# Patient Record
Sex: MALE | Race: WHITE | HISPANIC OR LATINO | Employment: FULL TIME | URBAN - METROPOLITAN AREA
[De-identification: names, ages, dates, MRNs, and addresses within clinical notes are randomized per-mention and may not be internally consistent; named-entity substitution may affect disease eponyms.]

---

## 2017-06-16 ENCOUNTER — GENERIC CONVERSION - ENCOUNTER (OUTPATIENT)
Dept: OTHER | Facility: OTHER | Age: 47
End: 2017-06-16

## 2017-06-23 ENCOUNTER — ALLSCRIPTS OFFICE VISIT (OUTPATIENT)
Dept: OTHER | Facility: OTHER | Age: 47
End: 2017-06-23

## 2017-07-07 ENCOUNTER — ALLSCRIPTS OFFICE VISIT (OUTPATIENT)
Dept: OTHER | Facility: OTHER | Age: 47
End: 2017-07-07

## 2017-07-07 ENCOUNTER — APPOINTMENT (OUTPATIENT)
Dept: LAB | Facility: HOSPITAL | Age: 47
End: 2017-07-07
Payer: COMMERCIAL

## 2017-07-07 DIAGNOSIS — I10 ESSENTIAL (PRIMARY) HYPERTENSION: ICD-10-CM

## 2017-07-07 DIAGNOSIS — E66.9 OBESITY: ICD-10-CM

## 2017-07-07 DIAGNOSIS — E11.65 TYPE 2 DIABETES MELLITUS WITH HYPERGLYCEMIA (HCC): ICD-10-CM

## 2017-07-07 DIAGNOSIS — F32.9 MAJOR DEPRESSIVE DISORDER, SINGLE EPISODE: ICD-10-CM

## 2017-07-07 DIAGNOSIS — E55.9 VITAMIN D DEFICIENCY: ICD-10-CM

## 2017-07-07 DIAGNOSIS — E11.319 TYPE 2 DIABETES MELLITUS WITH RETINOPATHY WITHOUT MACULAR EDEMA (HCC): ICD-10-CM

## 2017-07-07 DIAGNOSIS — E78.5 HYPERLIPIDEMIA: ICD-10-CM

## 2017-07-07 DIAGNOSIS — E29.1 TESTICULAR HYPOFUNCTION: ICD-10-CM

## 2017-07-07 LAB
25(OH)D3 SERPL-MCNC: 40.7 NG/ML (ref 30–100)
ALBUMIN SERPL BCP-MCNC: 4.1 G/DL (ref 3.5–5)
ALP SERPL-CCNC: 71 U/L (ref 46–116)
ALT SERPL W P-5'-P-CCNC: 45 U/L (ref 12–78)
ANION GAP SERPL CALCULATED.3IONS-SCNC: 7 MMOL/L (ref 4–13)
AST SERPL W P-5'-P-CCNC: 35 U/L (ref 5–45)
BASOPHILS # BLD MANUAL: 0.18 THOUSAND/UL (ref 0–0.1)
BASOPHILS NFR MAR MANUAL: 1 % (ref 0–1)
BILIRUB SERPL-MCNC: 0.46 MG/DL (ref 0.2–1)
BUN SERPL-MCNC: 13 MG/DL (ref 5–25)
CALCIUM SERPL-MCNC: 8.8 MG/DL (ref 8.3–10.1)
CHLORIDE SERPL-SCNC: 104 MMOL/L (ref 100–108)
CHOLEST SERPL-MCNC: 118 MG/DL (ref 50–200)
CO2 SERPL-SCNC: 30 MMOL/L (ref 21–32)
CREAT SERPL-MCNC: 1.13 MG/DL (ref 0.6–1.3)
CREAT UR-MCNC: 262 MG/DL
EOSINOPHIL # BLD MANUAL: 0 THOUSAND/UL (ref 0–0.4)
EOSINOPHIL NFR BLD MANUAL: 0 % (ref 0–6)
ERYTHROCYTE [DISTWIDTH] IN BLOOD BY AUTOMATED COUNT: 13.2 % (ref 11.6–15.1)
GFR SERPL CREATININE-BSD FRML MDRD: >60 ML/MIN/1.73SQ M
GLUCOSE P FAST SERPL-MCNC: 58 MG/DL (ref 65–99)
HBA1C MFR BLD HPLC: 8.1 %
HCT VFR BLD AUTO: 40.6 % (ref 36.5–49.3)
HDLC SERPL-MCNC: 31 MG/DL (ref 40–60)
HGB BLD-MCNC: 13.2 G/DL (ref 12–17)
LDLC SERPL CALC-MCNC: 66 MG/DL (ref 0–100)
LYMPHOCYTES # BLD AUTO: 13.28 THOUSAND/UL (ref 0.6–4.47)
LYMPHOCYTES # BLD AUTO: 72 % (ref 14–44)
MCH RBC QN AUTO: 28.4 PG (ref 26.8–34.3)
MCHC RBC AUTO-ENTMCNC: 32.5 G/DL (ref 31.4–37.4)
MCV RBC AUTO: 87 FL (ref 82–98)
MICROALBUMIN UR-MCNC: 19 MG/L (ref 0–20)
MICROALBUMIN/CREAT 24H UR: 7 MG/G CREATININE (ref 0–30)
MONOCYTES # BLD AUTO: 1.29 THOUSAND/UL (ref 0–1.22)
MONOCYTES NFR BLD: 7 % (ref 4–12)
NEUTROPHILS # BLD MANUAL: 3.51 THOUSAND/UL (ref 1.85–7.62)
NEUTS SEG NFR BLD AUTO: 19 % (ref 43–75)
NRBC BLD AUTO-RTO: 0 /100 WBCS
PLATELET # BLD AUTO: 212 THOUSANDS/UL (ref 149–390)
PLATELET BLD QL SMEAR: ADEQUATE
PMV BLD AUTO: 9.7 FL (ref 8.9–12.7)
POTASSIUM SERPL-SCNC: 4 MMOL/L (ref 3.5–5.3)
PROT SERPL-MCNC: 7 G/DL (ref 6.4–8.2)
RBC # BLD AUTO: 4.65 MILLION/UL (ref 3.88–5.62)
RBC MORPH BLD: NORMAL
SODIUM SERPL-SCNC: 141 MMOL/L (ref 136–145)
T4 FREE SERPL-MCNC: 0.82 NG/DL (ref 0.76–1.46)
TOTAL CELLS COUNTED SPEC: 100
TRIGL SERPL-MCNC: 105 MG/DL
TSH SERPL DL<=0.05 MIU/L-ACNC: 1.82 UIU/ML (ref 0.36–3.74)
VARIANT LYMPHS # BLD AUTO: 1 %
WBC # BLD AUTO: 18.45 THOUSAND/UL (ref 4.31–10.16)

## 2017-07-07 PROCEDURE — 82570 ASSAY OF URINE CREATININE: CPT

## 2017-07-07 PROCEDURE — 36415 COLL VENOUS BLD VENIPUNCTURE: CPT

## 2017-07-07 PROCEDURE — 80053 COMPREHEN METABOLIC PANEL: CPT

## 2017-07-07 PROCEDURE — 82306 VITAMIN D 25 HYDROXY: CPT

## 2017-07-07 PROCEDURE — 85027 COMPLETE CBC AUTOMATED: CPT

## 2017-07-07 PROCEDURE — 84402 ASSAY OF FREE TESTOSTERONE: CPT

## 2017-07-07 PROCEDURE — 84443 ASSAY THYROID STIM HORMONE: CPT

## 2017-07-07 PROCEDURE — 84439 ASSAY OF FREE THYROXINE: CPT

## 2017-07-07 PROCEDURE — 85007 BL SMEAR W/DIFF WBC COUNT: CPT

## 2017-07-07 PROCEDURE — 82043 UR ALBUMIN QUANTITATIVE: CPT

## 2017-07-07 PROCEDURE — 84403 ASSAY OF TOTAL TESTOSTERONE: CPT

## 2017-07-07 PROCEDURE — 80061 LIPID PANEL: CPT

## 2017-07-08 LAB
TESTOST FREE SERPL-MCNC: 12.1 PG/ML (ref 6.8–21.5)
TESTOST SERPL-MCNC: 311 NG/DL (ref 348–1197)
TESTOSTERONE COMMENT: ABNORMAL

## 2017-07-14 ENCOUNTER — ALLSCRIPTS OFFICE VISIT (OUTPATIENT)
Dept: OTHER | Facility: OTHER | Age: 47
End: 2017-07-14

## 2017-07-24 ENCOUNTER — GENERIC CONVERSION - ENCOUNTER (OUTPATIENT)
Dept: OTHER | Facility: OTHER | Age: 47
End: 2017-07-24

## 2017-07-28 ENCOUNTER — GENERIC CONVERSION - ENCOUNTER (OUTPATIENT)
Dept: OTHER | Facility: OTHER | Age: 47
End: 2017-07-28

## 2017-10-17 ENCOUNTER — ALLSCRIPTS OFFICE VISIT (OUTPATIENT)
Dept: OTHER | Facility: OTHER | Age: 47
End: 2017-10-17

## 2017-10-17 LAB
GLUCOSE SERPL-MCNC: 86 MG/DL
HBA1C MFR BLD HPLC: 7.6 %

## 2017-11-29 ENCOUNTER — GENERIC CONVERSION - ENCOUNTER (OUTPATIENT)
Dept: OTHER | Facility: OTHER | Age: 47
End: 2017-11-29

## 2017-12-20 ENCOUNTER — GENERIC CONVERSION - ENCOUNTER (OUTPATIENT)
Dept: INTERNAL MEDICINE CLINIC | Facility: CLINIC | Age: 47
End: 2017-12-20

## 2018-01-12 NOTE — MISCELLANEOUS
Message   Date: 28 Jul 2017 2:56 PM EST, Recorded By: Hakan Gu For: Manuel Santana , Self   Phone: [phone] (Home), [phone] (Work)   Reason: General Medical Question   PC from PT with BS report which was reviewed by Dr Giancarlo Holland   He did report some dizziness around lunch @ times  Since his fasting BS were running high in the AM, DR Giancarlo Holland will decrease the AM dose to 20u, the lunch dose will remain the same @ 25u & the PM dose will increase to 30u  PT notified of the same & understands  He will call on MON to give us an update  Active Problems    1  Acute tonsillitis (463) (J03 90)   2  Axillary Lymphadenectomy   3  Chronic lymphocytic leukemia (204 10) (C91 10)   4  Colonoscopy (Fiberoptic) Screening   5  Cough (786 2) (R05)   6  Depression (311) (F32 9)   7  Diabetes mellitus type 2, uncontrolled (250 02) (E11 65)   8  Diabetic retinopathy (250 50,362 01) (E11 319)   9  Fatty liver (571 8) (K76 0)   10  Hyperlipidemia (272 4) (E78 5)   11  Hypertension (401 9) (I10)   12  Intertrigo (695 89) (L30 4)   13  Lymphocytosis (288 61) (D72 820)   14  Need for prophylactic vaccination and inoculation against influenza (V04 81) (Z23)   15  Obesity (278 00) (E66 9)   16  Otitis externa, unspecified laterality   17  Sebaceous cyst (706 2) (L72 3)   18  Sexual dysfunction (302 70) (R37)   19  Snoring (786 09) (R06 83)   20  Somnolence, daytime (780 54) (R40 0)   21  Special screening examination for neoplasm of prostate (V76 44) (Z12 5)   22  Testicular hypogonadism (257 2) (E29 1)   23  Thoracic back pain (724 1) (M54 6)   24  Thoracic disc herniation (722 11) (M51 24)   25  Vitamin D deficiency (268 9) (E55 9)    Current Meds   1  BD Pen Needle Marielos U/F 32G X 4 MM Miscellaneous; Use 3 per day; Therapy: 77Czp9955 to (Evaluate:62Cox7696)  Requested for: 16CCA2019; Last   Rx:[id number] Ordered   2   BuPROPion HCl ER (SR) 150 MG Oral Tablet Extended Release 12 Hour; TAKE 1   TABLET TWICE DAILY; Therapy: 45VOR2553 to (Marilou Rodrigues)  Requested for: 98GUS0214; Last   Rx:[id number] Ordered   3  Clotrimazole 1 % External Cream; apply sparingly to affected area(s) twice daily; Therapy: 34XZK5833 to (Last Rx:47Xcn0931)  Requested for: 33XFD6762 Ordered   4  GlipiZIDE 10 MG Oral Tablet; take one tablet by mouth twice daily; Therapy: 78YUJ8624 to (Marilou Rodrigues)  Requested for: 79WHS0079; Last   Rx:[id number] Ordered   5  Losartan Potassium 25 MG Oral Tablet; TAKE 1 TABLET DAILY AS DIRECTED; Therapy: 03KCC7767 to (Susanne Mg)  Requested for: 39TEW9304; Last   Rx:[id number] Ordered   6  MetFORMIN HCl - 1000 MG Oral Tablet; take one tablet by mouth twice daily; Therapy: 99VAK3173 to (Marilou Rodrigues)  Requested for: 53YCJ1877; Last   Rx:[id number]; Status: 1554 Surgeons Dr percy Moura Ordered   7  Metoprolol Succinate ER 50 MG Oral Tablet Extended Release 24 Hour; TAKE 1 TABLET   DAILY; Therapy: 13FOH4869 to (Evaluate:02Vly4043)  Requested for: 66Scs6689; Last   Rx:[id number] Ordered   8  NovoLOG Mix 70/30 FlexPen (70-30) 100 UNIT/ML Subcutaneous Suspension   Pen-injector; Inject 20U with breakfast 25U with lunch and 30U with   dinner; Therapy: 96LWL1431 to (Evaluate:04Okv4777)  Requested for: 40ROH3945; Last   Rx:[id number] Ordered   9  Omeprazole 20 MG Oral Capsule Delayed Release; TAKE 1 CAPSULE DAILY; Therapy: 98Kge7729 to (Evaluate:60Roc3293) Recorded   10  OneTouch Ultra Blue In [employer]; Therapy: 24VPN1946 to (Last Flaquita Arteaga)  Requested for: 68Mro8217 Ordered   11  Pravastatin Sodium 80 MG Oral Tablet; TAKE 1 TABLET DAILY; Therapy: 41SJB5688 to (Evaluate:38Vbw5692)  Requested for: 57URT8349; Last    Rx:[id number] Ordered   12  Vitamin D3 5000 UNIT Oral Capsule; Therapy: (Recorded:15Zll2460) to Recorded    Allergies    1   ACE Inhibitors    Signatures   Electronically signed by : GEMINI James ; Jul 28 2017  3:19PM EST                       (Author)

## 2018-01-12 NOTE — MISCELLANEOUS
Provider Comments  Provider Comments:   PATIENT NO SHOWED FOR 6- APPT  Signatures   Electronically signed by :  Marla Brown; Jun 23 2017  7:57AM EST                       (Author)

## 2018-01-14 VITALS
WEIGHT: 217 LBS | SYSTOLIC BLOOD PRESSURE: 132 MMHG | DIASTOLIC BLOOD PRESSURE: 80 MMHG | BODY MASS INDEX: 34.87 KG/M2 | TEMPERATURE: 98.1 F | RESPIRATION RATE: 18 BRPM | HEIGHT: 66 IN | HEART RATE: 75 BPM | OXYGEN SATURATION: 99 %

## 2018-01-14 VITALS
BODY MASS INDEX: 36.32 KG/M2 | DIASTOLIC BLOOD PRESSURE: 92 MMHG | WEIGHT: 226 LBS | HEART RATE: 69 BPM | SYSTOLIC BLOOD PRESSURE: 161 MMHG | RESPIRATION RATE: 15 BRPM | TEMPERATURE: 98.6 F | HEIGHT: 66 IN

## 2018-01-14 VITALS
WEIGHT: 216.5 LBS | HEART RATE: 71 BPM | RESPIRATION RATE: 20 BRPM | BODY MASS INDEX: 34.79 KG/M2 | DIASTOLIC BLOOD PRESSURE: 89 MMHG | TEMPERATURE: 97.6 F | SYSTOLIC BLOOD PRESSURE: 153 MMHG | HEIGHT: 66 IN

## 2018-01-18 NOTE — RESULT NOTES
Message   Recorded as Task   Date: 07/07/2017 02:03 PM, Created By: Phoebe Perera   Task Name: Follow Up   Assigned To: Siddhartha Lawrence   Regarding Patient: Nhung Magaña, Status: In Progress   Comment:    Rhianna Landa - 07 Jul 2017 2:03 PM     TASK CREATED  His bloodwork results were ok  His blood count is elevated as usual  He used to follow up with Dr Vipul Epperson before, he should call the to reestablish care  Also his blood sugar was low  monitor it for now, i changed his insulin regimen somewhat  Call us if he gets frequent hypoglycemic events   Milta Patches - 24 Jul 2017 2:42 PM     TASK EDITED  I left a message for Javan to call back  Milta Patches - 24 Jul 2017 2:43 PM     TASK IN PROGRESS   Milta Patches - 24 Jul 2017 2:50 PM     TASK EDITED  Javan saw Dr Jerrell Goldsmith on 7/14/17  He'll monitor his blood sugars and call them in, so that you can see how they are          Plan  Diabetes mellitus type 2, uncontrolled    · BD Pen Needle Marielos U/F 32G X 4 MM Miscellaneous; Use 3 per day

## 2018-01-22 VITALS
HEART RATE: 85 BPM | RESPIRATION RATE: 16 BRPM | DIASTOLIC BLOOD PRESSURE: 90 MMHG | HEIGHT: 66 IN | BODY MASS INDEX: 36.66 KG/M2 | SYSTOLIC BLOOD PRESSURE: 153 MMHG | TEMPERATURE: 97 F | WEIGHT: 228.13 LBS

## 2018-01-23 ENCOUNTER — GENERIC CONVERSION - ENCOUNTER (OUTPATIENT)
Dept: OTHER | Facility: OTHER | Age: 48
End: 2018-01-23

## 2018-02-21 DIAGNOSIS — K21.9 GASTROESOPHAGEAL REFLUX DISEASE WITHOUT ESOPHAGITIS: Primary | ICD-10-CM

## 2018-02-21 RX ORDER — OMEPRAZOLE 20 MG/1
1 CAPSULE, DELAYED RELEASE ORAL DAILY
COMMUNITY
Start: 2013-12-20 | End: 2018-02-21 | Stop reason: SDUPTHER

## 2018-02-21 RX ORDER — OMEPRAZOLE 20 MG/1
20 CAPSULE, DELAYED RELEASE ORAL DAILY
Qty: 90 CAPSULE | Refills: 0 | Status: SHIPPED | OUTPATIENT
Start: 2018-02-21 | End: 2018-04-26 | Stop reason: SDUPTHER

## 2018-02-26 DIAGNOSIS — I10 ESSENTIAL HYPERTENSION: Primary | ICD-10-CM

## 2018-02-26 RX ORDER — METOPROLOL SUCCINATE 50 MG/1
TABLET, EXTENDED RELEASE ORAL
Qty: 30 TABLET | Refills: 2 | Status: SHIPPED | OUTPATIENT
Start: 2018-02-26 | End: 2018-04-26 | Stop reason: SDUPTHER

## 2018-03-01 DIAGNOSIS — E11.65 TYPE 2 DIABETES MELLITUS WITH HYPERGLYCEMIA (HCC): ICD-10-CM

## 2018-03-06 DIAGNOSIS — E78.00 PURE HYPERCHOLESTEROLEMIA: Primary | ICD-10-CM

## 2018-03-06 RX ORDER — PRAVASTATIN SODIUM 80 MG/1
80 TABLET ORAL DAILY
Qty: 30 TABLET | Refills: 1 | Status: SHIPPED | OUTPATIENT
Start: 2018-03-06 | End: 2018-04-26 | Stop reason: SDUPTHER

## 2018-04-11 DIAGNOSIS — E11.9 TYPE 2 DIABETES MELLITUS WITHOUT COMPLICATION, WITHOUT LONG-TERM CURRENT USE OF INSULIN (HCC): Primary | ICD-10-CM

## 2018-04-26 DIAGNOSIS — E11.9 TYPE 2 DIABETES MELLITUS WITHOUT COMPLICATION, WITHOUT LONG-TERM CURRENT USE OF INSULIN (HCC): Primary | ICD-10-CM

## 2018-04-26 DIAGNOSIS — I10 ESSENTIAL HYPERTENSION: ICD-10-CM

## 2018-04-26 DIAGNOSIS — K21.9 GASTROESOPHAGEAL REFLUX DISEASE WITHOUT ESOPHAGITIS: ICD-10-CM

## 2018-04-26 DIAGNOSIS — E78.00 PURE HYPERCHOLESTEROLEMIA: ICD-10-CM

## 2018-04-26 RX ORDER — GLIPIZIDE 10 MG/1
1 TABLET ORAL 2 TIMES DAILY
COMMUNITY
Start: 2012-03-13 | End: 2018-04-26 | Stop reason: SDUPTHER

## 2018-04-26 RX ORDER — METFORMIN HYDROCHLORIDE 500 MG/1
1 TABLET, EXTENDED RELEASE ORAL 2 TIMES DAILY
COMMUNITY
Start: 2017-11-20 | End: 2018-04-26 | Stop reason: SDUPTHER

## 2018-04-26 RX ORDER — METOPROLOL SUCCINATE 50 MG/1
50 TABLET, EXTENDED RELEASE ORAL DAILY
Qty: 90 TABLET | Refills: 0 | Status: SHIPPED | OUTPATIENT
Start: 2018-04-26 | End: 2018-06-28 | Stop reason: SDUPTHER

## 2018-04-26 RX ORDER — LOSARTAN POTASSIUM 50 MG/1
50 TABLET ORAL DAILY
Qty: 90 TABLET | Refills: 0 | Status: SHIPPED | OUTPATIENT
Start: 2018-04-26 | End: 2018-07-24 | Stop reason: SDUPTHER

## 2018-04-26 RX ORDER — METFORMIN HYDROCHLORIDE 500 MG/1
500 TABLET, EXTENDED RELEASE ORAL 2 TIMES DAILY
Qty: 180 TABLET | Refills: 0 | Status: SHIPPED | OUTPATIENT
Start: 2018-04-26 | End: 2018-05-01 | Stop reason: SDUPTHER

## 2018-04-26 RX ORDER — OMEPRAZOLE 20 MG/1
20 CAPSULE, DELAYED RELEASE ORAL DAILY
Qty: 90 CAPSULE | Refills: 0 | Status: SHIPPED | OUTPATIENT
Start: 2018-04-26 | End: 2018-05-07 | Stop reason: SDUPTHER

## 2018-04-26 RX ORDER — PRAVASTATIN SODIUM 80 MG/1
80 TABLET ORAL DAILY
Qty: 90 TABLET | Refills: 0 | Status: SHIPPED | OUTPATIENT
Start: 2018-04-26 | End: 2018-05-01

## 2018-04-26 RX ORDER — LOSARTAN POTASSIUM 50 MG/1
1 TABLET ORAL DAILY
COMMUNITY
Start: 2017-07-07 | End: 2018-04-26 | Stop reason: SDUPTHER

## 2018-04-26 RX ORDER — GLIPIZIDE 10 MG/1
10 TABLET ORAL 2 TIMES DAILY
Qty: 180 TABLET | Refills: 0 | Status: SHIPPED | OUTPATIENT
Start: 2018-04-26 | End: 2018-07-24 | Stop reason: SDUPTHER

## 2018-05-01 ENCOUNTER — OFFICE VISIT (OUTPATIENT)
Dept: INTERNAL MEDICINE CLINIC | Facility: CLINIC | Age: 48
End: 2018-05-01
Payer: COMMERCIAL

## 2018-05-01 VITALS
TEMPERATURE: 98.2 F | RESPIRATION RATE: 15 BRPM | HEART RATE: 75 BPM | WEIGHT: 224.6 LBS | BODY MASS INDEX: 36.25 KG/M2 | SYSTOLIC BLOOD PRESSURE: 150 MMHG | DIASTOLIC BLOOD PRESSURE: 89 MMHG

## 2018-05-01 DIAGNOSIS — E11.9 TYPE 2 DIABETES MELLITUS WITHOUT COMPLICATION, WITHOUT LONG-TERM CURRENT USE OF INSULIN (HCC): Primary | ICD-10-CM

## 2018-05-01 DIAGNOSIS — F52.9 SEXUAL DISORDER: ICD-10-CM

## 2018-05-01 DIAGNOSIS — F32.9 REACTIVE DEPRESSION: ICD-10-CM

## 2018-05-01 DIAGNOSIS — K21.9 GASTROESOPHAGEAL REFLUX DISEASE WITHOUT ESOPHAGITIS: ICD-10-CM

## 2018-05-01 DIAGNOSIS — F32.5 MAJOR DEPRESSIVE DISORDER WITH SINGLE EPISODE, IN FULL REMISSION (HCC): Primary | ICD-10-CM

## 2018-05-01 DIAGNOSIS — I10 ESSENTIAL HYPERTENSION: ICD-10-CM

## 2018-05-01 DIAGNOSIS — E78.00 PURE HYPERCHOLESTEROLEMIA: ICD-10-CM

## 2018-05-01 PROCEDURE — 99214 OFFICE O/P EST MOD 30 MIN: CPT | Performed by: INTERNAL MEDICINE

## 2018-05-01 RX ORDER — ESCITALOPRAM OXALATE 10 MG/1
1 TABLET ORAL DAILY
COMMUNITY
Start: 2013-12-20 | End: 2018-05-01 | Stop reason: CLARIF

## 2018-05-01 RX ORDER — MAG HYDROX/ALUMINUM HYD/SIMETH 400-400-40
SUSPENSION, ORAL (FINAL DOSE FORM) ORAL
COMMUNITY

## 2018-05-01 RX ORDER — BUPROPION HYDROCHLORIDE 150 MG/1
TABLET, EXTENDED RELEASE ORAL
Qty: 60 TABLET | Refills: 3 | Status: SHIPPED | OUTPATIENT
Start: 2018-05-01 | End: 2018-07-24

## 2018-05-01 RX ORDER — SILDENAFIL 100 MG/1
TABLET, FILM COATED ORAL
COMMUNITY
Start: 2017-10-17 | End: 2018-10-10 | Stop reason: SDUPTHER

## 2018-05-01 RX ORDER — BUPROPION HYDROCHLORIDE 150 MG/1
150 TABLET, EXTENDED RELEASE ORAL 2 TIMES DAILY
Qty: 180 TABLET | Refills: 1 | Status: SHIPPED | OUTPATIENT
Start: 2018-05-01 | End: 2018-07-24 | Stop reason: SDUPTHER

## 2018-05-01 RX ORDER — METFORMIN HYDROCHLORIDE 500 MG/1
500 TABLET, EXTENDED RELEASE ORAL 2 TIMES DAILY
Qty: 180 TABLET | Refills: 0 | Status: SHIPPED | OUTPATIENT
Start: 2018-05-01 | End: 2018-05-22 | Stop reason: SDUPTHER

## 2018-05-01 RX ORDER — BUPROPION HYDROCHLORIDE 150 MG/1
1 TABLET, EXTENDED RELEASE ORAL 2 TIMES DAILY
COMMUNITY
Start: 2017-07-07 | End: 2018-05-01 | Stop reason: SDUPTHER

## 2018-05-01 NOTE — ASSESSMENT & PLAN NOTE
Will assess a hemoglobin A1c see how he is doing with multiple medication he said he feels well he is watching his diet    No polyuria polydipsia he is exercising

## 2018-05-01 NOTE — PATIENT INSTRUCTIONS
Plan de alimentación con "enfoque dietético para detener la hipertensión (DASH, por toño siglas en inglés)   LO QUE NECESITA SABER:   El plan de alimentación DASH está diseñado para ayudar a prevenir o disminuir la hipertensión  También puede ayudar a bajar el colesterol radha (colesterol LDL) y disminuír maldonado riesgo de enfermedad cardíaca  El plan es bajo en sodio, azúcar, grasas dañinas, y grasas en maldonado totalidad  Es alto en potasio, calcio, magnesio y [place]  Estos nutrientes se agregan al consumir más frutas, vegetales y granos enteros  INSTRUCCIONES SOBRE EL DOMINIC HOSPITALARIA:   Maldonado límite de sodio cada día: Maldonado dietista le indicará la cantidad de sodio que usted debe consumir a diario  La gente que tiene la presión arterial dominic debe consumir de 1,500 a 2,300 mg de sodio al día roseanne kenna  Zakiya cucharadita (cdta) de sal tiene 2,300 mg de sodio  Vidor puede parecer roseanne zakiya meta difícil, roberto pequeños cambios en los alimentos que usted consume pueden hacer zakiya gran diferencia  Maldonado médico o dietista puede ayudarlo a crear un plan alimenticio que cumpla maldonado límite de sodio  Formas de limitar el sodio:   · Sol las etiquetas de los alimentos  Las etiquetas pueden ayudarle a escoger alimentos bajos en sodio  La cantidad de sodio está incluida en miligramos (mg)  La columna del porcentaje de valor diario indica la cantidad de necesidades diarias satisfechas con 1 porción del alimento para cada nutriente en la lista  Escoja alimentos que tengan menos de 5% del porcentaje diario de [place]  Estos alimentos se consideran bajos en sodio  Los alimentos que tienen 20% o más del porcentaje diario de sodio se consideran alimentos altos en sodio  Evite alimentos que tengan más de 300 mg de sodio por porción  Escoja alimentos Alyse Cushing diga que son bajos en sodio, con sodio reducido, o sin sal agregada             · Evite la sal   No le agregue sal a la comida cuando se siente a la santiago a comer, y agregue muy poca sal a los alimentos israel la cocción  Use hierbas y condimentos, roseanne cebollas, ajo y especias sin sal para agregar sabor a los alimentos  Use jugo de lima, sourav o vinagre para darle a los alimentos un sabor ácido  Use chiles picantes o zakiya cantidad pequeña de salsa picante para agregar un sabor picante a los alimentos  · Pregunte sobre los sustitutos para la sal   Pregúntele a maldonado médico si es posible usar sustitutos de la sal  Algunos sustitutos de la sal vienen con ingredientes que pueden ser dañinos si usted tiene ciertos padecimientos médicos  · Escoja los alimentos cuidadosamente cuando sale a comer a restaurantes:  las comidas de los restaurantes, sobre todo restaurantes de comida rápida, susan siempre son altas en sodio  Algunos restaurantes ofrecen información nutricional que indica la cantidad de sodio en toño alimentos  Pida que preparen toño comidas con menos sal o sin sal   Lo que necesita saber acerca de las grasas:   · Incluya grasas saludables  Las grasas insaturadas y los ácidos grasos omega-3 son ejemplos de grasas saludables  Las grasas insaturadas se encuentran en los aceites de soja, [place], [place] o [place] y la margarina [facility] y [facility]  Los ácidos grasos [place] 3 se encuentran en el pescado con grasa, roseanne el salmón, el atún, la caballa y las zackary  También se le puede encontrar en el aceita de linaza y en la linaza molida  · Evite las grasas insalubres  No consuma grasas dañinas, roseanne grasas saturadas y grasas trans  Las grasas saturadas se encuentran en alimentos que contienen grasa animal  [place] Lav son Re wilkins, la [place]ira, la [facility] york, la cassandra y otros productos lácteos  Además se pueden encontrar en la [place], la margarina en suad, el aceite de antonio y el aceite de sheri  Las grasas trans se encuentran en comidas fritas, galletas estilo soda, tortillitas tostadas, y alimentos horneados hechos con [place]    Alimentos que puede incluir: Con el plan de alimentación DASH, usted necesita consumir un número específico de porciones de cada iris de alimentos  [place] le ayudará a consumir las cantidades suficientes de ciertos nutrientes y limitar otros  La cantidad de porciones que usted debe comer depende de la cantidad de calorías que usted necesita  [place]ass dietista le informará sobre cuántas calorías necesita usted  El número de porciones que viene en la lista al lado de los grupos alimenticios a continuación es para las personas que necesitan aproximadamente 2,000 calorías al día    · Granos:  6 a 8 porciones (3 de estas porciones deben ser de alimentos de granos enteros o integrales)    ¨ 1 tajada de pan integral     ¨ 1 onza de cereal seco    ¨ ½ taza de cereal cocinado, pasta, o arroz integral    · Verduras y frutas:  4 a 5 porciones de frutas y 4 a 5 porciones de vegetales    ¨ 1 fruta mediana    ¨ ½ taza de vegetales congelados, enlatados (sin sal adicional), o vegetales frescos y picados     ¨ ½ taza de fruta fresca, congelada, seca o enlatada (enlatada en sirope liviano o jugo de fruta)    ¨ ½ taza de jugo de verduras o frutas    · Productos lácteos:  2 a 3 porciones    ¨ 1 taza de [employer] o leche 1%    ¨ 1½ onzas de queso descremado o bajo en grasas y bajo en sodio    ¨ 6 onzas de yogurt descremado o bajo en grasas    · Hector appiah, hector elayne y pescado magros:  6 onzas o menos    ¨ Hector elayne (shahnaz, pavo) sin piel    ¨ Pescado (sobre todo pescado con grasa, roseanne salmón, atún fresco o STUART)    ¨ Carne de res o de cerdo magra (lomo, carne molida extra [place])    ¨ Claras de huevo y sustitutos del huevo    · [place] du sac, semillas y legumbres:  4 a 5 porciones por semana    ¨ ½ taza de frijoles y arbejas cocinadas    ¨ 1½ onzas de nueces sin sal    ¨ 2 cucharadas de mantequilla de maní o semillas    · Dulces y azúcares adicionales:  5 o menos por semana    ¨ 1 cucharada de azúcar, jalea o mermelada    ¨ ½ taza de sorbeto o gelatina    ¨ 1 taza de limonada    · Grasas:  2 a 3 porciones por semana    ¨ 1 cucharadita de margarina suave o aceite vegetal    ¨ 1 cucharada de [facility]    ¨ 2 cucharadas de aderezo para ensaladas  Alimentos que se deben evitar:   · Granos:      ¨ Postres horneados o fritos roseanne donas, pastelitos, galletas, y quequitos (altos en grasas y azúcar)    ¨ Mezclas para hacer pan de maíz y pasteles, alimentos empacados, roseanne rellenos para pavo, mezclas para hacer arroz y pasta, macarrones con [place], y cereales instantáneos (altos en sodio)    · Frutas y verduras:      ¨ Vegetales regulares enlatados (altos en sodio)    ¨ Repollo preparado en vinagre, vegetales en vinagre, y otros alimentos preparados en escabeche (altos en sodio)    ¨ Vegetales fritos o vegetales en mantequilla o salsas altas en grasas    ¨ Frutas en crema o salsa de mantequilla (altas en grasas)    · Productos lácteos:      ¨ Leche entera, leche semi descremada (2%), y crema (sisi en grasas)    ¨ Queso regular y queso procesado (alto en grasa y sodio)    · Hector y alimentos con proteínas:      ¨ Hector ahumadas o curadas, roseanne carne de nelda en conserva, tocino, jamón, perros calientes, y salchicha (sisi en grasas y sodio)    ¨ Frijoles enlatados y hector enlatadas o hector en pasta, roseanne hector en conserva, zackary, anchoas y [address] de imitación (altos en sodio)    ¨ Hector para emparedados, roseanne [place], [place], [place], y carne de res en rebanada (altos en sodio)    ¨ Hector altas en grasas (bistec estilo T-bone, carne molida para hamburguesas, costillas)    ¨ Huevos enteros y yemas de huevo (altos en grasas)    · Otros:      ¨ Condimentos hechos con sal, roseanne sal de ajo, sal de apio, sal de cebolla, sal condimentada, suavizantes para hector, y glutamato de monosodio (MSG, por toño siglas en inglés)    ¨ Sopa Miso y mezclas para sopa enlatadas o secas (altas en sodio)    ¨ Salsa de soya regular, salsa de [place], salsa [place], salsa para bistec, [place] Rollbar, y [address] [place] [address] Ave  vinagres con sabor (altas en sodio)    ¨ Condimentos regulares, roseanne [place], salsa de tomate y aderezos para ensalada (altos en sodio)    ¨ Salsa para kathryn y salsas en general, roseanne salsa [place] o de [place]-barre (altas en sodio y [place])    ¨ Bebidas altas en azúcar, roseanne bebidas gaseosas o jugos de frutas    ¨ Alimentos para [address], roseanne henry tostadas, palomitas de [place], pretzels, pie de sushant, [address] de soda [facility], y nueces saladas    ¨ Alimentos congelados, roseanne cenas preparadas, platos principales, vegetales con salsas, y kathryn cubiertas en pan (altas en sodio)  Otras pautas que debe seguir:   · Mantenga un peso saludable  Maldonado riesgo de enfermedad cardíaca es aún más alto si usted tiene sobrepeso  Maldonado médico podría sugerirle que adelgace si tiene sobrepeso  Usted puede perder peso si se propone consumir menos calorías y alimentos que tengan azúcar y grasas agregadas  El plan de alimentación DASH puede ayudarle a lograrlo  Zahraa buena forma de disminuir el consumo de calorías es consumiendo porciones más pequeñas en cada comida y menos meriendas entre comidas  Consulte a maldonado médico para obtener más información sobre cómo adelgazar  · Realice actividad física con regularidad  El ejercicio regular puede ayudarle a alcanzar o mantener un peso saludable  El ejercicio regular también puede ayudarle a disminuir maldonado presión arterial y mejorar toño niveles de colesterol  Loretta ejercicios moderados por 30 minutos o más todos los días de la [place]  Para bajar peso, asegúrese de ejercitarse por lo menos 60 minutos  Consulte con maldonado médico sobre un programa de ejercicio adecuado para usted  · Limite el consumo de alcohol  Las mujeres deberían limitar el consumo de alcohol a 1 bebida por día  Los hombres deberían limitar el consumo de alcohol a 2 tragos al día  Un trago equivale a 12 onzas de cerveza, 5 onzas de vino o 1 onza y ½ de licor    © 2017 [address] Information is for End User's use only and may not be sold, redistributed or otherwise used for commercial purposes  All illustrations and images included in CareNotes® are the copyrighted property of A D A M , Inc  or Jimmie Pineda  Esta información es sólo para uso en educación  Maldonado intención no es darle un consejo médico sobre enfermedades o tratamientos  Colsulte con maldonado Abraham So farmacéutico antes de seguir cualquier régimen médico para saber si es seguro y efectivo para usted  Go get her lab work done to check her diabetes year blood count your blood pressure is control will see her back in 4 months

## 2018-05-01 NOTE — ASSESSMENT & PLAN NOTE
Blood pressure is very well controlled got a blood pressure 130/80 continue same medication which is losartan 50 mg metoprolol succinate 50 mg

## 2018-05-01 NOTE — PROGRESS NOTES
Assessment/Plan:    Type 2 diabetes mellitus without complication, without long-term current use of insulin (Formerly [facility])    Will assess a hemoglobin A1c see how he is doing with multiple medication he said he feels well he is watching his diet  No polyuria polydipsia he is exercising    Gastroesophageal reflux disease without esophagitis   He did not complain about any takes omeprazole    Essential hypertension   Blood pressure is very well controlled got a blood pressure 130/80 continue same medication which is losartan 50 mg metoprolol succinate 50 mg    Pure hypercholesterolemia   Will check a lipid profile when his comes back  Apparently he is not taking the Pravachol         Problem List Items Addressed This Visit     Type 2 diabetes mellitus without complication, without long-term current use of insulin ([facility] [address] ) - Primary       Will assess a hemoglobin A1c see how he is doing with multiple medication he said he feels well he is watching his diet    No polyuria polydipsia he is exercising         Relevant Medications    insulin aspart protamine-insulin aspart (NOVOLOG MIX 70/30 FLEXPEN) 100 Units/mL SUPN    metFORMIN (GLUCOPHAGE-XR) 500 mg 24 hr tablet    Other Relevant Orders    CBC and differential    Comprehensive metabolic panel    Lipid Panel with Direct LDL reflex    TSH, 3rd generation with T4 reflex    Urinalysis with reflex to microscopic    Vitamin D 25 hydroxy    Gamma GT    HEMOGLOBIN A1C W/ EAG ESTIMATION    Microalbumin / creatinine urine ratio    LD,Blood    Essential hypertension      Blood pressure is very well controlled got a blood pressure 130/80 continue same medication which is losartan 50 mg metoprolol succinate 50 mg         Relevant Medications    metFORMIN (GLUCOPHAGE-XR) 500 mg 24 hr tablet    Other Relevant Orders    CBC and differential    Comprehensive metabolic panel    Lipid Panel with Direct LDL reflex    TSH, 3rd generation with T4 reflex    Urinalysis with reflex to microscopic Vitamin D 25 hydroxy    Gamma GT    HEMOGLOBIN A1C W/ EAG ESTIMATION    Microalbumin / creatinine urine ratio    LD,Blood    Magnesium    Pure hypercholesterolemia      Will check a lipid profile when his comes back  Apparently he is not taking the Pravachol         Relevant Orders    CBC and differential    Comprehensive metabolic panel    Lipid Panel with Direct LDL reflex    TSH, 3rd generation with T4 reflex    Urinalysis with reflex to microscopic    Vitamin D 25 hydroxy    Gamma GT    HEMOGLOBIN A1C W/ EAG ESTIMATION    Microalbumin / creatinine urine ratio    LD,Blood    Gastroesophageal reflux disease without esophagitis      He did not complain about any takes omeprazole         Relevant Orders    CBC and differential    Comprehensive metabolic panel    Lipid Panel with Direct LDL reflex    TSH, 3rd generation with T4 reflex    Urinalysis with reflex to microscopic    Vitamin D 25 hydroxy    Gamma GT    HEMOGLOBIN A1C W/ EAG ESTIMATION    Microalbumin / creatinine urine ratio    LD,Blood      Other Visit Diagnoses     Sexual disorder        Relevant Orders    Testosterone    Reactive depression        Relevant Medications    buPROPion (WELLBUTRIN SR) 150 mg 12 hr tablet            Subjective:      Patient ID: Marce Joyner is a 56 y o  male      Chief Complaint   Patient presents with    Follow-up         Current Outpatient Prescriptions:     buPROPion [facility]) 150 mg 12 hr tablet, Take 1 tablet (150 mg total) by mouth 2 (two) times a day, Disp: 180 tablet, Rfl: 1    Cholecalciferol (VITAMIN D3) 5000 units CAPS, Take by mouth, Disp: , Rfl:     glipiZIDE (GLUCOTROL) 10 mg tablet, Take 1 tablet (10 mg total) by mouth 2 (two) times a day, Disp: 180 tablet, Rfl: 0    insulin aspart protamine-insulin aspart (NOVOLOG MIX 70/30 FLEXPEN) 100 Units/mL SUPN, 15 units at breakfast, 20 units at Lunch, 25 units at dinner, Disp: 5 pen, Rfl: 0    Insulin Pen Needle (BD PEN NEEDLE ASHLEY U/F) 32G X 4 MM MISC, by Does not apply route, Disp: , Rfl:     losartan (COZAAR) 50 mg tablet, Take 1 tablet (50 mg total) by mouth daily, Disp: 90 tablet, Rfl: 0    metFORMIN (GLUCOPHAGE-XR) 500 mg 24 hr tablet, Take 1 tablet (500 mg total) by mouth 2 (two) times a day, Disp: 180 tablet, Rfl: 0    metoprolol succinate (TOPROL-XL) 50 mg 24 hr tablet, Take 1 tablet (50 mg total) by mouth daily, Disp: 90 tablet, Rfl: 0    omeprazole (PriLOSEC) 20 mg delayed release capsule, Take 1 capsule (20 mg total) by mouth daily, Disp: 90 capsule, Rfl: 0    sildenafil (VIAGRA) 100 mg tablet, Take by mouth, Disp: , Rfl:      Diabetes will check hemoglobin A1c with the next labs is on multiple medications including mix insulin before meals Glucophage and a cellphone a Re of said no episodes of hypoglycemia had no blood sugars she review from home  Will check a hemoglobin A1c    Blood pressure is well controlled no chest palpitation shortness of breath he is on losartan and metoprolol succinate  Hyperlipidemia apparently he is off the pravastatin will check a lipid profile make recommendations all diabetic should be on a statin for cardiovascular protection    Reviewed the ophthalmologist note he has diabetic retinopathy  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  Some the right eye        The following portions of the patient's history were reviewed and updated as appropriate: allergies, current medications, past family history, past medical history, past social history, past surgical history and problem list     Review of Systems   Constitutional: Negative  Negative for activity change, appetite change, fatigue, fever and unexpected weight change  HENT: Negative for congestion, ear pain, hearing loss, mouth sores, postnasal drip, rhinorrhea, sore throat, trouble swallowing and voice change  Eyes: Negative for pain, redness and visual disturbance     Respiratory: Negative for cough, chest tightness, shortness of breath and wheezing  Cardiovascular: Negative for chest pain, palpitations and leg swelling  Gastrointestinal: Negative for abdominal distention, abdominal pain, blood in stool, constipation, diarrhea and nausea  Endocrine: Negative for cold intolerance, heat intolerance, polydipsia, polyphagia and polyuria  Genitourinary: Negative for difficulty urinating, dysuria, flank pain, frequency, hematuria and urgency  Musculoskeletal: Negative for arthralgias, back pain, gait problem, joint swelling and myalgias  Skin: Negative for color change and pallor  Neurological: Negative for dizziness, tremors, seizures, syncope, weakness, numbness and headaches  Hematological: Negative for adenopathy  Does not bruise/bleed easily  Psychiatric/Behavioral: Negative  Negative for sleep disturbance  The patient is not nervous/anxious  Objective:    /89 (BP Location: Left arm, Patient Position: Sitting, Cuff Size: Large)   Pulse 75   Temp 98 2 °F (36 8 °C)   Resp 15   Wt 102 kg (224 lb 9 6 oz)   BMI 36 25 kg/m²      Physical Exam   Constitutional: He is oriented to person, place, and time  He appears well-developed and well-nourished  HENT:   Head: Normocephalic  Right Ear: External ear normal    Left Ear: External ear normal    Nose: Nose normal    Mouth/Throat: Oropharynx is clear and moist  No oropharyngeal exudate  Eyes: Conjunctivae and EOM are normal  Pupils are equal, round, and reactive to light  Neck: Normal range of motion  Neck supple  No thyromegaly present  Cardiovascular: Normal rate, regular rhythm, normal heart sounds and intact distal pulses  Exam reveals no gallop and no friction rub  No murmur heard  Blood pressure 130/80    No edema   Pulmonary/Chest: Effort normal and breath sounds normal  No respiratory distress  He has no wheezes  He has no rales  Abdominal: Soft  Bowel sounds are normal  He exhibits no distension and no mass   There is no tenderness  There is no rebound and no guarding  Musculoskeletal: Normal range of motion  Lymphadenopathy:     He has no cervical adenopathy  Neurological: He is alert and oriented to person, place, and time  Skin: Skin is warm and dry  Psychiatric: He has a normal mood and affect  His behavior is normal  Judgment normal    Nursing note and vitals reviewed

## 2018-05-07 DIAGNOSIS — K21.9 GASTROESOPHAGEAL REFLUX DISEASE WITHOUT ESOPHAGITIS: ICD-10-CM

## 2018-05-07 DIAGNOSIS — E78.00 PURE HYPERCHOLESTEROLEMIA: Primary | ICD-10-CM

## 2018-05-07 RX ORDER — OMEPRAZOLE 20 MG/1
20 CAPSULE, DELAYED RELEASE ORAL DAILY
Qty: 90 CAPSULE | Refills: 1 | Status: SHIPPED | OUTPATIENT
Start: 2018-05-07 | End: 2018-07-24 | Stop reason: SDUPTHER

## 2018-05-07 RX ORDER — PRAVASTATIN SODIUM 80 MG/1
80 TABLET ORAL DAILY
Qty: 90 TABLET | Refills: 1 | Status: SHIPPED | OUTPATIENT
Start: 2018-05-07 | End: 2018-07-24 | Stop reason: SDUPTHER

## 2018-05-22 DIAGNOSIS — E11.9 TYPE 2 DIABETES MELLITUS WITHOUT COMPLICATION, WITHOUT LONG-TERM CURRENT USE OF INSULIN (HCC): ICD-10-CM

## 2018-05-22 DIAGNOSIS — I10 ESSENTIAL HYPERTENSION: ICD-10-CM

## 2018-05-22 RX ORDER — METFORMIN HYDROCHLORIDE 500 MG/1
500 TABLET, EXTENDED RELEASE ORAL 2 TIMES DAILY
Qty: 180 TABLET | Refills: 1 | Status: SHIPPED | OUTPATIENT
Start: 2018-05-22 | End: 2018-07-24 | Stop reason: SDUPTHER

## 2018-05-31 DIAGNOSIS — E11.9 TYPE 2 DIABETES MELLITUS WITHOUT COMPLICATION, WITHOUT LONG-TERM CURRENT USE OF INSULIN (HCC): ICD-10-CM

## 2018-06-04 DIAGNOSIS — E11.9 TYPE 2 DIABETES MELLITUS WITHOUT COMPLICATION, WITHOUT LONG-TERM CURRENT USE OF INSULIN (HCC): ICD-10-CM

## 2018-06-20 ENCOUNTER — OFFICE VISIT (OUTPATIENT)
Dept: OBGYN CLINIC | Facility: HOSPITAL | Age: 48
End: 2018-06-20
Payer: COMMERCIAL

## 2018-06-28 DIAGNOSIS — I10 ESSENTIAL HYPERTENSION: ICD-10-CM

## 2018-06-28 RX ORDER — METOPROLOL SUCCINATE 50 MG/1
50 TABLET, EXTENDED RELEASE ORAL DAILY
Qty: 90 TABLET | Refills: 0 | Status: SHIPPED | OUTPATIENT
Start: 2018-06-28 | End: 2018-09-28 | Stop reason: SDUPTHER

## 2018-07-09 DIAGNOSIS — C91.10 CHRONIC LYMPHOCYTIC LEUKEMIA OF B-CELL TYPE NOT HAVING ACHIEVED REMISSION (HCC): ICD-10-CM

## 2018-07-24 DIAGNOSIS — K21.9 GASTROESOPHAGEAL REFLUX DISEASE WITHOUT ESOPHAGITIS: ICD-10-CM

## 2018-07-24 DIAGNOSIS — Z79.4 TYPE 2 DIABETES MELLITUS WITHOUT COMPLICATION, WITH LONG-TERM CURRENT USE OF INSULIN (HCC): Primary | ICD-10-CM

## 2018-07-24 DIAGNOSIS — F32.9 REACTIVE DEPRESSION: ICD-10-CM

## 2018-07-24 DIAGNOSIS — I10 ESSENTIAL HYPERTENSION: ICD-10-CM

## 2018-07-24 DIAGNOSIS — E11.9 TYPE 2 DIABETES MELLITUS WITHOUT COMPLICATION, WITH LONG-TERM CURRENT USE OF INSULIN (HCC): Primary | ICD-10-CM

## 2018-07-24 DIAGNOSIS — E11.9 TYPE 2 DIABETES MELLITUS WITHOUT COMPLICATION, WITHOUT LONG-TERM CURRENT USE OF INSULIN (HCC): ICD-10-CM

## 2018-07-24 DIAGNOSIS — E78.00 PURE HYPERCHOLESTEROLEMIA: ICD-10-CM

## 2018-07-24 RX ORDER — GLIPIZIDE 10 MG/1
10 TABLET ORAL 2 TIMES DAILY
Qty: 180 TABLET | Refills: 0 | Status: SHIPPED | OUTPATIENT
Start: 2018-07-24 | End: 2018-10-05 | Stop reason: SDUPTHER

## 2018-07-24 RX ORDER — PRAVASTATIN SODIUM 80 MG/1
80 TABLET ORAL DAILY
Qty: 90 TABLET | Refills: 0 | Status: SHIPPED | OUTPATIENT
Start: 2018-07-24 | End: 2018-10-05 | Stop reason: SDUPTHER

## 2018-07-24 RX ORDER — BUPROPION HYDROCHLORIDE 150 MG/1
150 TABLET, EXTENDED RELEASE ORAL 2 TIMES DAILY
Qty: 180 TABLET | Refills: 0 | Status: SHIPPED | OUTPATIENT
Start: 2018-07-24 | End: 2018-10-05 | Stop reason: SDUPTHER

## 2018-07-24 RX ORDER — LOSARTAN POTASSIUM 50 MG/1
50 TABLET ORAL DAILY
Qty: 90 TABLET | Refills: 0 | Status: SHIPPED | OUTPATIENT
Start: 2018-07-24 | End: 2018-10-05 | Stop reason: SDUPTHER

## 2018-07-24 RX ORDER — METFORMIN HYDROCHLORIDE 500 MG/1
500 TABLET, EXTENDED RELEASE ORAL 2 TIMES DAILY
Qty: 180 TABLET | Refills: 0 | Status: SHIPPED | OUTPATIENT
Start: 2018-07-24 | End: 2018-10-05 | Stop reason: SDUPTHER

## 2018-07-24 RX ORDER — OMEPRAZOLE 20 MG/1
20 CAPSULE, DELAYED RELEASE ORAL DAILY
Qty: 90 CAPSULE | Refills: 0 | Status: SHIPPED | OUTPATIENT
Start: 2018-07-24 | End: 2018-10-05 | Stop reason: SDUPTHER

## 2018-07-24 RX ORDER — INSULIN LISPRO 100 [IU]/ML
INJECTION, SUSPENSION SUBCUTANEOUS
Qty: 15 PEN | Refills: 0 | Status: SHIPPED | OUTPATIENT
Start: 2018-07-24 | End: 2018-08-27 | Stop reason: SDUPTHER

## 2018-07-30 PROCEDURE — 3072F LOW RISK FOR RETINOPATHY: CPT | Performed by: INTERNAL MEDICINE

## 2018-07-31 LAB
LEFT EYE DIABETIC RETINOPATHY: NORMAL
RIGHT EYE DIABETIC RETINOPATHY: NORMAL

## 2018-07-31 PROCEDURE — 3072F LOW RISK FOR RETINOPATHY: CPT | Performed by: INTERNAL MEDICINE

## 2018-08-07 ENCOUNTER — OFFICE VISIT (OUTPATIENT)
Dept: OBGYN CLINIC | Facility: HOSPITAL | Age: 48
End: 2018-08-07
Payer: COMMERCIAL

## 2018-08-27 DIAGNOSIS — Z79.4 TYPE 2 DIABETES MELLITUS WITHOUT COMPLICATION, WITH LONG-TERM CURRENT USE OF INSULIN (HCC): ICD-10-CM

## 2018-08-27 DIAGNOSIS — E11.9 TYPE 2 DIABETES MELLITUS WITHOUT COMPLICATION, WITH LONG-TERM CURRENT USE OF INSULIN (HCC): ICD-10-CM

## 2018-08-27 RX ORDER — INSULIN LISPRO 100 [IU]/ML
INJECTION, SUSPENSION SUBCUTANEOUS
Qty: 15 PEN | Refills: 2 | Status: SHIPPED | OUTPATIENT
Start: 2018-08-27 | End: 2018-09-24 | Stop reason: SDUPTHER

## 2018-08-27 RX ORDER — INSULIN LISPRO 100 [IU]/ML
INJECTION, SUSPENSION SUBCUTANEOUS
Qty: 15 PEN | Refills: 2 | Status: SHIPPED | OUTPATIENT
Start: 2018-08-27 | End: 2018-08-27 | Stop reason: SDUPTHER

## 2018-08-27 NOTE — TELEPHONE ENCOUNTER
Phone call from PT, On 7/24/2018 [facility] sent 90 day supply to [facility]  PT only received one box  He is requesting a new 90 prescription be sent to new pharmacy

## 2018-08-31 DIAGNOSIS — I10 ESSENTIAL HYPERTENSION: Primary | ICD-10-CM

## 2018-09-01 RX ORDER — GLYCERIN ADULT
SUPPOSITORY, RECTAL RECTAL WEEKLY
Qty: 1 DEVICE | Refills: 0 | Status: SHIPPED | OUTPATIENT
Start: 2018-09-01 | End: 2019-01-17 | Stop reason: ALTCHOICE

## 2018-09-04 ENCOUNTER — APPOINTMENT (OUTPATIENT)
Dept: LAB | Facility: HOSPITAL | Age: 48
End: 2018-09-04
Attending: INTERNAL MEDICINE
Payer: COMMERCIAL

## 2018-09-04 DIAGNOSIS — E11.9 TYPE 2 DIABETES MELLITUS WITHOUT COMPLICATION, WITHOUT LONG-TERM CURRENT USE OF INSULIN (HCC): ICD-10-CM

## 2018-09-04 DIAGNOSIS — F52.9 SEXUAL DISORDER: ICD-10-CM

## 2018-09-04 DIAGNOSIS — I10 ESSENTIAL HYPERTENSION: ICD-10-CM

## 2018-09-04 DIAGNOSIS — K21.9 GASTROESOPHAGEAL REFLUX DISEASE WITHOUT ESOPHAGITIS: ICD-10-CM

## 2018-09-04 DIAGNOSIS — E11.9 TYPE 2 DIABETES MELLITUS WITHOUT COMPLICATION, WITHOUT LONG-TERM CURRENT USE OF INSULIN (HCC): Primary | ICD-10-CM

## 2018-09-04 DIAGNOSIS — E78.00 PURE HYPERCHOLESTEROLEMIA: ICD-10-CM

## 2018-09-04 LAB
25(OH)D3 SERPL-MCNC: 32.5 NG/ML (ref 30–100)
ALBUMIN SERPL BCP-MCNC: 3.6 G/DL (ref 3.5–5)
ALP SERPL-CCNC: 97 U/L (ref 46–116)
ALT SERPL W P-5'-P-CCNC: 51 U/L (ref 12–78)
ANION GAP SERPL CALCULATED.3IONS-SCNC: 9 MMOL/L (ref 4–13)
AST SERPL W P-5'-P-CCNC: 21 U/L (ref 5–45)
BACTERIA UR QL AUTO: ABNORMAL /HPF
BASOPHILS # BLD MANUAL: 0.19 THOUSAND/UL (ref 0–0.1)
BASOPHILS NFR MAR MANUAL: 1 % (ref 0–1)
BILIRUB SERPL-MCNC: 0.38 MG/DL (ref 0.2–1)
BILIRUB UR QL STRIP: NEGATIVE
BUN SERPL-MCNC: 18 MG/DL (ref 5–25)
CALCIUM SERPL-MCNC: 8.8 MG/DL (ref 8.3–10.1)
CHLORIDE SERPL-SCNC: 100 MMOL/L (ref 100–108)
CHOLEST SERPL-MCNC: 156 MG/DL (ref 50–200)
CLARITY UR: CLEAR
CO2 SERPL-SCNC: 25 MMOL/L (ref 21–32)
COLOR UR: YELLOW
CREAT SERPL-MCNC: 1.09 MG/DL (ref 0.6–1.3)
CREAT UR-MCNC: 167 MG/DL
EOSINOPHIL # BLD MANUAL: 0 THOUSAND/UL (ref 0–0.4)
EOSINOPHIL NFR BLD MANUAL: 0 % (ref 0–6)
ERYTHROCYTE [DISTWIDTH] IN BLOOD BY AUTOMATED COUNT: 12.6 % (ref 11.6–15.1)
EST. AVERAGE GLUCOSE BLD GHB EST-MCNC: 295 MG/DL
GFR SERPL CREATININE-BSD FRML MDRD: 81 ML/MIN/1.73SQ M
GGT SERPL-CCNC: 92 U/L (ref 5–85)
GLUCOSE P FAST SERPL-MCNC: 306 MG/DL (ref 65–99)
GLUCOSE UR STRIP-MCNC: ABNORMAL MG/DL
HBA1C MFR BLD: 11.9 % (ref 4.2–6.3)
HCT VFR BLD AUTO: 42.8 % (ref 36.5–49.3)
HDLC SERPL-MCNC: 23 MG/DL (ref 40–60)
HGB BLD-MCNC: 13.5 G/DL (ref 12–17)
HGB UR QL STRIP.AUTO: NEGATIVE
KETONES UR STRIP-MCNC: NEGATIVE MG/DL
LDH SERPL-CCNC: 165 U/L (ref 81–234)
LDLC SERPL DIRECT ASSAY-MCNC: 58 MG/DL (ref 0–100)
LEUKOCYTE ESTERASE UR QL STRIP: ABNORMAL
LYMPHOCYTES # BLD AUTO: 13.65 THOUSAND/UL (ref 0.6–4.47)
LYMPHOCYTES # BLD AUTO: 71 % (ref 14–44)
MAGNESIUM SERPL-MCNC: 1.8 MG/DL (ref 1.6–2.6)
MCH RBC QN AUTO: 26.9 PG (ref 26.8–34.3)
MCHC RBC AUTO-ENTMCNC: 31.5 G/DL (ref 31.4–37.4)
MCV RBC AUTO: 85 FL (ref 82–98)
MICROALBUMIN UR-MCNC: 13.7 MG/L (ref 0–20)
MICROALBUMIN/CREAT 24H UR: 8 MG/G CREATININE (ref 0–30)
MONOCYTES # BLD AUTO: 0.38 THOUSAND/UL (ref 0–1.22)
MONOCYTES NFR BLD: 2 % (ref 4–12)
NEUTROPHILS # BLD MANUAL: 5 THOUSAND/UL (ref 1.85–7.62)
NEUTS SEG NFR BLD AUTO: 26 % (ref 43–75)
NITRITE UR QL STRIP: NEGATIVE
NON-SQ EPI CELLS URNS QL MICRO: ABNORMAL /HPF
NRBC BLD AUTO-RTO: 0 /100 WBCS
PH UR STRIP.AUTO: 5.5 [PH] (ref 4.5–8)
PLATELET # BLD AUTO: 206 THOUSANDS/UL (ref 149–390)
PLATELET BLD QL SMEAR: ADEQUATE
PMV BLD AUTO: 10.2 FL (ref 8.9–12.7)
POTASSIUM SERPL-SCNC: 4.1 MMOL/L (ref 3.5–5.3)
PROT SERPL-MCNC: 6.9 G/DL (ref 6.4–8.2)
PROT UR STRIP-MCNC: NEGATIVE MG/DL
RBC # BLD AUTO: 5.01 MILLION/UL (ref 3.88–5.62)
RBC #/AREA URNS AUTO: ABNORMAL /HPF
RBC MORPH BLD: NORMAL
SODIUM SERPL-SCNC: 134 MMOL/L (ref 136–145)
SP GR UR STRIP.AUTO: 1.02 (ref 1–1.03)
T4 FREE SERPL-MCNC: 0.9 NG/DL (ref 0.76–1.46)
TESTOST SERPL-MCNC: 148 NG/DL (ref 113–1065)
TOTAL CELLS COUNTED SPEC: 100
TRIGL SERPL-MCNC: 483 MG/DL
TSH SERPL DL<=0.05 MIU/L-ACNC: 4.21 UIU/ML (ref 0.36–3.74)
UROBILINOGEN UR QL STRIP.AUTO: 0.2 E.U./DL
WBC # BLD AUTO: 19.22 THOUSAND/UL (ref 4.31–10.16)
WBC #/AREA URNS AUTO: ABNORMAL /HPF

## 2018-09-04 PROCEDURE — 81001 URINALYSIS AUTO W/SCOPE: CPT | Performed by: INTERNAL MEDICINE

## 2018-09-04 PROCEDURE — 80053 COMPREHEN METABOLIC PANEL: CPT

## 2018-09-04 PROCEDURE — 3061F NEG MICROALBUMINURIA REV: CPT | Performed by: INTERNAL MEDICINE

## 2018-09-04 PROCEDURE — 84403 ASSAY OF TOTAL TESTOSTERONE: CPT

## 2018-09-04 PROCEDURE — 82306 VITAMIN D 25 HYDROXY: CPT

## 2018-09-04 PROCEDURE — 83615 LACTATE (LD) (LDH) ENZYME: CPT

## 2018-09-04 PROCEDURE — 82570 ASSAY OF URINE CREATININE: CPT | Performed by: INTERNAL MEDICINE

## 2018-09-04 PROCEDURE — 80061 LIPID PANEL: CPT

## 2018-09-04 PROCEDURE — 84439 ASSAY OF FREE THYROXINE: CPT

## 2018-09-04 PROCEDURE — 82977 ASSAY OF GGT: CPT

## 2018-09-04 PROCEDURE — 85007 BL SMEAR W/DIFF WBC COUNT: CPT

## 2018-09-04 PROCEDURE — 85027 COMPLETE CBC AUTOMATED: CPT

## 2018-09-04 PROCEDURE — 83721 ASSAY OF BLOOD LIPOPROTEIN: CPT

## 2018-09-04 PROCEDURE — 36415 COLL VENOUS BLD VENIPUNCTURE: CPT

## 2018-09-04 PROCEDURE — 84443 ASSAY THYROID STIM HORMONE: CPT

## 2018-09-04 PROCEDURE — 82043 UR ALBUMIN QUANTITATIVE: CPT | Performed by: INTERNAL MEDICINE

## 2018-09-04 PROCEDURE — 83036 HEMOGLOBIN GLYCOSYLATED A1C: CPT

## 2018-09-04 PROCEDURE — 83735 ASSAY OF MAGNESIUM: CPT

## 2018-09-24 DIAGNOSIS — Z79.4 TYPE 2 DIABETES MELLITUS WITHOUT COMPLICATION, WITH LONG-TERM CURRENT USE OF INSULIN (HCC): ICD-10-CM

## 2018-09-24 DIAGNOSIS — E11.9 TYPE 2 DIABETES MELLITUS WITHOUT COMPLICATION, WITH LONG-TERM CURRENT USE OF INSULIN (HCC): ICD-10-CM

## 2018-09-24 LAB
LEFT EYE DIABETIC RETINOPATHY: NORMAL
RIGHT EYE DIABETIC RETINOPATHY: NORMAL

## 2018-09-24 PROCEDURE — 3072F LOW RISK FOR RETINOPATHY: CPT | Performed by: INTERNAL MEDICINE

## 2018-09-24 RX ORDER — INSULIN LISPRO 100 [IU]/ML
INJECTION, SUSPENSION SUBCUTANEOUS
Qty: 5 PEN | Refills: 0 | Status: SHIPPED | OUTPATIENT
Start: 2018-09-24 | End: 2018-10-10 | Stop reason: SDUPTHER

## 2018-09-28 DIAGNOSIS — I10 ESSENTIAL HYPERTENSION: ICD-10-CM

## 2018-09-28 RX ORDER — METOPROLOL SUCCINATE 50 MG/1
50 TABLET, EXTENDED RELEASE ORAL DAILY
Qty: 90 TABLET | Refills: 0 | Status: SHIPPED | OUTPATIENT
Start: 2018-09-28 | End: 2018-12-12 | Stop reason: SDUPTHER

## 2018-10-05 DIAGNOSIS — E78.00 PURE HYPERCHOLESTEROLEMIA: ICD-10-CM

## 2018-10-05 DIAGNOSIS — K21.9 GASTROESOPHAGEAL REFLUX DISEASE WITHOUT ESOPHAGITIS: ICD-10-CM

## 2018-10-05 DIAGNOSIS — E11.9 TYPE 2 DIABETES MELLITUS WITHOUT COMPLICATION, WITHOUT LONG-TERM CURRENT USE OF INSULIN (HCC): ICD-10-CM

## 2018-10-05 DIAGNOSIS — F32.9 REACTIVE DEPRESSION: ICD-10-CM

## 2018-10-05 DIAGNOSIS — I10 ESSENTIAL HYPERTENSION: ICD-10-CM

## 2018-10-05 PROCEDURE — 4010F ACE/ARB THERAPY RXD/TAKEN: CPT | Performed by: INTERNAL MEDICINE

## 2018-10-05 RX ORDER — METFORMIN HYDROCHLORIDE 500 MG/1
TABLET, EXTENDED RELEASE ORAL
Qty: 180 TABLET | Refills: 0 | Status: SHIPPED | OUTPATIENT
Start: 2018-10-05 | End: 2018-10-10 | Stop reason: SDUPTHER

## 2018-10-05 RX ORDER — OMEPRAZOLE 20 MG/1
CAPSULE, DELAYED RELEASE ORAL
Qty: 90 CAPSULE | Refills: 0 | Status: SHIPPED | OUTPATIENT
Start: 2018-10-05 | End: 2019-01-06 | Stop reason: SDUPTHER

## 2018-10-05 RX ORDER — GLIPIZIDE 10 MG/1
TABLET ORAL
Qty: 180 TABLET | Refills: 0 | Status: SHIPPED | OUTPATIENT
Start: 2018-10-05 | End: 2019-01-06 | Stop reason: SDUPTHER

## 2018-10-05 RX ORDER — PRAVASTATIN SODIUM 80 MG/1
TABLET ORAL
Qty: 90 TABLET | Refills: 0 | Status: SHIPPED | OUTPATIENT
Start: 2018-10-05 | End: 2019-01-06 | Stop reason: SDUPTHER

## 2018-10-05 RX ORDER — PEN NEEDLE, DIABETIC 32GX 5/32"
NEEDLE, DISPOSABLE MISCELLANEOUS
Qty: 270 EACH | Refills: 0 | Status: SHIPPED | OUTPATIENT
Start: 2018-10-05 | End: 2019-01-06 | Stop reason: SDUPTHER

## 2018-10-05 RX ORDER — BUPROPION HYDROCHLORIDE 150 MG/1
TABLET, EXTENDED RELEASE ORAL
Qty: 180 TABLET | Refills: 0 | Status: SHIPPED | OUTPATIENT
Start: 2018-10-05 | End: 2019-01-06 | Stop reason: SDUPTHER

## 2018-10-05 RX ORDER — LOSARTAN POTASSIUM 50 MG/1
TABLET ORAL
Qty: 90 TABLET | Refills: 0 | Status: SHIPPED | OUTPATIENT
Start: 2018-10-05 | End: 2019-01-06 | Stop reason: SDUPTHER

## 2018-10-08 ENCOUNTER — OFFICE VISIT (OUTPATIENT)
Dept: OBGYN CLINIC | Facility: HOSPITAL | Age: 48
End: 2018-10-08
Payer: COMMERCIAL

## 2018-10-10 ENCOUNTER — OFFICE VISIT (OUTPATIENT)
Dept: INTERNAL MEDICINE CLINIC | Facility: CLINIC | Age: 48
End: 2018-10-10
Payer: COMMERCIAL

## 2018-10-10 VITALS
BODY MASS INDEX: 35.83 KG/M2 | DIASTOLIC BLOOD PRESSURE: 83 MMHG | HEART RATE: 72 BPM | SYSTOLIC BLOOD PRESSURE: 153 MMHG | WEIGHT: 222 LBS | RESPIRATION RATE: 15 BRPM | TEMPERATURE: 98.3 F

## 2018-10-10 DIAGNOSIS — E11.9 TYPE 2 DIABETES MELLITUS WITHOUT COMPLICATION, WITH LONG-TERM CURRENT USE OF INSULIN (HCC): ICD-10-CM

## 2018-10-10 DIAGNOSIS — K21.9 GASTROESOPHAGEAL REFLUX DISEASE WITHOUT ESOPHAGITIS: ICD-10-CM

## 2018-10-10 DIAGNOSIS — R79.89 LOW TESTOSTERONE: ICD-10-CM

## 2018-10-10 DIAGNOSIS — Z23 NEED FOR INFLUENZA VACCINATION: ICD-10-CM

## 2018-10-10 DIAGNOSIS — E78.2 MIXED HYPERLIPIDEMIA: ICD-10-CM

## 2018-10-10 DIAGNOSIS — E78.1 HYPERTRIGLYCERIDEMIA: ICD-10-CM

## 2018-10-10 DIAGNOSIS — E11.9 TYPE 2 DIABETES MELLITUS WITHOUT COMPLICATION, WITHOUT LONG-TERM CURRENT USE OF INSULIN (HCC): Primary | ICD-10-CM

## 2018-10-10 DIAGNOSIS — Z79.4 TYPE 2 DIABETES MELLITUS WITHOUT COMPLICATION, WITH LONG-TERM CURRENT USE OF INSULIN (HCC): ICD-10-CM

## 2018-10-10 DIAGNOSIS — I10 ESSENTIAL HYPERTENSION: ICD-10-CM

## 2018-10-10 PROBLEM — C91.10 CHRONIC LYMPHOCYTIC LEUKEMIA (HCC): Status: ACTIVE | Noted: 2018-10-10

## 2018-10-10 LAB — SL AMB POCT GLUCOSE BLD: 278

## 2018-10-10 PROCEDURE — 82948 REAGENT STRIP/BLOOD GLUCOSE: CPT | Performed by: INTERNAL MEDICINE

## 2018-10-10 PROCEDURE — 1036F TOBACCO NON-USER: CPT | Performed by: INTERNAL MEDICINE

## 2018-10-10 PROCEDURE — 90471 IMMUNIZATION ADMIN: CPT | Performed by: INTERNAL MEDICINE

## 2018-10-10 PROCEDURE — 99214 OFFICE O/P EST MOD 30 MIN: CPT | Performed by: INTERNAL MEDICINE

## 2018-10-10 PROCEDURE — 90682 RIV4 VACC RECOMBINANT DNA IM: CPT | Performed by: INTERNAL MEDICINE

## 2018-10-10 RX ORDER — METFORMIN HYDROCHLORIDE 500 MG/1
1000 TABLET, EXTENDED RELEASE ORAL 2 TIMES DAILY WITH MEALS
Qty: 180 TABLET | Refills: 1 | Status: SHIPPED | OUTPATIENT
Start: 2018-10-10 | End: 2018-10-10 | Stop reason: SDUPTHER

## 2018-10-10 RX ORDER — METFORMIN HYDROCHLORIDE 500 MG/1
1000 TABLET, EXTENDED RELEASE ORAL 2 TIMES DAILY WITH MEALS
Qty: 180 TABLET | Refills: 0 | Status: SHIPPED | OUTPATIENT
Start: 2018-10-10 | End: 2019-02-02 | Stop reason: SDUPTHER

## 2018-10-10 RX ORDER — SILDENAFIL 100 MG/1
TABLET, FILM COATED ORAL
Qty: 10 TABLET | Refills: 1 | Status: SHIPPED | OUTPATIENT
Start: 2018-10-10 | End: 2019-05-17

## 2018-10-10 RX ORDER — INSULIN LISPRO 100 [IU]/ML
INJECTION, SUSPENSION SUBCUTANEOUS
Qty: 5 PEN | Refills: 0 | Status: SHIPPED | OUTPATIENT
Start: 2018-10-10 | End: 2018-10-15 | Stop reason: SDUPTHER

## 2018-10-10 RX ORDER — SILDENAFIL 100 MG/1
TABLET, FILM COATED ORAL
Qty: 10 TABLET | Refills: 2 | Status: SHIPPED | OUTPATIENT
Start: 2018-10-10 | End: 2018-10-10 | Stop reason: SDUPTHER

## 2018-10-10 NOTE — PATIENT INSTRUCTIONS
Check her blood sugars before breakfast lunch and dinner and before you go to bed send me the results in 2 weeks so we can adjust the insulin  The metformin has been increased to a g twice a day  Will see her back after the holidays will let her know adjustments over the phone

## 2018-10-10 NOTE — ASSESSMENT & PLAN NOTE
Elevated triglycerides because of poor diabetic control hemoglobin A1c is over 11 will repeat the triglyceride levels when he comes back is going to fax his blood sugars in 2 weeks no change in plan continue pravastatin

## 2018-10-10 NOTE — PROGRESS NOTES
Assessment/Plan:    Type 2 diabetes mellitus without complication, without long-term current use of insulin ([facility])  Lab Results   Component Value Date    HGBA1C 11 9 (H) 09/04/2018       No results for input(s): POCGLU in the last 72 hours  Blood Sugar Average: Last 72 hrs:  Poorly control is deteriorated from hemoglobin of A1c of 7 3-11 9 he only checks his blood sugars in the morning I advised him to check his blood sugars before breakfast lunch and dinner before he goes to back and let us know in 2 weeks in the meantime I increase the metformin to a g twice a day  He will continue the other medication which are as follows  Glipizide 10 mg twice a day  Humalog mix 7525 takes 15 units with breakfast 20 units with lunch 25 units with dinner unable to do adjustment because I have no data he understands that now  Chronic lymphocytic leukemia ([facility] [address] )  He has elevated white count with lymphocytosis will be evaluated by the hematologist in the near future he saw him a few years ago good news he has no night sweats fever or chills and platelet count is normal he is not anemic observation to continue  Essential hypertension  Blood pressure is stable at this particular time he takes losartan 50 mg per day continue the same  Mixed hyperlipidemia  Elevated triglycerides because of poor diabetic control hemoglobin A1c is over 11 will repeat the triglyceride levels when he comes back is going to fax his blood sugars in 2 weeks no change in plan continue pravastatin       Diagnoses and all orders for this visit:    Type 2 diabetes mellitus without complication, without long-term current use of insulin ([facility])  -     Discontinue: metFORMIN (GLUCOPHAGE-XR) 500 mg 24 hr tablet; Take 2 tablets (1,000 mg total) by mouth 2 (two) times a day with meals for 90 days  -     CBC and differential; Future  -     Comprehensive metabolic panel; Future  -     TSH, 3rd generation with Free T4 reflex;  Future  -     Hemoglobin A1C; Future  -     Triglycerides; Future  -     Testosterone; Future  -     Discontinue: sildenafil (VIAGRA) 100 mg tablet; Take a half or 1 tablet 1 hr before relations  -     metFORMIN (GLUCOPHAGE-XR) 500 mg 24 hr tablet; Take 2 tablets (1,000 mg total) by mouth 2 (two) times a day with meals for 90 days  -     sildenafil (VIAGRA) 100 mg tablet; Take a half or 1 tablet 1 hr before relations    Essential hypertension  -     Discontinue: metFORMIN (GLUCOPHAGE-XR) 500 mg 24 hr tablet; Take 2 tablets (1,000 mg total) by mouth 2 (two) times a day with meals for 90 days  -     CBC and differential; Future  -     Comprehensive metabolic panel; Future  -     TSH, 3rd generation with Free T4 reflex; Future  -     Hemoglobin A1C; Future  -     Triglycerides; Future  -     metFORMIN (GLUCOPHAGE-XR) 500 mg 24 hr tablet; Take 2 tablets (1,000 mg total) by mouth 2 (two) times a day with meals for 90 days    Mixed hyperlipidemia    Gastroesophageal reflux disease without esophagitis  -     CBC and differential; Future  -     Comprehensive metabolic panel; Future  -     TSH, 3rd generation with Free T4 reflex; Future  -     Hemoglobin A1C; Future  -     Triglycerides; Future    Need for influenza vaccination  -     influenza vaccine, [id number], quadrivalent, recombinant, PF, 0 5 mL, for patients 18 yr+ ([facility])    Hypertriglyceridemia  -     CBC and differential; Future  -     Comprehensive metabolic panel; Future  -     TSH, 3rd generation with Free T4 reflex; Future  -     Hemoglobin A1C; Future  -     Triglycerides; Future    Low testosterone  -     Discontinue: sildenafil (VIAGRA) 100 mg tablet; Take a half or 1 tablet 1 hr before relations  -     sildenafil (VIAGRA) 100 mg tablet; Take a half or 1 tablet 1 hr before relations    Type 2 diabetes mellitus without complication, with long-term current use of insulin (HCC)  -     Insulin Lispro Prot & Lispro (HUMALOG MIX 75/25 KWIKPEN) (75-25) 100 units/mL injection pen;  Inject 15 units with breakfast, 20 units with lunch and 25 units with dinner          Subjective:      Patient ID: Jaci Stinson is a 53 y o  male  Chief Complaint   Patient presents with    Follow-up         Current Outpatient Prescriptions:     BD PEN NEEDLE ASHLEY U/F 32G X 4 MM MISC, USE TO INJECT INSULIN THREE TIMES A DAY, Disp: 270 each, Rfl: 0    Blood Pressure Monitoring (BLOOD PRESSURE MONITOR/ARM) ROBYN, by Does not apply route once a week, Disp: 1 Device, Rfl: 0    buPROPion (WELLBUTRIN SR) 150 mg 12 hr tablet, TAKE 1 TABLET TWICE A DAY, Disp: 180 tablet, Rfl: 0    Cholecalciferol (VITAMIN D3) 5000 units CAPS, Take by mouth, Disp: , Rfl:     glipiZIDE (GLUCOTROL) 10 mg tablet, TAKE 1 TABLET TWICE A DAY, Disp: 180 tablet, Rfl: 0    Insulin Lispro Prot & Lispro (HUMALOG MIX 75/25 KWIKPEN) (75-25) 100 units/mL injection pen, Inject 15 units with breakfast, 20 units with lunch and 25 units with dinner, Disp: 5 pen, Rfl: 0    losartan (COZAAR) 50 mg tablet, TAKE 1 TABLET DAILY, Disp: 90 tablet, Rfl: 0    metFORMIN (GLUCOPHAGE-XR) 500 mg 24 hr tablet, Take 2 tablets (1,000 mg total) by mouth 2 (two) times a day with meals for 90 days, Disp: 180 tablet, Rfl: 0    metoprolol succinate (TOPROL-XL) 50 mg 24 hr tablet, Take 1 tablet (50 mg total) by mouth daily, Disp: 90 tablet, Rfl: 0    omeprazole (PriLOSEC) 20 mg delayed release capsule, TAKE 1 CAPSULE DAILY, Disp: 90 capsule, Rfl: 0    pravastatin (PRAVACHOL) 80 mg tablet, TAKE 1 TABLET DAILY, Disp: 90 tablet, Rfl: 0    sildenafil (VIAGRA) 100 mg tablet, Take a half or 1 tablet 1 hr before relations, Disp: 10 tablet, Rfl: 1    Long discussion about diabetic control his the sugars at deteriorated he came for a office visit today for follow-up he has been not compliant with follow-up with his office visits    He only checks his blood sugar in the morning he needs to check his blood sugars before breakfast lunch and dinner before he goes to bed the A1c is 11 3 his A1c last year was 7 3 medication have not been changed she has been taking metformin 500 twice a day glipizide 10 mg twice a day and makes NovoLog insulin 70 30 prior to each meal we cannot help him on as we have data is going to go home and start checking his sugars and tell me the results in 2 weeks I did at went ahead and increase the metformin to a g twice a day  He is asymptomatic is working very hard is doing a lot of overtime had difficult time making his office appointments  Blood pressure borderline control    Hyperlipidemia triglycerides are elevated due to the poor diabetic control  He is on pravastatin 80 mg per day which will continue    For sexual dysfunction will order a prolactin level testosterone level we gave him Viagra        The following portions of the patient's history were reviewed and updated as appropriate: allergies, current medications, past family history, past medical history, past social history, past surgical history and problem list     Review of Systems   Constitutional: Negative  Negative for activity change, appetite change, fatigue, fever and unexpected weight change  HENT: Negative for congestion, ear pain, hearing loss, mouth sores, postnasal drip, rhinorrhea, sore throat, trouble swallowing and voice change  Eyes: Negative for pain, redness and visual disturbance  Respiratory: Negative for cough, chest tightness, shortness of breath and wheezing  Cardiovascular: Negative for chest pain, palpitations and leg swelling  Gastrointestinal: Negative for abdominal distention, abdominal pain, blood in stool, constipation, diarrhea and nausea  Endocrine: Negative for cold intolerance, heat intolerance, polydipsia, polyphagia and polyuria  Genitourinary: Negative for difficulty urinating, dysuria, flank pain, frequency, hematuria and urgency  Musculoskeletal: Negative for arthralgias, back pain, gait problem, joint swelling and myalgias     Skin: Negative for color change and pallor  Neurological: Negative for dizziness, tremors, seizures, syncope, weakness, numbness and headaches  Hematological: Negative for adenopathy  Does not bruise/bleed easily  Psychiatric/Behavioral: Negative  Negative for sleep disturbance  The patient is not nervous/anxious            Objective:    Results for orders placed or performed in visit on 09/04/18   CBC and differential   Result Value Ref Range    WBC 19 22 (H) 4 31 - 10 16 Thousand/uL    RBC 5 01 3 88 - 5 62 Million/uL    Hemoglobin 13 5 12 0 - 17 0 g/dL    Hematocrit 42 8 36 5 - 49 3 %    MCV 85 82 - 98 fL    MCH 26 9 26 8 - 34 3 pg    MCHC 31 5 31 4 - 37 4 g/dL    RDW 12 6 11 6 - 15 1 %    MPV 10 2 8 9 - 12 7 fL    Platelets 470 467 - 897 Thousands/uL    nRBC 0 /100 WBCs   Comprehensive metabolic panel   Result Value Ref Range    Sodium 134 (L) 136 - 145 mmol/L    Potassium 4 1 3 5 - 5 3 mmol/L    Chloride 100 100 - 108 mmol/L    CO2 25 21 - 32 mmol/L    ANION GAP 9 4 - 13 mmol/L    BUN 18 5 - 25 mg/dL    Creatinine 1 09 0 60 - 1 30 mg/dL    Glucose, Fasting 306 (H) 65 - 99 mg/dL    Calcium 8 8 8 3 - 10 1 mg/dL    AST 21 5 - 45 U/L    ALT 51 12 - 78 U/L    Alkaline Phosphatase 97 46 - 116 U/L    Total Protein 6 9 6 4 - 8 2 g/dL    Albumin 3 6 3 5 - 5 0 g/dL    Total Bilirubin 0 38 0 20 - 1 00 mg/dL    eGFR 81 ml/min/1 73sq m   Lipid Panel with Direct LDL reflex   Result Value Ref Range    Cholesterol 156 50 - 200 mg/dL    Triglycerides 483 (H) <=150 mg/dL    HDL, Direct 23 (L) 40 - 60 mg/dL    LDL Calculated  0 - 100 mg/dL   TSH, 3rd generation with T4 reflex   Result Value Ref Range    TSH 3RD GENERATON 4 213 (H) 0 358 - 3 740 uIU/mL   Vitamin D 25 hydroxy   Result Value Ref Range    Vit D, 25-Hydroxy 32 5 30 0 - 100 0 ng/mL   Gamma GT   Result Value Ref Range    GGT 92 (H) 5 - 85 U/L   LD,Blood   Result Value Ref Range     81 - 234 U/L   Magnesium   Result Value Ref Range    Magnesium 1 8 1 6 - 2 6 mg/dL Testosterone   Result Value Ref Range    Testosterone 148 0 113-1,065 ng/dL   LDL cholesterol, direct   Result Value Ref Range    LDL Direct 58 0 - 100 mg/dl   T4, free   Result Value Ref Range    Free T4 0 90 0 76 - 1 46 ng/dL   HEMOGLOBIN A1C W/ EAG ESTIMATION   Result Value Ref Range    Hemoglobin A1C 11 9 (H) 4 2 - 6 3 %     mg/dl   Manual Differential(PHLEBS Do Not Order)   Result Value Ref Range    Segmented % 26 (L) 43 - 75 %    Lymphocytes % 71 (H) 14 - 44 %    Monocytes % 2 (L) 4 - 12 %    Eosinophils % 0 0 - 6 %    Basophils % 1 0 - 1 %    Absolute Neutrophils 5 00 1 85 - 7 62 Thousand/uL    Lymphocytes Absolute 13 65 (H) 0 60 - 4 47 Thousand/uL    Monocytes Absolute 0 38 0 00 - 1 22 Thousand/uL    Eosinophils Absolute 0 00 0 00 - 0 40 Thousand/uL    Basophils Absolute 0 19 (H) 0 00 - 0 10 Thousand/uL    Total Counted 100     RBC Morphology Normal     Platelet Estimate Adequate Adequate       /83 (BP Location: Right arm, Patient Position: Sitting, Cuff Size: Large)   Pulse 72   Temp 98 3 °F (36 8 °C)   Resp 15   Wt 101 kg (222 lb)   BMI 35 83 kg/m²      Physical Exam   Constitutional: He is oriented to person, place, and time  He appears well-developed and well-nourished  HENT:   Head: Normocephalic  Right Ear: External ear normal    Left Ear: External ear normal    Nose: Nose normal    Mouth/Throat: Oropharynx is clear and moist  No oropharyngeal exudate  Eyes: Pupils are equal, round, and reactive to light  Conjunctivae and EOM are normal    Neck: Normal range of motion  Neck supple  No thyromegaly present  Cardiovascular: Normal rate, regular rhythm, normal heart sounds and intact distal pulses  Exam reveals no gallop and no friction rub  No murmur heard  S1-S2 no gallops  Extremities no edema   Pulmonary/Chest: Effort normal and breath sounds normal  No respiratory distress  He has no wheezes  He has no rales     Lungs are clear no wheezing rales or rhonchi   Abdominal: Soft  Bowel sounds are normal  He exhibits no distension and no mass  There is no tenderness  There is no rebound and no guarding  Abdomen soft nontender   Musculoskeletal: Normal range of motion  Lymphadenopathy:     He has no cervical adenopathy  Neurological: He is alert and oriented to person, place, and time  Skin: Skin is warm and dry  Psychiatric: He has a normal mood and affect  His behavior is normal  Judgment normal    Nursing note and vitals reviewed

## 2018-10-10 NOTE — ASSESSMENT & PLAN NOTE
Lab Results   Component Value Date    HGBA1C 11 9 (H) 09/04/2018       No results for input(s): POCGLU in the last 72 hours  Blood Sugar Average: Last 72 hrs:  Poorly control is deteriorated from hemoglobin of A1c of 7 3-11 9 he only checks his blood sugars in the morning I advised him to check his blood sugars before breakfast lunch and dinner before he goes to back and let us know in 2 weeks in the meantime I increase the metformin to a g twice a day  He will continue the other medication which are as follows  Glipizide 10 mg twice a day  Humalog mix 7525 takes 15 units with breakfast 20 units with lunch 25 units with dinner unable to do adjustment because I have no data he understands that now

## 2018-10-10 NOTE — ASSESSMENT & PLAN NOTE
He has elevated white count with lymphocytosis will be evaluated by the hematologist in the near future he saw him a few years ago good news he has no night sweats fever or chills and platelet count is normal he is not anemic observation to continue

## 2018-10-11 DIAGNOSIS — I10 ESSENTIAL HYPERTENSION, BENIGN: Primary | ICD-10-CM

## 2018-10-12 ENCOUNTER — OFFICE VISIT (OUTPATIENT)
Dept: HEMATOLOGY ONCOLOGY | Facility: CLINIC | Age: 48
End: 2018-10-12
Payer: COMMERCIAL

## 2018-10-12 VITALS
SYSTOLIC BLOOD PRESSURE: 146 MMHG | WEIGHT: 223 LBS | OXYGEN SATURATION: 99 % | HEIGHT: 66 IN | DIASTOLIC BLOOD PRESSURE: 80 MMHG | BODY MASS INDEX: 35.84 KG/M2 | HEART RATE: 78 BPM | RESPIRATION RATE: 16 BRPM | TEMPERATURE: 96.7 F

## 2018-10-12 DIAGNOSIS — C91.10 CHRONIC LYMPHOCYTIC LEUKEMIA (HCC): Primary | ICD-10-CM

## 2018-10-12 PROCEDURE — 99214 OFFICE O/P EST MOD 30 MIN: CPT | Performed by: INTERNAL MEDICINE

## 2018-10-12 NOTE — PROGRESS NOTES
Hematology / Oncology Outpatient Follow Up Note    Javan Delatorre 53 y o  male LRZ:[id number]         Date:  10/12/2018    Assessment / Plan:  A 53year old gentleman who has history of poorly controlled diabetes  He has chronic lymphocytosis at least since 2011  Further workup showed CD5 positive CD23 positive, B cell clonal disease consistent with CLL  Fish panel for CLL were negative for common chromosomal abnormalities  He is currently on watchful waiting, since he is completely asymptomatic  Clinically, he has no evidence of progression  He may asymptomatic  Therefore, I recommended him to continue watchful waiting  I will see him again in a year with CBC, CMP and LDH  He is in agreement with my recommendations  Subjective:     HPI:  A 44year old gentleman who has history of diabetes, which was diagnosed at the age of 33years old  He use insulin to control his diabetes  He has diabetic retinopathy as well as neuropathy  He was referred to me today for his lymphocytosis  By looking back his CBC, he already had mild lymphocytosis in February 2011  Recent CBC showed WBC 14 9, with 72% lymphocytes  He has absolutely no abnormality of hemoglobin, and platelet count  Dr Jaison Benson, who is his primary care physician ordered a SPEP which showed no evidence of monoclonal protein  He has no complaints of fever, chills, or night sweats  He told me that his weight fluctuates by 20 pounds  However, he has exactly same weight as a year ago  In the last one week, he has some night sweats  He also has a complaint of back pain for 3 days  He has no respiratory symptoms  He denied any fever  He also told me that he has some lump in the left axilla for about 3 years which has been slowly growing  His performance status appears to be normal          Interval History:  A 53year old gentleman with chronic lymphocytic leukemia with no detectable cytogenetic abnormalities   This was diagnosed in June 2014  However, he had lymphocytosis since at least 2011  He presents today for routine follow-up  He is currently on watchful waiting  He has poorly controlled diabetes  Most recent A1c was about 11  He has no significant symptomatology from hematology standpoint  He denied fever, chills or night sweats  He has no weight loss  He has no complaint of pain  He denied any respiratory symptoms  His performance status is normal       Objective:     Primary Diagnosis:    Chronic lymphocytic leukemia  No detectable cytogenetic abnormality, based on FISH panel  Diagnosed in 2014  Cancer Staging:  Cancer Staging  No matching staging information was found for the patient  Previous Hematologic/ Oncologic Treatment:         Current Hematologic/ Oncologic Treatment:      Watchful waiting    Disease Status:     No evidence of progression  Test Results:    Pathology:    Flow cytometry of peripheral blood showed CD5 positive, CD23 positive, B cell clonal disease consistent with chronic lymphocytic leukemia  Kappa, lambda light chains were not expressed  Fish panel were negative for typical chromosomal abnormalities for CLL  Radiology:        Laboratory:    See below  Physical Exam:      General Appearance:    Alert, oriented        Eyes:    PERRL   Ears:    Normal external ear canals, both ears   Nose:   Nares normal, septum midline   Throat:   Mucosa moist  Pharynx without injection  Neck:   Supple       Lungs:     Clear to auscultation bilaterally   Chest Wall:    No tenderness or deformity    Heart:    Regular rate and rhythm       Abdomen:     Soft, non-tender, bowel sounds +, no organomegaly           Extremities:   Extremities no cyanosis or edema       Skin:   no rash or icterus      Lymph nodes:   Cervical, supraclavicular, and axillary nodes normal   Neurologic:   CNII-XII intact, normal strength, sensation and reflexes     Throughout          Breast exam:   NA         ROS: Review of Systems All other systems reviewed and are negative  Imaging: No results found  Labs:   Lab Results   Component Value Date    WBC 19 22 (H) 09/04/2018    HGB 13 5 09/04/2018    HCT 42 8 09/04/2018    MCV 85 09/04/2018     09/04/2018     Lab Results   Component Value Date     (L) 09/04/2018    K 4 1 09/04/2018     09/04/2018    CO2 25 09/04/2018    ANIONGAP 5 12/06/2014    BUN 18 09/04/2018    CREATININE 1 09 09/04/2018    GLUCOSE 81 12/06/2014    GLUF 306 (H) 09/04/2018    CALCIUM 8 8 09/04/2018    AST 21 09/04/2018    ALT 51 09/04/2018    ALKPHOS 97 09/04/2018    PROT 7 0 12/06/2014    BILITOT 0 4 12/06/2014    EGFR 81 09/04/2018         Lab Results   Component Value Date     09/04/2018       Lab Results   Component Value Date    UPEP The serum total 03/07/2014         Current Medications: Reviewed  Allergies: Reviewed  PMH/FH/SH:  Reviewed      Vital Sign:    Body surface area is 2 09 meters squared      Wt Readings from Last 3 Encounters:   10/12/18 101 kg (223 lb)   10/10/18 101 kg (222 lb)   05/01/18 102 kg (224 lb 9 6 oz)        Temp Readings from Last 3 Encounters:   10/12/18 (!) 96 7 °F (35 9 °C) (Tympanic)   10/10/18 98 3 °F (36 8 °C)   05/01/18 98 2 °F (36 8 °C)        BP Readings from Last 3 Encounters:   10/12/18 146/80   10/10/18 153/83   05/01/18 150/89         Pulse Readings from Last 3 Encounters:   10/12/18 78   10/10/18 72   05/01/18 75     @LASTSAO2(3)@

## 2018-10-15 RX ORDER — INSULIN LISPRO 100 [IU]/ML
INJECTION, SUSPENSION SUBCUTANEOUS
Qty: 20 PEN | Refills: 0 | Status: SHIPPED | OUTPATIENT
Start: 2018-10-15 | End: 2018-11-16 | Stop reason: ALTCHOICE

## 2018-10-15 RX ORDER — BLOOD PRESSURE TEST KIT
KIT MISCELLANEOUS DAILY
Qty: 1 EACH | Refills: 0 | Status: SHIPPED | OUTPATIENT
Start: 2018-10-15 | End: 2019-01-17 | Stop reason: ALTCHOICE

## 2018-12-03 ENCOUNTER — TELEPHONE (OUTPATIENT)
Dept: INTERNAL MEDICINE CLINIC | Facility: CLINIC | Age: 48
End: 2018-12-03

## 2018-12-03 NOTE — TELEPHONE ENCOUNTER
Javan called with blood sugar readings (see list)  He was told to continue with the same meds,  Check blood sugars twice daily and bring the readings to his next office visit

## 2018-12-12 DIAGNOSIS — I10 ESSENTIAL HYPERTENSION: ICD-10-CM

## 2018-12-12 RX ORDER — METOPROLOL SUCCINATE 50 MG/1
TABLET, EXTENDED RELEASE ORAL
Qty: 90 TABLET | Refills: 0 | Status: SHIPPED | OUTPATIENT
Start: 2018-12-12 | End: 2019-03-12 | Stop reason: SDUPTHER

## 2018-12-13 DIAGNOSIS — E11.9 TYPE 2 DIABETES MELLITUS WITHOUT COMPLICATION, WITHOUT LONG-TERM CURRENT USE OF INSULIN (HCC): Primary | ICD-10-CM

## 2018-12-13 RX ORDER — INSULIN LISPRO 100 [IU]/ML
INJECTION, SUSPENSION SUBCUTANEOUS
Qty: 5 PEN | Refills: 0 | Status: SHIPPED | OUTPATIENT
Start: 2018-12-13 | End: 2018-12-17 | Stop reason: SDUPTHER

## 2018-12-17 DIAGNOSIS — E11.9 TYPE 2 DIABETES MELLITUS WITHOUT COMPLICATION, WITHOUT LONG-TERM CURRENT USE OF INSULIN (HCC): ICD-10-CM

## 2018-12-17 RX ORDER — INSULIN LISPRO 100 [IU]/ML
INJECTION, SUSPENSION SUBCUTANEOUS
Qty: 15 PEN | Refills: 0 | Status: SHIPPED | OUTPATIENT
Start: 2018-12-17 | End: 2018-12-17 | Stop reason: SDUPTHER

## 2018-12-17 RX ORDER — INSULIN LISPRO 100 [IU]/ML
INJECTION, SUSPENSION SUBCUTANEOUS
Qty: 25 PEN | Refills: 1 | Status: SHIPPED | OUTPATIENT
Start: 2018-12-17 | End: 2018-12-18 | Stop reason: SDUPTHER

## 2018-12-17 NOTE — TELEPHONE ENCOUNTER
Pharmacy requesting 25 pens, pt will have no co-pay if sent in this quantity  Pt also requesting to use [address]  I called [facility] and canceled prescription that was sent today

## 2018-12-18 RX ORDER — INSULIN LISPRO 100 [IU]/ML
INJECTION, SUSPENSION SUBCUTANEOUS
Qty: 25 PEN | Refills: 0 | Status: SHIPPED | OUTPATIENT
Start: 2018-12-18 | End: 2019-03-12 | Stop reason: SDUPTHER

## 2018-12-27 LAB
LEFT EYE DIABETIC RETINOPATHY: NORMAL
RIGHT EYE DIABETIC RETINOPATHY: NORMAL

## 2019-01-06 DIAGNOSIS — E78.00 PURE HYPERCHOLESTEROLEMIA: ICD-10-CM

## 2019-01-06 DIAGNOSIS — F32.9 REACTIVE DEPRESSION: ICD-10-CM

## 2019-01-06 DIAGNOSIS — K21.9 GASTROESOPHAGEAL REFLUX DISEASE WITHOUT ESOPHAGITIS: ICD-10-CM

## 2019-01-06 DIAGNOSIS — E11.9 TYPE 2 DIABETES MELLITUS WITHOUT COMPLICATION, WITHOUT LONG-TERM CURRENT USE OF INSULIN (HCC): ICD-10-CM

## 2019-01-06 DIAGNOSIS — I10 ESSENTIAL HYPERTENSION: ICD-10-CM

## 2019-01-06 PROCEDURE — 4010F ACE/ARB THERAPY RXD/TAKEN: CPT | Performed by: INTERNAL MEDICINE

## 2019-01-06 RX ORDER — OMEPRAZOLE 20 MG/1
CAPSULE, DELAYED RELEASE ORAL
Qty: 90 CAPSULE | Refills: 0 | Status: SHIPPED | OUTPATIENT
Start: 2019-01-06 | End: 2019-04-03 | Stop reason: SDUPTHER

## 2019-01-06 RX ORDER — GLIPIZIDE 10 MG/1
TABLET ORAL
Qty: 180 TABLET | Refills: 0 | Status: SHIPPED | OUTPATIENT
Start: 2019-01-06 | End: 2019-04-03 | Stop reason: SDUPTHER

## 2019-01-06 RX ORDER — PRAVASTATIN SODIUM 80 MG/1
TABLET ORAL
Qty: 90 TABLET | Refills: 0 | Status: SHIPPED | OUTPATIENT
Start: 2019-01-06 | End: 2019-04-03 | Stop reason: SDUPTHER

## 2019-01-06 RX ORDER — BUPROPION HYDROCHLORIDE 150 MG/1
TABLET, EXTENDED RELEASE ORAL
Qty: 180 TABLET | Refills: 0 | Status: SHIPPED | OUTPATIENT
Start: 2019-01-06 | End: 2019-04-03 | Stop reason: SDUPTHER

## 2019-01-06 RX ORDER — LOSARTAN POTASSIUM 50 MG/1
TABLET ORAL
Qty: 90 TABLET | Refills: 0 | Status: SHIPPED | OUTPATIENT
Start: 2019-01-06 | End: 2019-04-03 | Stop reason: SDUPTHER

## 2019-01-06 RX ORDER — PEN NEEDLE, DIABETIC 32GX 5/32"
NEEDLE, DISPOSABLE MISCELLANEOUS
Qty: 270 EACH | Refills: 0 | Status: SHIPPED | OUTPATIENT
Start: 2019-01-06 | End: 2019-04-03 | Stop reason: SDUPTHER

## 2019-01-09 ENCOUNTER — TELEPHONE (OUTPATIENT)
Dept: INTERNAL MEDICINE CLINIC | Facility: CLINIC | Age: 49
End: 2019-01-09

## 2019-01-09 NOTE — TELEPHONE ENCOUNTER
Spoke with the patient and instructed him to please keep a log of his blood sugar readings at home and bring them with him to his appointment on 1/17/19  The patient understood  He stated he will also be getting his labs done prior to the visit      ----- Message from Bertha Arriaga MD sent at 1/8/2019 12:22 PM EST -----  Please call the patient regarding his abnormal result  Ophthalmology found diabetic retinopathy hemoglobin A1c was 11 asking about his blood sugar control and could he send some blood sugars that he has been recording

## 2019-01-15 ENCOUNTER — APPOINTMENT (OUTPATIENT)
Dept: LAB | Facility: HOSPITAL | Age: 49
End: 2019-01-15
Attending: INTERNAL MEDICINE
Payer: COMMERCIAL

## 2019-01-15 ENCOUNTER — TRANSCRIBE ORDERS (OUTPATIENT)
Dept: ADMINISTRATIVE | Facility: HOSPITAL | Age: 49
End: 2019-01-15

## 2019-01-15 DIAGNOSIS — K21.9 GASTROESOPHAGEAL REFLUX DISEASE WITHOUT ESOPHAGITIS: ICD-10-CM

## 2019-01-15 DIAGNOSIS — E11.9 DIABETES MELLITUS WITHOUT COMPLICATION (HCC): ICD-10-CM

## 2019-01-15 DIAGNOSIS — E78.1 HYPERTRIGLYCERIDEMIA: ICD-10-CM

## 2019-01-15 DIAGNOSIS — E11.9 DIABETES MELLITUS WITHOUT COMPLICATION (HCC): Primary | ICD-10-CM

## 2019-01-15 DIAGNOSIS — E11.9 TYPE 2 DIABETES MELLITUS WITHOUT COMPLICATION, WITHOUT LONG-TERM CURRENT USE OF INSULIN (HCC): ICD-10-CM

## 2019-01-15 DIAGNOSIS — I10 ESSENTIAL HYPERTENSION: ICD-10-CM

## 2019-01-15 LAB
ALBUMIN SERPL BCP-MCNC: 3.8 G/DL (ref 3.5–5)
ALP SERPL-CCNC: 105 U/L (ref 46–116)
ALT SERPL W P-5'-P-CCNC: 33 U/L (ref 12–78)
ANION GAP SERPL CALCULATED.3IONS-SCNC: 10 MMOL/L (ref 4–13)
AST SERPL W P-5'-P-CCNC: 23 U/L (ref 5–45)
BASOPHILS # BLD MANUAL: 0 THOUSAND/UL (ref 0–0.1)
BASOPHILS NFR MAR MANUAL: 0 % (ref 0–1)
BILIRUB SERPL-MCNC: 0.3 MG/DL (ref 0.2–1)
BUN SERPL-MCNC: 17 MG/DL (ref 5–25)
CALCIUM SERPL-MCNC: 9.6 MG/DL (ref 8.3–10.1)
CHLORIDE SERPL-SCNC: 100 MMOL/L (ref 100–108)
CO2 SERPL-SCNC: 27 MMOL/L (ref 21–32)
CREAT SERPL-MCNC: 1.01 MG/DL (ref 0.6–1.3)
EOSINOPHIL # BLD MANUAL: 0.21 THOUSAND/UL (ref 0–0.4)
EOSINOPHIL NFR BLD MANUAL: 1 % (ref 0–6)
ERYTHROCYTE [DISTWIDTH] IN BLOOD BY AUTOMATED COUNT: 12.8 % (ref 11.6–15.1)
EST. AVERAGE GLUCOSE BLD GHB EST-MCNC: 214 MG/DL
GFR SERPL CREATININE-BSD FRML MDRD: 88 ML/MIN/1.73SQ M
GLUCOSE P FAST SERPL-MCNC: 173 MG/DL (ref 65–99)
HBA1C MFR BLD: 9.1 % (ref 4.2–6.3)
HCT VFR BLD AUTO: 47.3 % (ref 36.5–49.3)
HGB BLD-MCNC: 14.1 G/DL (ref 12–17)
LG PLATELETS BLD QL SMEAR: PRESENT
LYMPHOCYTES # BLD AUTO: 47 % (ref 14–44)
LYMPHOCYTES # BLD AUTO: 9.73 THOUSAND/UL (ref 0.6–4.47)
MCH RBC QN AUTO: 26.9 PG (ref 26.8–34.3)
MCHC RBC AUTO-ENTMCNC: 29.8 G/DL (ref 31.4–37.4)
MCV RBC AUTO: 90 FL (ref 82–98)
MONOCYTES # BLD AUTO: 1.24 THOUSAND/UL (ref 0–1.22)
MONOCYTES NFR BLD: 6 % (ref 4–12)
NEUTROPHILS # BLD MANUAL: 6.01 THOUSAND/UL (ref 1.85–7.62)
NEUTS BAND NFR BLD MANUAL: 2 % (ref 0–8)
NEUTS SEG NFR BLD AUTO: 27 % (ref 43–75)
NRBC BLD AUTO-RTO: 0 /100 WBCS
OVALOCYTES BLD QL SMEAR: PRESENT
PLATELET # BLD AUTO: 313 THOUSANDS/UL (ref 149–390)
PLATELET BLD QL SMEAR: ADEQUATE
PMV BLD AUTO: 9 FL (ref 8.9–12.7)
POTASSIUM SERPL-SCNC: 4.4 MMOL/L (ref 3.5–5.3)
PROT SERPL-MCNC: 7.5 G/DL (ref 6.4–8.2)
RBC # BLD AUTO: 5.24 MILLION/UL (ref 3.88–5.62)
SODIUM SERPL-SCNC: 137 MMOL/L (ref 136–145)
TESTOST SERPL-MCNC: 206 NG/DL (ref 113–1065)
TOTAL CELLS COUNTED SPEC: 100
TRIGL SERPL-MCNC: 132 MG/DL
TSH SERPL DL<=0.05 MIU/L-ACNC: 3.1 UIU/ML (ref 0.36–3.74)
VARIANT LYMPHS # BLD AUTO: 17 %
WBC # BLD AUTO: 20.71 THOUSAND/UL (ref 4.31–10.16)

## 2019-01-15 PROCEDURE — 85027 COMPLETE CBC AUTOMATED: CPT

## 2019-01-15 PROCEDURE — 84443 ASSAY THYROID STIM HORMONE: CPT

## 2019-01-15 PROCEDURE — 85007 BL SMEAR W/DIFF WBC COUNT: CPT

## 2019-01-15 PROCEDURE — 36415 COLL VENOUS BLD VENIPUNCTURE: CPT

## 2019-01-15 PROCEDURE — 83036 HEMOGLOBIN GLYCOSYLATED A1C: CPT

## 2019-01-15 PROCEDURE — 84403 ASSAY OF TOTAL TESTOSTERONE: CPT

## 2019-01-15 PROCEDURE — 84478 ASSAY OF TRIGLYCERIDES: CPT

## 2019-01-15 PROCEDURE — 80053 COMPREHEN METABOLIC PANEL: CPT

## 2019-01-17 ENCOUNTER — OFFICE VISIT (OUTPATIENT)
Dept: INTERNAL MEDICINE CLINIC | Facility: CLINIC | Age: 49
End: 2019-01-17
Payer: COMMERCIAL

## 2019-01-17 VITALS
WEIGHT: 221 LBS | SYSTOLIC BLOOD PRESSURE: 140 MMHG | BODY MASS INDEX: 35.52 KG/M2 | HEIGHT: 66 IN | RESPIRATION RATE: 14 BRPM | HEART RATE: 86 BPM | TEMPERATURE: 97.8 F | DIASTOLIC BLOOD PRESSURE: 80 MMHG

## 2019-01-17 DIAGNOSIS — C91.10 CHRONIC LYMPHOCYTIC LEUKEMIA (HCC): ICD-10-CM

## 2019-01-17 DIAGNOSIS — E78.2 MIXED HYPERLIPIDEMIA: ICD-10-CM

## 2019-01-17 DIAGNOSIS — I10 ESSENTIAL HYPERTENSION: ICD-10-CM

## 2019-01-17 DIAGNOSIS — E11.9 TYPE 2 DIABETES MELLITUS WITHOUT COMPLICATION, WITHOUT LONG-TERM CURRENT USE OF INSULIN (HCC): Primary | ICD-10-CM

## 2019-01-17 DIAGNOSIS — K21.9 GASTROESOPHAGEAL REFLUX DISEASE WITHOUT ESOPHAGITIS: ICD-10-CM

## 2019-01-17 PROCEDURE — 99214 OFFICE O/P EST MOD 30 MIN: CPT | Performed by: INTERNAL MEDICINE

## 2019-01-17 PROCEDURE — 1036F TOBACCO NON-USER: CPT | Performed by: INTERNAL MEDICINE

## 2019-01-17 PROCEDURE — 3008F BODY MASS INDEX DOCD: CPT | Performed by: INTERNAL MEDICINE

## 2019-01-17 NOTE — ASSESSMENT & PLAN NOTE
Lab Results   Component Value Date    HGBA1C 9 1 (H) 01/15/2019       No results for input(s): POCGLU in the last 72 hours  Blood Sugar Average: Last 72 hrs:  He has diabetic retinopathy now on cording to the ophthalmologist the hemoglobin A1c went from 11-9 which is an improvement he needs to get below 8  Blood sugars are much better the triglycerides have gone from 400-150 which is great I did not make any changes will see him back in 4 months I told him to every 2-3 weeks to send the blood sugars

## 2019-01-17 NOTE — PROGRESS NOTES
Assessment/Plan:  Blood sugars improved no change in medication will see him back in 4 months    Type 2 diabetes mellitus without complication, without long-term current use of insulin ([facility])  Lab Results   Component Value Date    HGBA1C 9 1 (H) 01/15/2019       No results for input(s): POCGLU in the last 72 hours  Blood Sugar Average: Last 72 hrs:  He has diabetic retinopathy now on cording to the ophthalmologist the hemoglobin A1c went from 11-9 which is an improvement he needs to get below 8  Blood sugars are much better the triglycerides have gone from 400-150 which is great I did not make any changes will see him back in 4 months I told him to every 2-3 weeks to send the blood sugars  Gastroesophageal reflux disease without esophagitis  Asymptomatic  No abdominal pain or dyspepsia    Essential hypertension  Blood pressure is well control I got a blood pressure of 140/80  Takes losartan 50 mg per day  Mixed hyperlipidemia  Triglycerides have reduced with better blood sugar control down below 150  From 400       Diagnoses and all orders for this visit:    Type 2 diabetes mellitus without complication, without long-term current use of insulin ([facility])  -     CBC and differential; Future  -     Hemoglobin A1C; Future  -     Basic metabolic panel; Future  -     TSH, 3rd generation with Free T4 reflex; Future    Essential hypertension  -     CBC and differential; Future  -     Hemoglobin A1C; Future  -     Basic metabolic panel; Future  -     TSH, 3rd generation with Free T4 reflex; Future    Chronic lymphocytic leukemia (HCC)  -     CBC and differential; Future  -     Hemoglobin A1C; Future  -     Basic metabolic panel; Future  -     TSH, 3rd generation with Free T4 reflex; Future    Mixed hyperlipidemia    Gastroesophageal reflux disease without esophagitis          Subjective:      Patient ID: Kendra Mcclendon is a 53 y o  male      Chief Complaint   Patient presents with    Follow-up     MEDS NOT VERIFIED  Needs foot exam     Sore Throat     x3 weeks    Fever         Current Outpatient Prescriptions:     BD PEN NEEDLE ASHLEY U/F 32G X 4 MM MISC, USE TO INJECT INSULIN THREE TIMES A DAY, Disp: 270 each, Rfl: 0    buPROPion (WELLBUTRIN SR) 150 mg 12 hr tablet, TAKE 1 TABLET TWICE A DAY, Disp: 180 tablet, Rfl: 0    Cholecalciferol (VITAMIN D3) 5000 units CAPS, Take by mouth, Disp: , Rfl:     glipiZIDE (GLUCOTROL) 10 mg tablet, TAKE 1 TABLET TWICE A DAY, Disp: 180 tablet, Rfl: 0    Insulin Lispro Prot & Lispro (HUMALOG MIX 75/25 KWIKPEN) (75-25) 100 units/mL injection pen, Inject 25 units in the morning and at lunch time, inject 35 units at dinner, Disp: 25 pen, Rfl: 0    losartan (COZAAR) 50 mg tablet, TAKE 1 TABLET DAILY, Disp: 90 tablet, Rfl: 0    metFORMIN (GLUCOPHAGE-XR) 500 mg 24 hr tablet, Take 2 tablets (1,000 mg total) by mouth 2 (two) times a day with meals for 90 days, Disp: 180 tablet, Rfl: 0    metoprolol succinate (TOPROL-XL) 50 mg 24 hr tablet, TAKE 1 TABLET DAILY, Disp: 90 tablet, Rfl: 0    omeprazole (PriLOSEC) 20 mg delayed release capsule, TAKE 1 CAPSULE DAILY, Disp: 90 capsule, Rfl: 0    pravastatin (PRAVACHOL) 80 mg tablet, TAKE 1 TABLET DAILY, Disp: 90 tablet, Rfl: 0    sildenafil (VIAGRA) 100 mg tablet, Take a half or 1 tablet 1 hr before relations, Disp: 10 tablet, Rfl: 1    Problem 1  Diabetes poorly controlled but he is improving now blood sugars are at home are excellent  And I did not make any changes on insulin or the oral medications  Denies polyuria polydipsia triglycerides went down with better control  Retinopathy should improve now the goal to get a hemoglobin A1c close to 7 without having episodes of hypoglycemia    Obesity I have asked him to try to lose some weight if he starts losing weight with exercise and behavior modification he may need to cut down the insulin by 3-5 units at a time otherwise will get hypoglycemia      He is chronic lymphocytic leukemia he has no night sweats except he had a URI about a few weeks ago and got some penicillin from his daughter  I gave him some penicillin to use in case he has a recurrence  He does have an enlarged tonsils  But no exudates  Denies any fever chills or night sweats now  He does not have any lymphadenopathy in the neck that I could palpate    Hyperlipidemia has improved continue on a statin        The following portions of the patient's history were reviewed and updated as appropriate: allergies, current medications, past family history, past medical history, past social history, past surgical history and problem list     Review of Systems   Constitutional: Negative  Negative for activity change, appetite change, fatigue, fever and unexpected weight change  HENT: Negative for congestion, ear pain, hearing loss, mouth sores, postnasal drip, rhinorrhea, sore throat, trouble swallowing and voice change  Eyes: Negative for pain, redness and visual disturbance  Respiratory: Negative for cough, chest tightness, shortness of breath and wheezing  Cardiovascular: Negative for chest pain, palpitations and leg swelling  Gastrointestinal: Negative for abdominal distention, abdominal pain, blood in stool, constipation, diarrhea and nausea  Endocrine: Negative for cold intolerance, heat intolerance, polydipsia, polyphagia and polyuria  Genitourinary: Negative for difficulty urinating, dysuria, flank pain, frequency, hematuria and urgency  Musculoskeletal: Negative for arthralgias, back pain, gait problem, joint swelling and myalgias  Skin: Negative for color change and pallor  Neurological: Negative for dizziness, tremors, seizures, syncope, weakness, numbness and headaches  Hematological: Negative for adenopathy  Does not bruise/bleed easily  Psychiatric/Behavioral: Negative  Negative for sleep disturbance  The patient is not nervous/anxious            Objective:    Patient's shoes and socks removed  Right Foot/Ankle   Right Foot Inspection  Skin Exam: skin normal skin not intact, no dry skin, no warmth, no callus, no erythema, no maceration, no abnormal color, no pre-ulcer, no ulcer and no callus                          Toe Exam: ROM and strength within normal limitsno swelling, no tenderness, erythema and  no right toe deformity  Sensory   Vibration: intact  Proprioception: intact   Monofilament testing: intact  Vascular    The right DP pulse is 2+  Left Foot/Ankle  Left Foot Inspection  Skin Exam: skin normalskin not intact, no dry skin, no warmth, no erythema, no maceration, normal color, no pre-ulcer, no ulcer and no callus                         Toe Exam: ROM and strength within normal limitsno swelling, no tenderness, no erythema and no left toe deformity                   Sensory   Vibration: intact  Proprioception: intact  Monofilament: intact  Vascular    The left DP pulse is 2+  Assign Risk Category:  No deformity present; No loss of protective sensation; No weak pulses       Risk: 0        Results for orders placed or performed in visit on 01/15/19   Hemoglobin A1C   Result Value Ref Range    Hemoglobin A1C 9 1 (H) 4 2 - 6 3 %     mg/dl   Testosterone   Result Value Ref Range    Testosterone 206 0 113-1,065 ng/dL       /80 (BP Location: Right arm, Patient Position: Sitting)   Pulse 86   Temp 97 8 °F (36 6 °C)   Resp 14   Ht 5' 6" (1 676 m)   Wt 100 kg (221 lb)   BMI 35 67 kg/m²      Physical Exam   Constitutional: He is oriented to person, place, and time  He appears well-developed and well-nourished  HENT:   Head: Normocephalic  Right Ear: External ear normal    Left Ear: External ear normal    Nose: Nose normal    Mouth/Throat: Oropharynx is clear and moist  No oropharyngeal exudate  No lymphadenopathy palpable anywhere in the neck axilla supraclavicular inguinal   Eyes: Pupils are equal, round, and reactive to light   Conjunctivae and EOM are normal    Neck: Normal range of motion  Neck supple  No thyromegaly present  Cardiovascular: Normal rate, regular rhythm, normal heart sounds and intact distal pulses  Exam reveals no gallop and no friction rub  Pulses are no weak pulses  No murmur heard  Pulses:       Dorsalis pedis pulses are 2+ on the right side, and 2+ on the left side  Blood pressure 140/80  S1-S2 regular rhythm  Extremities no edema   Pulmonary/Chest: Effort normal and breath sounds normal  No respiratory distress  He has no wheezes  He has no rales  Lungs are clear no wheezing rales or rhonchi   Abdominal: Soft  Bowel sounds are normal  He exhibits no distension and no mass  There is no tenderness  There is no rebound and no guarding  Obese soft nontender   Musculoskeletal: Normal range of motion  Feet:    Feet:   Right Foot:   Skin Integrity: Negative for ulcer, skin breakdown, erythema, warmth, callus or dry skin  Left Foot:   Skin Integrity: Negative for ulcer, skin breakdown, erythema, warmth, callus or dry skin  Lymphadenopathy:     He has no cervical adenopathy  Neurological: He is alert and oriented to person, place, and time  Skin: Skin is warm and dry  Psychiatric: He has a normal mood and affect  His behavior is normal  Judgment normal    Nursing note and vitals reviewed

## 2019-01-17 NOTE — PATIENT INSTRUCTIONS
Your diabetes is better control and very   Your triglycerides went down  Your blood pressure is control  Your testosterone improve all the parameters have improved  I will give you some amoxicillin in case you get a recurrence sore throat  Will see her back in 4 months in the spring

## 2019-02-02 DIAGNOSIS — E11.9 TYPE 2 DIABETES MELLITUS WITHOUT COMPLICATION, WITHOUT LONG-TERM CURRENT USE OF INSULIN (HCC): ICD-10-CM

## 2019-02-02 DIAGNOSIS — I10 ESSENTIAL HYPERTENSION: ICD-10-CM

## 2019-02-02 RX ORDER — METFORMIN HYDROCHLORIDE 500 MG/1
TABLET, EXTENDED RELEASE ORAL
Qty: 360 TABLET | Refills: 0 | Status: SHIPPED | OUTPATIENT
Start: 2019-02-02 | End: 2019-04-25 | Stop reason: SDUPTHER

## 2019-02-27 LAB
LEFT EYE DIABETIC RETINOPATHY: NORMAL
RIGHT EYE DIABETIC RETINOPATHY: NORMAL

## 2019-03-12 ENCOUNTER — OFFICE VISIT (OUTPATIENT)
Dept: OBGYN CLINIC | Facility: HOSPITAL | Age: 49
End: 2019-03-12
Payer: COMMERCIAL

## 2019-03-12 DIAGNOSIS — E11.9 TYPE 2 DIABETES MELLITUS WITHOUT COMPLICATION, WITHOUT LONG-TERM CURRENT USE OF INSULIN (HCC): ICD-10-CM

## 2019-03-12 DIAGNOSIS — I10 ESSENTIAL HYPERTENSION: ICD-10-CM

## 2019-03-12 RX ORDER — INSULIN LISPRO 100 [IU]/ML
INJECTION, SUSPENSION SUBCUTANEOUS
Qty: 75 ML | Refills: 0 | Status: SHIPPED | OUTPATIENT
Start: 2019-03-12 | End: 2019-05-17

## 2019-03-12 RX ORDER — METOPROLOL SUCCINATE 50 MG/1
TABLET, EXTENDED RELEASE ORAL
Qty: 90 TABLET | Refills: 0 | Status: SHIPPED | OUTPATIENT
Start: 2019-03-12 | End: 2019-06-10 | Stop reason: SDUPTHER

## 2019-04-03 DIAGNOSIS — K21.9 GASTROESOPHAGEAL REFLUX DISEASE WITHOUT ESOPHAGITIS: ICD-10-CM

## 2019-04-03 DIAGNOSIS — I10 ESSENTIAL HYPERTENSION: ICD-10-CM

## 2019-04-03 DIAGNOSIS — E78.00 PURE HYPERCHOLESTEROLEMIA: ICD-10-CM

## 2019-04-03 DIAGNOSIS — F32.9 REACTIVE DEPRESSION: ICD-10-CM

## 2019-04-03 DIAGNOSIS — E11.9 TYPE 2 DIABETES MELLITUS WITHOUT COMPLICATION, WITHOUT LONG-TERM CURRENT USE OF INSULIN (HCC): ICD-10-CM

## 2019-04-03 LAB
LEFT EYE DIABETIC RETINOPATHY: NORMAL
RIGHT EYE DIABETIC RETINOPATHY: NORMAL

## 2019-04-03 PROCEDURE — 2022F DILAT RTA XM EVC RTNOPTHY: CPT | Performed by: INTERNAL MEDICINE

## 2019-04-03 RX ORDER — PRAVASTATIN SODIUM 80 MG/1
TABLET ORAL
Qty: 90 TABLET | Refills: 0 | Status: SHIPPED | OUTPATIENT
Start: 2019-04-03 | End: 2019-07-02 | Stop reason: SDUPTHER

## 2019-04-03 RX ORDER — GLIPIZIDE 10 MG/1
TABLET ORAL
Qty: 180 TABLET | Refills: 0 | Status: SHIPPED | OUTPATIENT
Start: 2019-04-03 | End: 2019-07-02 | Stop reason: SDUPTHER

## 2019-04-03 RX ORDER — PEN NEEDLE, DIABETIC 32GX 5/32"
NEEDLE, DISPOSABLE MISCELLANEOUS
Qty: 270 EACH | Refills: 0 | Status: SHIPPED | OUTPATIENT
Start: 2019-04-03 | End: 2019-07-02 | Stop reason: SDUPTHER

## 2019-04-03 RX ORDER — LOSARTAN POTASSIUM 50 MG/1
TABLET ORAL
Qty: 90 TABLET | Refills: 0 | Status: SHIPPED | OUTPATIENT
Start: 2019-04-03 | End: 2019-07-02 | Stop reason: SDUPTHER

## 2019-04-03 RX ORDER — OMEPRAZOLE 20 MG/1
CAPSULE, DELAYED RELEASE ORAL
Qty: 90 CAPSULE | Refills: 0 | Status: SHIPPED | OUTPATIENT
Start: 2019-04-03 | End: 2019-06-27 | Stop reason: SDUPTHER

## 2019-04-03 RX ORDER — BUPROPION HYDROCHLORIDE 150 MG/1
TABLET, EXTENDED RELEASE ORAL
Qty: 180 TABLET | Refills: 0 | Status: SHIPPED | OUTPATIENT
Start: 2019-04-03 | End: 2019-07-02 | Stop reason: SDUPTHER

## 2019-04-17 ENCOUNTER — OFFICE VISIT (OUTPATIENT)
Dept: OBGYN CLINIC | Facility: HOSPITAL | Age: 49
End: 2019-04-17
Payer: COMMERCIAL

## 2019-04-25 DIAGNOSIS — E11.9 TYPE 2 DIABETES MELLITUS WITHOUT COMPLICATION, WITHOUT LONG-TERM CURRENT USE OF INSULIN (HCC): ICD-10-CM

## 2019-04-25 DIAGNOSIS — I10 ESSENTIAL HYPERTENSION: ICD-10-CM

## 2019-04-25 RX ORDER — METFORMIN HYDROCHLORIDE 500 MG/1
TABLET, EXTENDED RELEASE ORAL
Qty: 360 TABLET | Refills: 0 | Status: SHIPPED | OUTPATIENT
Start: 2019-04-25 | End: 2019-07-24 | Stop reason: SDUPTHER

## 2019-05-16 ENCOUNTER — APPOINTMENT (OUTPATIENT)
Dept: LAB | Facility: HOSPITAL | Age: 49
End: 2019-05-16
Attending: INTERNAL MEDICINE
Payer: COMMERCIAL

## 2019-05-16 DIAGNOSIS — C91.10 CHRONIC LYMPHOCYTIC LEUKEMIA (HCC): ICD-10-CM

## 2019-05-16 DIAGNOSIS — I10 ESSENTIAL HYPERTENSION: ICD-10-CM

## 2019-05-16 DIAGNOSIS — E11.9 TYPE 2 DIABETES MELLITUS WITHOUT COMPLICATION, WITHOUT LONG-TERM CURRENT USE OF INSULIN (HCC): ICD-10-CM

## 2019-05-16 LAB
BASOPHILS # BLD MANUAL: 0 THOUSAND/UL (ref 0–0.1)
BASOPHILS NFR MAR MANUAL: 0 % (ref 0–1)
EOSINOPHIL # BLD MANUAL: 0.19 THOUSAND/UL (ref 0–0.4)
EOSINOPHIL NFR BLD MANUAL: 1 % (ref 0–6)
ERYTHROCYTE [DISTWIDTH] IN BLOOD BY AUTOMATED COUNT: 12.8 % (ref 11.6–15.1)
HCT VFR BLD AUTO: 42.8 % (ref 36.5–49.3)
HGB BLD-MCNC: 13.1 G/DL (ref 12–17)
LG PLATELETS BLD QL SMEAR: PRESENT
LYMPHOCYTES # BLD AUTO: 11.84 THOUSAND/UL (ref 0.6–4.47)
LYMPHOCYTES # BLD AUTO: 62 % (ref 14–44)
MCH RBC QN AUTO: 27.9 PG (ref 26.8–34.3)
MCHC RBC AUTO-ENTMCNC: 30.6 G/DL (ref 31.4–37.4)
MCV RBC AUTO: 91 FL (ref 82–98)
MONOCYTES # BLD AUTO: 0.95 THOUSAND/UL (ref 0–1.22)
MONOCYTES NFR BLD: 5 % (ref 4–12)
NEUTROPHILS # BLD MANUAL: 3.63 THOUSAND/UL (ref 1.85–7.62)
NEUTS BAND NFR BLD MANUAL: 1 % (ref 0–8)
NEUTS SEG NFR BLD AUTO: 18 % (ref 43–75)
NRBC BLD AUTO-RTO: 0 /100 WBCS
PLATELET # BLD AUTO: 230 THOUSANDS/UL (ref 149–390)
PLATELET BLD QL SMEAR: ADEQUATE
PMV BLD AUTO: 9.3 FL (ref 8.9–12.7)
RBC # BLD AUTO: 4.7 MILLION/UL (ref 3.88–5.62)
RBC MORPH BLD: PRESENT
TOTAL CELLS COUNTED SPEC: 100
TSH SERPL DL<=0.05 MIU/L-ACNC: 3.47 UIU/ML (ref 0.36–3.74)
VARIANT LYMPHS # BLD AUTO: 13 %
WBC # BLD AUTO: 19.09 THOUSAND/UL (ref 4.31–10.16)

## 2019-05-16 PROCEDURE — 85027 COMPLETE CBC AUTOMATED: CPT

## 2019-05-16 PROCEDURE — 36415 COLL VENOUS BLD VENIPUNCTURE: CPT

## 2019-05-16 PROCEDURE — 85007 BL SMEAR W/DIFF WBC COUNT: CPT

## 2019-05-16 PROCEDURE — 84443 ASSAY THYROID STIM HORMONE: CPT

## 2019-05-17 ENCOUNTER — HOSPITAL ENCOUNTER (OUTPATIENT)
Dept: RADIOLOGY | Facility: HOSPITAL | Age: 49
Discharge: HOME | End: 2019-05-17
Attending: INTERNAL MEDICINE
Payer: COMMERCIAL

## 2019-05-17 ENCOUNTER — OFFICE VISIT (OUTPATIENT)
Dept: INTERNAL MEDICINE CLINIC | Facility: CLINIC | Age: 49
End: 2019-05-17
Payer: COMMERCIAL

## 2019-05-17 VITALS
DIASTOLIC BLOOD PRESSURE: 88 MMHG | RESPIRATION RATE: 14 BRPM | WEIGHT: 223 LBS | HEIGHT: 66 IN | BODY MASS INDEX: 35.84 KG/M2 | SYSTOLIC BLOOD PRESSURE: 148 MMHG | TEMPERATURE: 97.9 F | HEART RATE: 74 BPM

## 2019-05-17 DIAGNOSIS — K21.9 GASTROESOPHAGEAL REFLUX DISEASE WITHOUT ESOPHAGITIS: ICD-10-CM

## 2019-05-17 DIAGNOSIS — M54.6 THORACIC BACK PAIN, UNSPECIFIED BACK PAIN LATERALITY, UNSPECIFIED CHRONICITY: ICD-10-CM

## 2019-05-17 DIAGNOSIS — R37 SEXUAL DYSFUNCTION: ICD-10-CM

## 2019-05-17 DIAGNOSIS — C91.10 CHRONIC LYMPHOCYTIC LEUKEMIA (HCC): Primary | ICD-10-CM

## 2019-05-17 DIAGNOSIS — I10 ESSENTIAL HYPERTENSION: ICD-10-CM

## 2019-05-17 DIAGNOSIS — E11.9 TYPE 2 DIABETES MELLITUS WITHOUT COMPLICATION, WITHOUT LONG-TERM CURRENT USE OF INSULIN (HCC): ICD-10-CM

## 2019-05-17 DIAGNOSIS — B35.1 ONYCHOMYCOSIS OF TOENAIL: ICD-10-CM

## 2019-05-17 DIAGNOSIS — C91.10 CHRONIC LYMPHOCYTIC LEUKEMIA (HCC): ICD-10-CM

## 2019-05-17 DIAGNOSIS — Z00.00 HEALTHCARE MAINTENANCE: ICD-10-CM

## 2019-05-17 DIAGNOSIS — E78.2 MIXED HYPERLIPIDEMIA: ICD-10-CM

## 2019-05-17 PROCEDURE — 72072 X-RAY EXAM THORAC SPINE 3VWS: CPT

## 2019-05-17 PROCEDURE — 1036F TOBACCO NON-USER: CPT | Performed by: INTERNAL MEDICINE

## 2019-05-17 PROCEDURE — 99215 OFFICE O/P EST HI 40 MIN: CPT | Performed by: INTERNAL MEDICINE

## 2019-05-17 PROCEDURE — 3008F BODY MASS INDEX DOCD: CPT | Performed by: INTERNAL MEDICINE

## 2019-05-17 RX ORDER — CELECOXIB 200 MG/1
200 CAPSULE ORAL DAILY
Qty: 30 CAPSULE | Refills: 1 | Status: SHIPPED | OUTPATIENT
Start: 2019-05-17 | End: 2022-05-25

## 2019-05-17 RX ORDER — CYCLOBENZAPRINE HCL 5 MG
TABLET ORAL
Qty: 30 TABLET | Refills: 1 | Status: SHIPPED | OUTPATIENT
Start: 2019-05-17 | End: 2019-10-15 | Stop reason: ALTCHOICE

## 2019-05-17 RX ORDER — INSULIN LISPRO 100 [IU]/ML
INJECTION, SUSPENSION SUBCUTANEOUS
Qty: 75 ML | Refills: 0
Start: 2019-05-17 | End: 2019-06-11

## 2019-05-17 RX ORDER — SILDENAFIL CITRATE 20 MG/1
TABLET ORAL
Qty: 20 TABLET | Refills: 3 | Status: SHIPPED | OUTPATIENT
Start: 2019-05-17 | End: 2019-10-15 | Stop reason: ALTCHOICE

## 2019-05-23 ENCOUNTER — TELEPHONE (OUTPATIENT)
Dept: INTERNAL MEDICINE CLINIC | Facility: CLINIC | Age: 49
End: 2019-05-23

## 2019-06-08 DIAGNOSIS — E11.9 TYPE 2 DIABETES MELLITUS WITHOUT COMPLICATION, WITHOUT LONG-TERM CURRENT USE OF INSULIN (HCC): ICD-10-CM

## 2019-06-09 RX ORDER — INSULIN LISPRO 100 [IU]/ML
INJECTION, SUSPENSION SUBCUTANEOUS
Qty: 75 ML | Refills: 0 | Status: SHIPPED | OUTPATIENT
Start: 2019-06-09 | End: 2019-07-05 | Stop reason: SDUPTHER

## 2019-06-10 DIAGNOSIS — I10 ESSENTIAL HYPERTENSION: ICD-10-CM

## 2019-06-10 RX ORDER — METOPROLOL SUCCINATE 50 MG/1
TABLET, EXTENDED RELEASE ORAL
Qty: 90 TABLET | Refills: 0 | Status: SHIPPED | OUTPATIENT
Start: 2019-06-10 | End: 2019-09-08 | Stop reason: SDUPTHER

## 2019-06-11 DIAGNOSIS — E11.9 TYPE 2 DIABETES MELLITUS WITHOUT COMPLICATION, WITHOUT LONG-TERM CURRENT USE OF INSULIN (HCC): ICD-10-CM

## 2019-06-13 LAB
LEFT EYE DIABETIC RETINOPATHY: NORMAL
RIGHT EYE DIABETIC RETINOPATHY: NORMAL

## 2019-06-13 PROCEDURE — 2022F DILAT RTA XM EVC RTNOPTHY: CPT | Performed by: INTERNAL MEDICINE

## 2019-06-27 DIAGNOSIS — K21.9 GASTROESOPHAGEAL REFLUX DISEASE WITHOUT ESOPHAGITIS: ICD-10-CM

## 2019-06-27 RX ORDER — OMEPRAZOLE 20 MG/1
20 CAPSULE, DELAYED RELEASE ORAL DAILY
Qty: 90 CAPSULE | Refills: 0 | Status: SHIPPED | OUTPATIENT
Start: 2019-06-27 | End: 2019-09-10 | Stop reason: SDUPTHER

## 2019-07-02 DIAGNOSIS — E78.00 PURE HYPERCHOLESTEROLEMIA: ICD-10-CM

## 2019-07-02 DIAGNOSIS — F32.9 REACTIVE DEPRESSION: ICD-10-CM

## 2019-07-02 DIAGNOSIS — I10 ESSENTIAL HYPERTENSION: ICD-10-CM

## 2019-07-02 DIAGNOSIS — E11.9 TYPE 2 DIABETES MELLITUS WITHOUT COMPLICATION, WITHOUT LONG-TERM CURRENT USE OF INSULIN (HCC): ICD-10-CM

## 2019-07-02 PROCEDURE — 4010F ACE/ARB THERAPY RXD/TAKEN: CPT | Performed by: INTERNAL MEDICINE

## 2019-07-02 RX ORDER — LOSARTAN POTASSIUM 50 MG/1
TABLET ORAL
Qty: 90 TABLET | Refills: 0 | Status: SHIPPED | OUTPATIENT
Start: 2019-07-02 | End: 2019-09-30 | Stop reason: SDUPTHER

## 2019-07-02 RX ORDER — GLIPIZIDE 10 MG/1
TABLET ORAL
Qty: 180 TABLET | Refills: 0 | Status: SHIPPED | OUTPATIENT
Start: 2019-07-02 | End: 2019-09-10 | Stop reason: SDUPTHER

## 2019-07-02 RX ORDER — BUPROPION HYDROCHLORIDE 150 MG/1
TABLET, EXTENDED RELEASE ORAL
Qty: 180 TABLET | Refills: 0 | Status: SHIPPED | OUTPATIENT
Start: 2019-07-02 | End: 2019-09-30 | Stop reason: SDUPTHER

## 2019-07-02 RX ORDER — PEN NEEDLE, DIABETIC 32GX 5/32"
NEEDLE, DISPOSABLE MISCELLANEOUS
Qty: 270 EACH | Refills: 0 | Status: SHIPPED | OUTPATIENT
Start: 2019-07-02 | End: 2019-09-30 | Stop reason: SDUPTHER

## 2019-07-02 RX ORDER — PRAVASTATIN SODIUM 80 MG/1
TABLET ORAL
Qty: 90 TABLET | Refills: 0 | Status: SHIPPED | OUTPATIENT
Start: 2019-07-02 | End: 2019-09-30 | Stop reason: SDUPTHER

## 2019-07-05 DIAGNOSIS — E11.9 TYPE 2 DIABETES MELLITUS WITHOUT COMPLICATION, WITHOUT LONG-TERM CURRENT USE OF INSULIN (HCC): ICD-10-CM

## 2019-07-06 RX ORDER — INSULIN LISPRO 100 [IU]/ML
INJECTION, SUSPENSION SUBCUTANEOUS
Qty: 75 ML | Refills: 0 | Status: SHIPPED | OUTPATIENT
Start: 2019-07-06 | End: 2019-12-30 | Stop reason: SDUPTHER

## 2019-07-24 DIAGNOSIS — E11.9 TYPE 2 DIABETES MELLITUS WITHOUT COMPLICATION, WITHOUT LONG-TERM CURRENT USE OF INSULIN (HCC): ICD-10-CM

## 2019-07-24 DIAGNOSIS — I10 ESSENTIAL HYPERTENSION: ICD-10-CM

## 2019-07-24 LAB
LEFT EYE DIABETIC RETINOPATHY: NORMAL
RIGHT EYE DIABETIC RETINOPATHY: NORMAL

## 2019-07-24 PROCEDURE — 2022F DILAT RTA XM EVC RTNOPTHY: CPT | Performed by: INTERNAL MEDICINE

## 2019-07-24 RX ORDER — METFORMIN HYDROCHLORIDE 500 MG/1
TABLET, EXTENDED RELEASE ORAL
Qty: 360 TABLET | Refills: 0 | Status: SHIPPED | OUTPATIENT
Start: 2019-07-24 | End: 2019-10-22 | Stop reason: SDUPTHER

## 2019-09-08 DIAGNOSIS — I10 ESSENTIAL HYPERTENSION: ICD-10-CM

## 2019-09-08 RX ORDER — METOPROLOL SUCCINATE 50 MG/1
TABLET, EXTENDED RELEASE ORAL
Qty: 90 TABLET | Refills: 4 | Status: SHIPPED | OUTPATIENT
Start: 2019-09-08 | End: 2020-07-24 | Stop reason: SDUPTHER

## 2019-09-10 DIAGNOSIS — K21.9 GASTROESOPHAGEAL REFLUX DISEASE WITHOUT ESOPHAGITIS: ICD-10-CM

## 2019-09-10 DIAGNOSIS — I10 ESSENTIAL HYPERTENSION: ICD-10-CM

## 2019-09-10 DIAGNOSIS — E11.9 TYPE 2 DIABETES MELLITUS WITHOUT COMPLICATION, WITHOUT LONG-TERM CURRENT USE OF INSULIN (HCC): ICD-10-CM

## 2019-09-10 RX ORDER — OMEPRAZOLE 20 MG/1
20 CAPSULE, DELAYED RELEASE ORAL DAILY
Qty: 14 CAPSULE | Refills: 0 | Status: SHIPPED | OUTPATIENT
Start: 2019-09-10 | End: 2019-09-24 | Stop reason: SDUPTHER

## 2019-09-10 RX ORDER — GLIPIZIDE 10 MG/1
10 TABLET ORAL 2 TIMES DAILY
Qty: 28 TABLET | Refills: 0 | Status: SHIPPED | OUTPATIENT
Start: 2019-09-10 | End: 2019-09-30 | Stop reason: SDUPTHER

## 2019-09-18 ENCOUNTER — APPOINTMENT (OUTPATIENT)
Dept: LAB | Facility: HOSPITAL | Age: 49
End: 2019-09-18
Attending: INTERNAL MEDICINE
Payer: COMMERCIAL

## 2019-09-18 DIAGNOSIS — E78.2 MIXED HYPERLIPIDEMIA: ICD-10-CM

## 2019-09-18 DIAGNOSIS — E11.9 TYPE 2 DIABETES MELLITUS WITHOUT COMPLICATION, WITHOUT LONG-TERM CURRENT USE OF INSULIN (HCC): ICD-10-CM

## 2019-09-18 DIAGNOSIS — C91.10 CHRONIC LYMPHOCYTIC LEUKEMIA (HCC): ICD-10-CM

## 2019-09-18 DIAGNOSIS — K21.9 GASTROESOPHAGEAL REFLUX DISEASE WITHOUT ESOPHAGITIS: ICD-10-CM

## 2019-09-18 DIAGNOSIS — Z00.00 HEALTHCARE MAINTENANCE: ICD-10-CM

## 2019-09-18 DIAGNOSIS — I10 ESSENTIAL HYPERTENSION: ICD-10-CM

## 2019-09-18 LAB
25(OH)D3 SERPL-MCNC: 48.1 NG/ML (ref 30–100)
ALBUMIN SERPL BCP-MCNC: 3.8 G/DL (ref 3.5–5)
ALP SERPL-CCNC: 71 U/L (ref 46–116)
ALT SERPL W P-5'-P-CCNC: 39 U/L (ref 12–78)
ANION GAP SERPL CALCULATED.3IONS-SCNC: 8 MMOL/L (ref 4–13)
AST SERPL W P-5'-P-CCNC: 31 U/L (ref 5–45)
BASOPHILS # BLD MANUAL: 0.17 THOUSAND/UL (ref 0–0.1)
BASOPHILS NFR MAR MANUAL: 1 % (ref 0–1)
BILIRUB SERPL-MCNC: 0.46 MG/DL (ref 0.2–1)
BUN SERPL-MCNC: 11 MG/DL (ref 5–25)
CALCIUM SERPL-MCNC: 9.1 MG/DL (ref 8.3–10.1)
CHLORIDE SERPL-SCNC: 105 MMOL/L (ref 100–108)
CHOLEST SERPL-MCNC: 121 MG/DL (ref 50–200)
CO2 SERPL-SCNC: 28 MMOL/L (ref 21–32)
CREAT SERPL-MCNC: 1.16 MG/DL (ref 0.6–1.3)
EOSINOPHIL # BLD MANUAL: 0 THOUSAND/UL (ref 0–0.4)
EOSINOPHIL NFR BLD MANUAL: 0 % (ref 0–6)
ERYTHROCYTE [DISTWIDTH] IN BLOOD BY AUTOMATED COUNT: 12.7 % (ref 11.6–15.1)
GFR SERPL CREATININE-BSD FRML MDRD: 74 ML/MIN/1.73SQ M
GGT SERPL-CCNC: 64 U/L (ref 5–85)
GLUCOSE P FAST SERPL-MCNC: 123 MG/DL (ref 65–99)
HCT VFR BLD AUTO: 43 % (ref 36.5–49.3)
HCV AB SER QL: NORMAL
HDLC SERPL-MCNC: 22 MG/DL (ref 40–60)
HGB BLD-MCNC: 13.2 G/DL (ref 12–17)
LDH SERPL-CCNC: 191 U/L (ref 81–234)
LDLC SERPL CALC-MCNC: 63 MG/DL (ref 0–100)
LYMPHOCYTES # BLD AUTO: 12.21 THOUSAND/UL (ref 0.6–4.47)
LYMPHOCYTES # BLD AUTO: 72 % (ref 14–44)
MAGNESIUM SERPL-MCNC: 1.6 MG/DL (ref 1.6–2.6)
MCH RBC QN AUTO: 27.2 PG (ref 26.8–34.3)
MCHC RBC AUTO-ENTMCNC: 30.7 G/DL (ref 31.4–37.4)
MCV RBC AUTO: 89 FL (ref 82–98)
MONOCYTES # BLD AUTO: 0.34 THOUSAND/UL (ref 0–1.22)
MONOCYTES NFR BLD: 2 % (ref 4–12)
NEUTROPHILS # BLD MANUAL: 3.9 THOUSAND/UL (ref 1.85–7.62)
NEUTS BAND NFR BLD MANUAL: 1 % (ref 0–8)
NEUTS SEG NFR BLD AUTO: 22 % (ref 43–75)
NRBC BLD AUTO-RTO: 0 /100 WBCS
OVALOCYTES BLD QL SMEAR: PRESENT
PLATELET # BLD AUTO: 211 THOUSANDS/UL (ref 149–390)
PLATELET BLD QL SMEAR: ADEQUATE
PMV BLD AUTO: 9.8 FL (ref 8.9–12.7)
POLYCHROMASIA BLD QL SMEAR: PRESENT
POTASSIUM SERPL-SCNC: 4.1 MMOL/L (ref 3.5–5.3)
PROT SERPL-MCNC: 6.7 G/DL (ref 6.4–8.2)
PSA SERPL-MCNC: 1.3 NG/ML (ref 0–4)
RBC # BLD AUTO: 4.86 MILLION/UL (ref 3.88–5.62)
SMUDGE CELLS BLD QL SMEAR: PRESENT
SODIUM SERPL-SCNC: 141 MMOL/L (ref 136–145)
STOMATOCYTES BLD QL SMEAR: PRESENT
TESTOST SERPL-MCNC: 100 NG/DL (ref 113–1065)
TOTAL CELLS COUNTED SPEC: 100
TRIGL SERPL-MCNC: 182 MG/DL
TSH SERPL DL<=0.05 MIU/L-ACNC: 2.41 UIU/ML (ref 0.36–3.74)
VARIANT LYMPHS # BLD AUTO: 2 %
WBC # BLD AUTO: 16.96 THOUSAND/UL (ref 4.31–10.16)

## 2019-09-18 PROCEDURE — 84403 ASSAY OF TOTAL TESTOSTERONE: CPT

## 2019-09-18 PROCEDURE — 84443 ASSAY THYROID STIM HORMONE: CPT

## 2019-09-18 PROCEDURE — 82977 ASSAY OF GGT: CPT

## 2019-09-18 PROCEDURE — 83615 LACTATE (LD) (LDH) ENZYME: CPT

## 2019-09-18 PROCEDURE — 85007 BL SMEAR W/DIFF WBC COUNT: CPT

## 2019-09-18 PROCEDURE — 80061 LIPID PANEL: CPT

## 2019-09-18 PROCEDURE — 80053 COMPREHEN METABOLIC PANEL: CPT

## 2019-09-18 PROCEDURE — 83735 ASSAY OF MAGNESIUM: CPT

## 2019-09-18 PROCEDURE — 82306 VITAMIN D 25 HYDROXY: CPT

## 2019-09-18 PROCEDURE — 87389 HIV-1 AG W/HIV-1&-2 AB AG IA: CPT

## 2019-09-18 PROCEDURE — 36415 COLL VENOUS BLD VENIPUNCTURE: CPT

## 2019-09-18 PROCEDURE — G0103 PSA SCREENING: HCPCS

## 2019-09-18 PROCEDURE — 86803 HEPATITIS C AB TEST: CPT

## 2019-09-18 PROCEDURE — 85027 COMPLETE CBC AUTOMATED: CPT

## 2019-09-20 LAB — HIV 1+2 AB+HIV1 P24 AG SERPL QL IA: NORMAL

## 2019-09-24 DIAGNOSIS — K21.9 GASTROESOPHAGEAL REFLUX DISEASE WITHOUT ESOPHAGITIS: ICD-10-CM

## 2019-09-24 RX ORDER — OMEPRAZOLE 20 MG/1
20 CAPSULE, DELAYED RELEASE ORAL DAILY
Qty: 14 CAPSULE | Refills: 0 | Status: SHIPPED | OUTPATIENT
Start: 2019-09-24 | End: 2019-10-11 | Stop reason: SDUPTHER

## 2019-09-30 DIAGNOSIS — E11.9 TYPE 2 DIABETES MELLITUS WITHOUT COMPLICATION, WITHOUT LONG-TERM CURRENT USE OF INSULIN (HCC): ICD-10-CM

## 2019-09-30 DIAGNOSIS — F32.9 REACTIVE DEPRESSION: ICD-10-CM

## 2019-09-30 DIAGNOSIS — I10 ESSENTIAL HYPERTENSION: ICD-10-CM

## 2019-09-30 DIAGNOSIS — E78.00 PURE HYPERCHOLESTEROLEMIA: ICD-10-CM

## 2019-09-30 RX ORDER — BUPROPION HYDROCHLORIDE 150 MG/1
TABLET, EXTENDED RELEASE ORAL
Qty: 180 TABLET | Refills: 4 | Status: SHIPPED | OUTPATIENT
Start: 2019-09-30 | End: 2020-02-18

## 2019-09-30 RX ORDER — PEN NEEDLE, DIABETIC 32GX 5/32"
NEEDLE, DISPOSABLE MISCELLANEOUS
Qty: 270 EACH | Refills: 4 | Status: SHIPPED | OUTPATIENT
Start: 2019-09-30 | End: 2020-10-20 | Stop reason: SDUPTHER

## 2019-09-30 RX ORDER — PRAVASTATIN SODIUM 80 MG/1
TABLET ORAL
Qty: 90 TABLET | Refills: 4 | Status: SHIPPED | OUTPATIENT
Start: 2019-09-30 | End: 2020-09-02 | Stop reason: SDUPTHER

## 2019-09-30 RX ORDER — GLIPIZIDE 10 MG/1
10 TABLET ORAL 2 TIMES DAILY
Qty: 180 TABLET | Refills: 0 | Status: SHIPPED | OUTPATIENT
Start: 2019-09-30 | End: 2019-12-30 | Stop reason: SDUPTHER

## 2019-09-30 RX ORDER — LOSARTAN POTASSIUM 50 MG/1
TABLET ORAL
Qty: 90 TABLET | Refills: 4 | Status: SHIPPED | OUTPATIENT
Start: 2019-09-30 | End: 2020-09-02 | Stop reason: SDUPTHER

## 2019-10-11 ENCOUNTER — TELEPHONE (OUTPATIENT)
Dept: HEMATOLOGY ONCOLOGY | Facility: CLINIC | Age: 49
End: 2019-10-11

## 2019-10-11 DIAGNOSIS — K21.9 GASTROESOPHAGEAL REFLUX DISEASE WITHOUT ESOPHAGITIS: ICD-10-CM

## 2019-10-11 RX ORDER — OMEPRAZOLE 20 MG/1
20 CAPSULE, DELAYED RELEASE ORAL DAILY
Qty: 30 CAPSULE | Refills: 0 | Status: SHIPPED | OUTPATIENT
Start: 2019-10-11 | End: 2019-11-11 | Stop reason: SDUPTHER

## 2019-10-15 ENCOUNTER — OFFICE VISIT (OUTPATIENT)
Dept: INTERNAL MEDICINE CLINIC | Facility: CLINIC | Age: 49
End: 2019-10-15
Payer: COMMERCIAL

## 2019-10-15 VITALS
SYSTOLIC BLOOD PRESSURE: 140 MMHG | HEIGHT: 66 IN | TEMPERATURE: 98.1 F | WEIGHT: 218 LBS | RESPIRATION RATE: 13 BRPM | HEART RATE: 70 BPM | DIASTOLIC BLOOD PRESSURE: 86 MMHG | BODY MASS INDEX: 35.03 KG/M2

## 2019-10-15 DIAGNOSIS — E78.2 MIXED HYPERLIPIDEMIA: ICD-10-CM

## 2019-10-15 DIAGNOSIS — Z23 ENCOUNTER FOR IMMUNIZATION: ICD-10-CM

## 2019-10-15 DIAGNOSIS — E11.9 TYPE 2 DIABETES MELLITUS WITHOUT COMPLICATION, WITHOUT LONG-TERM CURRENT USE OF INSULIN (HCC): Primary | ICD-10-CM

## 2019-10-15 DIAGNOSIS — B35.1 ONYCHOMYCOSIS: ICD-10-CM

## 2019-10-15 DIAGNOSIS — R79.89 LOW TESTOSTERONE: ICD-10-CM

## 2019-10-15 DIAGNOSIS — I10 ESSENTIAL HYPERTENSION: ICD-10-CM

## 2019-10-15 DIAGNOSIS — C91.10 CHRONIC LYMPHOCYTIC LEUKEMIA (HCC): ICD-10-CM

## 2019-10-15 DIAGNOSIS — K21.9 GASTROESOPHAGEAL REFLUX DISEASE WITHOUT ESOPHAGITIS: ICD-10-CM

## 2019-10-15 LAB
SL AMB POCT GLUCOSE BLD: 183
SL AMB POCT HEMOGLOBIN AIC: 9.1 (ref ?–6.5)

## 2019-10-15 PROCEDURE — 3008F BODY MASS INDEX DOCD: CPT | Performed by: INTERNAL MEDICINE

## 2019-10-15 PROCEDURE — 83036 HEMOGLOBIN GLYCOSYLATED A1C: CPT | Performed by: INTERNAL MEDICINE

## 2019-10-15 PROCEDURE — 82948 REAGENT STRIP/BLOOD GLUCOSE: CPT | Performed by: INTERNAL MEDICINE

## 2019-10-15 PROCEDURE — 90682 RIV4 VACC RECOMBINANT DNA IM: CPT | Performed by: INTERNAL MEDICINE

## 2019-10-15 PROCEDURE — 99214 OFFICE O/P EST MOD 30 MIN: CPT | Performed by: INTERNAL MEDICINE

## 2019-10-15 PROCEDURE — 90471 IMMUNIZATION ADMIN: CPT | Performed by: INTERNAL MEDICINE

## 2019-10-15 NOTE — PATIENT INSTRUCTIONS
Your blood pressure is control  Your diabetes is control from the sugars from home A1c is elevated so will check a blood sugar 2 hours after you eat a few times a week he that before breakfast lunch and dinner 2 hours after you eat and see if this HP change  Continue monitor your blood sugars as you are

## 2019-10-15 NOTE — PROGRESS NOTES
Assessment/Plan:  Repeat testosterone level before considered treatment PSA is normal which is good news we added a [address] LH    Problem 1  Low testosterone like to repeat that again  I know is cyclical at times and also like to check an [address] and LH will do a free and total      Problem 2  Diabetes this is a discrepancy between the hemoglobin A1c and blood sugars the blood sugars from home are excellent  I told him to check his blood sugars 2 hours after he eats to see if there is any difference  Will continue metformin extended release 502 twice a day with the glipizide 10 mg twice a day  No episodes of hypoglycemia he only had 1 blood sugar over 200    Problem 3  Blood pressure labile but well control on losartan 50 mg per day and metoprolol succinate 50 mg daily  No change in plan    Problem 4  Onychomycosis of the toenail on the left foot I gave him Penlac  As new medication  Problem 5  Reflux is not complain on omeprazole    Problem 6  Osteoarthritis low back pain takes Celebrex as needed  Renal function is normal             No problem-specific Assessment & Plan notes found for this encounter  Diagnoses and all orders for this visit:    Type 2 diabetes mellitus without complication, without long-term current use of insulin (HCC)  -     POCT hemoglobin A1c  -     POCT blood glucose  -     CBC and differential; Future  -     Comprehensive metabolic panel; Future  -     TSH, 3rd generation with Free T4 reflex; Future  -     Microalbumin / creatinine urine ratio  -     Urinalysis with reflex to microscopic  -     Magnesium; Future  -     Gamma GT; Future  -     LD,Blood; Future    Essential hypertension  -     CBC and differential; Future  -     Comprehensive metabolic panel; Future  -     TSH, 3rd generation with Free T4 reflex; Future  -     Microalbumin / creatinine urine ratio  -     Urinalysis with reflex to microscopic  -     Magnesium;  Future  -     Gamma GT; Future  -     LD,Blood; Future    Chronic lymphocytic leukemia (HCC)  -     CBC and differential; Future  -     Comprehensive metabolic panel; Future  -     TSH, 3rd generation with Free T4 reflex; Future  -     Microalbumin / creatinine urine ratio  -     Urinalysis with reflex to microscopic  -     Magnesium; Future  -     Gamma GT; Future  -     LD,Blood; Future    Mixed hyperlipidemia    Gastroesophageal reflux disease without esophagitis  -     CBC and differential; Future  -     Comprehensive metabolic panel; Future  -     TSH, 3rd generation with Free T4 reflex; Future  -     Microalbumin / creatinine urine ratio  -     Urinalysis with reflex to microscopic  -     Magnesium; Future  -     Gamma GT; Future  -     LD,Blood; Future    Encounter for immunization  -     FLUBLOK: influenza vaccine, quadrivalent, recombinant, PF, 0 5 mL    Onychomycosis  -     ciclopirox (PENLAC) 8 % solution; Apply topically daily at bedtime    Low testosterone  -     Testosterone; Future  -     Testosterone, free, total; Future  -     FSH and LH; Future          Subjective:      Patient ID: Tila Maza is a 51 y o  male  Chief Complaint   Patient presents with    Follow-up     Overdue  Needs micralbumin urine        Abdominal Pain    Foot Pain     left foot    Back Pain         Current Outpatient Medications:     BD PEN NEEDLE ASHLEY U/F 32G X 4 MM MISC, USE TO INJECT INSULIN THREE TIMES A DAY, Disp: 270 each, Rfl: 4    buPROPion (WELLBUTRIN SR) 150 mg 12 hr tablet, TAKE 1 TABLET TWICE A DAY, Disp: 180 tablet, Rfl: 4    Cholecalciferol (VITAMIN D3) 5000 units CAPS, Take by mouth, Disp: , Rfl:     glipiZIDE (GLUCOTROL) 10 mg tablet, Take 1 tablet (10 mg total) by mouth 2 (two) times a day, Disp: 180 tablet, Rfl: 0    Insulin Lispro Prot & Lispro (HUMALOG MIX 75/25 KWIKPEN) (75-25) 100 units/mL injection pen, INJECT 25 UNITS UNDER THE SKIN EVERY MORNING AND AT LUNCH TIME, AND 35 UNITS AT DINNER, Disp: 75 mL, Rfl: 0    losartan (COZAAR) 50 mg tablet, TAKE 1 TABLET DAILY, Disp: 90 tablet, Rfl: 4    metFORMIN (GLUCOPHAGE-XR) 500 mg 24 hr tablet, TAKE 2 TABLETS TWICE A DAY WITH MEALS, Disp: 360 tablet, Rfl: 0    metoprolol succinate (TOPROL-XL) 50 mg 24 hr tablet, TAKE 1 TABLET DAILY, Disp: 90 tablet, Rfl: 4    omeprazole (PriLOSEC) 20 mg delayed release capsule, Take 1 capsule (20 mg total) by mouth daily, Disp: 30 capsule, Rfl: 0    pravastatin (PRAVACHOL) 80 mg tablet, TAKE 1 TABLET DAILY, Disp: 90 tablet, Rfl: 4    celecoxib (CeleBREX) 200 mg capsule, Take 1 capsule (200 mg total) by mouth daily for 30 days, Disp: 30 capsule, Rfl: 1    ciclopirox (PENLAC) 8 % solution, Apply topically daily at bedtime, Disp: 6 6 mL, Rfl: 0    HPI    The following portions of the patient's history were reviewed and updated as appropriate: allergies, current medications, past family history, past medical history, past social history, past surgical history and problem list     Review of Systems   Constitutional: Negative  Negative for activity change, appetite change, fatigue, fever and unexpected weight change  HENT: Negative for congestion, ear pain, hearing loss, mouth sores, postnasal drip, rhinorrhea, sore throat, trouble swallowing and voice change  Eyes: Negative for pain, redness and visual disturbance  Respiratory: Negative for cough, chest tightness, shortness of breath and wheezing  Cardiovascular: Negative for chest pain, palpitations and leg swelling  Gastrointestinal: Negative for abdominal distention, abdominal pain, blood in stool, constipation, diarrhea and nausea  Endocrine: Negative for cold intolerance, heat intolerance, polydipsia, polyphagia and polyuria  Genitourinary: Negative for difficulty urinating, dysuria, flank pain, frequency, hematuria and urgency  Musculoskeletal: Negative for arthralgias, back pain, gait problem, joint swelling and myalgias  Skin: Negative for color change and pallor  Toenail hurts on the left foot to onychomycosis   Neurological: Negative for dizziness, tremors, seizures, syncope, weakness, numbness and headaches  Hematological: Negative for adenopathy  Does not bruise/bleed easily  Psychiatric/Behavioral: Negative  Negative for sleep disturbance  The patient is not nervous/anxious  Objective:    Results for orders placed or performed in visit on 10/15/19   POCT hemoglobin A1c   Result Value Ref Range    Hemoglobin A1C 9 1 (A) 6 5   POCT blood glucose   Result Value Ref Range    GLUCOSE         /86 (BP Location: Left arm, Patient Position: Sitting)   Pulse 70   Temp 98 1 °F (36 7 °C)   Resp 13   Ht 5' 6" (1 676 m)   Wt 98 9 kg (218 lb)   BMI 35 19 kg/m²      Physical Exam   Constitutional: He is oriented to person, place, and time  He appears well-developed and well-nourished  HENT:   Head: Normocephalic  Right Ear: External ear normal    Left Ear: External ear normal    Nose: Nose normal    Mouth/Throat: Oropharynx is clear and moist  No oropharyngeal exudate  Eyes: Pupils are equal, round, and reactive to light  Conjunctivae and EOM are normal    Neck: Normal range of motion  Neck supple  No thyromegaly present  Cardiovascular: Normal rate, regular rhythm, normal heart sounds and intact distal pulses  Exam reveals no gallop and no friction rub  No murmur heard  S1-S2 regular rhythm  Extremities no edema   Pulmonary/Chest: Effort normal and breath sounds normal  No respiratory distress  He has no wheezes  He has no rales  Lungs are clear no wheezing rales or rhonchi   Abdominal: Soft  Bowel sounds are normal  He exhibits no distension and no mass  There is no tenderness  There is no rebound and no guarding  Abdomen obese soft nontender no hepatosplenomegaly no abdominal pulsation  Adding find any lymphadenopathy palpable in the supraclavicular neck axilla epitrochlear or inguinal area  Musculoskeletal: Normal range of motion  Lymphadenopathy:     He has no cervical adenopathy  Neurological: He is alert and oriented to person, place, and time  Skin: Skin is warm and dry  Onychomycosis of the toenail of the left foot no evidence of erythema cellulitis nail thicken due to the fungal infection   Psychiatric: He has a normal mood and affect  His behavior is normal  Judgment normal    Nursing note and vitals reviewed

## 2019-10-21 ENCOUNTER — APPOINTMENT (OUTPATIENT)
Dept: LAB | Facility: HOSPITAL | Age: 49
End: 2019-10-21
Attending: INTERNAL MEDICINE
Payer: COMMERCIAL

## 2019-10-21 DIAGNOSIS — R79.89 LOW TESTOSTERONE: ICD-10-CM

## 2019-10-21 LAB
FSH SERPL-ACNC: 3.4 MIU/ML (ref 0.7–10.8)
LH SERPL-ACNC: 4.8 MIU/ML (ref 1.2–10.6)

## 2019-10-21 PROCEDURE — 83002 ASSAY OF GONADOTROPIN (LH): CPT

## 2019-10-21 PROCEDURE — 83001 ASSAY OF GONADOTROPIN (FSH): CPT

## 2019-10-21 PROCEDURE — 36415 COLL VENOUS BLD VENIPUNCTURE: CPT

## 2019-10-21 PROCEDURE — 84402 ASSAY OF FREE TESTOSTERONE: CPT

## 2019-10-21 PROCEDURE — 84403 ASSAY OF TOTAL TESTOSTERONE: CPT

## 2019-10-22 DIAGNOSIS — E11.9 TYPE 2 DIABETES MELLITUS WITHOUT COMPLICATION, WITHOUT LONG-TERM CURRENT USE OF INSULIN (HCC): ICD-10-CM

## 2019-10-22 DIAGNOSIS — I10 ESSENTIAL HYPERTENSION: ICD-10-CM

## 2019-10-22 LAB
TESTOST FREE SERPL-MCNC: 11.4 PG/ML (ref 6.8–21.5)
TESTOST SERPL-MCNC: 195 NG/DL (ref 264–916)

## 2019-10-22 RX ORDER — METFORMIN HYDROCHLORIDE 500 MG/1
TABLET, EXTENDED RELEASE ORAL
Qty: 360 TABLET | Refills: 4 | Status: SHIPPED | OUTPATIENT
Start: 2019-10-22 | End: 2020-09-02 | Stop reason: SDUPTHER

## 2019-11-11 DIAGNOSIS — K21.9 GASTROESOPHAGEAL REFLUX DISEASE WITHOUT ESOPHAGITIS: ICD-10-CM

## 2019-11-11 RX ORDER — OMEPRAZOLE 20 MG/1
20 CAPSULE, DELAYED RELEASE ORAL DAILY
Qty: 90 CAPSULE | Refills: 0 | Status: SHIPPED | OUTPATIENT
Start: 2019-11-11 | End: 2020-02-10

## 2019-11-14 DIAGNOSIS — K21.9 GASTROESOPHAGEAL REFLUX DISEASE WITHOUT ESOPHAGITIS: ICD-10-CM

## 2019-11-14 RX ORDER — OMEPRAZOLE 20 MG/1
CAPSULE, DELAYED RELEASE ORAL
Qty: 30 CAPSULE | Refills: 0 | Status: SHIPPED | OUTPATIENT
Start: 2019-11-14 | End: 2020-02-18

## 2019-11-15 ENCOUNTER — TELEPHONE (OUTPATIENT)
Dept: HEMATOLOGY ONCOLOGY | Facility: CLINIC | Age: 49
End: 2019-11-15

## 2019-11-15 NOTE — TELEPHONE ENCOUNTER
Called patient on 11/15 left message stating to call the office if he would like to reschedule his missed appointment with dr rangel today

## 2019-12-30 DIAGNOSIS — I10 ESSENTIAL HYPERTENSION: ICD-10-CM

## 2019-12-30 DIAGNOSIS — E11.9 TYPE 2 DIABETES MELLITUS WITHOUT COMPLICATION, WITHOUT LONG-TERM CURRENT USE OF INSULIN (HCC): ICD-10-CM

## 2019-12-30 RX ORDER — GLIPIZIDE 10 MG/1
TABLET ORAL
Qty: 180 TABLET | Refills: 4 | Status: SHIPPED | OUTPATIENT
Start: 2019-12-30 | End: 2020-10-16 | Stop reason: SDUPTHER

## 2019-12-30 RX ORDER — INSULIN LISPRO 100 [IU]/ML
INJECTION, SUSPENSION SUBCUTANEOUS
Qty: 75 ML | Refills: 0 | Status: SHIPPED | OUTPATIENT
Start: 2019-12-30 | End: 2020-01-06 | Stop reason: SDUPTHER

## 2020-01-06 DIAGNOSIS — E11.9 TYPE 2 DIABETES MELLITUS WITHOUT COMPLICATION, WITHOUT LONG-TERM CURRENT USE OF INSULIN (HCC): ICD-10-CM

## 2020-01-06 RX ORDER — INSULIN LISPRO 100 [IU]/ML
INJECTION, SUSPENSION SUBCUTANEOUS
Qty: 10 PEN | Refills: 0 | Status: SHIPPED | OUTPATIENT
Start: 2020-01-06 | End: 2020-03-10

## 2020-01-17 ENCOUNTER — APPOINTMENT (OUTPATIENT)
Dept: LAB | Facility: HOSPITAL | Age: 50
End: 2020-01-17
Attending: INTERNAL MEDICINE
Payer: COMMERCIAL

## 2020-01-17 DIAGNOSIS — K21.9 GASTROESOPHAGEAL REFLUX DISEASE WITHOUT ESOPHAGITIS: ICD-10-CM

## 2020-01-17 DIAGNOSIS — E11.9 TYPE 2 DIABETES MELLITUS WITHOUT COMPLICATION, WITHOUT LONG-TERM CURRENT USE OF INSULIN (HCC): ICD-10-CM

## 2020-01-17 DIAGNOSIS — C91.10 CHRONIC LYMPHOCYTIC LEUKEMIA (HCC): ICD-10-CM

## 2020-01-17 DIAGNOSIS — I10 ESSENTIAL HYPERTENSION: ICD-10-CM

## 2020-01-17 LAB
ALBUMIN SERPL BCP-MCNC: 4.1 G/DL (ref 3.5–5)
ALP SERPL-CCNC: 71 U/L (ref 46–116)
ALT SERPL W P-5'-P-CCNC: 41 U/L (ref 12–78)
ANION GAP SERPL CALCULATED.3IONS-SCNC: 8 MMOL/L (ref 4–13)
AST SERPL W P-5'-P-CCNC: 24 U/L (ref 5–45)
BASOPHILS # BLD MANUAL: 0.15 THOUSAND/UL (ref 0–0.1)
BASOPHILS NFR MAR MANUAL: 1 % (ref 0–1)
BILIRUB SERPL-MCNC: 0.46 MG/DL (ref 0.2–1)
BILIRUB UR QL STRIP: NEGATIVE
BUN SERPL-MCNC: 11 MG/DL (ref 5–25)
CALCIUM SERPL-MCNC: 9.4 MG/DL (ref 8.3–10.1)
CHLORIDE SERPL-SCNC: 101 MMOL/L (ref 100–108)
CLARITY UR: CLEAR
CO2 SERPL-SCNC: 30 MMOL/L (ref 21–32)
COLOR UR: YELLOW
CREAT SERPL-MCNC: 1.13 MG/DL (ref 0.6–1.3)
CREAT UR-MCNC: 139 MG/DL
EOSINOPHIL # BLD MANUAL: 0 THOUSAND/UL (ref 0–0.4)
EOSINOPHIL NFR BLD MANUAL: 0 % (ref 0–6)
ERYTHROCYTE [DISTWIDTH] IN BLOOD BY AUTOMATED COUNT: 12.6 % (ref 11.6–15.1)
GFR SERPL CREATININE-BSD FRML MDRD: 76 ML/MIN/1.73SQ M
GGT SERPL-CCNC: 63 U/L (ref 5–85)
GLUCOSE P FAST SERPL-MCNC: 205 MG/DL (ref 65–99)
GLUCOSE UR STRIP-MCNC: ABNORMAL MG/DL
HCT VFR BLD AUTO: 45.2 % (ref 36.5–49.3)
HGB BLD-MCNC: 14 G/DL (ref 12–17)
HGB UR QL STRIP.AUTO: NEGATIVE
KETONES UR STRIP-MCNC: NEGATIVE MG/DL
LDH SERPL-CCNC: 172 U/L (ref 81–234)
LEUKOCYTE ESTERASE UR QL STRIP: NEGATIVE
LYMPHOCYTES # BLD AUTO: 11.32 THOUSAND/UL (ref 0.6–4.47)
LYMPHOCYTES # BLD AUTO: 76 % (ref 14–44)
MAGNESIUM SERPL-MCNC: 1.8 MG/DL (ref 1.6–2.6)
MCH RBC QN AUTO: 27.6 PG (ref 26.8–34.3)
MCHC RBC AUTO-ENTMCNC: 31 G/DL (ref 31.4–37.4)
MCV RBC AUTO: 89 FL (ref 82–98)
METAMYELOCYTES NFR BLD MANUAL: 1 % (ref 0–1)
MICROALBUMIN UR-MCNC: 20.8 MG/L (ref 0–20)
MICROALBUMIN/CREAT 24H UR: 15 MG/G CREATININE (ref 0–30)
MONOCYTES # BLD AUTO: 0.6 THOUSAND/UL (ref 0–1.22)
MONOCYTES NFR BLD: 4 % (ref 4–12)
MYELOCYTES NFR BLD MANUAL: 1 % (ref 0–1)
NEUTROPHILS # BLD MANUAL: 2.53 THOUSAND/UL (ref 1.85–7.62)
NEUTS SEG NFR BLD AUTO: 17 % (ref 43–75)
NITRITE UR QL STRIP: NEGATIVE
NRBC BLD AUTO-RTO: 0 /100 WBCS
PH UR STRIP.AUTO: 7.5 [PH]
PLATELET # BLD AUTO: 251 THOUSANDS/UL (ref 149–390)
PLATELET BLD QL SMEAR: ADEQUATE
PMV BLD AUTO: 9.8 FL (ref 8.9–12.7)
POTASSIUM SERPL-SCNC: 4.7 MMOL/L (ref 3.5–5.3)
PROT SERPL-MCNC: 7.3 G/DL (ref 6.4–8.2)
PROT UR STRIP-MCNC: NEGATIVE MG/DL
RBC # BLD AUTO: 5.07 MILLION/UL (ref 3.88–5.62)
RBC MORPH BLD: NORMAL
SODIUM SERPL-SCNC: 139 MMOL/L (ref 136–145)
SP GR UR STRIP.AUTO: 1.01 (ref 1–1.03)
TOTAL CELLS COUNTED SPEC: 100
TSH SERPL DL<=0.05 MIU/L-ACNC: 2.68 UIU/ML (ref 0.36–3.74)
UROBILINOGEN UR QL STRIP.AUTO: 0.2 E.U./DL
WBC # BLD AUTO: 14.9 THOUSAND/UL (ref 4.31–10.16)

## 2020-01-17 PROCEDURE — 82977 ASSAY OF GGT: CPT

## 2020-01-17 PROCEDURE — 82043 UR ALBUMIN QUANTITATIVE: CPT | Performed by: INTERNAL MEDICINE

## 2020-01-17 PROCEDURE — 80053 COMPREHEN METABOLIC PANEL: CPT

## 2020-01-17 PROCEDURE — 82570 ASSAY OF URINE CREATININE: CPT | Performed by: INTERNAL MEDICINE

## 2020-01-17 PROCEDURE — 84443 ASSAY THYROID STIM HORMONE: CPT

## 2020-01-17 PROCEDURE — 85007 BL SMEAR W/DIFF WBC COUNT: CPT

## 2020-01-17 PROCEDURE — 36415 COLL VENOUS BLD VENIPUNCTURE: CPT

## 2020-01-17 PROCEDURE — 83615 LACTATE (LD) (LDH) ENZYME: CPT

## 2020-01-17 PROCEDURE — 85027 COMPLETE CBC AUTOMATED: CPT

## 2020-01-17 PROCEDURE — 83735 ASSAY OF MAGNESIUM: CPT

## 2020-01-17 PROCEDURE — 81003 URINALYSIS AUTO W/O SCOPE: CPT | Performed by: INTERNAL MEDICINE

## 2020-02-10 DIAGNOSIS — K21.9 GASTROESOPHAGEAL REFLUX DISEASE WITHOUT ESOPHAGITIS: ICD-10-CM

## 2020-02-10 RX ORDER — OMEPRAZOLE 20 MG/1
CAPSULE, DELAYED RELEASE ORAL
Qty: 90 CAPSULE | Refills: 4 | Status: SHIPPED | OUTPATIENT
Start: 2020-02-10 | End: 2020-04-30 | Stop reason: SDUPTHER

## 2020-02-18 ENCOUNTER — OFFICE VISIT (OUTPATIENT)
Dept: INTERNAL MEDICINE CLINIC | Facility: CLINIC | Age: 50
End: 2020-02-18
Payer: COMMERCIAL

## 2020-02-18 VITALS
WEIGHT: 223 LBS | RESPIRATION RATE: 16 BRPM | TEMPERATURE: 98.5 F | SYSTOLIC BLOOD PRESSURE: 128 MMHG | BODY MASS INDEX: 35.84 KG/M2 | HEIGHT: 66 IN | DIASTOLIC BLOOD PRESSURE: 82 MMHG

## 2020-02-18 DIAGNOSIS — Z23 ENCOUNTER FOR IMMUNIZATION: ICD-10-CM

## 2020-02-18 DIAGNOSIS — C91.10 CHRONIC LYMPHOCYTIC LEUKEMIA (HCC): ICD-10-CM

## 2020-02-18 DIAGNOSIS — E11.9 TYPE 2 DIABETES MELLITUS WITHOUT COMPLICATION, WITHOUT LONG-TERM CURRENT USE OF INSULIN (HCC): ICD-10-CM

## 2020-02-18 DIAGNOSIS — E78.2 MIXED HYPERLIPIDEMIA: ICD-10-CM

## 2020-02-18 DIAGNOSIS — R79.89 LOW TESTOSTERONE: ICD-10-CM

## 2020-02-18 DIAGNOSIS — E11.9 TYPE 2 DIABETES MELLITUS WITHOUT COMPLICATION, WITHOUT LONG-TERM CURRENT USE OF INSULIN (HCC): Primary | ICD-10-CM

## 2020-02-18 DIAGNOSIS — I10 ESSENTIAL HYPERTENSION: ICD-10-CM

## 2020-02-18 DIAGNOSIS — K21.9 GASTROESOPHAGEAL REFLUX DISEASE WITHOUT ESOPHAGITIS: Primary | ICD-10-CM

## 2020-02-18 LAB
SL AMB POCT GLUCOSE BLD: 173
SL AMB POCT HEMOGLOBIN AIC: 9.7 (ref ?–6.5)

## 2020-02-18 PROCEDURE — 3079F DIAST BP 80-89 MM HG: CPT | Performed by: INTERNAL MEDICINE

## 2020-02-18 PROCEDURE — 3060F POS MICROALBUMINURIA REV: CPT | Performed by: INTERNAL MEDICINE

## 2020-02-18 PROCEDURE — 3008F BODY MASS INDEX DOCD: CPT | Performed by: INTERNAL MEDICINE

## 2020-02-18 PROCEDURE — 83036 HEMOGLOBIN GLYCOSYLATED A1C: CPT | Performed by: INTERNAL MEDICINE

## 2020-02-18 PROCEDURE — 99214 OFFICE O/P EST MOD 30 MIN: CPT | Performed by: INTERNAL MEDICINE

## 2020-02-18 PROCEDURE — 90670 PCV13 VACCINE IM: CPT | Performed by: INTERNAL MEDICINE

## 2020-02-18 PROCEDURE — 82948 REAGENT STRIP/BLOOD GLUCOSE: CPT | Performed by: INTERNAL MEDICINE

## 2020-02-18 PROCEDURE — 3074F SYST BP LT 130 MM HG: CPT | Performed by: INTERNAL MEDICINE

## 2020-02-18 PROCEDURE — 1036F TOBACCO NON-USER: CPT | Performed by: INTERNAL MEDICINE

## 2020-02-18 PROCEDURE — 90471 IMMUNIZATION ADMIN: CPT | Performed by: INTERNAL MEDICINE

## 2020-02-18 PROCEDURE — 3046F HEMOGLOBIN A1C LEVEL >9.0%: CPT | Performed by: INTERNAL MEDICINE

## 2020-02-18 RX ORDER — FLASH GLUCOSE SCANNING READER
EACH MISCELLANEOUS
Qty: 100 DEVICE | Refills: 1 | Status: SHIPPED | OUTPATIENT
Start: 2020-02-18 | End: 2021-05-14

## 2020-02-18 RX ORDER — SILDENAFIL 100 MG/1
100 TABLET, FILM COATED ORAL DAILY PRN
Qty: 10 TABLET | Refills: 1 | Status: SHIPPED | OUTPATIENT
Start: 2020-02-18 | End: 2020-10-20

## 2020-02-18 RX ORDER — FLASH GLUCOSE SENSOR
KIT MISCELLANEOUS
Qty: 2 EACH | Refills: 2 | Status: SHIPPED | OUTPATIENT
Start: 2020-02-18 | End: 2021-05-14

## 2020-02-18 NOTE — PATIENT INSTRUCTIONS
Endocrinology referral your A1c is 9 7 does not match your blood sugars that you're recording at home you may need to take it at different time before meals and at bedtime and a couple of x2 hours after you eat  Your blood pressure is control  I think her hip pain is more soft-tissue stop doing those exercises I think the pain will resolve  Continue follow-up with Hematology    Prevnar vaccine today

## 2020-02-18 NOTE — PROGRESS NOTES
Assessment/Plan: For diabetic foot exam next office visit endocrinology referral hemoglobin A1c 9 7 does not match the blood sugars he has recording at home  He may need to recorded 4 times a day and recorded 2 hours after he eats will refer him to diabetic teaching and dietary comes Prevnar vaccine today      Comes in for his regular office visit is complaining of right hip pain apparently was doing some exercise at the gym that was aggravated the hip the hip only hurts when he abducts the hip on the right side otherwise he is able to walk without any problem he has no pain  By stopping the exercises high am sure the pain will improve because cell like is soft-tissue    Problem 2  Diabetes he is taking insulin Humalog mix 692122 units in the morning and at lunchtime and 35 units with dinner he has blood sugars from home are not that bad in the morning there below 130 before lunch there 160-199 but it does not fit with a hemoglobin A1c of 9 7 some going to have endocrinology review he may need to check his blood sugars before dinner and at bedtime at 1 time check a 2 hours after he    Sexual dysfunction has improved since he stop the Wellbutrin he stop it for about 2 or 3 months and he feels better  Pneumococcal vaccine Prevnar 13  BMI Counseling: There is no height or weight on file to calculate BMI  The BMI is above normal  Nutrition recommendations include decreasing portion sizes, encouraging healthy choices of fruits and vegetables, decreasing fast food intake, consuming healthier snacks, limiting drinks that contain sugar, increasing intake of lean protein, reducing intake of saturated and trans fat and reducing intake of cholesterol  Exercise recommendations include moderate physical activity 150 minutes/week  No pharmacotherapy was ordered  Patient referred to PCP due to patient being overweight  No problem-specific Assessment & Plan notes found for this encounter         There are no diagnoses linked to this encounter  Subjective:      Patient ID: Tila Maza is a 51 y o  male  No chief complaint on file  Current Outpatient Medications:     BD PEN NEEDLE ASHLEY U/F 32G X 4 MM MISC, USE TO INJECT INSULIN THREE TIMES A DAY, Disp: 270 each, Rfl: 4    buPROPion (WELLBUTRIN SR) 150 mg 12 hr tablet, TAKE 1 TABLET TWICE A DAY, Disp: 180 tablet, Rfl: 4    celecoxib (CeleBREX) 200 mg capsule, Take 1 capsule (200 mg total) by mouth daily for 30 days, Disp: 30 capsule, Rfl: 1    Cholecalciferol (VITAMIN D3) 5000 units CAPS, Take by mouth, Disp: , Rfl:     ciclopirox (PENLAC) 8 % solution, Apply topically daily at bedtime, Disp: 6 6 mL, Rfl: 0    glipiZIDE (GLUCOTROL) 10 mg tablet, TAKE 1 TABLET TWICE A DAY, Disp: 180 tablet, Rfl: 4    Insulin Lispro Prot & Lispro (HUMALOG MIX 75/25 KWIKPEN) (75-25) 100 units/mL injection pen, INJECT 25 UNITS IN THE MORNING AND AT LUNCH TIME AND 35 UNITS AT DINNER, Disp: 10 pen, Rfl: 0    losartan (COZAAR) 50 mg tablet, TAKE 1 TABLET DAILY, Disp: 90 tablet, Rfl: 4    metFORMIN (GLUCOPHAGE-XR) 500 mg 24 hr tablet, TAKE 2 TABLETS TWICE A DAY WITH MEALS, Disp: 360 tablet, Rfl: 4    metoprolol succinate (TOPROL-XL) 50 mg 24 hr tablet, TAKE 1 TABLET DAILY, Disp: 90 tablet, Rfl: 4    omeprazole (PriLOSEC) 20 mg delayed release capsule, TAKE ONE CAPSULE BY MOUTH DAILY, Disp: 30 capsule, Rfl: 0    omeprazole (PriLOSEC) 20 mg delayed release capsule, TAKE 1 CAPSULE DAILY, Disp: 90 capsule, Rfl: 4    pravastatin (PRAVACHOL) 80 mg tablet, TAKE 1 TABLET DAILY, Disp: 90 tablet, Rfl: 4    HPI    The following portions of the patient's history were reviewed and updated as appropriate: allergies, current medications, past family history, past medical history, past social history, past surgical history and problem list     Review of Systems   Constitutional: Negative    Negative for activity change, appetite change, fatigue, fever and unexpected weight change  HENT: Negative for congestion, ear pain, hearing loss, mouth sores, postnasal drip, rhinorrhea, sore throat, trouble swallowing and voice change  Eyes: Negative for pain, redness and visual disturbance  Respiratory: Negative for cough, chest tightness, shortness of breath and wheezing  Cardiovascular: Negative for chest pain, palpitations and leg swelling  Gastrointestinal: Negative for abdominal distention, abdominal pain, blood in stool, constipation, diarrhea and nausea  Endocrine: Negative for cold intolerance, heat intolerance, polydipsia, polyphagia and polyuria  Genitourinary: Negative for difficulty urinating, dysuria, flank pain, frequency, hematuria and urgency  Musculoskeletal: Negative for arthralgias, back pain, gait problem, joint swelling and myalgias  Hip pain appears to be tendinitis is able to walk without any problem   Skin: Negative for color change and pallor  Neurological: Negative for dizziness, tremors, seizures, syncope, weakness, numbness and headaches  Hematological: Negative for adenopathy  Does not bruise/bleed easily  Psychiatric/Behavioral: Negative  Negative for sleep disturbance  The patient is not nervous/anxious  Objective:    Patient's shoes and socks removed        Results for orders placed or performed in visit on 01/17/20   CBC and differential   Result Value Ref Range    WBC 14 90 (H) 4 31 - 10 16 Thousand/uL    RBC 5 07 3 88 - 5 62 Million/uL    Hemoglobin 14 0 12 0 - 17 0 g/dL    Hematocrit 45 2 36 5 - 49 3 %    MCV 89 82 - 98 fL    MCH 27 6 26 8 - 34 3 pg    MCHC 31 0 (L) 31 4 - 37 4 g/dL    RDW 12 6 11 6 - 15 1 %    MPV 9 8 8 9 - 12 7 fL    Platelets 653 533 - 837 Thousands/uL    nRBC 0 /100 WBCs   Comprehensive metabolic panel   Result Value Ref Range    Sodium 139 136 - 145 mmol/L    Potassium 4 7 3 5 - 5 3 mmol/L    Chloride 101 100 - 108 mmol/L    CO2 30 21 - 32 mmol/L    ANION GAP 8 4 - 13 mmol/L    BUN 11 5 - 25 mg/dL Creatinine 1 13 0 60 - 1 30 mg/dL    Glucose, Fasting 205 (H) 65 - 99 mg/dL    Calcium 9 4 8 3 - 10 1 mg/dL    AST 24 5 - 45 U/L    ALT 41 12 - 78 U/L    Alkaline Phosphatase 71 46 - 116 U/L    Total Protein 7 3 6 4 - 8 2 g/dL    Albumin 4 1 3 5 - 5 0 g/dL    Total Bilirubin 0 46 0 20 - 1 00 mg/dL    eGFR 76 ml/min/1 73sq m   TSH, 3rd generation with Free T4 reflex   Result Value Ref Range    TSH 3RD GENERATON 2 676 0 358 - 3 740 uIU/mL   Magnesium   Result Value Ref Range    Magnesium 1 8 1 6 - 2 6 mg/dL   Gamma GT   Result Value Ref Range    GGT 63 5 - 85 U/L   LD,Blood   Result Value Ref Range     81 - 234 U/L   Manual Differential(PHLEBS Do Not Order)   Result Value Ref Range    Segmented % 17 (L) 43 - 75 %    Lymphocytes % 76 (H) 14 - 44 %    Monocytes % 4 4 - 12 %    Eosinophils, % 0 0 - 6 %    Basophils % 1 0 - 1 %    Metamyelocytes% 1 0 - 1 %    Myelocytes % 1 0 - 1 %    Absolute Neutrophils 2 53 1 85 - 7 62 Thousand/uL    Lymphocytes Absolute 11 32 (H) 0 60 - 4 47 Thousand/uL    Monocytes Absolute 0 60 0 00 - 1 22 Thousand/uL    Eosinophils Absolute 0 00 0 00 - 0 40 Thousand/uL    Basophils Absolute 0 15 (H) 0 00 - 0 10 Thousand/uL    Total Counted 100     RBC Morphology Normal     Platelet Estimate Adequate Adequate       There were no vitals taken for this visit  Physical Exam   Constitutional: He is oriented to person, place, and time  He appears well-developed and well-nourished  HENT:   Head: Normocephalic  Right Ear: External ear normal    Left Ear: External ear normal    Nose: Nose normal    Mouth/Throat: Oropharynx is clear and moist  No oropharyngeal exudate  Eyes: Pupils are equal, round, and reactive to light  Conjunctivae and EOM are normal    Neck: Normal range of motion  Neck supple  No thyromegaly present  No left no lymphadenopathy palpable in the neck supraclavicular axilla epitrochlear or inguinal area     Cardiovascular: Normal rate, regular rhythm, normal heart sounds and intact distal pulses  Exam reveals no gallop and no friction rub  No murmur heard  S1-S2 regular rhythm  Extremities no edema   Pulmonary/Chest: Effort normal and breath sounds normal  No respiratory distress  He has no wheezes  He has no rales  Lungs are clear no wheezing rales or rhonchi   Abdominal: Soft  Bowel sounds are normal  He exhibits no distension and no mass  There is no tenderness  There is no rebound and no guarding  Abdomen obese soft nontender   Musculoskeletal: Normal range of motion  Lymphadenopathy:     He has no cervical adenopathy  Neurological: He is alert and oriented to person, place, and time  Skin: Skin is warm and dry  Psychiatric: He has a normal mood and affect  His behavior is normal  Judgment normal    Nursing note and vitals reviewed

## 2020-03-10 DIAGNOSIS — E11.9 TYPE 2 DIABETES MELLITUS WITHOUT COMPLICATION, WITHOUT LONG-TERM CURRENT USE OF INSULIN (HCC): ICD-10-CM

## 2020-03-10 RX ORDER — INSULIN LISPRO 100 [IU]/ML
INJECTION, SUSPENSION SUBCUTANEOUS
Qty: 75 ML | Refills: 3 | Status: SHIPPED | OUTPATIENT
Start: 2020-03-10 | End: 2020-04-06 | Stop reason: SDUPTHER

## 2020-04-06 DIAGNOSIS — E11.9 TYPE 2 DIABETES MELLITUS WITHOUT COMPLICATION, WITHOUT LONG-TERM CURRENT USE OF INSULIN (HCC): ICD-10-CM

## 2020-04-06 RX ORDER — INSULIN LISPRO 100 [IU]/ML
INJECTION, SUSPENSION SUBCUTANEOUS
Qty: 75 ML | Refills: 3 | Status: SHIPPED | OUTPATIENT
Start: 2020-04-06 | End: 2020-10-20 | Stop reason: SDUPTHER

## 2020-04-30 DIAGNOSIS — K21.9 GASTROESOPHAGEAL REFLUX DISEASE WITHOUT ESOPHAGITIS: ICD-10-CM

## 2020-04-30 RX ORDER — OMEPRAZOLE 20 MG/1
20 CAPSULE, DELAYED RELEASE ORAL DAILY
Qty: 90 CAPSULE | Refills: 0 | Status: SHIPPED | OUTPATIENT
Start: 2020-04-30 | End: 2020-07-29

## 2020-07-24 DIAGNOSIS — I10 ESSENTIAL HYPERTENSION: ICD-10-CM

## 2020-07-24 RX ORDER — METOPROLOL SUCCINATE 50 MG/1
50 TABLET, EXTENDED RELEASE ORAL DAILY
Qty: 90 TABLET | Refills: 0 | Status: SHIPPED | OUTPATIENT
Start: 2020-07-24 | End: 2020-10-16

## 2020-07-29 DIAGNOSIS — K21.9 GASTROESOPHAGEAL REFLUX DISEASE WITHOUT ESOPHAGITIS: ICD-10-CM

## 2020-07-29 RX ORDER — OMEPRAZOLE 20 MG/1
CAPSULE, DELAYED RELEASE ORAL
Qty: 90 CAPSULE | Refills: 0 | Status: SHIPPED | OUTPATIENT
Start: 2020-07-29 | End: 2020-11-23 | Stop reason: SDUPTHER

## 2020-08-01 ENCOUNTER — APPOINTMENT (OUTPATIENT)
Dept: LAB | Facility: HOSPITAL | Age: 50
End: 2020-08-01
Attending: INTERNAL MEDICINE
Payer: COMMERCIAL

## 2020-08-01 DIAGNOSIS — K21.9 GASTROESOPHAGEAL REFLUX DISEASE WITHOUT ESOPHAGITIS: ICD-10-CM

## 2020-08-01 DIAGNOSIS — I10 ESSENTIAL HYPERTENSION: ICD-10-CM

## 2020-08-01 DIAGNOSIS — E11.9 TYPE 2 DIABETES MELLITUS WITHOUT COMPLICATION, WITHOUT LONG-TERM CURRENT USE OF INSULIN (HCC): ICD-10-CM

## 2020-08-01 DIAGNOSIS — C91.10 CHRONIC LYMPHOCYTIC LEUKEMIA (HCC): ICD-10-CM

## 2020-08-01 LAB
ALBUMIN SERPL BCP-MCNC: 3.7 G/DL (ref 3.5–5)
ALP SERPL-CCNC: 92 U/L (ref 46–116)
ALT SERPL W P-5'-P-CCNC: 63 U/L (ref 12–78)
ANION GAP SERPL CALCULATED.3IONS-SCNC: 8 MMOL/L (ref 4–13)
AST SERPL W P-5'-P-CCNC: 29 U/L (ref 5–45)
BASOPHILS # BLD AUTO: 0.07 THOUSANDS/ΜL (ref 0–0.1)
BASOPHILS NFR BLD AUTO: 0 % (ref 0–1)
BILIRUB SERPL-MCNC: 0.46 MG/DL (ref 0.2–1)
BUN SERPL-MCNC: 11 MG/DL (ref 5–25)
CALCIUM SERPL-MCNC: 9.2 MG/DL (ref 8.3–10.1)
CHLORIDE SERPL-SCNC: 102 MMOL/L (ref 100–108)
CO2 SERPL-SCNC: 28 MMOL/L (ref 21–32)
CREAT SERPL-MCNC: 1 MG/DL (ref 0.6–1.3)
CREAT UR-MCNC: 158 MG/DL
EOSINOPHIL # BLD AUTO: 0.1 THOUSAND/ΜL (ref 0–0.61)
EOSINOPHIL NFR BLD AUTO: 1 % (ref 0–6)
ERYTHROCYTE [DISTWIDTH] IN BLOOD BY AUTOMATED COUNT: 12.4 % (ref 11.6–15.1)
EST. AVERAGE GLUCOSE BLD GHB EST-MCNC: 266 MG/DL
GFR SERPL CREATININE-BSD FRML MDRD: 89 ML/MIN/1.73SQ M
GLUCOSE P FAST SERPL-MCNC: 200 MG/DL (ref 65–99)
HBA1C MFR BLD: 10.9 %
HCT VFR BLD AUTO: 42.8 % (ref 36.5–49.3)
HGB BLD-MCNC: 13.3 G/DL (ref 12–17)
IMM GRANULOCYTES # BLD AUTO: 0.1 THOUSAND/UL (ref 0–0.2)
IMM GRANULOCYTES NFR BLD AUTO: 1 % (ref 0–2)
LYMPHOCYTES # BLD AUTO: 12.8 THOUSANDS/ΜL (ref 0.6–4.47)
LYMPHOCYTES NFR BLD AUTO: 75 % (ref 14–44)
MAGNESIUM SERPL-MCNC: 1.9 MG/DL (ref 1.6–2.6)
MCH RBC QN AUTO: 27.7 PG (ref 26.8–34.3)
MCHC RBC AUTO-ENTMCNC: 31.1 G/DL (ref 31.4–37.4)
MCV RBC AUTO: 89 FL (ref 82–98)
MICROALBUMIN UR-MCNC: 15.3 MG/L (ref 0–20)
MICROALBUMIN/CREAT 24H UR: 10 MG/G CREATININE (ref 0–30)
MONOCYTES # BLD AUTO: 0.88 THOUSAND/ΜL (ref 0.17–1.22)
MONOCYTES NFR BLD AUTO: 5 % (ref 4–12)
NEUTROPHILS # BLD AUTO: 3.02 THOUSANDS/ΜL (ref 1.85–7.62)
NEUTS SEG NFR BLD AUTO: 18 % (ref 43–75)
NRBC BLD AUTO-RTO: 0 /100 WBCS
PLATELET # BLD AUTO: 177 THOUSANDS/UL (ref 149–390)
PMV BLD AUTO: 9.8 FL (ref 8.9–12.7)
POTASSIUM SERPL-SCNC: 4.8 MMOL/L (ref 3.5–5.3)
PROT SERPL-MCNC: 6.8 G/DL (ref 6.4–8.2)
RBC # BLD AUTO: 4.8 MILLION/UL (ref 3.88–5.62)
SODIUM SERPL-SCNC: 138 MMOL/L (ref 136–145)
TSH SERPL DL<=0.05 MIU/L-ACNC: 2.76 UIU/ML (ref 0.36–3.74)
WBC # BLD AUTO: 16.97 THOUSAND/UL (ref 4.31–10.16)

## 2020-08-01 PROCEDURE — 3046F HEMOGLOBIN A1C LEVEL >9.0%: CPT | Performed by: INTERNAL MEDICINE

## 2020-08-01 PROCEDURE — 85025 COMPLETE CBC W/AUTO DIFF WBC: CPT

## 2020-08-01 PROCEDURE — 83036 HEMOGLOBIN GLYCOSYLATED A1C: CPT

## 2020-08-01 PROCEDURE — 84443 ASSAY THYROID STIM HORMONE: CPT

## 2020-08-01 PROCEDURE — 80053 COMPREHEN METABOLIC PANEL: CPT

## 2020-08-01 PROCEDURE — 82570 ASSAY OF URINE CREATININE: CPT | Performed by: INTERNAL MEDICINE

## 2020-08-01 PROCEDURE — 3061F NEG MICROALBUMINURIA REV: CPT | Performed by: INTERNAL MEDICINE

## 2020-08-01 PROCEDURE — 82043 UR ALBUMIN QUANTITATIVE: CPT | Performed by: INTERNAL MEDICINE

## 2020-08-01 PROCEDURE — 36415 COLL VENOUS BLD VENIPUNCTURE: CPT

## 2020-08-01 PROCEDURE — 83735 ASSAY OF MAGNESIUM: CPT

## 2020-08-06 ENCOUNTER — TELEPHONE (OUTPATIENT)
Dept: INTERNAL MEDICINE CLINIC | Facility: CLINIC | Age: 50
End: 2020-08-06

## 2020-08-06 NOTE — TELEPHONE ENCOUNTER
----- Message from Shaina Huerta MD sent at 8/2/2020 10:50 AM EDT -----  Please call the patient regarding his abnormal result  poor  Control over 1 year  A1C  Up  10 9  Needs  Monitor  Glucose  Ac &  Hs  Send  Results  1 week  Ask  What he is doing  With insulin  ? ?   Try to go  Diabetic  Teaching  Refer  !!!  OV  SEPT  Save  !!!!

## 2020-08-06 NOTE — TELEPHONE ENCOUNTER
I called the patient to advise him of abnormal A1C results  Patient stated he is not understanding why his A1C results come back high when his blood sugars at home range from 110-120 in the morning and 130-150 in the afternoon  He also stated he eat a normal diet and he goes to the gym daily to exercise  He stated he does take his insulin as directed which is 25 units in the morning and afternoon and 35 units in the evening  Patient also stated he has already gone to diabetic teaching and nutrition classes  He stated he has been following meals plans and meal preps they have given him  Dr Waleska Cobb was informed and he stated the patient is to test his blood sugars 4 times a day before each meal and before bedtime  He is to write down all blood sugar results and bring them to his next appointment  I advised the patient of Dr Michelle gage and he understood

## 2020-09-02 DIAGNOSIS — E11.9 TYPE 2 DIABETES MELLITUS WITHOUT COMPLICATION, WITHOUT LONG-TERM CURRENT USE OF INSULIN (HCC): ICD-10-CM

## 2020-09-02 DIAGNOSIS — I10 ESSENTIAL HYPERTENSION: ICD-10-CM

## 2020-09-02 DIAGNOSIS — E78.00 PURE HYPERCHOLESTEROLEMIA: ICD-10-CM

## 2020-09-02 RX ORDER — METFORMIN HYDROCHLORIDE 500 MG/1
1000 TABLET, EXTENDED RELEASE ORAL 2 TIMES DAILY WITH MEALS
Qty: 60 TABLET | Refills: 0 | Status: SHIPPED | OUTPATIENT
Start: 2020-09-02 | End: 2020-09-18

## 2020-09-02 RX ORDER — LOSARTAN POTASSIUM 50 MG/1
50 TABLET ORAL DAILY
Qty: 30 TABLET | Refills: 0 | Status: SHIPPED | OUTPATIENT
Start: 2020-09-02 | End: 2020-10-03

## 2020-09-02 RX ORDER — PRAVASTATIN SODIUM 80 MG/1
80 TABLET ORAL DAILY
Qty: 30 TABLET | Refills: 0 | Status: SHIPPED | OUTPATIENT
Start: 2020-09-02 | End: 2020-10-03

## 2020-09-18 DIAGNOSIS — I10 ESSENTIAL HYPERTENSION: ICD-10-CM

## 2020-09-18 DIAGNOSIS — E11.9 TYPE 2 DIABETES MELLITUS WITHOUT COMPLICATION, WITHOUT LONG-TERM CURRENT USE OF INSULIN (HCC): ICD-10-CM

## 2020-09-18 RX ORDER — METFORMIN HYDROCHLORIDE 500 MG/1
TABLET, EXTENDED RELEASE ORAL
Qty: 60 TABLET | Refills: 0 | Status: SHIPPED | OUTPATIENT
Start: 2020-09-18 | End: 2020-10-01

## 2020-10-01 DIAGNOSIS — I10 ESSENTIAL HYPERTENSION: ICD-10-CM

## 2020-10-01 DIAGNOSIS — E11.9 TYPE 2 DIABETES MELLITUS WITHOUT COMPLICATION, WITHOUT LONG-TERM CURRENT USE OF INSULIN (HCC): ICD-10-CM

## 2020-10-01 RX ORDER — METFORMIN HYDROCHLORIDE 500 MG/1
TABLET, EXTENDED RELEASE ORAL
Qty: 60 TABLET | Refills: 0 | Status: SHIPPED | OUTPATIENT
Start: 2020-10-01 | End: 2020-10-14

## 2020-10-03 DIAGNOSIS — E78.00 PURE HYPERCHOLESTEROLEMIA: ICD-10-CM

## 2020-10-03 DIAGNOSIS — E11.9 TYPE 2 DIABETES MELLITUS WITHOUT COMPLICATION, WITHOUT LONG-TERM CURRENT USE OF INSULIN (HCC): ICD-10-CM

## 2020-10-03 DIAGNOSIS — I10 ESSENTIAL HYPERTENSION: ICD-10-CM

## 2020-10-03 PROCEDURE — 4010F ACE/ARB THERAPY RXD/TAKEN: CPT | Performed by: INTERNAL MEDICINE

## 2020-10-03 RX ORDER — LOSARTAN POTASSIUM 50 MG/1
TABLET ORAL
Qty: 30 TABLET | Refills: 0 | Status: SHIPPED | OUTPATIENT
Start: 2020-10-03 | End: 2020-10-31

## 2020-10-03 RX ORDER — PRAVASTATIN SODIUM 80 MG/1
TABLET ORAL
Qty: 30 TABLET | Refills: 0 | Status: SHIPPED | OUTPATIENT
Start: 2020-10-03 | End: 2020-10-31

## 2020-10-14 DIAGNOSIS — I10 ESSENTIAL HYPERTENSION: ICD-10-CM

## 2020-10-14 DIAGNOSIS — E11.9 TYPE 2 DIABETES MELLITUS WITHOUT COMPLICATION, WITHOUT LONG-TERM CURRENT USE OF INSULIN (HCC): ICD-10-CM

## 2020-10-14 RX ORDER — METFORMIN HYDROCHLORIDE 500 MG/1
TABLET, EXTENDED RELEASE ORAL
Qty: 60 TABLET | Refills: 0 | Status: SHIPPED | OUTPATIENT
Start: 2020-10-14 | End: 2020-10-28

## 2020-10-16 DIAGNOSIS — I10 ESSENTIAL HYPERTENSION: ICD-10-CM

## 2020-10-16 DIAGNOSIS — E11.9 TYPE 2 DIABETES MELLITUS WITHOUT COMPLICATION, WITHOUT LONG-TERM CURRENT USE OF INSULIN (HCC): ICD-10-CM

## 2020-10-16 RX ORDER — GLIPIZIDE 10 MG/1
10 TABLET ORAL
Qty: 180 TABLET | Refills: 1 | Status: SHIPPED | OUTPATIENT
Start: 2020-10-16 | End: 2021-03-02 | Stop reason: SDUPTHER

## 2020-10-16 RX ORDER — METOPROLOL SUCCINATE 50 MG/1
TABLET, EXTENDED RELEASE ORAL
Qty: 90 TABLET | Refills: 0 | Status: SHIPPED | OUTPATIENT
Start: 2020-10-16 | End: 2021-01-14 | Stop reason: SDUPTHER

## 2020-10-20 ENCOUNTER — OFFICE VISIT (OUTPATIENT)
Dept: INTERNAL MEDICINE CLINIC | Facility: CLINIC | Age: 50
End: 2020-10-20
Payer: COMMERCIAL

## 2020-10-20 VITALS
HEART RATE: 84 BPM | BODY MASS INDEX: 35.2 KG/M2 | RESPIRATION RATE: 13 BRPM | DIASTOLIC BLOOD PRESSURE: 92 MMHG | TEMPERATURE: 98 F | SYSTOLIC BLOOD PRESSURE: 148 MMHG | HEIGHT: 66 IN | WEIGHT: 219 LBS

## 2020-10-20 DIAGNOSIS — I10 ESSENTIAL HYPERTENSION: ICD-10-CM

## 2020-10-20 DIAGNOSIS — Z23 ENCOUNTER FOR IMMUNIZATION: ICD-10-CM

## 2020-10-20 DIAGNOSIS — R61 NIGHT SWEATS: ICD-10-CM

## 2020-10-20 DIAGNOSIS — C91.10 CHRONIC LYMPHOCYTIC LEUKEMIA (HCC): ICD-10-CM

## 2020-10-20 DIAGNOSIS — E78.2 MIXED HYPERLIPIDEMIA: ICD-10-CM

## 2020-10-20 DIAGNOSIS — K21.9 GASTROESOPHAGEAL REFLUX DISEASE WITHOUT ESOPHAGITIS: Primary | ICD-10-CM

## 2020-10-20 DIAGNOSIS — R37 SEXUAL DYSFUNCTION: ICD-10-CM

## 2020-10-20 DIAGNOSIS — R79.89 LOW TESTOSTERONE: ICD-10-CM

## 2020-10-20 DIAGNOSIS — E11.9 TYPE 2 DIABETES MELLITUS WITHOUT COMPLICATION, WITHOUT LONG-TERM CURRENT USE OF INSULIN (HCC): ICD-10-CM

## 2020-10-20 PROCEDURE — 1036F TOBACCO NON-USER: CPT | Performed by: INTERNAL MEDICINE

## 2020-10-20 PROCEDURE — 3080F DIAST BP >= 90 MM HG: CPT | Performed by: INTERNAL MEDICINE

## 2020-10-20 PROCEDURE — 99396 PREV VISIT EST AGE 40-64: CPT | Performed by: INTERNAL MEDICINE

## 2020-10-20 PROCEDURE — 90471 IMMUNIZATION ADMIN: CPT | Performed by: INTERNAL MEDICINE

## 2020-10-20 PROCEDURE — 90682 RIV4 VACC RECOMBINANT DNA IM: CPT | Performed by: INTERNAL MEDICINE

## 2020-10-20 RX ORDER — SILDENAFIL CITRATE 20 MG/1
TABLET ORAL
Qty: 30 TABLET | Refills: 0 | Status: SHIPPED | OUTPATIENT
Start: 2020-10-20 | End: 2022-03-25 | Stop reason: SDUPTHER

## 2020-10-20 RX ORDER — INSULIN LISPRO 100 [IU]/ML
INJECTION, SUSPENSION SUBCUTANEOUS
Qty: 10 PEN | Refills: 3 | Status: SHIPPED | OUTPATIENT
Start: 2020-10-20 | End: 2020-11-27 | Stop reason: SDUPTHER

## 2020-10-20 RX ORDER — PEN NEEDLE, DIABETIC 32GX 5/32"
NEEDLE, DISPOSABLE MISCELLANEOUS
Qty: 10 EACH | Refills: 3 | Status: SHIPPED | OUTPATIENT
Start: 2020-10-20 | End: 2020-12-14 | Stop reason: ALTCHOICE

## 2020-10-28 DIAGNOSIS — I10 ESSENTIAL HYPERTENSION: ICD-10-CM

## 2020-10-28 DIAGNOSIS — E11.9 TYPE 2 DIABETES MELLITUS WITHOUT COMPLICATION, WITHOUT LONG-TERM CURRENT USE OF INSULIN (HCC): ICD-10-CM

## 2020-10-28 RX ORDER — METFORMIN HYDROCHLORIDE 500 MG/1
TABLET, EXTENDED RELEASE ORAL
Qty: 60 TABLET | Refills: 0 | Status: SHIPPED | OUTPATIENT
Start: 2020-10-28 | End: 2020-11-17

## 2020-10-31 DIAGNOSIS — E78.00 PURE HYPERCHOLESTEROLEMIA: ICD-10-CM

## 2020-10-31 DIAGNOSIS — E11.9 TYPE 2 DIABETES MELLITUS WITHOUT COMPLICATION, WITHOUT LONG-TERM CURRENT USE OF INSULIN (HCC): ICD-10-CM

## 2020-10-31 DIAGNOSIS — I10 ESSENTIAL HYPERTENSION: ICD-10-CM

## 2020-10-31 RX ORDER — PRAVASTATIN SODIUM 80 MG/1
TABLET ORAL
Qty: 30 TABLET | Refills: 0 | Status: SHIPPED | OUTPATIENT
Start: 2020-10-31 | End: 2020-11-30 | Stop reason: SDUPTHER

## 2020-10-31 RX ORDER — LOSARTAN POTASSIUM 50 MG/1
TABLET ORAL
Qty: 30 TABLET | Refills: 0 | Status: SHIPPED | OUTPATIENT
Start: 2020-10-31 | End: 2020-11-30 | Stop reason: SDUPTHER

## 2020-11-17 DIAGNOSIS — E11.9 TYPE 2 DIABETES MELLITUS WITHOUT COMPLICATION, WITHOUT LONG-TERM CURRENT USE OF INSULIN (HCC): ICD-10-CM

## 2020-11-17 DIAGNOSIS — I10 ESSENTIAL HYPERTENSION: ICD-10-CM

## 2020-11-17 RX ORDER — METFORMIN HYDROCHLORIDE 500 MG/1
TABLET, EXTENDED RELEASE ORAL
Qty: 60 TABLET | Refills: 0 | Status: SHIPPED | OUTPATIENT
Start: 2020-11-17 | End: 2020-12-04 | Stop reason: SDUPTHER

## 2020-11-23 DIAGNOSIS — K21.9 GASTROESOPHAGEAL REFLUX DISEASE WITHOUT ESOPHAGITIS: ICD-10-CM

## 2020-11-23 RX ORDER — OMEPRAZOLE 20 MG/1
20 CAPSULE, DELAYED RELEASE ORAL DAILY
Qty: 90 CAPSULE | Refills: 0 | Status: SHIPPED | OUTPATIENT
Start: 2020-11-23 | End: 2020-11-27 | Stop reason: SDUPTHER

## 2020-11-27 DIAGNOSIS — K21.9 GASTROESOPHAGEAL REFLUX DISEASE WITHOUT ESOPHAGITIS: ICD-10-CM

## 2020-11-27 DIAGNOSIS — E11.9 TYPE 2 DIABETES MELLITUS WITHOUT COMPLICATION, WITHOUT LONG-TERM CURRENT USE OF INSULIN (HCC): ICD-10-CM

## 2020-11-27 RX ORDER — OMEPRAZOLE 20 MG/1
20 CAPSULE, DELAYED RELEASE ORAL DAILY
Qty: 90 CAPSULE | Refills: 0 | Status: SHIPPED | OUTPATIENT
Start: 2020-11-27 | End: 2021-02-22

## 2020-11-27 RX ORDER — INSULIN LISPRO PROTAMIN/LISPRO 75-25/ML
VIAL (ML) SUBCUTANEOUS
Qty: 70 ML | Refills: 3 | Status: SHIPPED | OUTPATIENT
Start: 2020-11-27 | End: 2020-12-14 | Stop reason: SDUPTHER

## 2020-11-30 DIAGNOSIS — E11.9 TYPE 2 DIABETES MELLITUS WITHOUT COMPLICATION, WITHOUT LONG-TERM CURRENT USE OF INSULIN (HCC): ICD-10-CM

## 2020-11-30 DIAGNOSIS — I10 ESSENTIAL HYPERTENSION: ICD-10-CM

## 2020-11-30 DIAGNOSIS — E78.00 PURE HYPERCHOLESTEROLEMIA: ICD-10-CM

## 2020-11-30 RX ORDER — LOSARTAN POTASSIUM 50 MG/1
50 TABLET ORAL DAILY
Qty: 30 TABLET | Refills: 1 | Status: SHIPPED | OUTPATIENT
Start: 2020-11-30 | End: 2021-01-25

## 2020-11-30 RX ORDER — PRAVASTATIN SODIUM 80 MG/1
80 TABLET ORAL DAILY
Qty: 30 TABLET | Refills: 1 | Status: SHIPPED | OUTPATIENT
Start: 2020-11-30 | End: 2021-01-25

## 2020-12-04 DIAGNOSIS — E11.9 TYPE 2 DIABETES MELLITUS WITHOUT COMPLICATION, WITHOUT LONG-TERM CURRENT USE OF INSULIN (HCC): ICD-10-CM

## 2020-12-04 DIAGNOSIS — I10 ESSENTIAL HYPERTENSION: ICD-10-CM

## 2020-12-04 RX ORDER — METFORMIN HYDROCHLORIDE 500 MG/1
500 TABLET, EXTENDED RELEASE ORAL 2 TIMES DAILY WITH MEALS
Qty: 60 TABLET | Refills: 2 | Status: SHIPPED | OUTPATIENT
Start: 2020-12-04 | End: 2021-03-02 | Stop reason: SDUPTHER

## 2020-12-14 DIAGNOSIS — E11.9 TYPE 2 DIABETES MELLITUS WITHOUT COMPLICATION, WITHOUT LONG-TERM CURRENT USE OF INSULIN (HCC): ICD-10-CM

## 2020-12-14 RX ORDER — SYRINGE-NEEDLE,INSULIN,0.5 ML 30 G X1/2"
SYRINGE, EMPTY DISPOSABLE MISCELLANEOUS
Qty: 300 EACH | Refills: 1 | Status: SHIPPED | OUTPATIENT
Start: 2020-12-14 | End: 2021-01-20 | Stop reason: SDUPTHER

## 2020-12-14 RX ORDER — INSULIN LISPRO PROTAMIN/LISPRO 75-25/ML
VIAL (ML) SUBCUTANEOUS
Qty: 90 ML | Refills: 1 | Status: SHIPPED | OUTPATIENT
Start: 2020-12-14 | End: 2021-01-07

## 2021-01-07 DIAGNOSIS — E11.9 TYPE 2 DIABETES MELLITUS WITHOUT COMPLICATION, WITHOUT LONG-TERM CURRENT USE OF INSULIN (HCC): ICD-10-CM

## 2021-01-07 RX ORDER — INSULIN LISPRO 100 [IU]/ML
INJECTION, SUSPENSION SUBCUTANEOUS
Qty: 5 PEN | Refills: 2 | Status: SHIPPED | OUTPATIENT
Start: 2021-01-07 | End: 2021-02-03 | Stop reason: SDUPTHER

## 2021-01-07 NOTE — TELEPHONE ENCOUNTER
Pt requesting pens, insurance will not cover a 90 day supply only a 30  Spoke to pt and he ok the 30 days supply

## 2021-01-14 DIAGNOSIS — I10 ESSENTIAL HYPERTENSION: ICD-10-CM

## 2021-01-14 RX ORDER — METOPROLOL SUCCINATE 50 MG/1
50 TABLET, EXTENDED RELEASE ORAL DAILY
Qty: 90 TABLET | Refills: 0 | Status: SHIPPED | OUTPATIENT
Start: 2021-01-14 | End: 2021-04-15

## 2021-01-20 DIAGNOSIS — E11.9 TYPE 2 DIABETES MELLITUS WITHOUT COMPLICATION, WITHOUT LONG-TERM CURRENT USE OF INSULIN (HCC): ICD-10-CM

## 2021-01-20 DIAGNOSIS — E11.9 TYPE 2 DIABETES MELLITUS WITHOUT COMPLICATION, WITHOUT LONG-TERM CURRENT USE OF INSULIN (HCC): Primary | ICD-10-CM

## 2021-01-20 RX ORDER — SYRINGE-NEEDLE,INSULIN,0.5 ML 30 G X1/2"
SYRINGE, EMPTY DISPOSABLE MISCELLANEOUS
Qty: 300 EACH | Refills: 1 | Status: SHIPPED | OUTPATIENT
Start: 2021-01-20

## 2021-01-20 RX ORDER — PEN NEEDLE, DIABETIC 32GX 5/32"
NEEDLE, DISPOSABLE MISCELLANEOUS 3 TIMES DAILY
Qty: 100 EACH | Refills: 3 | Status: SHIPPED | OUTPATIENT
Start: 2021-01-20 | End: 2021-03-19 | Stop reason: SDUPTHER

## 2021-01-21 DIAGNOSIS — Z11.52 ENCOUNTER FOR SCREENING FOR COVID-19: Primary | ICD-10-CM

## 2021-01-25 DIAGNOSIS — E11.9 TYPE 2 DIABETES MELLITUS WITHOUT COMPLICATION, WITHOUT LONG-TERM CURRENT USE OF INSULIN (HCC): ICD-10-CM

## 2021-01-25 DIAGNOSIS — I10 ESSENTIAL HYPERTENSION: ICD-10-CM

## 2021-01-25 DIAGNOSIS — E78.00 PURE HYPERCHOLESTEROLEMIA: ICD-10-CM

## 2021-01-25 RX ORDER — PRAVASTATIN SODIUM 80 MG/1
TABLET ORAL
Qty: 30 TABLET | Refills: 1 | Status: SHIPPED | OUTPATIENT
Start: 2021-01-25 | End: 2021-03-23

## 2021-01-25 RX ORDER — LOSARTAN POTASSIUM 50 MG/1
TABLET ORAL
Qty: 30 TABLET | Refills: 1 | Status: SHIPPED | OUTPATIENT
Start: 2021-01-25 | End: 2021-03-19

## 2021-02-03 DIAGNOSIS — E11.9 TYPE 2 DIABETES MELLITUS WITHOUT COMPLICATION, WITHOUT LONG-TERM CURRENT USE OF INSULIN (HCC): ICD-10-CM

## 2021-02-03 RX ORDER — INSULIN LISPRO 100 [IU]/ML
INJECTION, SUSPENSION SUBCUTANEOUS
Qty: 5 PEN | Refills: 2 | Status: SHIPPED | OUTPATIENT
Start: 2021-02-03 | End: 2021-02-04 | Stop reason: SDUPTHER

## 2021-02-04 DIAGNOSIS — E11.9 TYPE 2 DIABETES MELLITUS WITHOUT COMPLICATION, WITHOUT LONG-TERM CURRENT USE OF INSULIN (HCC): ICD-10-CM

## 2021-02-04 RX ORDER — INSULIN LISPRO 100 [IU]/ML
INJECTION, SUSPENSION SUBCUTANEOUS
Qty: 15 PEN | Refills: 2 | Status: SHIPPED | OUTPATIENT
Start: 2021-02-04 | End: 2021-02-08 | Stop reason: SDUPTHER

## 2021-02-04 NOTE — TELEPHONE ENCOUNTER
Please resend, insurance will pay for 90 day  Pt usually get 30 day but insurance policy has changed

## 2021-02-08 DIAGNOSIS — E11.9 TYPE 2 DIABETES MELLITUS WITHOUT COMPLICATION, WITHOUT LONG-TERM CURRENT USE OF INSULIN (HCC): ICD-10-CM

## 2021-02-08 RX ORDER — INSULIN LISPRO 100 [IU]/ML
INJECTION, SUSPENSION SUBCUTANEOUS
Qty: 26 PEN | Refills: 2 | Status: SHIPPED | OUTPATIENT
Start: 2021-02-08 | End: 2021-05-06 | Stop reason: SDUPTHER

## 2021-02-22 DIAGNOSIS — K21.9 GASTROESOPHAGEAL REFLUX DISEASE WITHOUT ESOPHAGITIS: ICD-10-CM

## 2021-02-22 RX ORDER — OMEPRAZOLE 20 MG/1
CAPSULE, DELAYED RELEASE ORAL
Qty: 90 CAPSULE | Refills: 0 | Status: SHIPPED | OUTPATIENT
Start: 2021-02-22 | End: 2021-05-14

## 2021-02-27 DIAGNOSIS — E11.9 TYPE 2 DIABETES MELLITUS WITHOUT COMPLICATION, WITHOUT LONG-TERM CURRENT USE OF INSULIN (HCC): ICD-10-CM

## 2021-02-27 DIAGNOSIS — I10 ESSENTIAL HYPERTENSION: ICD-10-CM

## 2021-02-27 RX ORDER — METFORMIN HYDROCHLORIDE 500 MG/1
TABLET, EXTENDED RELEASE ORAL
Qty: 60 TABLET | Refills: 2 | OUTPATIENT
Start: 2021-02-27

## 2021-03-02 DIAGNOSIS — E11.9 TYPE 2 DIABETES MELLITUS WITHOUT COMPLICATION, WITHOUT LONG-TERM CURRENT USE OF INSULIN (HCC): ICD-10-CM

## 2021-03-02 DIAGNOSIS — I10 ESSENTIAL HYPERTENSION: ICD-10-CM

## 2021-03-02 RX ORDER — GLIPIZIDE 10 MG/1
10 TABLET ORAL
Qty: 180 TABLET | Refills: 1 | Status: SHIPPED | OUTPATIENT
Start: 2021-03-02 | End: 2021-04-09

## 2021-03-02 RX ORDER — METFORMIN HYDROCHLORIDE 500 MG/1
500 TABLET, EXTENDED RELEASE ORAL 2 TIMES DAILY WITH MEALS
Qty: 180 TABLET | Refills: 1 | Status: SHIPPED | OUTPATIENT
Start: 2021-03-02 | End: 2021-04-30 | Stop reason: SDUPTHER

## 2021-03-18 ENCOUNTER — LAB (OUTPATIENT)
Dept: LAB | Facility: HOSPITAL | Age: 51
End: 2021-03-18
Attending: INTERNAL MEDICINE
Payer: COMMERCIAL

## 2021-03-18 DIAGNOSIS — E11.9 TYPE 2 DIABETES MELLITUS WITHOUT COMPLICATION, WITHOUT LONG-TERM CURRENT USE OF INSULIN (HCC): ICD-10-CM

## 2021-03-18 DIAGNOSIS — I10 ESSENTIAL HYPERTENSION: ICD-10-CM

## 2021-03-18 DIAGNOSIS — C91.10 CHRONIC LYMPHOCYTIC LEUKEMIA (HCC): ICD-10-CM

## 2021-03-18 DIAGNOSIS — E78.2 MIXED HYPERLIPIDEMIA: ICD-10-CM

## 2021-03-18 LAB
ALBUMIN SERPL BCP-MCNC: 3.8 G/DL (ref 3.5–5)
ALP SERPL-CCNC: 112 U/L (ref 46–116)
ALT SERPL W P-5'-P-CCNC: 68 U/L (ref 12–78)
ANION GAP SERPL CALCULATED.3IONS-SCNC: 4 MMOL/L (ref 4–13)
AST SERPL W P-5'-P-CCNC: 30 U/L (ref 5–45)
BASOPHILS # BLD MANUAL: 0 THOUSAND/UL (ref 0–0.1)
BASOPHILS NFR MAR MANUAL: 0 % (ref 0–1)
BILIRUB SERPL-MCNC: 0.44 MG/DL (ref 0.2–1)
BUN SERPL-MCNC: 14 MG/DL (ref 5–25)
CALCIUM SERPL-MCNC: 9.8 MG/DL (ref 8.3–10.1)
CHLORIDE SERPL-SCNC: 103 MMOL/L (ref 100–108)
CHOLEST SERPL-MCNC: 147 MG/DL (ref 50–200)
CO2 SERPL-SCNC: 29 MMOL/L (ref 21–32)
CREAT SERPL-MCNC: 1.04 MG/DL (ref 0.6–1.3)
EOSINOPHIL # BLD MANUAL: 0.17 THOUSAND/UL (ref 0–0.4)
EOSINOPHIL NFR BLD MANUAL: 1 % (ref 0–6)
ERYTHROCYTE [DISTWIDTH] IN BLOOD BY AUTOMATED COUNT: 12.2 % (ref 11.6–15.1)
GFR SERPL CREATININE-BSD FRML MDRD: 84 ML/MIN/1.73SQ M
GLUCOSE P FAST SERPL-MCNC: 213 MG/DL (ref 65–99)
HCT VFR BLD AUTO: 45.4 % (ref 36.5–49.3)
HDLC SERPL-MCNC: 26 MG/DL
HGB BLD-MCNC: 14.2 G/DL (ref 12–17)
LDLC SERPL CALC-MCNC: 78 MG/DL (ref 0–100)
LYMPHOCYTES # BLD AUTO: 11.07 THOUSAND/UL (ref 0.6–4.47)
LYMPHOCYTES # BLD AUTO: 67 % (ref 14–44)
MCH RBC QN AUTO: 26.3 PG (ref 26.8–34.3)
MCHC RBC AUTO-ENTMCNC: 31.3 G/DL (ref 31.4–37.4)
MCV RBC AUTO: 84 FL (ref 82–98)
MONOCYTES # BLD AUTO: 0.5 THOUSAND/UL (ref 0–1.22)
MONOCYTES NFR BLD: 3 % (ref 4–12)
NEUTROPHILS # BLD MANUAL: 3.47 THOUSAND/UL (ref 1.85–7.62)
NEUTS SEG NFR BLD AUTO: 21 % (ref 43–75)
NRBC BLD AUTO-RTO: 0 /100 WBCS
PLATELET # BLD AUTO: 175 THOUSANDS/UL (ref 149–390)
PLATELET BLD QL SMEAR: ADEQUATE
PMV BLD AUTO: 10.1 FL (ref 8.9–12.7)
POTASSIUM SERPL-SCNC: 4.3 MMOL/L (ref 3.5–5.3)
PROT SERPL-MCNC: 6.7 G/DL (ref 6.4–8.2)
RBC # BLD AUTO: 5.4 MILLION/UL (ref 3.88–5.62)
RBC MORPH BLD: NORMAL
SODIUM SERPL-SCNC: 136 MMOL/L (ref 136–145)
TOTAL CELLS COUNTED SPEC: 100
TRIGL SERPL-MCNC: 217 MG/DL
VARIANT LYMPHS # BLD AUTO: 8 %
WBC # BLD AUTO: 16.52 THOUSAND/UL (ref 4.31–10.16)

## 2021-03-18 PROCEDURE — 36415 COLL VENOUS BLD VENIPUNCTURE: CPT

## 2021-03-18 PROCEDURE — 80061 LIPID PANEL: CPT

## 2021-03-18 PROCEDURE — 83036 HEMOGLOBIN GLYCOSYLATED A1C: CPT

## 2021-03-18 PROCEDURE — 85007 BL SMEAR W/DIFF WBC COUNT: CPT

## 2021-03-18 PROCEDURE — 85027 COMPLETE CBC AUTOMATED: CPT

## 2021-03-18 PROCEDURE — 80053 COMPREHEN METABOLIC PANEL: CPT

## 2021-03-19 ENCOUNTER — OFFICE VISIT (OUTPATIENT)
Dept: INTERNAL MEDICINE CLINIC | Facility: CLINIC | Age: 51
End: 2021-03-19
Payer: COMMERCIAL

## 2021-03-19 VITALS
HEART RATE: 73 BPM | WEIGHT: 222 LBS | HEIGHT: 66 IN | SYSTOLIC BLOOD PRESSURE: 182 MMHG | OXYGEN SATURATION: 98 % | DIASTOLIC BLOOD PRESSURE: 90 MMHG | TEMPERATURE: 97.6 F | BODY MASS INDEX: 35.68 KG/M2 | RESPIRATION RATE: 18 BRPM

## 2021-03-19 DIAGNOSIS — I10 ESSENTIAL HYPERTENSION: ICD-10-CM

## 2021-03-19 DIAGNOSIS — C91.10 CHRONIC LYMPHOCYTIC LEUKEMIA (HCC): ICD-10-CM

## 2021-03-19 DIAGNOSIS — R79.89 LOW TESTOSTERONE: ICD-10-CM

## 2021-03-19 DIAGNOSIS — E11.9 TYPE 2 DIABETES MELLITUS WITHOUT COMPLICATION, WITHOUT LONG-TERM CURRENT USE OF INSULIN (HCC): ICD-10-CM

## 2021-03-19 DIAGNOSIS — E78.2 MIXED HYPERLIPIDEMIA: ICD-10-CM

## 2021-03-19 DIAGNOSIS — K21.9 GASTROESOPHAGEAL REFLUX DISEASE WITHOUT ESOPHAGITIS: Primary | ICD-10-CM

## 2021-03-19 LAB
EST. AVERAGE GLUCOSE BLD GHB EST-MCNC: 309 MG/DL
HBA1C MFR BLD: 12.4 %

## 2021-03-19 PROCEDURE — 3046F HEMOGLOBIN A1C LEVEL >9.0%: CPT | Performed by: INTERNAL MEDICINE

## 2021-03-19 PROCEDURE — 1036F TOBACCO NON-USER: CPT | Performed by: INTERNAL MEDICINE

## 2021-03-19 PROCEDURE — 3008F BODY MASS INDEX DOCD: CPT | Performed by: INTERNAL MEDICINE

## 2021-03-19 PROCEDURE — 99214 OFFICE O/P EST MOD 30 MIN: CPT | Performed by: INTERNAL MEDICINE

## 2021-03-19 PROCEDURE — 3080F DIAST BP >= 90 MM HG: CPT | Performed by: INTERNAL MEDICINE

## 2021-03-19 PROCEDURE — 3725F SCREEN DEPRESSION PERFORMED: CPT | Performed by: INTERNAL MEDICINE

## 2021-03-19 PROCEDURE — 3077F SYST BP >= 140 MM HG: CPT | Performed by: INTERNAL MEDICINE

## 2021-03-19 RX ORDER — PEN NEEDLE, DIABETIC 32GX 5/32"
NEEDLE, DISPOSABLE MISCELLANEOUS 3 TIMES DAILY
Qty: 100 EACH | Refills: 3 | Status: SHIPPED | OUTPATIENT
Start: 2021-03-19 | End: 2021-05-06 | Stop reason: SDUPTHER

## 2021-03-19 RX ORDER — BLOOD-GLUCOSE METER
KIT MISCELLANEOUS
Qty: 90 EACH | Refills: 3 | Status: SHIPPED | OUTPATIENT
Start: 2021-03-19

## 2021-03-19 RX ORDER — LOSARTAN POTASSIUM AND HYDROCHLOROTHIAZIDE 12.5; 1 MG/1; MG/1
1 TABLET ORAL DAILY
Qty: 30 TABLET | Refills: 5 | Status: SHIPPED | OUTPATIENT
Start: 2021-03-19 | End: 2021-04-30

## 2021-03-19 NOTE — PATIENT INSTRUCTIONS
Will stop the losartan 50 mg   Going to start losartan hydrochlorothiazide 100/12 51 tablet daily  Going to watch her salt intake  Going to see the endocrinologist to better control your diabetes and helps with the management   Going to send you to diabetic school   Will going to see her back in 6 weeks    New medication for diabetes Jardiance 10 mg daily

## 2021-03-19 NOTE — PROGRESS NOTES
BMI Counseling: Body mass index is 35 83 kg/m²  The BMI is above normal  Nutrition recommendations include decreasing portion sizes, encouraging healthy choices of fruits and vegetables, decreasing fast food intake, consuming healthier snacks, limiting drinks that contain sugar, moderation in carbohydrate intake, increasing intake of lean protein, reducing intake of saturated and trans fat and reducing intake of cholesterol  Exercise recommendations include moderate physical activity 150 minutes/week  No pharmacotherapy was ordered  Patient referred to PCP due to patient being overweight  Assessment/PlanLosartan hydrochlorothiazide 100/25 to take 1 tablet for better blood pressure control refer to endocrinology for diabetes hemoglobin A1c is 12 the highs is been in the last 3 years       problem 1  Diabetes poorly control over the last 3-4 years the last hemoglobin A1c that was in control was in 2017 in spite being on list pro combination Humalog 7525 on metformin on sulfonylurea as her sugars are still on poorly control we need endocrinology help he needs to see a dietitian he needs to review the diet to avoid complications in the future    Problem 2  Blood pressure elevated the most elevated blood pressure he had in the few years he is only taking losartan 50 mg per day will going to increase the losartan to 100 mg with hydrochlorothiazide to better improve the blood pressure control he is going to have electrolytes done with a microalbumin creatinine ratio in the urine a urinalysis before he comes back  History of chronic lymphocytic leukemia his white count is stable as well as his hemoglobin he has no B symptoms of night sweats weight loss or fever which is good news  I did not palpate any lymphadenopathy  Will add Jardiance 10 mg daily    He is to follow-up with Hematology         No problem-specific Assessment & Plan notes found for this encounter         Diagnoses and all orders for this visit:    Gastroesophageal reflux disease without esophagitis    Type 2 diabetes mellitus without complication, without long-term current use of insulin (HCC)    Essential hypertension    Mixed hyperlipidemia    Chronic lymphocytic leukemia (HCC)    Low testosterone          Subjective:      Patient ID: Chani Xiong is a 53 y o  male  No chief complaint on file          Current Outpatient Medications:     celecoxib (CeleBREX) 200 mg capsule, Take 1 capsule (200 mg total) by mouth daily for 30 days, Disp: 30 capsule, Rfl: 1    Cholecalciferol (VITAMIN D3) 5000 units CAPS, Take by mouth, Disp: , Rfl:     ciclopirox (PENLAC) 8 % solution, Apply topically daily at bedtime (Patient not taking: Reported on 10/20/2020), Disp: 6 6 mL, Rfl: 0    Continuous Blood Gluc  (FREESTYLE ROMIE 14 DAY READER) ROBYN, Test blood sugars 4 times daily (Patient not taking: Reported on 10/20/2020), Disp: 100 Device, Rfl: 1    Continuous Blood Gluc Sensor (FREESTYLE ROMIE 14 DAY SENSOR) MISC, Test blood sugars 4 times daily (Patient not taking: Reported on 10/20/2020), Disp: 2 each, Rfl: 2    glipiZIDE (GLUCOTROL) 10 mg tablet, Take 1 tablet (10 mg total) by mouth 2 (two) times a day before meals, Disp: 180 tablet, Rfl: 1    Insulin Lispro Prot & Lispro (HumaLOG Mix 75/25 KwikPen) (75-25) 100 units/mL injection pen, Inject 25 units at breakfast and lunch and 35 units at dinner, Disp: 26 pen, Rfl: 2    Insulin Pen Needle (BD Pen Needle Marielos 2nd Gen) 32G X 4 MM MISC, Use 3 (three) times a day, Disp: 100 each, Rfl: 3    Insulin Syringe-Needle U-100 (B-D INS SYR ULTRAFINE  5CC/30G) 30G X 1/2" 0 5 ML MISC, Inject insulin 3 times daily, Disp: 300 each, Rfl: 1    losartan (COZAAR) 50 mg tablet, TAKE 1 TABLET(50 MG) BY MOUTH DAILY, Disp: 30 tablet, Rfl: 1    metFORMIN (GLUCOPHAGE-XR) 500 mg 24 hr tablet, Take 1 tablet (500 mg total) by mouth 2 (two) times a day with meals, Disp: 180 tablet, Rfl: 1    metoprolol succinate (TOPROL-XL) 50 mg 24 hr tablet, Take 1 tablet (50 mg total) by mouth daily, Disp: 90 tablet, Rfl: 0    omeprazole (PriLOSEC) 20 mg delayed release capsule, TAKE ONE CAPSULE BY MOUTH DAILY, Disp: 90 capsule, Rfl: 0    pravastatin (PRAVACHOL) 80 mg tablet, TAKE 1 TABLET(80 MG) BY MOUTH DAILY, Disp: 30 tablet, Rfl: 1    sildenafil (REVATIO) 20 mg tablet, Take 1 or 2 tablets 1 hour prior to relation, Disp: 30 tablet, Rfl: 0    HPI    The following portions of the patient's history were reviewed and updated as appropriate: allergies, current medications, past family history, past medical history, past social history, past surgical history and problem list     Review of Systems   Constitutional: Negative  Negative for activity change, appetite change, fatigue, fever and unexpected weight change  HENT: Negative for congestion, ear pain, hearing loss, mouth sores, postnasal drip, rhinorrhea, sore throat, trouble swallowing and voice change  Eyes: Negative for pain, redness and visual disturbance  Respiratory: Negative for cough, chest tightness, shortness of breath and wheezing  Cardiovascular: Negative for chest pain, palpitations and leg swelling  Gastrointestinal: Negative for abdominal distention, abdominal pain, blood in stool, constipation, diarrhea and nausea  Endocrine: Negative for cold intolerance, heat intolerance, polydipsia, polyphagia and polyuria  Genitourinary: Negative for difficulty urinating, dysuria, flank pain, frequency, hematuria and urgency  Musculoskeletal: Negative for arthralgias, back pain, gait problem, joint swelling and myalgias  Skin: Negative for color change and pallor  Neurological: Negative for dizziness, tremors, seizures, syncope, weakness, numbness and headaches  Hematological: Negative for adenopathy  Does not bruise/bleed easily  Psychiatric/Behavioral: Negative  Negative for sleep disturbance  The patient is not nervous/anxious  Objective:    Patient's shoes and socks removed  Right Foot/Ankle   Right Foot Inspection  Skin Exam: skin normal skin not intact, no dry skin, no warmth, no callus, no erythema, no maceration, no abnormal color, no pre-ulcer, no ulcer and no callus                          Toe Exam: ROM and strength within normal limitsno swelling, no tenderness, erythema and  no right toe deformity  Sensory   Vibration: intact  Proprioception: intact   Monofilament testing: intact  Vascular    The right DP pulse is 2+  Left Foot/Ankle  Left Foot Inspection  Skin Exam: skin normalskin not intact, no dry skin, no warmth, no erythema, no maceration, normal color, no pre-ulcer, no ulcer and no callus                         Toe Exam: ROM and strength within normal limitsno swelling, no tenderness, no erythema and no left toe deformity                   Sensory   Vibration: intact  Proprioception: intact  Monofilament: intact  Vascular    The left DP pulse is 2+  Assign Risk Category:  No deformity present; No loss of protective sensation; No weak pulses       Risk: 0        There were no vitals taken for this visit  Physical Exam  Vitals signs and nursing note reviewed  Constitutional:       Appearance: He is well-developed  HENT:      Head: Normocephalic  Right Ear: External ear normal       Left Ear: External ear normal       Nose: Nose normal       Mouth/Throat:      Pharynx: No oropharyngeal exudate  Eyes:      Conjunctiva/sclera: Conjunctivae normal       Pupils: Pupils are equal, round, and reactive to light  Neck:      Musculoskeletal: Normal range of motion and neck supple  Thyroid: No thyromegaly  Cardiovascular:      Rate and Rhythm: Normal rate and regular rhythm  Pulses: no weak pulses          Dorsalis pedis pulses are 2+ on the right side and 2+ on the left side  Heart sounds: Normal heart sounds  No murmur  No friction rub  No gallop         Comments: S1-S2 regular rhythm Extremities no edema  Pulmonary:      Effort: Pulmonary effort is normal  No respiratory distress  Breath sounds: Normal breath sounds  No wheezing or rales  Comments: Lungs are clear no wheezing rales or rhonchi  Abdominal:      General: Bowel sounds are normal  There is no distension  Palpations: Abdomen is soft  There is no mass  Tenderness: There is no abdominal tenderness  There is no guarding or rebound  Musculoskeletal: Normal range of motion  Feet:    Feet:      Right foot:      Skin integrity: No ulcer, skin breakdown, erythema, warmth, callus or dry skin  Left foot:      Skin integrity: No ulcer, skin breakdown, erythema, warmth, callus or dry skin  Lymphadenopathy:      Cervical: No cervical adenopathy  Skin:     General: Skin is warm and dry  Neurological:      Mental Status: He is alert and oriented to person, place, and time     Psychiatric:         Behavior: Behavior normal          Judgment: Judgment normal

## 2021-03-23 DIAGNOSIS — E78.00 PURE HYPERCHOLESTEROLEMIA: ICD-10-CM

## 2021-03-23 RX ORDER — PRAVASTATIN SODIUM 80 MG/1
TABLET ORAL
Qty: 30 TABLET | Refills: 1 | Status: SHIPPED | OUTPATIENT
Start: 2021-03-23 | End: 2021-05-21

## 2021-03-24 RX ORDER — LANCETS 28 GAUGE
1 EACH MISCELLANEOUS
COMMUNITY
End: 2021-05-14 | Stop reason: SDUPTHER

## 2021-03-24 RX ORDER — BLOOD-GLUCOSE METER
KIT MISCELLANEOUS
COMMUNITY

## 2021-03-29 ENCOUNTER — TELEPHONE (OUTPATIENT)
Dept: INTERNAL MEDICINE CLINIC | Facility: CLINIC | Age: 51
End: 2021-03-29

## 2021-03-29 DIAGNOSIS — E11.9 TYPE 2 DIABETES MELLITUS WITHOUT COMPLICATION, WITHOUT LONG-TERM CURRENT USE OF INSULIN (HCC): Primary | ICD-10-CM

## 2021-03-29 DIAGNOSIS — I10 ESSENTIAL HYPERTENSION: ICD-10-CM

## 2021-03-29 NOTE — TELEPHONE ENCOUNTER
Pt called he stated tat he was taking losartan 50mg and that he was having knee and pelvic pain so the Dr Peña Rodríguez changed his medication to losartan -hydrochlorothiazide 100-12 5 mg and he was wondering if this is going to have the same symptoms with this new medication       Per Dr Peña Rodríguez he would like to have pt continue to take this medication for a week and call back on Tuesday after he had tried it for at lease 1 to 2 weeks and he would like pt to go for cbc,bmp,magnessium     Detailed message given to pt

## 2021-04-07 ENCOUNTER — IMMUNIZATIONS (OUTPATIENT)
Dept: FAMILY MEDICINE CLINIC | Facility: HOSPITAL | Age: 51
End: 2021-04-07

## 2021-04-07 DIAGNOSIS — Z23 ENCOUNTER FOR IMMUNIZATION: Primary | ICD-10-CM

## 2021-04-07 PROCEDURE — 0011A SARS-COV-2 / COVID-19 MRNA VACCINE (MODERNA) 100 MCG: CPT

## 2021-04-07 PROCEDURE — 91301 SARS-COV-2 / COVID-19 MRNA VACCINE (MODERNA) 100 MCG: CPT

## 2021-04-08 LAB
LEFT EYE DIABETIC RETINOPATHY: NORMAL
RIGHT EYE DIABETIC RETINOPATHY: NORMAL

## 2021-04-09 DIAGNOSIS — I10 ESSENTIAL HYPERTENSION: ICD-10-CM

## 2021-04-09 DIAGNOSIS — E11.9 TYPE 2 DIABETES MELLITUS WITHOUT COMPLICATION, WITHOUT LONG-TERM CURRENT USE OF INSULIN (HCC): ICD-10-CM

## 2021-04-09 RX ORDER — GLIPIZIDE 10 MG/1
TABLET ORAL
Qty: 180 TABLET | Refills: 1 | Status: SHIPPED | OUTPATIENT
Start: 2021-04-09 | End: 2022-03-25 | Stop reason: SDUPTHER

## 2021-04-15 DIAGNOSIS — I10 ESSENTIAL HYPERTENSION: ICD-10-CM

## 2021-04-15 RX ORDER — METOPROLOL SUCCINATE 50 MG/1
TABLET, EXTENDED RELEASE ORAL
Qty: 90 TABLET | Refills: 0 | Status: SHIPPED | OUTPATIENT
Start: 2021-04-15 | End: 2021-05-14 | Stop reason: SDUPTHER

## 2021-04-27 ENCOUNTER — APPOINTMENT (OUTPATIENT)
Dept: LAB | Facility: HOSPITAL | Age: 51
End: 2021-04-27
Attending: INTERNAL MEDICINE
Payer: COMMERCIAL

## 2021-04-27 DIAGNOSIS — I10 ESSENTIAL HYPERTENSION: ICD-10-CM

## 2021-04-27 DIAGNOSIS — E11.9 TYPE 2 DIABETES MELLITUS WITHOUT COMPLICATION, WITHOUT LONG-TERM CURRENT USE OF INSULIN (HCC): ICD-10-CM

## 2021-04-27 LAB
ALBUMIN SERPL BCP-MCNC: 3.9 G/DL (ref 3.5–5)
ALP SERPL-CCNC: 104 U/L (ref 46–116)
ALT SERPL W P-5'-P-CCNC: 57 U/L (ref 12–78)
ANION GAP SERPL CALCULATED.3IONS-SCNC: 7 MMOL/L (ref 4–13)
AST SERPL W P-5'-P-CCNC: 38 U/L (ref 5–45)
BACTERIA UR QL AUTO: NORMAL /HPF
BASOPHILS # BLD MANUAL: 0.17 THOUSAND/UL (ref 0–0.1)
BASOPHILS NFR MAR MANUAL: 1 % (ref 0–1)
BILIRUB SERPL-MCNC: 0.53 MG/DL (ref 0.2–1)
BILIRUB UR QL STRIP: NEGATIVE
BUN SERPL-MCNC: 18 MG/DL (ref 5–25)
CALCIUM SERPL-MCNC: 9.8 MG/DL (ref 8.3–10.1)
CHLORIDE SERPL-SCNC: 103 MMOL/L (ref 100–108)
CLARITY UR: CLEAR
CO2 SERPL-SCNC: 30 MMOL/L (ref 21–32)
COLOR UR: YELLOW
CREAT SERPL-MCNC: 1.14 MG/DL (ref 0.6–1.3)
CREAT UR-MCNC: 136 MG/DL
EOSINOPHIL # BLD MANUAL: 0.17 THOUSAND/UL (ref 0–0.4)
EOSINOPHIL NFR BLD MANUAL: 1 % (ref 0–6)
ERYTHROCYTE [DISTWIDTH] IN BLOOD BY AUTOMATED COUNT: 12.7 % (ref 11.6–15.1)
GFR SERPL CREATININE-BSD FRML MDRD: 75 ML/MIN/1.73SQ M
GLUCOSE P FAST SERPL-MCNC: 173 MG/DL (ref 65–99)
GLUCOSE UR STRIP-MCNC: ABNORMAL MG/DL
HCT VFR BLD AUTO: 47.3 % (ref 36.5–49.3)
HGB BLD-MCNC: 14.5 G/DL (ref 12–17)
HGB UR QL STRIP.AUTO: NEGATIVE
KETONES UR STRIP-MCNC: NEGATIVE MG/DL
LEUKOCYTE ESTERASE UR QL STRIP: ABNORMAL
LYMPHOCYTES # BLD AUTO: 10.19 THOUSAND/UL (ref 0.6–4.47)
LYMPHOCYTES # BLD AUTO: 61 % (ref 14–44)
MAGNESIUM SERPL-MCNC: 2.1 MG/DL (ref 1.6–2.6)
MCH RBC QN AUTO: 26.4 PG (ref 26.8–34.3)
MCHC RBC AUTO-ENTMCNC: 30.7 G/DL (ref 31.4–37.4)
MCV RBC AUTO: 86 FL (ref 82–98)
MICROALBUMIN UR-MCNC: 6.8 MG/L (ref 0–20)
MICROALBUMIN/CREAT 24H UR: 5 MG/G CREATININE (ref 0–30)
MONOCYTES # BLD AUTO: 1 THOUSAND/UL (ref 0–1.22)
MONOCYTES NFR BLD: 6 % (ref 4–12)
NEUTROPHILS # BLD MANUAL: 3.68 THOUSAND/UL (ref 1.85–7.62)
NEUTS BAND NFR BLD MANUAL: 1 % (ref 0–8)
NEUTS SEG NFR BLD AUTO: 21 % (ref 43–75)
NITRITE UR QL STRIP: NEGATIVE
NON-SQ EPI CELLS URNS QL MICRO: NORMAL /HPF
NRBC BLD AUTO-RTO: 0 /100 WBCS
PH UR STRIP.AUTO: 5 [PH]
PLATELET # BLD AUTO: 215 THOUSANDS/UL (ref 149–390)
PLATELET BLD QL SMEAR: ADEQUATE
PMV BLD AUTO: 9.9 FL (ref 8.9–12.7)
POTASSIUM SERPL-SCNC: 4.2 MMOL/L (ref 3.5–5.3)
PROT SERPL-MCNC: 7 G/DL (ref 6.4–8.2)
PROT UR STRIP-MCNC: NEGATIVE MG/DL
RBC # BLD AUTO: 5.5 MILLION/UL (ref 3.88–5.62)
RBC #/AREA URNS AUTO: NORMAL /HPF
RBC MORPH BLD: NORMAL
SODIUM SERPL-SCNC: 140 MMOL/L (ref 136–145)
SP GR UR STRIP.AUTO: 1.02 (ref 1–1.03)
TOTAL CELLS COUNTED SPEC: 100
UROBILINOGEN UR QL STRIP.AUTO: 0.2 E.U./DL
VARIANT LYMPHS # BLD AUTO: 9 %
WBC # BLD AUTO: 16.71 THOUSAND/UL (ref 4.31–10.16)
WBC #/AREA URNS AUTO: NORMAL /HPF

## 2021-04-27 PROCEDURE — 85007 BL SMEAR W/DIFF WBC COUNT: CPT

## 2021-04-27 PROCEDURE — 85027 COMPLETE CBC AUTOMATED: CPT

## 2021-04-27 PROCEDURE — 36415 COLL VENOUS BLD VENIPUNCTURE: CPT

## 2021-04-27 PROCEDURE — 3061F NEG MICROALBUMINURIA REV: CPT | Performed by: INTERNAL MEDICINE

## 2021-04-27 PROCEDURE — 80053 COMPREHEN METABOLIC PANEL: CPT

## 2021-04-27 PROCEDURE — 82043 UR ALBUMIN QUANTITATIVE: CPT | Performed by: INTERNAL MEDICINE

## 2021-04-27 PROCEDURE — 82570 ASSAY OF URINE CREATININE: CPT | Performed by: INTERNAL MEDICINE

## 2021-04-27 PROCEDURE — 83735 ASSAY OF MAGNESIUM: CPT

## 2021-04-27 PROCEDURE — 81001 URINALYSIS AUTO W/SCOPE: CPT | Performed by: INTERNAL MEDICINE

## 2021-04-30 ENCOUNTER — OFFICE VISIT (OUTPATIENT)
Dept: INTERNAL MEDICINE CLINIC | Facility: CLINIC | Age: 51
End: 2021-04-30
Payer: COMMERCIAL

## 2021-04-30 VITALS
SYSTOLIC BLOOD PRESSURE: 152 MMHG | TEMPERATURE: 98.6 F | RESPIRATION RATE: 18 BRPM | BODY MASS INDEX: 36.16 KG/M2 | DIASTOLIC BLOOD PRESSURE: 80 MMHG | WEIGHT: 225 LBS | HEIGHT: 66 IN | HEART RATE: 78 BPM

## 2021-04-30 DIAGNOSIS — I10 ESSENTIAL HYPERTENSION: ICD-10-CM

## 2021-04-30 DIAGNOSIS — K21.9 GASTROESOPHAGEAL REFLUX DISEASE WITHOUT ESOPHAGITIS: Primary | ICD-10-CM

## 2021-04-30 DIAGNOSIS — C91.10 CHRONIC LYMPHOCYTIC LEUKEMIA (HCC): ICD-10-CM

## 2021-04-30 DIAGNOSIS — E11.9 TYPE 2 DIABETES MELLITUS WITHOUT COMPLICATION, WITHOUT LONG-TERM CURRENT USE OF INSULIN (HCC): ICD-10-CM

## 2021-04-30 DIAGNOSIS — E78.2 MIXED HYPERLIPIDEMIA: ICD-10-CM

## 2021-04-30 PROCEDURE — 3079F DIAST BP 80-89 MM HG: CPT | Performed by: INTERNAL MEDICINE

## 2021-04-30 PROCEDURE — 3008F BODY MASS INDEX DOCD: CPT | Performed by: INTERNAL MEDICINE

## 2021-04-30 PROCEDURE — 99214 OFFICE O/P EST MOD 30 MIN: CPT | Performed by: INTERNAL MEDICINE

## 2021-04-30 PROCEDURE — 3077F SYST BP >= 140 MM HG: CPT | Performed by: INTERNAL MEDICINE

## 2021-04-30 PROCEDURE — 1036F TOBACCO NON-USER: CPT | Performed by: INTERNAL MEDICINE

## 2021-04-30 RX ORDER — LOSARTAN POTASSIUM AND HYDROCHLOROTHIAZIDE 25; 100 MG/1; MG/1
1 TABLET ORAL DAILY
Qty: 30 TABLET | Refills: 5 | Status: SHIPPED | OUTPATIENT
Start: 2021-04-30 | End: 2021-10-26

## 2021-04-30 RX ORDER — METFORMIN HYDROCHLORIDE 500 MG/1
TABLET, EXTENDED RELEASE ORAL
Qty: 180 TABLET | Refills: 1 | Status: SHIPPED | OUTPATIENT
Start: 2021-04-30 | End: 2021-08-25

## 2021-04-30 NOTE — PROGRESS NOTES
Assessment/Plan:  Follow-up with the endocrinologist  Increase the losartan hydrochlorothiazide to 125 for better blood pressure control  Increase the metformin extended release to 500 in the morning and a 1000 before dinner  I am very happy you get her COVID-19 vaccine your due for your 2nd injection in May 10      1  Diabetes control has improved not quite perfect yet she is going to see the endocrinologist to he is on multiple medications to control the diabetes I increase the metformin today to 500 with breakfast and a g with dinner he is also taking the following  Glipizide 10 mg twice a day  Insulin list list pro is 7525 25 units prior to breakfast and lunch 35 units prior to dinner  Jardiance 10 mg daily    2  Blood pressure much better control and very happy with losartan hydrochlorothiazide will increase that to 125 1 tablet daily denies any chest palpitation shortness of breath    3  Chronic lymphocytic leukemia being followed by hematology asymptomatic no B symptoms    4  GERD asymptomatic  5  Hyperlipidemia on pravastatin 80 mg per day tolerating the medication      Labs were review in detail and they are stable blood sugars have improved    Results for orders placed or performed in visit on 04/27/21   CBC and differential   Result Value Ref Range    WBC 16 71 (H) 4 31 - 10 16 Thousand/uL    RBC 5 50 3 88 - 5 62 Million/uL    Hemoglobin 14 5 12 0 - 17 0 g/dL    Hematocrit 47 3 36 5 - 49 3 %    MCV 86 82 - 98 fL    MCH 26 4 (L) 26 8 - 34 3 pg    MCHC 30 7 (L) 31 4 - 37 4 g/dL    RDW 12 7 11 6 - 15 1 %    MPV 9 9 8 9 - 12 7 fL    Platelets 526 815 - 546 Thousands/uL    nRBC 0 /100 WBCs   Magnesium   Result Value Ref Range    Magnesium 2 1 1 6 - 2 6 mg/dL   Comprehensive metabolic panel   Result Value Ref Range    Sodium 140 136 - 145 mmol/L    Potassium 4 2 3 5 - 5 3 mmol/L    Chloride 103 100 - 108 mmol/L    CO2 30 21 - 32 mmol/L    ANION GAP 7 4 - 13 mmol/L    BUN 18 5 - 25 mg/dL    Creatinine 1 14 0 60 - 1 30 mg/dL    Glucose, Fasting 173 (H) 65 - 99 mg/dL    Calcium 9 8 8 3 - 10 1 mg/dL    AST 38 5 - 45 U/L    ALT 57 12 - 78 U/L    Alkaline Phosphatase 104 46 - 116 U/L    Total Protein 7 0 6 4 - 8 2 g/dL    Albumin 3 9 3 5 - 5 0 g/dL    Total Bilirubin 0 53 0 20 - 1 00 mg/dL    eGFR 75 ml/min/1 73sq m   Manual Differential(PHLEBS Do Not Order)   Result Value Ref Range    Segmented % 21 (L) 43 - 75 %    Bands % 1 0 - 8 %    Lymphocytes % 61 (H) 14 - 44 %    Monocytes % 6 4 - 12 %    Eosinophils, % 1 0 - 6 %    Basophils % 1 0 - 1 %    Atypical Lymphocytes % 9 (H) <=0 %    Absolute Neutrophils 3 68 1 85 - 7 62 Thousand/uL    Lymphocytes Absolute 10 19 (H) 0 60 - 4 47 Thousand/uL    Monocytes Absolute 1 00 0 00 - 1 22 Thousand/uL    Eosinophils Absolute 0 17 0 00 - 0 40 Thousand/uL    Basophils Absolute 0 17 (H) 0 00 - 0 10 Thousand/uL    Total Counted 100     RBC Morphology Normal     Platelet Estimate Adequate Adequate       No problem-specific Assessment & Plan notes found for this encounter  Diagnoses and all orders for this visit:    Gastroesophageal reflux disease without esophagitis    Type 2 diabetes mellitus without complication, without long-term current use of insulin (HCC)    Essential hypertension    Mixed hyperlipidemia    Chronic lymphocytic leukemia (HCC)          Subjective:      Patient ID: Claudette Breaker is a 53 y o  male  No chief complaint on file          Current Outpatient Medications:     Blood Glucose Monitoring Suppl (FreeStyle Freedom Lite) w/Device KIT, Use Test blood sugars 4 times daily, Disp: , Rfl:     celecoxib (CeleBREX) 200 mg capsule, Take 1 capsule (200 mg total) by mouth daily for 30 days (Patient not taking: Reported on 3/19/2021), Disp: 30 capsule, Rfl: 1    Cholecalciferol (VITAMIN D3) 5000 units CAPS, Take by mouth, Disp: , Rfl:     ciclopirox (PENLAC) 8 % solution, Apply topically daily at bedtime (Patient not taking: Reported on 10/20/2020), Disp: 6 6 mL, Rfl: 0    Continuous Blood Gluc  (FREESTYLE ROMIE 14 DAY READER) ROBYN, Test blood sugars 4 times daily (Patient not taking: Reported on 10/20/2020), Disp: 100 Device, Rfl: 1    Continuous Blood Gluc Sensor (FREESTYLE ROMIE 14 DAY SENSOR) MISC, Test blood sugars 4 times daily (Patient not taking: Reported on 10/20/2020), Disp: 2 each, Rfl: 2    Empagliflozin 10 MG TABS, Take 1 tablet (10 mg total) by mouth every morning, Disp: 30 tablet, Rfl: 2    glipiZIDE (GLUCOTROL) 10 mg tablet, TAKE 1 TABLET(10 MG) BY MOUTH TWICE DAILY BEFORE MEALS, Disp: 180 tablet, Rfl: 1    glucose blood (FREESTYLE LITE) test strip, Use as instructed (Patient taking differently: 4 (four) times daily (after meals and at bedtime) ), Disp: 90 each, Rfl: 3    Insulin Lispro Prot & Lispro (HumaLOG Mix 75/25 KwikPen) (75-25) 100 units/mL injection pen, Inject 25 units at breakfast and lunch and 35 units at dinner, Disp: 26 pen, Rfl: 2    Insulin Pen Needle (BD Pen Needle Marielos 2nd Gen) 32G X 4 MM MISC, Use 3 (three) times a day, Disp: 100 each, Rfl: 3    Insulin Syringe-Needle U-100 (B-D INS SYR ULTRAFINE  5CC/30G) 30G X 1/2" 0 5 ML MISC, Inject insulin 3 times daily, Disp: 300 each, Rfl: 1    Lancets (freestyle) lancets, Use 1 each Test blood sugars 4 times daily, Disp: , Rfl:     losartan-hydrochlorothiazide (HYZAAR) 100-12 5 MG per tablet, Take 1 tablet by mouth daily, Disp: 30 tablet, Rfl: 5    metFORMIN (GLUCOPHAGE-XR) 500 mg 24 hr tablet, Take 1 tablet (500 mg total) by mouth 2 (two) times a day with meals, Disp: 180 tablet, Rfl: 1    metoprolol succinate (TOPROL-XL) 50 mg 24 hr tablet, TAKE 1 TABLET(50 MG) BY MOUTH DAILY, Disp: 90 tablet, Rfl: 0    omeprazole (PriLOSEC) 20 mg delayed release capsule, TAKE ONE CAPSULE BY MOUTH DAILY, Disp: 90 capsule, Rfl: 0    pravastatin (PRAVACHOL) 80 mg tablet, TAKE 1 TABLET(80 MG) BY MOUTH DAILY, Disp: 30 tablet, Rfl: 1    sildenafil (REVATIO) 20 mg tablet, Take 1 or 2 tablets 1 hour prior to relation, Disp: 30 tablet, Rfl: 0    HPI    The following portions of the patient's history were reviewed and updated as appropriate: allergies, current medications, past family history, past medical history, past social history, past surgical history and problem list     Review of Systems   Constitutional: Negative  Negative for activity change, appetite change, fatigue, fever and unexpected weight change  HENT: Negative for congestion, ear pain, hearing loss, mouth sores, postnasal drip, rhinorrhea, sore throat, trouble swallowing and voice change  Eyes: Negative for pain, redness and visual disturbance  Respiratory: Negative for cough, chest tightness, shortness of breath and wheezing  Cardiovascular: Negative for chest pain, palpitations and leg swelling  Gastrointestinal: Negative for abdominal distention, abdominal pain, blood in stool, constipation, diarrhea and nausea  Endocrine: Negative for cold intolerance, heat intolerance, polydipsia, polyphagia and polyuria  Genitourinary: Negative for difficulty urinating, dysuria, flank pain, frequency, hematuria and urgency  Musculoskeletal: Negative for arthralgias, back pain, gait problem, joint swelling and myalgias  Skin: Negative for color change and pallor  Neurological: Negative for dizziness, tremors, seizures, syncope, weakness, numbness and headaches  Hematological: Negative for adenopathy  Does not bruise/bleed easily  Psychiatric/Behavioral: Negative  Negative for sleep disturbance  The patient is not nervous/anxious  Objective: There were no vitals taken for this visit  Physical Exam  Vitals signs and nursing note reviewed  Constitutional:       Appearance: He is well-developed  HENT:      Head: Normocephalic  Right Ear: External ear normal       Left Ear: External ear normal       Nose: Nose normal       Mouth/Throat:      Pharynx: No oropharyngeal exudate     Eyes: Conjunctiva/sclera: Conjunctivae normal       Pupils: Pupils are equal, round, and reactive to light  Neck:      Musculoskeletal: Normal range of motion and neck supple  Thyroid: No thyromegaly  Cardiovascular:      Rate and Rhythm: Normal rate and regular rhythm  Heart sounds: Normal heart sounds  No murmur  No friction rub  No gallop  Comments: S1-S2 regular rhythm  Extremities no edema calf tenderness  Blood pressure 150/80 left arm sitting    Pulmonary:      Effort: Pulmonary effort is normal  No respiratory distress  Breath sounds: Normal breath sounds  No wheezing or rales  Comments: Lungs are clear no wheezing rales or rhonchi  Abdominal:      General: Bowel sounds are normal  There is no distension  Palpations: Abdomen is soft  There is no mass  Tenderness: There is no abdominal tenderness  There is no guarding or rebound  Comments: Obese soft nontender no hepatosplenomegaly no lymphadenopathy palpable in the neck supraclavicular axillary inguinal or epitrochlear area  Musculoskeletal: Normal range of motion  Lymphadenopathy:      Cervical: No cervical adenopathy  Skin:     General: Skin is warm and dry  Neurological:      Mental Status: He is alert and oriented to person, place, and time     Psychiatric:         Behavior: Behavior normal          Judgment: Judgment normal

## 2021-04-30 NOTE — PATIENT INSTRUCTIONS
For better blood pressure control is improving and very happy will going to increase the losartan hydrochlorothiazide 100 and 25 to take 1 tablet daily    Follow-up with the endocrinologist that you have an appointment    The metformin extended release will increase to 1 tablet with breakfast and 2 tablets with dinner

## 2021-05-06 DIAGNOSIS — E11.9 TYPE 2 DIABETES MELLITUS WITHOUT COMPLICATION, WITHOUT LONG-TERM CURRENT USE OF INSULIN (HCC): ICD-10-CM

## 2021-05-06 RX ORDER — PEN NEEDLE, DIABETIC 32GX 5/32"
NEEDLE, DISPOSABLE MISCELLANEOUS 3 TIMES DAILY
Qty: 100 EACH | Refills: 3 | Status: SHIPPED | OUTPATIENT
Start: 2021-05-06 | End: 2021-05-06 | Stop reason: SDUPTHER

## 2021-05-06 RX ORDER — INSULIN LISPRO 100 [IU]/ML
INJECTION, SUSPENSION SUBCUTANEOUS
Qty: 225 ML | Refills: 1 | Status: SHIPPED | OUTPATIENT
Start: 2021-05-06 | End: 2021-05-06 | Stop reason: SDUPTHER

## 2021-05-06 RX ORDER — PEN NEEDLE, DIABETIC 32GX 5/32"
NEEDLE, DISPOSABLE MISCELLANEOUS 3 TIMES DAILY
Qty: 300 EACH | Refills: 1 | Status: SHIPPED | OUTPATIENT
Start: 2021-05-06 | End: 2021-11-15

## 2021-05-06 RX ORDER — INSULIN LISPRO 100 [IU]/ML
INJECTION, SUSPENSION SUBCUTANEOUS
Qty: 225 ML | Refills: 0 | Status: SHIPPED | OUTPATIENT
Start: 2021-05-06 | End: 2021-10-11

## 2021-05-06 RX ORDER — INSULIN LISPRO 100 [IU]/ML
INJECTION, SUSPENSION SUBCUTANEOUS
Qty: 225 ML | Refills: 1 | Status: SHIPPED | OUTPATIENT
Start: 2021-05-06 | End: 2021-05-06

## 2021-05-06 RX ORDER — PEN NEEDLE, DIABETIC 32GX 5/32"
NEEDLE, DISPOSABLE MISCELLANEOUS 3 TIMES DAILY
Qty: 300 EACH | Refills: 3 | Status: SHIPPED | OUTPATIENT
Start: 2021-05-06 | End: 2021-05-06 | Stop reason: SDUPTHER

## 2021-05-10 ENCOUNTER — IMMUNIZATIONS (OUTPATIENT)
Dept: FAMILY MEDICINE CLINIC | Facility: HOSPITAL | Age: 51
End: 2021-05-10

## 2021-05-10 DIAGNOSIS — Z23 ENCOUNTER FOR IMMUNIZATION: Primary | ICD-10-CM

## 2021-05-10 PROCEDURE — 91301 SARS-COV-2 / COVID-19 MRNA VACCINE (MODERNA) 100 MCG: CPT

## 2021-05-10 PROCEDURE — 0012A SARS-COV-2 / COVID-19 MRNA VACCINE (MODERNA) 100 MCG: CPT

## 2021-05-14 DIAGNOSIS — I10 ESSENTIAL HYPERTENSION: ICD-10-CM

## 2021-05-14 DIAGNOSIS — K21.9 GASTROESOPHAGEAL REFLUX DISEASE WITHOUT ESOPHAGITIS: ICD-10-CM

## 2021-05-14 DIAGNOSIS — E11.9 TYPE 2 DIABETES MELLITUS WITHOUT COMPLICATION, WITHOUT LONG-TERM CURRENT USE OF INSULIN (HCC): ICD-10-CM

## 2021-05-14 RX ORDER — METOPROLOL SUCCINATE 50 MG/1
50 TABLET, EXTENDED RELEASE ORAL DAILY
Qty: 90 TABLET | Refills: 0 | Status: SHIPPED | OUTPATIENT
Start: 2021-05-14 | End: 2021-07-10

## 2021-05-14 RX ORDER — LANCETS 28 GAUGE
1 EACH MISCELLANEOUS 3 TIMES DAILY
Qty: 300 EACH | Refills: 0 | Status: SHIPPED | OUTPATIENT
Start: 2021-05-14

## 2021-05-14 RX ORDER — OMEPRAZOLE 20 MG/1
CAPSULE, DELAYED RELEASE ORAL
Qty: 90 CAPSULE | Refills: 0 | Status: SHIPPED | OUTPATIENT
Start: 2021-05-14 | End: 2021-08-06

## 2021-05-21 DIAGNOSIS — E78.00 PURE HYPERCHOLESTEROLEMIA: ICD-10-CM

## 2021-05-21 RX ORDER — PRAVASTATIN SODIUM 80 MG/1
TABLET ORAL
Qty: 30 TABLET | Refills: 1 | Status: SHIPPED | OUTPATIENT
Start: 2021-05-21 | End: 2021-07-18

## 2021-05-27 ENCOUNTER — TELEPHONE (OUTPATIENT)
Dept: ADMINISTRATIVE | Facility: OTHER | Age: 51
End: 2021-05-27

## 2021-05-27 ENCOUNTER — CONSULT (OUTPATIENT)
Dept: ENDOCRINOLOGY | Facility: CLINIC | Age: 51
End: 2021-05-27
Payer: COMMERCIAL

## 2021-05-27 VITALS
SYSTOLIC BLOOD PRESSURE: 126 MMHG | HEART RATE: 74 BPM | BODY MASS INDEX: 35.65 KG/M2 | WEIGHT: 221.8 LBS | HEIGHT: 66 IN | DIASTOLIC BLOOD PRESSURE: 86 MMHG

## 2021-05-27 DIAGNOSIS — I10 ESSENTIAL HYPERTENSION: ICD-10-CM

## 2021-05-27 DIAGNOSIS — E11.9 TYPE 2 DIABETES MELLITUS WITHOUT COMPLICATION, WITHOUT LONG-TERM CURRENT USE OF INSULIN (HCC): ICD-10-CM

## 2021-05-27 DIAGNOSIS — E11.3299 MILD NONPROLIFERATIVE DIABETIC RETINOPATHY ASSOCIATED WITH TYPE 2 DIABETES MELLITUS, MACULAR EDEMA PRESENCE UNSPECIFIED, UNSPECIFIED LATERALITY (HCC): ICD-10-CM

## 2021-05-27 DIAGNOSIS — Z79.4 TYPE 2 DIABETES MELLITUS WITH HYPERGLYCEMIA, WITH LONG-TERM CURRENT USE OF INSULIN (HCC): Primary | ICD-10-CM

## 2021-05-27 DIAGNOSIS — E78.2 MIXED HYPERLIPIDEMIA: ICD-10-CM

## 2021-05-27 DIAGNOSIS — K21.9 GASTROESOPHAGEAL REFLUX DISEASE WITHOUT ESOPHAGITIS: ICD-10-CM

## 2021-05-27 DIAGNOSIS — E11.65 TYPE 2 DIABETES MELLITUS WITH HYPERGLYCEMIA, WITH LONG-TERM CURRENT USE OF INSULIN (HCC): Primary | ICD-10-CM

## 2021-05-27 PROCEDURE — 99205 OFFICE O/P NEW HI 60 MIN: CPT | Performed by: INTERNAL MEDICINE

## 2021-05-27 PROCEDURE — 3008F BODY MASS INDEX DOCD: CPT | Performed by: INTERNAL MEDICINE

## 2021-05-27 PROCEDURE — 3074F SYST BP LT 130 MM HG: CPT | Performed by: INTERNAL MEDICINE

## 2021-05-27 PROCEDURE — 1036F TOBACCO NON-USER: CPT | Performed by: INTERNAL MEDICINE

## 2021-05-27 PROCEDURE — 3079F DIAST BP 80-89 MM HG: CPT | Performed by: INTERNAL MEDICINE

## 2021-05-27 NOTE — LETTER
Diabetic Eye Exam Form    Date Requested: 21  Patient: Melina Valentino  Patient : 1970   Referring Provider: Jacquelin Gagnon MD     2nd Request    Dilated Retinal Exam, Optomap-Iris Exam, or Fundus Photography Done         Yes ([place] one above)         No     Date of Diabetic Eye Exam ______________________________  Left Eye      Exam did show retinopathy    Exam did not show retinopathy         Mild       Moderate       None       Proliferative       Severe     Right Eye     Exam did show retinopathy    Exam did not show retinopathy         Mild       Moderate       None       Proliferative       Severe     Comments __________________________________________________________    Practice Providing Exam ______________________________________________    Exam Performed By (print name) _______________________________________      Provider Signature ___________________________________________________      These reports are needed for  compliance    Please fax this completed form and a copy of the Diabetic Eye Exam report to our office located at [place] [address] as soon as possible to [phone] laura Barakat: Phone [phone]    We thank you for your assistance in treating our mutual patient

## 2021-05-27 NOTE — ASSESSMENT & PLAN NOTE
Lab Results   Component Value Date    HGBA1C 12 4 (H) 03/18/2021    patient counseled on complications of uncontrolled type 2 diabetes including retinopathy, nephropathy and neuropathy  Counseled on the importance of strict glycemic control to delay progression of complications  Counseled on the importance of taking insulin as directed and the importance of taking  Mixture insulin before meals  For now I have given him written instructions to take Humalog 7525 25 units before breakfast and lunch and 35 before dinner  He will monitor his blood sugars 3 to 4 times a day  Also referred for a diagnostic trial of continuous glucose monitor to get a better understanding of his blood sugar patterns  I have also referred him for medical nutrition therapy

## 2021-05-27 NOTE — PROGRESS NOTES
Pam Singleton 53 y o  male MRN: [id number]    Encounter: [id number]      Assessment/Plan     Problem List Items Addressed This Visit        Digestive    Gastroesophageal reflux disease without esophagitis       Endocrine    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 ([address] )       Lab Results   Component Value Date    HGBA1C 12 4 (H) 03/18/2021     Continue follow-up with ophthalmologist -counseled on the importance of improving glycemic control         Type 2 diabetes mellitus with hyperglycemia, with long-term current use of insulin ([address] ) - Primary       Lab Results   Component Value Date    HGBA1C 12 4 (H) 03/18/2021    patient counseled on complications of uncontrolled type 2 diabetes including retinopathy, nephropathy and neuropathy  Counseled on the importance of strict glycemic control to delay progression of complications  Counseled on the importance of taking insulin as directed and the importance of taking  Mixture insulin before meals  For now I have given him written instructions to take Humalog 7525 25 units before breakfast and lunch and 35 before dinner  He will monitor his blood sugars 3 to 4 times a day  Also referred for a diagnostic trial of continuous glucose monitor to get a better understanding of his blood sugar patterns  I have also referred him for medical nutrition therapy           Relevant Orders    Continous glucose monitoring nighat placement    Continous glucose monitoring nighat intrepretation    Ambulatory referral to Diabetic Education    Comprehensive metabolic panel Lab Collect    HEMOGLOBIN A1C W/ EAG ESTIMATION Lab Collect    TSH, 3rd generation Lab Collect    T4, free Lab Collect       Cardiovascular and Mediastinum    Essential hypertension      Continue current regimen, reassess next visit         Relevant Orders    Microalbumin / creatinine urine ratio Lab Collect       Other    Mixed hyperlipidemia      Continue statins - referred for medical nutrition therapy         Relevant Orders    Lipid Panel with Direct LDL reflex Lab Collect      Other Visit Diagnoses     Type 2 diabetes mellitus without complication, without long-term current use of insulin (HCC)            CC: Diabetes    History of Present Illness     HPI: 67-year-old male referred here for evaluation of uncontrolled type 2 diabetes  He has had Type 2 DM >10 years , initially on orals and has been on insulin for many years    diabetes is complicated by retinopathy and has been getting injections  For the past couple of years  No CVD /stroke      Current insulin regimen- Humalog 75/25   25 units twice a day  And 35 units at bedtime 2 am - he is supposed to take insulin before meals however it does not appear that he is timing is insulin appropriately and usually taking it in between meals    Checks f s 3/day and most sugars range 120-290s  Denies hypoglycemia   No polyuria , polydpsia , c/o blurry vision right eye   No numbness and tingling in feet    Seeing podiatrist tomorrow     Review of Systems    Historical Information   Past Medical History:   Diagnosis Date    Diabetes ([address] )     Fatty liver     Hyperlipidemia     Hypertension     Intertrigo     resolved 10/26/17    Sebaceous cyst     Sexual disorder     Sexual dysfunction     last assessed 03/04/14    Snoring     last assessed 12/08/14    Somnolence, daytime     last assessed 12/08/14    Thoracic disc herniation     last assessed 05/02/14    Vision problem     Vitamin D deficiency      Past Surgical History:   Procedure Laterality Date    COLONOSCOPY      last assessed 01/22/14    LYMPHADENECTOMY      Axillary; last assessed 05/30/14     Social History   Social History     Substance and Sexual Activity   Alcohol Use No     Social History     Substance and Sexual Activity   Drug Use No     Social History     Tobacco Use   Smoking Status Never Smoker   Smokeless Tobacco Never Used     Family History: Family History   Problem Relation Age of Onset    Diabetes Mother     Kidney cancer Mother     Diabetes Father     Diabetes Sister     Diabetes Family     Hypertension Family        Meds/Allergies   Current Outpatient Medications   Medication Sig Dispense Refill    Cholecalciferol (VITAMIN D3) 5000 units CAPS Take by mouth      Empagliflozin 10 MG TABS Take 1 tablet (10 mg total) by mouth every morning 90 tablet 0    glipiZIDE (GLUCOTROL) 10 mg tablet TAKE 1 TABLET(10 MG) BY MOUTH TWICE DAILY BEFORE MEALS 180 tablet 1    glucose blood (FREESTYLE LITE) test strip Use as instructed (Patient taking differently: 4 (four) times daily (after meals and at bedtime) ) 90 each 3    Insulin Lispro Prot & Lispro (HumaLOG Mix 75/25 KwikPen) (75-25) 100 units/mL injection pen Inject 25 units at breakfast and lunch and 35 units at dinner 225 mL 0    Insulin Pen Needle (BD Pen Needle Marielos 2nd Gen) 32G X 4 MM MISC Use 3 (three) times a day 300 each 1    Insulin Syringe-Needle U-100 (B-D INS SYR ULTRAFINE  5CC/30G) 30G X 1/2" 0 5 ML MISC Inject insulin 3 times daily 300 each 1    Lancets (freestyle) lancets Use 1 each 3 (three) times a day Test blood sugars 4 times daily 300 each 0    losartan-hydrochlorothiazide (HYZAAR) 100-25 MG per tablet Take 1 tablet by mouth daily 30 tablet 5    metFORMIN (GLUCOPHAGE-XR) 500 mg 24 hr tablet One tablet with breakfast and 2 tablets with dinner 180 tablet 1    metoprolol succinate (TOPROL-XL) 50 mg 24 hr tablet Take 1 tablet (50 mg total) by mouth daily 90 tablet 0    omeprazole (PriLOSEC) 20 mg delayed release capsule TAKE 1 CAPSULE(20 MG) BY MOUTH DAILY 90 capsule 0    pravastatin (PRAVACHOL) 80 mg tablet TAKE 1 TABLET(80 MG) BY MOUTH DAILY 30 tablet 1    sildenafil (REVATIO) 20 mg tablet Take 1 or 2 tablets 1 hour prior to relation 30 tablet 0    Blood Glucose Monitoring Suppl (FreeStyle Freedom Lite) w/Device KIT Use Test blood sugars 4 times daily      celecoxib (CeleBREX) 200 mg capsule Take 1 capsule (200 mg total) by mouth daily for 30 days (Patient not taking: Reported on 3/19/2021) 30 capsule 1    ciclopirox (PENLAC) 8 % solution Apply topically daily at bedtime (Patient not taking: Reported on 10/20/2020) 6 6 mL 0     No current facility-administered medications for this visit  Allergies   Allergen Reactions    Ace Inhibitors Cough       Objective   Vitals: Blood pressure 126/86, pulse 74, height 5' 6" (1 676 m), weight 101 kg (221 lb 12 8 oz)  Physical Exam  Constitutional:       Appearance: Normal appearance  He is obese  He is not ill-appearing or diaphoretic  HENT:      Head: Normocephalic and atraumatic  Eyes:      General: No scleral icterus  Extraocular Movements: Extraocular movements intact  Neck:      Musculoskeletal: Neck supple  Cardiovascular:      Rate and Rhythm: Normal rate and regular rhythm  Heart sounds: Normal heart sounds  No murmur  Pulmonary:      Effort: Pulmonary effort is normal  No respiratory distress  Breath sounds: Normal breath sounds  No wheezing  Abdominal:      General: There is no distension  Palpations: Abdomen is soft  Tenderness: There is no abdominal tenderness  There is no guarding  Musculoskeletal:      Right lower leg: No edema  Left lower leg: No edema  Lymphadenopathy:      Cervical: No cervical adenopathy  Skin:     General: Skin is warm and dry  Neurological:      General: No focal deficit present  Mental Status: He is alert and oriented to person, place, and time  Psychiatric:         Mood and Affect: Mood normal          Behavior: Behavior normal          Thought Content: Thought content normal          Judgment: Judgment normal          The history was obtained from the review of the chart, patient      Lab Results:   Lab Results   Component Value Date/Time    Hemoglobin A1C 12 4 (H) 03/18/2021 10:05 AM    Hemoglobin A1C 10 9 (H) 08/01/2020 09:28 AM    WBC 16 71 (H) 04/27/2021 09:55 AM    WBC 16 52 (H) 03/18/2021 10:05 AM    WBC 16 97 (H) 08/01/2020 09:28 AM    Hemoglobin 14 5 04/27/2021 09:55 AM    Hemoglobin 14 2 03/18/2021 10:05 AM    Hemoglobin 13 3 08/01/2020 09:28 AM    Hematocrit 47 3 04/27/2021 09:55 AM    Hematocrit 45 4 03/18/2021 10:05 AM    Hematocrit 42 8 08/01/2020 09:28 AM    MCV 86 04/27/2021 09:55 AM    MCV 84 03/18/2021 10:05 AM    MCV 89 08/01/2020 09:28 AM    Platelets 215 30/51/2652 09:55 AM    Platelets 938 65/59/4232 10:05 AM    Platelets 331 40/95/7578 09:28 AM    BUN 18 04/27/2021 09:55 AM    BUN 14 03/18/2021 10:05 AM    BUN 11 08/01/2020 09:28 AM    Potassium 4 2 04/27/2021 09:55 AM    Potassium 4 3 03/18/2021 10:05 AM    Potassium 4 8 08/01/2020 09:28 AM    Chloride 103 04/27/2021 09:55 AM    Chloride 103 03/18/2021 10:05 AM    Chloride 102 08/01/2020 09:28 AM    CO2 30 04/27/2021 09:55 AM    CO2 29 03/18/2021 10:05 AM    CO2 28 08/01/2020 09:28 AM    Creatinine 1 14 04/27/2021 09:55 AM    Creatinine 1 04 03/18/2021 10:05 AM    Creatinine 1 00 08/01/2020 09:28 AM    AST 38 04/27/2021 09:55 AM    AST 30 03/18/2021 10:05 AM    AST 29 08/01/2020 09:28 AM    ALT 57 04/27/2021 09:55 AM    ALT 68 03/18/2021 10:05 AM    ALT 63 08/01/2020 09:28 AM    Albumin 3 9 04/27/2021 09:55 AM    Albumin 3 8 03/18/2021 10:05 AM    Albumin 3 7 08/01/2020 09:28 AM    HDL, Direct 26 (L) 03/18/2021 10:05 AM    Triglycerides 217 (H) 03/18/2021 10:05 AM           Portions of the record may have been created with voice recognition software  Occasional wrong word or "sound a like" substitutions may have occurred due to the inherent limitations of voice recognition software  Read the chart carefully and recognize, using context, where substitutions have occurred

## 2021-05-27 NOTE — PATIENT INSTRUCTIONS
Hypoglycemia in a Person with Diabetes   WHAT YOU NEED TO KNOW:   Hypoglycemia is a serious condition that happens when your blood glucose (sugar) level drops too low  The blood sugar level is usually too high in a person with diabetes, but the level can also drop too low  It is important to follow your diabetes management plan to keep your blood sugar level steady  DISCHARGE INSTRUCTIONS:   You or someone close to you needs to call the local emergency number (911 in the [address] [facility]) if:   · You have a seizure or pass out  · Your blood sugar is less than 50 mg/dL and does not respond to treatment  · You feel you are going to pass out  · You have trouble thinking clearly  Call your diabetes care team if:   · You have had symptoms of low blood sugar several times  · You have questions about the amount of insulin or diabetes medicine you are taking  · You have questions or concerns about your condition or care  Medicines:   · Insulin or diabetes medicine  help to keep your blood sugar under control  · Glucagon  may be needed if you have severe hypoglycemia  · Take your medicine as directed  Contact your healthcare provider if you think your medicine is not helping or if you have side effects  Tell him or her if you are allergic to any medicine  Keep a list of the medicines, vitamins, and herbs you take  Include the amounts, and when and why you take them  Bring the list or the pill bottles to follow-up visits  Carry your medicine list with you in case of an emergency  Manage hypoglycemia:   · Check your blood sugar level right away if you have symptoms of hypoglycemia  Hypoglycemia is usually 70 mg/dL or below  Ask your diabetes care team what blood sugar level is too low for you  · If your blood sugar level is too low, eat or drink 15 grams of fast-acting carbohydrate  Examples of this amount of fast-acting carbohydrate are 4 ounces (½ cup) of fruit juice or 4 ounces of regular soda  Other examples are 2 tablespoons of raisins or 1 tube of glucose gel  Check your blood sugar level 15 minutes later  Sit still as you wait  If the level is still low (less than 100 mg/dL), have another 15 grams of carbohydrate  When the level returns to 100 mg/dL, eat a meal if it is time  If your meal time is more than 1 hour away, eat a snack  The snack should contain carbohydrates, such as the following:     ? 3/4 cup of cereal    ? 1 cup of skim or low fat milk    ? 6 soda crackers    ? 1/2 of a turkey sandwich    ? 15 fat-free chips  This will help prevent another drop in blood sugar  Always carefully follow your diabetes care team's instructions on how to treat low blood sugar levels  · Always carry a source of fast-acting carbohydrate  If you have symptoms of hypoglycemia and you do not have a blood glucose meter, have a source of fast-acting carbohydrate anyway  Avoid carbohydrate foods that are high in fat  The fat content may make the carbohydrate take longer to increase your blood sugar level  Ask your diabetes care team if you should carry a glucagon kit  Glucagon is a medicine that is injected when you develop severe hypoglycemia and become unconscious  Check the expiration date every month and replace it before it expires  · Teach others how to help you if you have symptoms of hypoglycemia  Tell them about the symptoms of hypoglycemia  Ask them to give you a source of fast-acting carbohydrate if you cannot get it yourself  Ask them to give you a glucagon injection if you have signs of hypoglycemia and you become unconscious or have a seizure  Ask them to call the local emergency number (911 in the [address] [facility])   This is an emergency  Tell them never to try to make you swallow anything if you faint or have a seizure  · Wear medical alert jewelry  or carry a card that says you have diabetes  Ask where to get these items  Prevent hypoglycemia:   · Take diabetes medicine as directed    Take your medicine at the right time and in the right amount  Do not  double the amount of medicine you take unless instructed by your diabetes care team      · Eat regular meals and snacks  Talk to your dietitian or diabetes care team about a meal plan that is right for you  Do not skip meals  · Check your blood sugar level as directed  Ask your diabetes care team what your blood sugar levels should be before and after you eat  Ask when and how often to check your blood sugar level  You may need to check at least 3 times each day  Record your blood sugar level results and take the record with you when you see your care team  Changes may need to be made to your medicine, food, or exercise schedules using the record  · Check your blood sugar level before you exercise  Physical activity, such as exercise, can decrease your blood sugar level  If your blood sugar level is less than 100 mg/dL, have a carbohydrate snack  Examples are 4 to 6 crackers, ½ banana, 8 ounces (1 cup) of nonfat or 1% milk, or 4 ounces (½ cup) of juice  If you will be active for more than 1 hour, you may need to check your blood sugar level every 30 minutes  Your diabetes care team may also recommend that you check your blood sugar level after your activity  · Know the risks if you choose to drink alcohol  Alcohol can cause your blood sugar levels to be low if you use insulin  Alcohol can cause high blood sugar levels and weight gain if you drink too much  Women 21 years or older and men 72 years or older should limit alcohol to 1 drink a day  Men aged 24 to 59 years should limit alcohol to 2 drinks a day  A drink of alcohol is 12 ounces of beer, 5 ounces of wine, or 1½ ounces of liquor  Follow up with your diabetes care team or specialist as directed: You may need dose changes to your insulin or oral diabetes medicine if you have hypoglycemia  Write down your questions so you remember to ask them during your visits     © Copyright [employer] [facility] Information is for [employer] use only and may not be sold, redistributed or otherwise used for commercial purposes  All illustrations and images included in CareNotes® are the copyrighted property of A D A M [employer] Inc  or Rhoda Bolden   The above information is an  only  It is not intended as medical advice for individual conditions or treatments  Talk to your doctor, nurse or pharmacist before following any medical regimen to see if it is safe and effective for you

## 2021-05-27 NOTE — TELEPHONE ENCOUNTER
Upon review of the In Basket request and the patient's chart, initial outreach has been made via fax, please see Contacts section for details       Thank you  [facility] [phone] Fax [phone]

## 2021-05-27 NOTE — TELEPHONE ENCOUNTER
----- Message from Hien Purdy sent at 5/27/2021 10:08 AM EDT -----  Regarding: diabetic eye exam  05/27/21 10:09 AM    Hello, our patient Sheldon Rubio has had diabetic eye exam completed/performed  Please assist in obtaining the exclusion documentation by [facility], Dr Emiliano Suarez in [facility]  The date of service is within a year       Thank you,  Hien Purdy  [facility]

## 2021-05-27 NOTE — LETTER
Diabetic Eye Exam Form    Date Requested: 21  Patient: Jackie Short  Patient : [id number]   Referring Provider: Fabio Anguiano MD    Dilated Retinal Exam, Optomap-Iris Exam, or Fundus Photography Done         Yes ([place] one above)         No     Date of Diabetic Eye Exam ______________________________  Left Eye      Exam did show retinopathy    Exam did not show retinopathy         Mild       Moderate       None       Proliferative       Severe     Right Eye     Exam did show retinopathy    Exam did not show retinopathy         Mild       Moderate       None       Proliferative       Severe     Comments __________________________________________________________    Practice Providing Exam ______________________________________________    Exam Performed By (print name) _______________________________________      Provider Signature ___________________________________________________      These reports are needed for  compliance    Please fax this completed form and a copy of the Diabetic Eye Exam report to our office located at [place] [address] as soon as possible to [phone] laura Geronimo: Phone [phone]    We thank you for your assistance in treating our mutual patient

## 2021-05-27 NOTE — ASSESSMENT & PLAN NOTE
Lab Results   Component Value Date    HGBA1C 12 4 (H) 03/18/2021     Continue follow-up with ophthalmologist -counseled on the importance of improving glycemic control

## 2021-05-28 DIAGNOSIS — B35.1 ONYCHOMYCOSIS: ICD-10-CM

## 2021-06-01 NOTE — TELEPHONE ENCOUNTER
As a follow-up, a second attempt has been made for outreach via fax, please see Contacts section for details      Thank you  [facility] [phone] Fax [phone]

## 2021-06-03 NOTE — TELEPHONE ENCOUNTER
Upon review of the In Basket request we were able to locate, review, and update the patient chart as requested for Diabetic Eye Exam     Any additional questions or concerns should be emailed to the Practice Liaisons via [email or web address] email, please do not reply via In Apprema      Thank you  Bertin Tiwarih [place]

## 2021-07-10 DIAGNOSIS — I10 ESSENTIAL HYPERTENSION: ICD-10-CM

## 2021-07-10 RX ORDER — METOPROLOL SUCCINATE 50 MG/1
TABLET, EXTENDED RELEASE ORAL
Qty: 90 TABLET | Refills: 0 | Status: SHIPPED | OUTPATIENT
Start: 2021-07-10 | End: 2021-10-14

## 2021-07-18 DIAGNOSIS — E78.00 PURE HYPERCHOLESTEROLEMIA: ICD-10-CM

## 2021-07-18 RX ORDER — PRAVASTATIN SODIUM 80 MG/1
TABLET ORAL
Qty: 30 TABLET | Refills: 1 | Status: SHIPPED | OUTPATIENT
Start: 2021-07-18 | End: 2021-08-19

## 2021-07-30 ENCOUNTER — OFFICE VISIT (OUTPATIENT)
Dept: INTERNAL MEDICINE CLINIC | Facility: CLINIC | Age: 51
End: 2021-07-30
Payer: COMMERCIAL

## 2021-07-30 VITALS
SYSTOLIC BLOOD PRESSURE: 130 MMHG | RESPIRATION RATE: 12 BRPM | HEART RATE: 72 BPM | BODY MASS INDEX: 34.75 KG/M2 | HEIGHT: 66 IN | WEIGHT: 216.2 LBS | TEMPERATURE: 97 F | DIASTOLIC BLOOD PRESSURE: 80 MMHG

## 2021-07-30 DIAGNOSIS — E78.2 MIXED HYPERLIPIDEMIA: ICD-10-CM

## 2021-07-30 DIAGNOSIS — N47.1 PHIMOSIS: ICD-10-CM

## 2021-07-30 DIAGNOSIS — Z12.5 PROSTATE CANCER SCREENING: ICD-10-CM

## 2021-07-30 DIAGNOSIS — E11.3299 MILD NONPROLIFERATIVE DIABETIC RETINOPATHY ASSOCIATED WITH TYPE 2 DIABETES MELLITUS, MACULAR EDEMA PRESENCE UNSPECIFIED, UNSPECIFIED LATERALITY (HCC): ICD-10-CM

## 2021-07-30 DIAGNOSIS — Z79.4 TYPE 2 DIABETES MELLITUS WITH HYPERGLYCEMIA, WITH LONG-TERM CURRENT USE OF INSULIN (HCC): Primary | ICD-10-CM

## 2021-07-30 DIAGNOSIS — K21.9 GASTROESOPHAGEAL REFLUX DISEASE WITHOUT ESOPHAGITIS: ICD-10-CM

## 2021-07-30 DIAGNOSIS — I10 ESSENTIAL HYPERTENSION: ICD-10-CM

## 2021-07-30 DIAGNOSIS — C91.10 CHRONIC LYMPHOCYTIC LEUKEMIA (HCC): ICD-10-CM

## 2021-07-30 DIAGNOSIS — E11.65 TYPE 2 DIABETES MELLITUS WITH HYPERGLYCEMIA, WITH LONG-TERM CURRENT USE OF INSULIN (HCC): Primary | ICD-10-CM

## 2021-07-30 PROBLEM — K64.8 OTHER HEMORRHOIDS: Status: ACTIVE | Noted: 2021-07-30

## 2021-07-30 PROCEDURE — 1036F TOBACCO NON-USER: CPT | Performed by: INTERNAL MEDICINE

## 2021-07-30 PROCEDURE — 3079F DIAST BP 80-89 MM HG: CPT | Performed by: INTERNAL MEDICINE

## 2021-07-30 PROCEDURE — 3008F BODY MASS INDEX DOCD: CPT | Performed by: INTERNAL MEDICINE

## 2021-07-30 PROCEDURE — 3075F SYST BP GE 130 - 139MM HG: CPT | Performed by: INTERNAL MEDICINE

## 2021-07-30 PROCEDURE — 99214 OFFICE O/P EST MOD 30 MIN: CPT | Performed by: INTERNAL MEDICINE

## 2021-07-30 NOTE — PATIENT INSTRUCTIONS
Urology evaluation for you're 6 most issue probably need a circumcision    Your blood pressure is control    Am glad your diabetes is better continue follow-up with endocrinology    Will see her back in 3-4 months

## 2021-07-30 NOTE — PROGRESS NOTES
Assessment/Plan:  Phimosis urology referral needs to see Hematology follow-up in the leukemia diabetes improved adjustment of insulin blood pressure control back in 3-4 months      1  Phimosis side the for quite sometimes off and on he needs urology evaluation most likely for circumcision  2  Diabetes seeing an endocrinologist now the blood sugars are improving with adjustment of the insulin no episodes of hypoglycemia which is good news  3  Blood pressure well controlled no chest palpitation shortness of breath or headaches  4  Chronic lymphocytic leukemia told him to see the the Hematology for follow-up the good news the CBC has been stable since 2018 he has no lymphadenopathy and no B symptoms of weight loss sweats or fever  For health maintenance he is going to do a PSA and is going to  schedule a colonoscopy with [facility]      No problem-specific Assessment & Plan notes found for this encounter  Diagnoses and all orders for this visit:    Type 2 diabetes mellitus with hyperglycemia, with long-term current use of insulin (HCC)    Gastroesophageal reflux disease without esophagitis    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, macular edema presence unspecified, unspecified laterality (HCC)    Essential hypertension    Mixed hyperlipidemia    Chronic lymphocytic leukemia (HCC)          Subjective:      Patient ID: Miriam Burton is a 53 y o  male  No chief complaint on file          Current Outpatient Medications:     Blood Glucose Monitoring Suppl (FreeStyle Freedom Lite) w/Device KIT, Use Test blood sugars 4 times daily, Disp: , Rfl:     celecoxib (CeleBREX) 200 mg capsule, Take 1 capsule (200 mg total) by mouth daily for 30 days (Patient not taking: Reported on 3/19/2021), Disp: 30 capsule, Rfl: 1    Cholecalciferol (VITAMIN D3) 5000 units CAPS, Take by mouth, Disp: , Rfl:     ciclopirox (PENLAC) 8 % solution, Apply topically daily at bedtime, Disp: 6 6 mL, Rfl: 0    Empagliflozin 10 MG TABS, Take 1 tablet (10 mg total) by mouth every morning, Disp: 90 tablet, Rfl: 0    glipiZIDE (GLUCOTROL) 10 mg tablet, TAKE 1 TABLET(10 MG) BY MOUTH TWICE DAILY BEFORE MEALS, Disp: 180 tablet, Rfl: 1    glucose blood (FREESTYLE LITE) test strip, Use as instructed (Patient taking differently: 4 (four) times daily (after meals and at bedtime) ), Disp: 90 each, Rfl: 3    Insulin Lispro Prot & Lispro (HumaLOG Mix 75/25 KwikPen) (75-25) 100 units/mL injection pen, Inject 25 units at breakfast and lunch and 35 units at dinner, Disp: 225 mL, Rfl: 0    Insulin Pen Needle (BD Pen Needle Marielos 2nd Gen) 32G X 4 MM MISC, Use 3 (three) times a day, Disp: 300 each, Rfl: 1    Insulin Syringe-Needle U-100 (B-D INS SYR ULTRAFINE  5CC/30G) 30G X 1/2" 0 5 ML MISC, Inject insulin 3 times daily, Disp: 300 each, Rfl: 1    Lancets (freestyle) lancets, Use 1 each 3 (three) times a day Test blood sugars 4 times daily, Disp: 300 each, Rfl: 0    losartan-hydrochlorothiazide (HYZAAR) 100-25 MG per tablet, Take 1 tablet by mouth daily, Disp: 30 tablet, Rfl: 5    metFORMIN (GLUCOPHAGE-XR) 500 mg 24 hr tablet, One tablet with breakfast and 2 tablets with dinner, Disp: 180 tablet, Rfl: 1    metoprolol succinate (TOPROL-XL) 50 mg 24 hr tablet, TAKE 1 TABLET(50 MG) BY MOUTH DAILY, Disp: 90 tablet, Rfl: 0    omeprazole (PriLOSEC) 20 mg delayed release capsule, TAKE 1 CAPSULE(20 MG) BY MOUTH DAILY, Disp: 90 capsule, Rfl: 0    pravastatin (PRAVACHOL) 80 mg tablet, TAKE 1 TABLET(80 MG) BY MOUTH DAILY, Disp: 30 tablet, Rfl: 1    sildenafil (REVATIO) 20 mg tablet, Take 1 or 2 tablets 1 hour prior to relation, Disp: 30 tablet, Rfl: 0    HPI    The following portions of the patient's history were reviewed and updated as appropriate: allergies, current medications, past family history, past medical history, past social history, past surgical history and problem list     Review of Systems Constitutional: Negative  Negative for activity change, appetite change, fatigue, fever and unexpected weight change  HENT: Negative for congestion, ear pain, hearing loss, mouth sores, postnasal drip, rhinorrhea, sore throat, trouble swallowing and voice change  Eyes: Negative for pain, redness and visual disturbance  Respiratory: Negative for cough, chest tightness, shortness of breath and wheezing  Cardiovascular: Negative for chest pain, palpitations and leg swelling  Gastrointestinal: Negative for abdominal distention, abdominal pain, blood in stool, constipation, diarrhea and nausea  Endocrine: Negative for cold intolerance, heat intolerance, polydipsia, polyphagia and polyuria  Genitourinary: Positive for penile pain and penile swelling  Negative for difficulty urinating, dysuria, flank pain, frequency, hematuria and urgency  Musculoskeletal: Negative for arthralgias, back pain, gait problem, joint swelling and myalgias  Skin: Negative for color change and pallor  Neurological: Negative for dizziness, tremors, seizures, syncope, weakness, numbness and headaches  Hematological: Negative for adenopathy  Does not bruise/bleed easily  Psychiatric/Behavioral: Negative  Negative for sleep disturbance  The patient is not nervous/anxious  Objective: There were no vitals taken for this visit  Physical Exam  Vitals and nursing note reviewed  Constitutional:       Appearance: He is well-developed  HENT:      Head: Normocephalic  Right Ear: External ear normal       Left Ear: External ear normal       Nose: Nose normal       Mouth/Throat:      Pharynx: No oropharyngeal exudate  Eyes:      Conjunctiva/sclera: Conjunctivae normal       Pupils: Pupils are equal, round, and reactive to light  Neck:      Thyroid: No thyromegaly  Cardiovascular:      Rate and Rhythm: Normal rate and regular rhythm  Heart sounds: Normal heart sounds  No murmur heard     No friction rub  No gallop  Comments: S1-S2 regular rhythm  Extremities no edema  Pulmonary:      Effort: Pulmonary effort is normal  No respiratory distress  Breath sounds: Normal breath sounds  No wheezing or rales  Comments: Lungs are clear no wheezing rales or rhonchi  Abdominal:      General: Bowel sounds are normal  There is no distension  Palpations: Abdomen is soft  There is no mass  Tenderness: There is no abdominal tenderness  There is no guarding or rebound  Comments: Abdomen soft nontender   Genitourinary:     Comments: Phimosis uncircumcised unable to pull the foreskin back completely has pain doing erection   Musculoskeletal:         General: Normal range of motion  Cervical back: Normal range of motion and neck supple  Lymphadenopathy:      Cervical: No cervical adenopathy  Skin:     General: Skin is warm and dry  Neurological:      Mental Status: He is alert and oriented to person, place, and time     Psychiatric:         Behavior: Behavior normal          Judgment: Judgment normal

## 2021-08-06 DIAGNOSIS — K21.9 GASTROESOPHAGEAL REFLUX DISEASE WITHOUT ESOPHAGITIS: ICD-10-CM

## 2021-08-06 RX ORDER — OMEPRAZOLE 20 MG/1
CAPSULE, DELAYED RELEASE ORAL
Qty: 90 CAPSULE | Refills: 0 | Status: SHIPPED | OUTPATIENT
Start: 2021-08-06 | End: 2021-10-18

## 2021-08-10 DIAGNOSIS — E11.9 TYPE 2 DIABETES MELLITUS WITHOUT COMPLICATION, WITHOUT LONG-TERM CURRENT USE OF INSULIN (HCC): ICD-10-CM

## 2021-08-10 RX ORDER — EMPAGLIFLOZIN 10 MG/1
TABLET, FILM COATED ORAL
Qty: 90 TABLET | Refills: 0 | Status: SHIPPED | OUTPATIENT
Start: 2021-08-10 | End: 2021-11-15

## 2021-08-19 DIAGNOSIS — E78.00 PURE HYPERCHOLESTEROLEMIA: ICD-10-CM

## 2021-08-19 RX ORDER — PRAVASTATIN SODIUM 80 MG/1
TABLET ORAL
Qty: 30 TABLET | Refills: 1 | Status: SHIPPED | OUTPATIENT
Start: 2021-08-19 | End: 2021-09-29

## 2021-08-25 DIAGNOSIS — E11.9 TYPE 2 DIABETES MELLITUS WITHOUT COMPLICATION, WITHOUT LONG-TERM CURRENT USE OF INSULIN (HCC): ICD-10-CM

## 2021-08-25 DIAGNOSIS — I10 ESSENTIAL HYPERTENSION: ICD-10-CM

## 2021-08-25 RX ORDER — METFORMIN HYDROCHLORIDE 500 MG/1
TABLET, EXTENDED RELEASE ORAL
Qty: 180 TABLET | Refills: 1 | Status: SHIPPED | OUTPATIENT
Start: 2021-08-25 | End: 2021-11-24

## 2021-09-21 ENCOUNTER — TELEPHONE (OUTPATIENT)
Dept: GASTROENTEROLOGY | Facility: MEDICAL CENTER | Age: 51
End: 2021-09-21

## 2021-09-29 DIAGNOSIS — E11.9 TYPE 2 DIABETES MELLITUS WITHOUT COMPLICATION, WITHOUT LONG-TERM CURRENT USE OF INSULIN (HCC): Primary | ICD-10-CM

## 2021-09-29 DIAGNOSIS — E78.00 PURE HYPERCHOLESTEROLEMIA: ICD-10-CM

## 2021-09-29 RX ORDER — FLASH GLUCOSE SENSOR
KIT MISCELLANEOUS
Qty: 6 EACH | Refills: 0 | Status: SHIPPED | OUTPATIENT
Start: 2021-09-29 | End: 2021-12-22 | Stop reason: ALTCHOICE

## 2021-09-29 RX ORDER — FLASH GLUCOSE SENSOR
KIT MISCELLANEOUS
Qty: 1 EACH | Refills: 0 | Status: SHIPPED | OUTPATIENT
Start: 2021-09-29 | End: 2021-12-22 | Stop reason: ALTCHOICE

## 2021-09-29 RX ORDER — PRAVASTATIN SODIUM 80 MG/1
TABLET ORAL
Qty: 30 TABLET | Refills: 1 | Status: SHIPPED | OUTPATIENT
Start: 2021-09-29 | End: 2021-11-23

## 2021-10-13 DIAGNOSIS — I10 ESSENTIAL HYPERTENSION: ICD-10-CM

## 2021-10-14 RX ORDER — METOPROLOL SUCCINATE 50 MG/1
TABLET, EXTENDED RELEASE ORAL
Qty: 90 TABLET | Refills: 0 | Status: SHIPPED | OUTPATIENT
Start: 2021-10-14 | End: 2022-03-25 | Stop reason: SDUPTHER

## 2021-10-16 DIAGNOSIS — K21.9 GASTROESOPHAGEAL REFLUX DISEASE WITHOUT ESOPHAGITIS: ICD-10-CM

## 2021-10-18 RX ORDER — OMEPRAZOLE 20 MG/1
CAPSULE, DELAYED RELEASE ORAL
Qty: 90 CAPSULE | Refills: 0 | Status: SHIPPED | OUTPATIENT
Start: 2021-10-18 | End: 2022-01-18

## 2021-10-26 DIAGNOSIS — I10 ESSENTIAL HYPERTENSION: ICD-10-CM

## 2021-10-26 RX ORDER — LOSARTAN POTASSIUM AND HYDROCHLOROTHIAZIDE 25; 100 MG/1; MG/1
1 TABLET ORAL DAILY
Qty: 30 TABLET | Refills: 5 | Status: SHIPPED | OUTPATIENT
Start: 2021-10-26 | End: 2022-03-25 | Stop reason: SDUPTHER

## 2021-11-13 ENCOUNTER — APPOINTMENT (OUTPATIENT)
Dept: LAB | Facility: HOSPITAL | Age: 51
End: 2021-11-13
Attending: INTERNAL MEDICINE
Payer: COMMERCIAL

## 2021-11-13 DIAGNOSIS — Z12.5 PROSTATE CANCER SCREENING: ICD-10-CM

## 2021-11-13 DIAGNOSIS — E11.3299 MILD NONPROLIFERATIVE DIABETIC RETINOPATHY ASSOCIATED WITH TYPE 2 DIABETES MELLITUS, MACULAR EDEMA PRESENCE UNSPECIFIED, UNSPECIFIED LATERALITY (HCC): ICD-10-CM

## 2021-11-13 DIAGNOSIS — C91.10 CHRONIC LYMPHOCYTIC LEUKEMIA (HCC): ICD-10-CM

## 2021-11-13 DIAGNOSIS — I10 ESSENTIAL HYPERTENSION: ICD-10-CM

## 2021-11-13 DIAGNOSIS — N47.1 PHIMOSIS: ICD-10-CM

## 2021-11-13 DIAGNOSIS — E78.2 MIXED HYPERLIPIDEMIA: ICD-10-CM

## 2021-11-13 DIAGNOSIS — E11.9 TYPE 2 DIABETES MELLITUS WITHOUT COMPLICATION, WITHOUT LONG-TERM CURRENT USE OF INSULIN (HCC): ICD-10-CM

## 2021-11-13 DIAGNOSIS — E11.65 TYPE 2 DIABETES MELLITUS WITH HYPERGLYCEMIA, WITH LONG-TERM CURRENT USE OF INSULIN (HCC): ICD-10-CM

## 2021-11-13 DIAGNOSIS — K21.9 GASTROESOPHAGEAL REFLUX DISEASE WITHOUT ESOPHAGITIS: ICD-10-CM

## 2021-11-13 DIAGNOSIS — Z79.4 TYPE 2 DIABETES MELLITUS WITH HYPERGLYCEMIA, WITH LONG-TERM CURRENT USE OF INSULIN (HCC): ICD-10-CM

## 2021-11-13 LAB
ALBUMIN SERPL BCP-MCNC: 3.7 G/DL (ref 3.5–5)
ALP SERPL-CCNC: 91 U/L (ref 46–116)
ALT SERPL W P-5'-P-CCNC: 49 U/L (ref 12–78)
ANION GAP SERPL CALCULATED.3IONS-SCNC: 7 MMOL/L (ref 4–13)
AST SERPL W P-5'-P-CCNC: 34 U/L (ref 5–45)
BASOPHILS # BLD AUTO: 0.07 THOUSANDS/ΜL (ref 0–0.1)
BASOPHILS NFR BLD AUTO: 1 % (ref 0–1)
BILIRUB SERPL-MCNC: 0.37 MG/DL (ref 0.2–1)
BUN SERPL-MCNC: 23 MG/DL (ref 5–25)
CALCIUM SERPL-MCNC: 9.5 MG/DL (ref 8.3–10.1)
CHLORIDE SERPL-SCNC: 107 MMOL/L (ref 100–108)
CHOLEST SERPL-MCNC: 128 MG/DL (ref 50–200)
CO2 SERPL-SCNC: 28 MMOL/L (ref 21–32)
CREAT SERPL-MCNC: 1.27 MG/DL (ref 0.6–1.3)
CREAT UR-MCNC: 120 MG/DL
EOSINOPHIL # BLD AUTO: 0.06 THOUSAND/ΜL (ref 0–0.61)
EOSINOPHIL NFR BLD AUTO: 0 % (ref 0–6)
ERYTHROCYTE [DISTWIDTH] IN BLOOD BY AUTOMATED COUNT: 12.6 % (ref 11.6–15.1)
EST. AVERAGE GLUCOSE BLD GHB EST-MCNC: 209 MG/DL
GFR SERPL CREATININE-BSD FRML MDRD: 65 ML/MIN/1.73SQ M
GLUCOSE P FAST SERPL-MCNC: 169 MG/DL (ref 65–99)
HBA1C MFR BLD: 8.9 %
HCT VFR BLD AUTO: 45.2 % (ref 36.5–49.3)
HDLC SERPL-MCNC: 29 MG/DL
HGB BLD-MCNC: 13.8 G/DL (ref 12–17)
IMM GRANULOCYTES # BLD AUTO: 0.05 THOUSAND/UL (ref 0–0.2)
IMM GRANULOCYTES NFR BLD AUTO: 0 % (ref 0–2)
LDLC SERPL CALC-MCNC: 71 MG/DL (ref 0–100)
LYMPHOCYTES # BLD AUTO: 10.46 THOUSANDS/ΜL (ref 0.6–4.47)
LYMPHOCYTES NFR BLD AUTO: 75 % (ref 14–44)
MAGNESIUM SERPL-MCNC: 2.1 MG/DL (ref 1.6–2.6)
MCH RBC QN AUTO: 27.5 PG (ref 26.8–34.3)
MCHC RBC AUTO-ENTMCNC: 30.5 G/DL (ref 31.4–37.4)
MCV RBC AUTO: 90 FL (ref 82–98)
MICROALBUMIN UR-MCNC: <5 MG/L (ref 0–20)
MICROALBUMIN/CREAT 24H UR: <4 MG/G CREATININE (ref 0–30)
MONOCYTES # BLD AUTO: 0.68 THOUSAND/ΜL (ref 0.17–1.22)
MONOCYTES NFR BLD AUTO: 5 % (ref 4–12)
NEUTROPHILS # BLD AUTO: 2.7 THOUSANDS/ΜL (ref 1.85–7.62)
NEUTS SEG NFR BLD AUTO: 19 % (ref 43–75)
NRBC BLD AUTO-RTO: 0 /100 WBCS
PLATELET # BLD AUTO: 177 THOUSANDS/UL (ref 149–390)
PMV BLD AUTO: 9.7 FL (ref 8.9–12.7)
POTASSIUM SERPL-SCNC: 4.4 MMOL/L (ref 3.5–5.3)
PROT SERPL-MCNC: 6.5 G/DL (ref 6.4–8.2)
PSA SERPL-MCNC: 1 NG/ML (ref 0–4)
RBC # BLD AUTO: 5.01 MILLION/UL (ref 3.88–5.62)
SODIUM SERPL-SCNC: 142 MMOL/L (ref 136–145)
T4 FREE SERPL-MCNC: 0.92 NG/DL (ref 0.76–1.46)
TRIGL SERPL-MCNC: 139 MG/DL
TSH SERPL DL<=0.05 MIU/L-ACNC: 2.18 UIU/ML (ref 0.36–3.74)
WBC # BLD AUTO: 14.02 THOUSAND/UL (ref 4.31–10.16)

## 2021-11-13 PROCEDURE — 84443 ASSAY THYROID STIM HORMONE: CPT

## 2021-11-13 PROCEDURE — 84439 ASSAY OF FREE THYROXINE: CPT

## 2021-11-13 PROCEDURE — 83036 HEMOGLOBIN GLYCOSYLATED A1C: CPT

## 2021-11-13 PROCEDURE — 82043 UR ALBUMIN QUANTITATIVE: CPT

## 2021-11-13 PROCEDURE — 82570 ASSAY OF URINE CREATININE: CPT

## 2021-11-13 PROCEDURE — 80053 COMPREHEN METABOLIC PANEL: CPT

## 2021-11-13 PROCEDURE — 80061 LIPID PANEL: CPT

## 2021-11-13 PROCEDURE — 36415 COLL VENOUS BLD VENIPUNCTURE: CPT

## 2021-11-13 PROCEDURE — 83735 ASSAY OF MAGNESIUM: CPT

## 2021-11-13 PROCEDURE — 85025 COMPLETE CBC W/AUTO DIFF WBC: CPT

## 2021-11-13 PROCEDURE — G0103 PSA SCREENING: HCPCS

## 2021-11-15 RX ORDER — EMPAGLIFLOZIN 10 MG/1
TABLET, FILM COATED ORAL
Qty: 90 TABLET | Refills: 0 | Status: SHIPPED | OUTPATIENT
Start: 2021-11-15 | End: 2022-02-14

## 2021-11-23 DIAGNOSIS — E78.00 PURE HYPERCHOLESTEROLEMIA: ICD-10-CM

## 2021-11-23 RX ORDER — PRAVASTATIN SODIUM 80 MG/1
TABLET ORAL
Qty: 30 TABLET | Refills: 1 | Status: SHIPPED | OUTPATIENT
Start: 2021-11-23 | End: 2022-03-25 | Stop reason: SDUPTHER

## 2021-11-24 DIAGNOSIS — I10 ESSENTIAL HYPERTENSION: ICD-10-CM

## 2021-11-24 DIAGNOSIS — E11.9 TYPE 2 DIABETES MELLITUS WITHOUT COMPLICATION, WITHOUT LONG-TERM CURRENT USE OF INSULIN (HCC): ICD-10-CM

## 2021-11-24 RX ORDER — METFORMIN HYDROCHLORIDE 500 MG/1
TABLET, EXTENDED RELEASE ORAL
Qty: 180 TABLET | Refills: 1 | Status: SHIPPED | OUTPATIENT
Start: 2021-11-24 | End: 2022-03-25 | Stop reason: SDUPTHER

## 2021-11-30 ENCOUNTER — RA CDI HCC (OUTPATIENT)
Dept: OTHER | Facility: HOSPITAL | Age: 51
End: 2021-11-30

## 2021-12-17 ENCOUNTER — OFFICE VISIT (OUTPATIENT)
Dept: GASTROENTEROLOGY | Facility: MEDICAL CENTER | Age: 51
End: 2021-12-17
Payer: COMMERCIAL

## 2021-12-17 VITALS
SYSTOLIC BLOOD PRESSURE: 136 MMHG | WEIGHT: 223 LBS | HEART RATE: 71 BPM | BODY MASS INDEX: 35.99 KG/M2 | DIASTOLIC BLOOD PRESSURE: 82 MMHG | TEMPERATURE: 97.9 F

## 2021-12-17 DIAGNOSIS — Z12.11 SCREENING FOR COLON CANCER: ICD-10-CM

## 2021-12-17 DIAGNOSIS — K21.9 GASTROESOPHAGEAL REFLUX DISEASE WITHOUT ESOPHAGITIS: Primary | ICD-10-CM

## 2021-12-17 PROCEDURE — 99243 OFF/OP CNSLTJ NEW/EST LOW 30: CPT | Performed by: PHYSICIAN ASSISTANT

## 2021-12-22 ENCOUNTER — OFFICE VISIT (OUTPATIENT)
Dept: ENDOCRINOLOGY | Facility: CLINIC | Age: 51
End: 2021-12-22
Payer: COMMERCIAL

## 2021-12-22 VITALS
DIASTOLIC BLOOD PRESSURE: 78 MMHG | SYSTOLIC BLOOD PRESSURE: 124 MMHG | HEIGHT: 66 IN | HEART RATE: 77 BPM | BODY MASS INDEX: 35.2 KG/M2 | WEIGHT: 219 LBS

## 2021-12-22 DIAGNOSIS — E11.9 TYPE 2 DIABETES MELLITUS WITHOUT COMPLICATION, WITHOUT LONG-TERM CURRENT USE OF INSULIN (HCC): Primary | ICD-10-CM

## 2021-12-22 DIAGNOSIS — Z79.4 TYPE 2 DIABETES MELLITUS WITH HYPERGLYCEMIA, WITH LONG-TERM CURRENT USE OF INSULIN (HCC): ICD-10-CM

## 2021-12-22 DIAGNOSIS — I10 ESSENTIAL HYPERTENSION: ICD-10-CM

## 2021-12-22 DIAGNOSIS — E11.65 TYPE 2 DIABETES MELLITUS WITH HYPERGLYCEMIA, WITH LONG-TERM CURRENT USE OF INSULIN (HCC): ICD-10-CM

## 2021-12-22 DIAGNOSIS — E78.2 MIXED HYPERLIPIDEMIA: ICD-10-CM

## 2021-12-22 PROCEDURE — 99214 OFFICE O/P EST MOD 30 MIN: CPT | Performed by: PHYSICIAN ASSISTANT

## 2021-12-22 RX ORDER — BLOOD-GLUCOSE TRANSMITTER
EACH MISCELLANEOUS
Qty: 1 EACH | Refills: 3 | Status: SHIPPED | OUTPATIENT
Start: 2021-12-22

## 2021-12-22 RX ORDER — BLOOD-GLUCOSE,RECEIVER,CONT
EACH MISCELLANEOUS
Qty: 1 EACH | Refills: 1 | Status: SHIPPED | OUTPATIENT
Start: 2021-12-22

## 2021-12-22 RX ORDER — BLOOD-GLUCOSE SENSOR
EACH MISCELLANEOUS
Qty: 1 EACH | Refills: 12 | Status: SHIPPED | OUTPATIENT
Start: 2021-12-22 | End: 2021-12-23 | Stop reason: SDUPTHER

## 2021-12-23 DIAGNOSIS — E11.9 TYPE 2 DIABETES MELLITUS WITHOUT COMPLICATION, WITHOUT LONG-TERM CURRENT USE OF INSULIN (HCC): ICD-10-CM

## 2021-12-23 RX ORDER — BLOOD-GLUCOSE SENSOR
EACH MISCELLANEOUS
Qty: 1 EACH | Refills: 12 | Status: SHIPPED | OUTPATIENT
Start: 2021-12-23

## 2021-12-30 ENCOUNTER — TELEPHONE (OUTPATIENT)
Dept: GASTROENTEROLOGY | Facility: MEDICAL CENTER | Age: 51
End: 2021-12-30

## 2021-12-30 ENCOUNTER — OFFICE VISIT (OUTPATIENT)
Dept: UROLOGY | Facility: MEDICAL CENTER | Age: 51
End: 2021-12-30
Payer: COMMERCIAL

## 2021-12-30 VITALS
WEIGHT: 213 LBS | HEIGHT: 66 IN | BODY MASS INDEX: 34.23 KG/M2 | DIASTOLIC BLOOD PRESSURE: 78 MMHG | SYSTOLIC BLOOD PRESSURE: 126 MMHG

## 2021-12-30 DIAGNOSIS — Z79.4 TYPE 2 DIABETES MELLITUS WITH HYPERGLYCEMIA, WITH LONG-TERM CURRENT USE OF INSULIN (HCC): ICD-10-CM

## 2021-12-30 DIAGNOSIS — E11.65 TYPE 2 DIABETES MELLITUS WITH HYPERGLYCEMIA, WITH LONG-TERM CURRENT USE OF INSULIN (HCC): ICD-10-CM

## 2021-12-30 DIAGNOSIS — N47.1 PHIMOSIS: ICD-10-CM

## 2021-12-30 PROCEDURE — 3008F BODY MASS INDEX DOCD: CPT | Performed by: UROLOGY

## 2021-12-30 PROCEDURE — 3078F DIAST BP <80 MM HG: CPT | Performed by: UROLOGY

## 2021-12-30 PROCEDURE — 3074F SYST BP LT 130 MM HG: CPT | Performed by: UROLOGY

## 2021-12-30 PROCEDURE — 1036F TOBACCO NON-USER: CPT | Performed by: UROLOGY

## 2021-12-30 PROCEDURE — 99204 OFFICE O/P NEW MOD 45 MIN: CPT | Performed by: UROLOGY

## 2022-01-05 ENCOUNTER — TELEPHONE (OUTPATIENT)
Dept: UROLOGY | Facility: CLINIC | Age: 52
End: 2022-01-05

## 2022-01-05 ENCOUNTER — PREP FOR PROCEDURE (OUTPATIENT)
Dept: UROLOGY | Facility: CLINIC | Age: 52
End: 2022-01-05

## 2022-01-05 DIAGNOSIS — N47.1 PHIMOSIS: Primary | ICD-10-CM

## 2022-01-05 NOTE — TELEPHONE ENCOUNTER
Patient is scheduled for 3/1/22 at [facility] with BM  Patient is aware the hospital will call the day prior with time of arrival, nothing to eat or drink after midnight, he will need a , and to hold blood thinning medications 7 days prior  Patient will go for urine culture 2 weeks prior to surgery       Mailed surgical pkt per patient request

## 2022-01-18 DIAGNOSIS — K21.9 GASTROESOPHAGEAL REFLUX DISEASE WITHOUT ESOPHAGITIS: ICD-10-CM

## 2022-01-18 RX ORDER — OMEPRAZOLE 20 MG/1
CAPSULE, DELAYED RELEASE ORAL
Qty: 90 CAPSULE | Refills: 0 | Status: SHIPPED | OUTPATIENT
Start: 2022-01-18 | End: 2022-03-25 | Stop reason: SDUPTHER

## 2022-02-01 ENCOUNTER — TELEPHONE (OUTPATIENT)
Dept: GASTROENTEROLOGY | Facility: MEDICAL CENTER | Age: 52
End: 2022-02-01

## 2022-02-02 ENCOUNTER — ANESTHESIA EVENT (OUTPATIENT)
Dept: GASTROENTEROLOGY | Facility: MEDICAL CENTER | Age: 52
End: 2022-02-02

## 2022-02-02 ENCOUNTER — HOSPITAL ENCOUNTER (OUTPATIENT)
Dept: GASTROENTEROLOGY | Facility: MEDICAL CENTER | Age: 52
Setting detail: OUTPATIENT SURGERY
Discharge: HOME | End: 2022-02-02
Admitting: INTERNAL MEDICINE
Payer: COMMERCIAL

## 2022-02-02 ENCOUNTER — ANESTHESIA (OUTPATIENT)
Dept: GASTROENTEROLOGY | Facility: MEDICAL CENTER | Age: 52
End: 2022-02-02

## 2022-02-02 VITALS
WEIGHT: 213 LBS | OXYGEN SATURATION: 97 % | DIASTOLIC BLOOD PRESSURE: 84 MMHG | SYSTOLIC BLOOD PRESSURE: 147 MMHG | HEART RATE: 73 BPM | TEMPERATURE: 98.3 F | HEIGHT: 66 IN | BODY MASS INDEX: 34.23 KG/M2 | RESPIRATION RATE: 14 BRPM

## 2022-02-02 DIAGNOSIS — Z12.11 SCREENING FOR COLON CANCER: ICD-10-CM

## 2022-02-02 DIAGNOSIS — K21.9 GASTROESOPHAGEAL REFLUX DISEASE WITHOUT ESOPHAGITIS: ICD-10-CM

## 2022-02-02 LAB
GLUCOSE SERPL-MCNC: 138 MG/DL (ref 65–140)
GLUCOSE SERPL-MCNC: 183 MG/DL (ref 65–140)

## 2022-02-02 PROCEDURE — 88313 SPECIAL STAINS GROUP 2: CPT | Performed by: PATHOLOGY

## 2022-02-02 PROCEDURE — 82948 REAGENT STRIP/BLOOD GLUCOSE: CPT

## 2022-02-02 PROCEDURE — G0121 COLON CA SCRN NOT HI RSK IND: HCPCS | Performed by: INTERNAL MEDICINE

## 2022-02-02 PROCEDURE — 88305 TISSUE EXAM BY PATHOLOGIST: CPT | Performed by: PATHOLOGY

## 2022-02-02 PROCEDURE — 43239 EGD BIOPSY SINGLE/MULTIPLE: CPT | Performed by: INTERNAL MEDICINE

## 2022-02-02 RX ORDER — MIDAZOLAM HYDROCHLORIDE 2 MG/2ML
INJECTION, SOLUTION INTRAMUSCULAR; INTRAVENOUS AS NEEDED
Status: DISCONTINUED | OUTPATIENT
Start: 2022-02-02 | End: 2022-02-02

## 2022-02-02 RX ORDER — PROPOFOL 10 MG/ML
INJECTION, EMULSION INTRAVENOUS CONTINUOUS PRN
Status: DISCONTINUED | OUTPATIENT
Start: 2022-02-02 | End: 2022-02-02

## 2022-02-02 RX ORDER — PROPOFOL 10 MG/ML
INJECTION, EMULSION INTRAVENOUS AS NEEDED
Status: DISCONTINUED | OUTPATIENT
Start: 2022-02-02 | End: 2022-02-02

## 2022-02-02 RX ORDER — SODIUM CHLORIDE 9 MG/ML
125 INJECTION, SOLUTION INTRAVENOUS CONTINUOUS
Status: DISCONTINUED | OUTPATIENT
Start: 2022-02-02 | End: 2022-02-02

## 2022-02-02 RX ORDER — LIDOCAINE HYDROCHLORIDE 20 MG/ML
INJECTION, SOLUTION EPIDURAL; INFILTRATION; INTRACAUDAL; PERINEURAL AS NEEDED
Status: DISCONTINUED | OUTPATIENT
Start: 2022-02-02 | End: 2022-02-02

## 2022-02-02 RX ADMIN — LIDOCAINE HYDROCHLORIDE 100 MG: 20 INJECTION, SOLUTION EPIDURAL; INFILTRATION; INTRACAUDAL; PERINEURAL at 12:18

## 2022-02-02 RX ADMIN — PROPOFOL 150 MG: 10 INJECTION, EMULSION INTRAVENOUS at 12:18

## 2022-02-02 RX ADMIN — SODIUM CHLORIDE: 9 INJECTION, SOLUTION INTRAVENOUS at 12:38

## 2022-02-02 RX ADMIN — Medication 40 MG: at 12:34

## 2022-02-02 RX ADMIN — MIDAZOLAM 2 MG: 1 INJECTION INTRAMUSCULAR; INTRAVENOUS at 12:26

## 2022-02-02 RX ADMIN — SODIUM CHLORIDE 125 ML/HR: 9 INJECTION, SOLUTION INTRAVENOUS at 11:57

## 2022-02-02 RX ADMIN — PROPOFOL 120 MCG/KG/MIN: 10 INJECTION, EMULSION INTRAVENOUS at 12:18

## 2022-02-02 NOTE — ANESTHESIA POSTPROCEDURE EVALUATION
Post-Op Assessment Note    CV Status:  Stable    Pain management: adequate     Mental Status:  Alert and awake   Hydration Status:  Euvolemic   PONV Controlled:  Controlled   Airway Patency:  Patent      Post Op Vitals Reviewed: Yes      Staff: Anesthesiologist         No complications documented      /67 (02/02/22 1247)    Temp      Pulse 81 (02/02/22 1247)   Resp 20 (02/02/22 1247)    SpO2 96 % (02/02/22 1247)

## 2022-02-02 NOTE — H&P
History and Physical -  [facility]  Shermon Claude 49 y o  male MRN: [id number]                  HPI: Shermon Claude is a 49y o  year old male who presents for GERD and CRC screening  REVIEW OF SYSTEMS: Per the HPI, and otherwise unremarkable  Historical Information   Past Medical History:   Diagnosis Date    Diabetes ([facility] )     Diabetes mellitus ([place] [address] )     Fatty liver     Hyperlipidemia     Hypertension     Intertrigo     resolved 10/26/17    Sebaceous cyst     Sexual disorder     Sexual dysfunction     last assessed 03/04/14    Snoring     last assessed 12/08/14    Somnolence, daytime     last assessed 12/08/14    Thoracic disc herniation     last assessed 05/02/14    Vision problem     Vitamin D deficiency      Past Surgical History:   Procedure Laterality Date    COLONOSCOPY      last assessed 01/22/14    LYMPHADENECTOMY      Axillary; last assessed 05/30/14     Social History   Social History     Substance and Sexual Activity   Alcohol Use Yes    Comment: social     Social History     Substance and Sexual Activity   Drug Use No     Social History     Tobacco Use   Smoking Status Never Smoker   Smokeless Tobacco Never Used     Family History   Problem Relation Age of Onset    Diabetes Mother     Kidney cancer Mother     Diabetes Father     Diabetes Sister     Diabetes Family     Hypertension Family        Meds/Allergies     (Not in a hospital admission)      Allergies   Allergen Reactions    Ace Inhibitors Cough       Objective     Blood pressure (!) 181/94, pulse 87, temperature 98 3 °F (36 8 °C), temperature source Temporal, resp  rate 20, height 5' 6" (1 676 m), weight 96 6 kg (213 lb), SpO2 96 %  PHYSICAL EXAMINATION:    General Appearance:   Alert, cooperative, no distress   HEENT:  Normocephalic, atraumatic, anicteric  Neck supple, symmetrical, trachea midline     Lungs:   Equal chest rise and unlabored breathing, normal effort, no coughing  Cardiovascular:   No visualized JVD  Abdomen:   No abdominal distension  Skin:   No jaundice, rashes, or lesions  Musculoskeletal:   Normal range of motion visualized  Psych:  Normal affect and normal insight  Neuro:  Alert and appropriate  ASSESSMENT/PLAN:  This is a 49y o  year old male here for EGD and colonoscopy, and he is stable and optimized for his procedure

## 2022-02-02 NOTE — ANESTHESIA PREPROCEDURE EVALUATION
Procedure:  COLONOSCOPY  EGD    Relevant Problems   CARDIO   (+) Essential hypertension   (+) Mixed hyperlipidemia   (+) Other hemorrhoids      ENDO   (+) Type 2 diabetes mellitus with hyperglycemia, with long-term current use of insulin (HCC)      GI/HEPATIC   (+) Gastroesophageal reflux disease without esophagitis      Other   (+) Chronic lymphocytic leukemia (HCC)   (+) Low testosterone   (+) 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 (HCC)        Physical Exam    Airway    Mallampati score: II  TM Distance: >3 FB  Neck ROM: full     Dental       Cardiovascular  Rhythm: regular, Rate: normal, Cardiovascular exam normal    Pulmonary  Pulmonary exam normal     Other Findings        Anesthesia Plan  ASA Score- 3     Anesthesia Type- IV sedation with anesthesia with ASA Monitors  Additional Monitors:   Airway Plan:           Plan Factors-Exercise tolerance (METS): >4 METS  Chart reviewed  Existing labs reviewed  Patient summary reviewed  Patient is not a current smoker  Obstructive sleep apnea risk education given perioperatively  Induction- intravenous  Postoperative Plan-     Informed Consent- Anesthetic plan and risks discussed with patient

## 2022-02-03 ENCOUNTER — PREP FOR PROCEDURE (OUTPATIENT)
Dept: GASTROENTEROLOGY | Facility: MEDICAL CENTER | Age: 52
End: 2022-02-03

## 2022-02-03 DIAGNOSIS — K31.9 GASTRIC LESION: Primary | ICD-10-CM

## 2022-02-03 NOTE — RESULT ENCOUNTER NOTE
Discussed biopsies with the patient  Suspect the lesion is gastric rest  Also discussed with Dr Lashon Yang  We will schedule EUS

## 2022-02-07 ENCOUNTER — TELEPHONE (OUTPATIENT)
Dept: GASTROENTEROLOGY | Facility: CLINIC | Age: 52
End: 2022-02-07

## 2022-02-07 NOTE — TELEPHONE ENCOUNTER
Scheduled date of EUS(as of today): 4/22/22  Physician performing EUS: Dr Paz  Location of EUS: [facility]  Instructions reviewed with patient by: Jess/markus  Clearances: N/A

## 2022-02-12 ENCOUNTER — APPOINTMENT (OUTPATIENT)
Dept: LAB | Facility: HOSPITAL | Age: 52
End: 2022-02-12
Attending: UROLOGY
Payer: COMMERCIAL

## 2022-02-12 DIAGNOSIS — N47.1 PHIMOSIS: ICD-10-CM

## 2022-02-12 DIAGNOSIS — E11.9 TYPE 2 DIABETES MELLITUS WITHOUT COMPLICATION, WITHOUT LONG-TERM CURRENT USE OF INSULIN (HCC): ICD-10-CM

## 2022-02-12 PROCEDURE — 87086 URINE CULTURE/COLONY COUNT: CPT

## 2022-02-12 PROCEDURE — 87147 CULTURE TYPE IMMUNOLOGIC: CPT

## 2022-02-13 DIAGNOSIS — E11.9 TYPE 2 DIABETES MELLITUS WITHOUT COMPLICATION, WITHOUT LONG-TERM CURRENT USE OF INSULIN (HCC): ICD-10-CM

## 2022-02-13 LAB — BACTERIA UR CULT: ABNORMAL

## 2022-02-14 ENCOUNTER — TELEPHONE (OUTPATIENT)
Dept: UROLOGY | Facility: MEDICAL CENTER | Age: 52
End: 2022-02-14

## 2022-02-14 DIAGNOSIS — N30.00 ACUTE CYSTITIS WITHOUT HEMATURIA: Primary | ICD-10-CM

## 2022-02-14 RX ORDER — EMPAGLIFLOZIN 10 MG/1
TABLET, FILM COATED ORAL
Qty: 90 TABLET | Refills: 0 | Status: SHIPPED | OUTPATIENT
Start: 2022-02-14 | End: 2022-03-25 | Stop reason: SDUPTHER

## 2022-02-14 RX ORDER — CEFUROXIME AXETIL 250 MG/1
250 TABLET ORAL EVERY 12 HOURS SCHEDULED
Qty: 10 TABLET | Refills: 0 | Status: SHIPPED | OUTPATIENT
Start: 2022-02-14 | End: 2022-02-19

## 2022-02-14 RX ORDER — PEN NEEDLE, DIABETIC 32GX 5/32"
NEEDLE, DISPOSABLE MISCELLANEOUS
Qty: 300 EACH | Refills: 3 | Status: SHIPPED | OUTPATIENT
Start: 2022-02-14

## 2022-02-14 NOTE — TELEPHONE ENCOUNTER
----- Message from Effie Hendrix MD sent at 2/14/2022  3:56 PM EST -----   Has circumcision in two weeks    I sent cefuroxime, twice per day for five days to pharmacy start now

## 2022-02-14 NOTE — TELEPHONE ENCOUNTER
Spoke to pt through [facility]  #[phone] to give msg from Dr Pierce Bower to start Ceftin prior to circumcision in OR scheduled for 3/1/22  Pt stated he cannot have surgery that day  Advised surgery scheduler would contact him to reschedule surgery  He should wait to take antibiotic until he is rescheduled

## 2022-02-28 ENCOUNTER — ANESTHESIA EVENT (OUTPATIENT)
Dept: PERIOP | Facility: HOSPITAL | Age: 52
End: 2022-02-28
Payer: COMMERCIAL

## 2022-02-28 PROCEDURE — NC001 PR NO CHARGE: Performed by: UROLOGY

## 2022-02-28 NOTE — H&P
HISTORY AND PHYSICAL  ? ? Patient Name: Ion Pereira  Patient MRN: [id number]  Attending Provider: Joshua Lo MD  Service: Urology  Chief Complaint    Phimosis, tight foreskin    HPI   Ion Pereira is a 49 y o  male with recent history:      Least two years of phimosis, tight foreskin, cracks, bleeds, difficult to pull back, very painful       Difficulty voiding when he has the inflammation but not anymore bother than that regarding urination  I plan circumcision  Potential risks and complications discussed, and informed consent was given by the patient  Medications  Meds/Allergies   No current facility-administered medications for this encounter  Cannot display prior to admission medications because the patient has not been admitted in this contact  No current facility-administered medications for this encounter      Current Outpatient Medications:     BD Pen Needle Marielos 2nd Gen 32G X 4 MM MISC, USE THREE TIMES A DAY, Disp: 300 each, Rfl: 3    Blood Glucose Monitoring Suppl (FreeStyle Freedom Lite) w/Device KIT, Use Test blood sugars 4 times daily, Disp: , Rfl:     celecoxib (CeleBREX) 200 mg capsule, Take 1 capsule (200 mg total) by mouth daily for 30 days, Disp: 30 capsule, Rfl: 1    Cholecalciferol (VITAMIN D3) 5000 units CAPS, Take by mouth, Disp: , Rfl:     ciclopirox (PENLAC) 8 % solution, Apply topically daily at bedtime (Patient not taking: Reported on 12/22/2021 ), Disp: 6 6 mL, Rfl: 0    Continuous Blood Gluc  (Dexcom G6 ) ROBYN, Disp 1 Kit, use as directed, Disp: 1 each, Rfl: 1    Continuous Blood Gluc Sensor (Dexcom G6 Sensor) MISC, Change every 10 days, Disp: 1 each, Rfl: 12    Continuous Blood Gluc Transmit (Dexcom G6 Transmitter) MISC, Dispense 1 kit, change every 90 days, Disp: 1 each, Rfl: 3    glipiZIDE (GLUCOTROL) 10 mg tablet, TAKE 1 TABLET(10 MG) BY MOUTH TWICE DAILY BEFORE MEALS, Disp: 180 tablet, Rfl: 1    glucose blood (FREESTYLE LITE) test strip, Use as instructed, Disp: 90 each, Rfl: 3    Insulin Lispro Prot & Lispro (HumaLOG Mix 75/25 KwikPen) (75-25) 100 units/mL injection pen, INJECT 25 UNITS AT BREAKFAST AND LUNCH AND 35 UNITS AT DINNER, Disp: 225 mL, Rfl: 0    Insulin Syringe-Needle U-100 (B-D INS SYR ULTRAFINE  5CC/30G) 30G X 1/2" 0 5 ML MISC, Inject insulin 3 times daily, Disp: 300 each, Rfl: 1    Jardiance 10 MG TABS, TAKE 1 TABLET(10 MG) BY MOUTH EVERY MORNING, Disp: 90 tablet, Rfl: 0    Lancets (freestyle) lancets, Use 1 each 3 (three) times a day Test blood sugars 4 times daily, Disp: 300 each, Rfl: 0    losartan-hydrochlorothiazide (HYZAAR) 100-25 MG per tablet, TAKE 1 TABLET BY MOUTH DAILY, Disp: 30 tablet, Rfl: 5    metFORMIN (GLUCOPHAGE-XR) 500 mg 24 hr tablet, TAKE 1 TABLET BY MOUTH WITH BREAKFAST AND 2 TABLETS WITH DINNER, Disp: 180 tablet, Rfl: 1    metoprolol succinate (TOPROL-XL) 50 mg 24 hr tablet, TAKE 1 TABLET(50 MG) BY MOUTH DAILY, Disp: 90 tablet, Rfl: 0    omeprazole (PriLOSEC) 20 mg delayed release capsule, TAKE 1 CAPSULE(20 MG) BY MOUTH DAILY, Disp: 90 capsule, Rfl: 0    pravastatin (PRAVACHOL) 80 mg tablet, TAKE 1 TABLET(80 MG) BY MOUTH DAILY, Disp: 30 tablet, Rfl: 1    sildenafil (REVATIO) 20 mg tablet, Take 1 or 2 tablets 1 hour prior to relation, Disp: 30 tablet, Rfl: 0  Review of Systems  10 point review of systems negative except as noted in HPI  Allergies  Allergies   Allergen Reactions    Ace Inhibitors Cough     PMH  Past Medical History:   Diagnosis Date    [facility] )     [facility] mellitus [facility]     Fatty liver     Hyperlipidemia     Hypertension     Intertrigo     resolved 10/26/17    Sebaceous cyst     Sexual disorder     Sexual dysfunction     last assessed 03/04/14    Snoring     last assessed 12/08/14    Somnolence, daytime     last assessed 12/08/14    Thoracic disc herniation     last assessed 05/02/14    Vision problem     Vitamin D deficiency      Past surgical history  Past Surgical History:   Procedure Laterality Date    COLONOSCOPY      last assessed 01/22/14    LYMPHADENECTOMY      Axillary; last assessed 05/30/14     Social history  Social History     Tobacco Use    Smoking status: Never Smoker    Smokeless tobacco: Never Used   Vaping Use    Vaping Use: Never used   Substance Use Topics    Alcohol use: Yes     Comment: social    Drug use: No     ?  Physical Exam     vs  General appearance: alert and oriented, in no acute distress  Head: Normocephalic, without obvious abnormality, atraumatic  Throat: lips, mucosa, and tongue normal; teeth and gums normal  Neck: no adenopathy, no carotid bruit, no JVD, supple, symmetrical, trachea midline and thyroid not enlarged, symmetric, no tenderness/mass/nodules  Lungs: clear to auscultation bilaterally  Heart: regular rate and rhythm, S1, S2 normal, no murmur, click, rub or gallop  Abdomen: soft, non-tender; bowel sounds normal; no masses,  no organomegaly  Extremities: extremities normal, warm and well-perfused; no cyanosis, clubbing, or edema  Keesha High MD

## 2022-03-01 ENCOUNTER — HOSPITAL ENCOUNTER (OUTPATIENT)
Facility: HOSPITAL | Age: 52
Setting detail: OUTPATIENT SURGERY
Discharge: HOME | End: 2022-03-01
Attending: UROLOGY | Admitting: UROLOGY
Payer: COMMERCIAL

## 2022-03-01 ENCOUNTER — ANESTHESIA (OUTPATIENT)
Dept: PERIOP | Facility: HOSPITAL | Age: 52
End: 2022-03-01
Payer: COMMERCIAL

## 2022-03-01 VITALS
OXYGEN SATURATION: 96 % | HEART RATE: 70 BPM | RESPIRATION RATE: 16 BRPM | DIASTOLIC BLOOD PRESSURE: 72 MMHG | TEMPERATURE: 97.4 F | WEIGHT: 213.19 LBS | BODY MASS INDEX: 34.41 KG/M2 | SYSTOLIC BLOOD PRESSURE: 130 MMHG

## 2022-03-01 DIAGNOSIS — Z79.4 TYPE 2 DIABETES MELLITUS WITH HYPERGLYCEMIA, WITH LONG-TERM CURRENT USE OF INSULIN (HCC): Primary | ICD-10-CM

## 2022-03-01 DIAGNOSIS — N47.1 PHIMOSIS: ICD-10-CM

## 2022-03-01 DIAGNOSIS — E11.65 TYPE 2 DIABETES MELLITUS WITH HYPERGLYCEMIA, WITH LONG-TERM CURRENT USE OF INSULIN (HCC): Primary | ICD-10-CM

## 2022-03-01 LAB
GLUCOSE SERPL-MCNC: 258 MG/DL (ref 65–140)
GLUCOSE SERPL-MCNC: 273 MG/DL (ref 65–140)
GLUCOSE SERPL-MCNC: 309 MG/DL (ref 65–140)
GLUCOSE SERPL-MCNC: 318 MG/DL (ref 65–140)
GLUCOSE SERPL-MCNC: 328 MG/DL (ref 65–140)

## 2022-03-01 PROCEDURE — 99024 POSTOP FOLLOW-UP VISIT: CPT | Performed by: UROLOGY

## 2022-03-01 PROCEDURE — 88304 TISSUE EXAM BY PATHOLOGIST: CPT | Performed by: PATHOLOGY

## 2022-03-01 PROCEDURE — 82948 REAGENT STRIP/BLOOD GLUCOSE: CPT

## 2022-03-01 PROCEDURE — 54161 CIRCUM 28 DAYS OR OLDER: CPT | Performed by: UROLOGY

## 2022-03-01 PROCEDURE — 99219 PR INITIAL OBSERVATION CARE/DAY 50 MINUTES: CPT | Performed by: INTERNAL MEDICINE

## 2022-03-01 PROCEDURE — 88312 SPECIAL STAINS GROUP 1: CPT | Performed by: PATHOLOGY

## 2022-03-01 RX ORDER — CEFAZOLIN SODIUM 2 G/50ML
2000 SOLUTION INTRAVENOUS ONCE
Status: DISCONTINUED | OUTPATIENT
Start: 2022-03-01 | End: 2022-03-01 | Stop reason: HOSPADM

## 2022-03-01 RX ORDER — HYDROMORPHONE HCL/PF 1 MG/ML
0.5 SYRINGE (ML) INJECTION
Status: DISCONTINUED | OUTPATIENT
Start: 2022-03-01 | End: 2022-03-01 | Stop reason: HOSPADM

## 2022-03-01 RX ORDER — ONDANSETRON 2 MG/ML
4 INJECTION INTRAMUSCULAR; INTRAVENOUS ONCE AS NEEDED
Status: DISCONTINUED | OUTPATIENT
Start: 2022-03-01 | End: 2022-03-01 | Stop reason: HOSPADM

## 2022-03-01 RX ORDER — GINSENG 100 MG
CAPSULE ORAL AS NEEDED
Status: DISCONTINUED | OUTPATIENT
Start: 2022-03-01 | End: 2022-03-01 | Stop reason: HOSPADM

## 2022-03-01 RX ORDER — ONDANSETRON 2 MG/ML
INJECTION INTRAMUSCULAR; INTRAVENOUS AS NEEDED
Status: DISCONTINUED | OUTPATIENT
Start: 2022-03-01 | End: 2022-03-01

## 2022-03-01 RX ORDER — SODIUM CHLORIDE 9 MG/ML
125 INJECTION, SOLUTION INTRAVENOUS CONTINUOUS
Status: DISCONTINUED | OUTPATIENT
Start: 2022-03-01 | End: 2022-03-01 | Stop reason: HOSPADM

## 2022-03-01 RX ORDER — LIDOCAINE HYDROCHLORIDE 20 MG/ML
INJECTION, SOLUTION EPIDURAL; INFILTRATION; INTRACAUDAL; PERINEURAL AS NEEDED
Status: DISCONTINUED | OUTPATIENT
Start: 2022-03-01 | End: 2022-03-01

## 2022-03-01 RX ORDER — SODIUM CHLORIDE, SODIUM LACTATE, POTASSIUM CHLORIDE, CALCIUM CHLORIDE 600; 310; 30; 20 MG/100ML; MG/100ML; MG/100ML; MG/100ML
INJECTION, SOLUTION INTRAVENOUS CONTINUOUS PRN
Status: DISCONTINUED | OUTPATIENT
Start: 2022-03-01 | End: 2022-03-01

## 2022-03-01 RX ORDER — BUPIVACAINE HYDROCHLORIDE 5 MG/ML
INJECTION, SOLUTION PERINEURAL AS NEEDED
Status: DISCONTINUED | OUTPATIENT
Start: 2022-03-01 | End: 2022-03-01 | Stop reason: HOSPADM

## 2022-03-01 RX ORDER — FENTANYL CITRATE 50 UG/ML
INJECTION, SOLUTION INTRAMUSCULAR; INTRAVENOUS AS NEEDED
Status: DISCONTINUED | OUTPATIENT
Start: 2022-03-01 | End: 2022-03-01

## 2022-03-01 RX ORDER — FENTANYL CITRATE/PF 50 MCG/ML
25 SYRINGE (ML) INJECTION
Status: DISCONTINUED | OUTPATIENT
Start: 2022-03-01 | End: 2022-03-01 | Stop reason: HOSPADM

## 2022-03-01 RX ORDER — DEXAMETHASONE SODIUM PHOSPHATE 4 MG/ML
INJECTION, SOLUTION INTRA-ARTICULAR; INTRALESIONAL; INTRAMUSCULAR; INTRAVENOUS; SOFT TISSUE AS NEEDED
Status: DISCONTINUED | OUTPATIENT
Start: 2022-03-01 | End: 2022-03-01

## 2022-03-01 RX ORDER — CEFAZOLIN SODIUM 2 G/50ML
SOLUTION INTRAVENOUS AS NEEDED
Status: DISCONTINUED | OUTPATIENT
Start: 2022-03-01 | End: 2022-03-01

## 2022-03-01 RX ORDER — MIDAZOLAM HYDROCHLORIDE 2 MG/2ML
INJECTION, SOLUTION INTRAMUSCULAR; INTRAVENOUS AS NEEDED
Status: DISCONTINUED | OUTPATIENT
Start: 2022-03-01 | End: 2022-03-01

## 2022-03-01 RX ORDER — PROPOFOL 10 MG/ML
INJECTION, EMULSION INTRAVENOUS AS NEEDED
Status: DISCONTINUED | OUTPATIENT
Start: 2022-03-01 | End: 2022-03-01

## 2022-03-01 RX ORDER — OXYCODONE HYDROCHLORIDE AND ACETAMINOPHEN 5; 325 MG/1; MG/1
2 TABLET ORAL EVERY 4 HOURS PRN
Status: CANCELLED | OUTPATIENT
Start: 2022-03-01

## 2022-03-01 RX ORDER — OXYCODONE HYDROCHLORIDE AND ACETAMINOPHEN 5; 325 MG/1; MG/1
1 TABLET ORAL EVERY 4 HOURS PRN
Status: CANCELLED | OUTPATIENT
Start: 2022-03-01

## 2022-03-01 RX ORDER — HYDROCODONE BITARTRATE AND ACETAMINOPHEN 5; 325 MG/1; MG/1
1-2 TABLET ORAL EVERY 6 HOURS PRN
Qty: 20 TABLET | Refills: 0 | Status: SHIPPED | OUTPATIENT
Start: 2022-03-01 | End: 2022-03-11

## 2022-03-01 RX ORDER — MAGNESIUM HYDROXIDE 1200 MG/15ML
LIQUID ORAL AS NEEDED
Status: DISCONTINUED | OUTPATIENT
Start: 2022-03-01 | End: 2022-03-01 | Stop reason: HOSPADM

## 2022-03-01 RX ADMIN — MIDAZOLAM 2 MG: 1 INJECTION INTRAMUSCULAR; INTRAVENOUS at 07:37

## 2022-03-01 RX ADMIN — PROPOFOL 20 MG: 10 INJECTION, EMULSION INTRAVENOUS at 08:32

## 2022-03-01 RX ADMIN — PROPOFOL 200 MG: 10 INJECTION, EMULSION INTRAVENOUS at 07:45

## 2022-03-01 RX ADMIN — FENTANYL CITRATE 50 MCG: 50 INJECTION INTRAMUSCULAR; INTRAVENOUS at 07:45

## 2022-03-01 RX ADMIN — CEFAZOLIN SODIUM 2000 MG: 2 SOLUTION INTRAVENOUS at 07:41

## 2022-03-01 RX ADMIN — SODIUM CHLORIDE, SODIUM LACTATE, POTASSIUM CHLORIDE, AND CALCIUM CHLORIDE: .6; .31; .03; .02 INJECTION, SOLUTION INTRAVENOUS at 08:13

## 2022-03-01 RX ADMIN — ONDANSETRON 8 MG: 2 INJECTION INTRAMUSCULAR; INTRAVENOUS at 08:08

## 2022-03-01 RX ADMIN — FENTANYL CITRATE 25 MCG: 50 INJECTION INTRAMUSCULAR; INTRAVENOUS at 08:19

## 2022-03-01 RX ADMIN — FENTANYL CITRATE 25 MCG: 50 INJECTION INTRAMUSCULAR; INTRAVENOUS at 08:24

## 2022-03-01 RX ADMIN — INSULIN HUMAN 6 UNITS: 100 INJECTION, SOLUTION PARENTERAL at 07:21

## 2022-03-01 RX ADMIN — SODIUM CHLORIDE 125 ML/HR: 9 INJECTION, SOLUTION INTRAVENOUS at 07:05

## 2022-03-01 RX ADMIN — DEXAMETHASONE SODIUM PHOSPHATE 4 MG: 4 INJECTION INTRA-ARTICULAR; INTRALESIONAL; INTRAMUSCULAR; INTRAVENOUS; SOFT TISSUE at 07:45

## 2022-03-01 RX ADMIN — INSULIN LISPRO 4 UNITS: 100 INJECTION, SOLUTION INTRAVENOUS; SUBCUTANEOUS at 09:19

## 2022-03-01 RX ADMIN — LIDOCAINE HYDROCHLORIDE 100 MG: 20 INJECTION, SOLUTION EPIDURAL; INFILTRATION; INTRACAUDAL; PERINEURAL at 07:45

## 2022-03-01 RX ADMIN — INSULIN LISPRO 8 UNITS: 100 INJECTION, SOLUTION INTRAVENOUS; SUBCUTANEOUS at 11:39

## 2022-03-01 NOTE — DISCHARGE SUMMARY
Discharge Summary - María Mccarthy 49 y o  male MRN: [id number]    Admission Date: 3/1/2022     Admitting Diagnosis: Phimosis [N47 1]    Procedures Performed:  Circumcision    Patient underwent planned outpt surgery and recovered without complication  Discharged in good condition  Medications were prescribed  Pt knows to have office follow-up at the appropriate time

## 2022-03-01 NOTE — ASSESSMENT & PLAN NOTE
Lab Results   Component Value Date    HGBA1C 8 9 (H) 11/13/2021     Patient follows with endocrinology currently takes 70/30 - 25 units with breakfast and lunch, 35 units with dinner  Additionally takes glipizide, metformin, Jardiance  Patient follows with endocrinology as an outpatient  Blood sugars have been elevated throughout the day today  Patient reports he did not take insulin yesterday  Average blood sugars on most recent A1c from 200-300  Will place order for sliding scale insulin  Patient can receive insulin per sliding scale prior to discharge    Resume home insulin regimen and encourage compliance  Outpatient follow-up with endocrinology

## 2022-03-01 NOTE — ANESTHESIA PREPROCEDURE EVALUATION
Procedure:  CIRCUMCISION ADULT (N/A Penis)    Relevant Problems   ANESTHESIA (within normal limits)      CARDIO   (+) Essential hypertension   (+) Mixed hyperlipidemia      ENDO   (+) Type 2 diabetes mellitus with hyperglycemia, with long-term current use of insulin (HCC)      GI/HEPATIC   (+) Gastroesophageal reflux disease without esophagitis      PULMONARY (within normal limits)      Other   (+) Chronic lymphocytic leukemia (HCC)   (+) Phimosis        Physical Exam    Airway    Mallampati score: II  TM Distance: >3 FB  Neck ROM: full     Dental   No notable dental hx     Cardiovascular  Cardiovascular exam normal    Pulmonary  Pulmonary exam normal     Other Findings        Anesthesia Plan  ASA Score- 3     Anesthesia Type- general with ASA Monitors  Additional Monitors:   Airway Plan: LMA  Plan Factors-    Chart reviewed  Existing labs reviewed  Induction- intravenous  Postoperative Plan-     Informed Consent- Anesthetic plan and risks discussed with patient

## 2022-03-01 NOTE — OP NOTE
OPERATIVE REPORT  PATIENT NAME: Leigh Christianson    :  1970  MRN: [id number]  Pt Location: [facility] OR ROOM 02    SURGERY DATE: 3/1/2022    Surgeon(s) and Role:     * Abby Lauren MD - Primary    Preop Diagnosis:  Phimosis [N47 1]    Post-Op Diagnosis Codes:     * Phimosis [N47 1]    Procedure(s) (LRB):  CIRCUMCISION ADULT (N/A)    Specimen(s):  ID Type Source Tests Collected by Time Destination   1 :  Tissue Foreskin TISSUE EXAM Abby Lauren MD 3/1/2022  8:09 AM        Estimated Blood Loss:   Minimal    Drains:  * No LDAs found *    Anesthesia Type:   General/LMA    Operative Indications:  Phimosis [N47 1]      Operative Findings:  Tight foreskin, inflamed redundant tissue around frenulum    Complications:   None    Procedure and Technique:    After the patient was placed under adequate general anesthesia, he was prepped and draped using chlorhexidine in supine position  0 5% Marcaine was used as a penile block, 10 mL  A dorsal incision was made at the 12 o'clock position on the foreskin down to just proximal to the corona  Similarly, a ventral incision was made down to the frenulum  Using scissors, the two lateral wings of foreskin were excised, taking care not to cut too much skin  Cautery was used for hemostasis  The two free cut edges of foreskin were reapproximated with a combination of simple and running three 0 chromic suture  At the completion of the procedure, there was no significant bleeding  A sterile dressing applied, patient taken recovery in good condition     I was present for the entire procedure    Patient Disposition:  PACU       SIGNATURE: Abby Lauren MD  DATE: 2022  TIME: 8:45 AM

## 2022-03-01 NOTE — CONSULTS
[address] 1970, 49 y o  male MRN: [id number]  Unit/Bed#: OR POOL Encounter: [id number]  Primary Care Provider: Kristi Navas MD   Date and time admitted to hospital: 3/1/2022  5:43 AM    Inpatient consult to Internal Medicine  Consult performed by: Jyoti Peralta DO  Consult ordered by: Ce Alvarenga MD          * Type 2 diabetes mellitus with hyperglycemia, with long-term current use of insulin [facility])  Assessment & Plan    Lab Results   Component Value Date    HGBA1C 8 9 (H) 11/13/2021     Patient follows with endocrinology currently takes 70/30 - 25 units with breakfast and lunch, 35 units with dinner  Additionally takes glipizide, metformin, Jardiance  Patient follows with endocrinology as an outpatient  Blood sugars have been elevated throughout the day today  Patient reports he did not take insulin yesterday  Average blood sugars on most recent A1c from 200-300  Will place order for sliding scale insulin  Patient can receive insulin per sliding scale prior to discharge  Resume home insulin regimen and encourage compliance  Outpatient follow-up with endocrinology    Gastroesophageal reflux disease without esophagitis  Assessment & Plan  Continue Prilosec    Mixed hyperlipidemia  Assessment & Plan  Continue statin    Essential hypertension  Assessment & Plan  Continue home losartan hydrochlorothiazide, metoprolol        VTE Prophylaxis:  Per primary team    Recommendations for Discharge:  · Patient to resume current home insulin regimen on discharge  · Encourage compliance and follow-up with endocrinology    Counseling / Coordination of Care Time: 60 minutes  Greater than 50% of total time spent on patient counseling and coordination of care        History of Present Illness:    Javan Jerry is a 49 y o  male with past medical history of type 2 diabetes, hypertension, hyperlipidemia, GERD who presents the hospital for elective circumcision with urology  Patient has had uncontrolled blood sugars throughout the morning requiring 2 doses of regular insulin  Internal Medicine consulted for uncontrolled hyperglycemia  Patient reports he takes 70/30 insulin 3 times a day  25 units with breakfast and lunch in 35 units with dinner  He reports that he forgot to take his insulin yesterday evening  Otherwise he reports compliance with insulin as well as diabetic medications  Patient currently denies any headaches, dizziness, vision changes, chest pain, shortness of breath, abdominal pain, nausea, vomiting polydipsia, polyuria  Review of Systems:    Review of Systems   Constitutional: Negative for chills and fever  HENT: Negative for sore throat and trouble swallowing  Eyes: Negative for photophobia and visual disturbance  Respiratory: Negative for shortness of breath and wheezing  Cardiovascular: Negative for chest pain and palpitations  Gastrointestinal: Negative for constipation, diarrhea, nausea and vomiting  Genitourinary: Negative for difficulty urinating and dysuria  Musculoskeletal: Negative for arthralgias and myalgias  Skin: Negative for rash and wound  Neurological: Negative for dizziness, light-headedness and headaches         Past Medical and Surgical History:     Past Medical History:   Diagnosis Date    Diabetes ([facility] )     Diabetes mellitus ([facility] )     Fatty liver     Hyperlipidemia     Hypertension     Intertrigo     resolved 10/26/17    Sebaceous cyst     Sexual disorder     Sexual dysfunction     last assessed 03/04/14    Snoring     last assessed 12/08/14    Somnolence, daytime     last assessed 12/08/14    Thoracic disc herniation     last assessed 05/02/14    Vision problem     Vitamin D deficiency        Past Surgical History:   Procedure Laterality Date    COLONOSCOPY      last assessed 01/22/14    LYMPHADENECTOMY      Axillary; last assessed 05/30/14 Meds/Allergies:    Pertinent medications reviewed    Allergies: Allergies   Allergen Reactions    Ace Inhibitors Cough       Social History:     Marital Status: Single    Substance Use History:   Social History     Substance and Sexual Activity   Alcohol Use Yes    Comment: social     Social History     Tobacco Use   Smoking Status Never Smoker   Smokeless Tobacco Never Used     Social History     Substance and Sexual Activity   Drug Use No       Family History:    Pertinent family history reviewed    Physical Exam:     Vitals:   Blood Pressure: 130/72 (03/01/22 1052)  Pulse: 70 (03/01/22 1052)  Temperature: (!) 97 4 °F (36 3 °C) (03/01/22 0953)  Temp Source: Temporal (03/01/22 6212)  Respirations: 16 (03/01/22 1052)  Weight - Scale: 96 7 kg (213 lb 3 oz) (03/01/22 0648)  SpO2: 96 % (03/01/22 1052)    Physical Exam  Vitals reviewed  Constitutional:       General: He is not in acute distress  Appearance: He is well-developed  He is not ill-appearing, toxic-appearing or diaphoretic  HENT:      Head: Normocephalic and atraumatic  Mouth/Throat:      Mouth: Mucous membranes are moist       Pharynx: No oropharyngeal exudate  Eyes:      General: No scleral icterus  Extraocular Movements: Extraocular movements intact  Conjunctiva/sclera: Conjunctivae normal    Cardiovascular:      Rate and Rhythm: Normal rate and regular rhythm  Heart sounds: Normal heart sounds  Pulmonary:      Effort: Pulmonary effort is normal  No respiratory distress  Breath sounds: Normal breath sounds  No wheezing or rales  Abdominal:      General: There is no distension  Palpations: Abdomen is soft  Tenderness: There is no abdominal tenderness  There is no guarding or rebound  Musculoskeletal:         General: No swelling, tenderness or deformity  Skin:     General: Skin is warm and dry  Neurological:      General: No focal deficit present  Mental Status: He is alert   Mental status is at baseline  Psychiatric:         Mood and Affect: Mood normal          Behavior: Behavior normal          Thought Content: Thought content normal          Judgment: Judgment normal            Additional Data:     Lab Results: I have reviewed pertinent results                     Lab Results   Component Value Date/Time    HGBA1C 8 9 (H) 11/13/2021 08:20 AM    HGBA1C 12 4 (H) 03/18/2021 10:05 AM    HGBA1C 10 9 (H) 08/01/2020 09:28 AM    HGBA1C 7 6 10/17/2017 04:40 PM    HGBA1C 8 1 07/07/2017 08:55 AM    HGBA1C 10 8 (H) 01/28/2014 10:12 AM     Results from last 7 days   Lab Units 03/01/22  1036 03/01/22  0938 03/01/22  0844 03/01/22  0658   POC GLUCOSE mg/dl 309* 258* 273* 318*           Imaging: I have reviewed pertinent images     No orders to display       ** Please Note: This note has been constructed using a voice recognition system   **

## 2022-03-01 NOTE — ANESTHESIA POSTPROCEDURE EVALUATION
Post-Op Assessment Note    CV Status:  Stable    Pain management: adequate     Mental Status:  Awake   Hydration Status:  Stable   PONV Controlled:  None   Airway Patency:  Patent, adequate and positional obstruction             No complications documented      BP      Temp     Pulse     Resp      SpO2      /58   Pulse 68   Temp (!) 97 2 °F (36 2 °C) (Temporal)   Resp 20   Wt 96 7 kg (213 lb 3 oz)   SpO2 93%   BMI 34 41 kg/m²

## 2022-03-02 ENCOUNTER — TELEPHONE (OUTPATIENT)
Dept: UROLOGY | Facility: AMBULATORY SURGERY CENTER | Age: 52
End: 2022-03-02

## 2022-03-02 LAB — GLUCOSE SERPL-MCNC: 276 MG/DL (ref 65–140)

## 2022-03-02 NOTE — TELEPHONE ENCOUNTER
Post Op Note    Reinaldo Nyoka Koyanagi is a 49 y o  male s/p circumcision performed 03/01/22  Reinaldo Nyoka Koyanagi is a patient of Dr Roberto Rubin and is seen at the [facility] office     Attempted 3 times to call patient and he wa unable to hear me - will attempt later

## 2022-03-10 NOTE — TELEPHONE ENCOUNTER
Pt is calling to confirm if he can restart his Physical Therapy again for his back    He was told by  to wait until a few days after surgery

## 2022-03-16 ENCOUNTER — OFFICE VISIT (OUTPATIENT)
Dept: UROLOGY | Facility: MEDICAL CENTER | Age: 52
End: 2022-03-16

## 2022-03-16 VITALS
WEIGHT: 213 LBS | SYSTOLIC BLOOD PRESSURE: 140 MMHG | HEART RATE: 85 BPM | DIASTOLIC BLOOD PRESSURE: 80 MMHG | BODY MASS INDEX: 34.23 KG/M2 | HEIGHT: 66 IN

## 2022-03-16 DIAGNOSIS — Z48.89 ENCOUNTER FOR POST SURGICAL WOUND CHECK: Primary | ICD-10-CM

## 2022-03-16 PROCEDURE — 99024 POSTOP FOLLOW-UP VISIT: CPT | Performed by: PHYSICIAN ASSISTANT

## 2022-03-16 RX ORDER — TRAMADOL HYDROCHLORIDE 50 MG/1
TABLET ORAL
COMMUNITY
Start: 2022-01-04 | End: 2022-05-25 | Stop reason: SDUPTHER

## 2022-03-16 NOTE — PROGRESS NOTES
3/16/2022      Chief Complaint   Patient presents with    Follow-up         Assessment and Plan    49 y o  male managed by Dr Geovanna Johnson     1  Phimosis  · S/p circumcision on 03/01/2022  · Incision sites healing well  No evidence of erythema or purulent drainage  · Follow up prn     History of Present Illness  Javan Landrum is a 49 y o  male here for evaluation following circumcision 3/1/22  Patient reports feeling well following his procedure  He denies any bleeding or purlent drainage from his incision site  He notes some tenderness at his incision site but notes this has been progressively imrpoving  He denies any signs of infection including erythema, fevers, or chills  Review of Systems   Constitutional: Negative for chills and fever  HENT: Negative  Respiratory: Negative  Cardiovascular: Negative  Gastrointestinal: Negative for abdominal pain, nausea and vomiting  Genitourinary: Positive for penile pain (improving)  Negative for difficulty urinating, dysuria, flank pain, frequency, hematuria, scrotal swelling, testicular pain and urgency  Musculoskeletal: Negative  Skin: Negative  Neurological: Negative  Vitals  Vitals:    03/16/22 1451   BP: 140/80   Pulse: 85   Weight: 96 6 kg (213 lb)   Height: 5' 6" (1 676 m)       Physical Exam  Vitals reviewed  Constitutional:       General: He is not in acute distress  Appearance: Normal appearance  He is not ill-appearing, toxic-appearing or diaphoretic  HENT:      Head: Normocephalic and atraumatic  Eyes:      Conjunctiva/sclera: Conjunctivae normal    Pulmonary:      Effort: Pulmonary effort is normal  No respiratory distress  Abdominal:      General: There is no distension  Palpations: Abdomen is soft  Tenderness: There is no abdominal tenderness  There is no right CVA tenderness, left CVA tenderness, guarding or rebound  Genitourinary:     Comments: Incision sites healing well   No evidence of erythema or purulent drainage  Musculoskeletal:         General: Normal range of motion  Cervical back: Normal range of motion  Skin:     General: Skin is warm and dry  Neurological:      General: No focal deficit present  Mental Status: He is alert and oriented to person, place, and time  Psychiatric:         Mood and Affect: Mood normal          Behavior: Behavior normal          Thought Content:  Thought content normal          Judgment: Judgment normal        Past History  Past Medical History:   Diagnosis Date    Diabetes ([facility] )     Diabetes mellitus ([facility] )     Fatty liver     Hyperlipidemia     Hypertension     Intertrigo     resolved 10/26/17    Sebaceous cyst     Sexual disorder     Sexual dysfunction     last assessed 03/04/14    Snoring     last assessed 12/08/14    Somnolence, daytime     last assessed 12/08/14    Thoracic disc herniation     last assessed 05/02/14    Vision problem     Vitamin D deficiency      Social History     Socioeconomic History    Marital status: Single     Spouse name: None    Number of children: None    Years of education: None    Highest education level: None   Occupational History    None   Tobacco Use    Smoking status: Never Smoker    Smokeless tobacco: Never Used   Vaping Use    Vaping Use: Never used   Substance and Sexual Activity    Alcohol use: Yes     Comment: social    Drug use: No    Sexual activity: None   Other Topics Concern    None   Social History Narrative    None     Social Determinants of Health     Financial Resource Strain: Not on file   Food Insecurity: Not on file   Transportation Needs: Not on file   Physical Activity: Not on file   Stress: Not on file   Social Connections: Not on file   Intimate Partner Violence: Not on file   Housing Stability: Not on file     Social History     Tobacco Use   Smoking Status Never Smoker   Smokeless Tobacco Never Used     Family History   Problem Relation Age of Onset    Diabetes Mother     Kidney cancer Mother     Diabetes Father     Diabetes Sister     Diabetes Family     Hypertension Family        The following portions of the patient's history were reviewed and updated as appropriate: allergies, current medications, past medical history, past social history, past surgical history and problem list     Results  No results found for this or any previous visit (from the past 1 hour(s)) ]  Lab Results   Component Value Date    PSA 1 0 11/13/2021    PSA 1 3 09/18/2019     Lab Results   Component Value Date    GLUCOSE 81 12/06/2014    CALCIUM 9 5 11/13/2021     (L) 12/06/2014    K 4 4 11/13/2021    CO2 28 11/13/2021     11/13/2021    BUN 23 11/13/2021    CREATININE 1 27 11/13/2021     Lab Results   Component Value Date    WBC 14 02 (H) 11/13/2021    HGB 13 8 11/13/2021    HCT 45 2 11/13/2021    MCV 90 11/13/2021     11/13/2021     Tashi Roper PA-C

## 2022-03-25 ENCOUNTER — OFFICE VISIT (OUTPATIENT)
Dept: INTERNAL MEDICINE CLINIC | Facility: CLINIC | Age: 52
End: 2022-03-25
Payer: COMMERCIAL

## 2022-03-25 VITALS
SYSTOLIC BLOOD PRESSURE: 160 MMHG | TEMPERATURE: 97.7 F | HEART RATE: 78 BPM | OXYGEN SATURATION: 98 % | DIASTOLIC BLOOD PRESSURE: 80 MMHG | RESPIRATION RATE: 18 BRPM | WEIGHT: 220 LBS | BODY MASS INDEX: 35.36 KG/M2 | HEIGHT: 66 IN

## 2022-03-25 DIAGNOSIS — E11.65 TYPE 2 DIABETES MELLITUS WITH HYPERGLYCEMIA, WITH LONG-TERM CURRENT USE OF INSULIN (HCC): ICD-10-CM

## 2022-03-25 DIAGNOSIS — K21.9 GASTROESOPHAGEAL REFLUX DISEASE WITHOUT ESOPHAGITIS: ICD-10-CM

## 2022-03-25 DIAGNOSIS — C91.10 CHRONIC LYMPHOCYTIC LEUKEMIA (HCC): ICD-10-CM

## 2022-03-25 DIAGNOSIS — F11.20 CONTINUOUS OPIOID DEPENDENCE (HCC): ICD-10-CM

## 2022-03-25 DIAGNOSIS — I10 ESSENTIAL HYPERTENSION: ICD-10-CM

## 2022-03-25 DIAGNOSIS — E78.00 PURE HYPERCHOLESTEROLEMIA: ICD-10-CM

## 2022-03-25 DIAGNOSIS — N47.1 PHIMOSIS: ICD-10-CM

## 2022-03-25 DIAGNOSIS — E11.9 TYPE 2 DIABETES MELLITUS WITHOUT COMPLICATION, WITHOUT LONG-TERM CURRENT USE OF INSULIN (HCC): ICD-10-CM

## 2022-03-25 DIAGNOSIS — E78.2 MIXED HYPERLIPIDEMIA: ICD-10-CM

## 2022-03-25 DIAGNOSIS — E11.3299 MILD NONPROLIFERATIVE DIABETIC RETINOPATHY ASSOCIATED WITH TYPE 2 DIABETES MELLITUS, MACULAR EDEMA PRESENCE UNSPECIFIED, UNSPECIFIED LATERALITY (HCC): ICD-10-CM

## 2022-03-25 DIAGNOSIS — Z79.4 TYPE 2 DIABETES MELLITUS WITH HYPERGLYCEMIA, WITH LONG-TERM CURRENT USE OF INSULIN (HCC): ICD-10-CM

## 2022-03-25 DIAGNOSIS — R37 SEXUAL DYSFUNCTION: ICD-10-CM

## 2022-03-25 DIAGNOSIS — K21.9 GASTROESOPHAGEAL REFLUX DISEASE WITHOUT ESOPHAGITIS: Primary | ICD-10-CM

## 2022-03-25 LAB — SL AMB POCT HEMOGLOBIN AIC: 11.1 (ref ?–6.5)

## 2022-03-25 PROCEDURE — 3079F DIAST BP 80-89 MM HG: CPT | Performed by: INTERNAL MEDICINE

## 2022-03-25 PROCEDURE — 3046F HEMOGLOBIN A1C LEVEL >9.0%: CPT | Performed by: INTERNAL MEDICINE

## 2022-03-25 PROCEDURE — 3077F SYST BP >= 140 MM HG: CPT | Performed by: INTERNAL MEDICINE

## 2022-03-25 PROCEDURE — 1036F TOBACCO NON-USER: CPT | Performed by: INTERNAL MEDICINE

## 2022-03-25 PROCEDURE — 99214 OFFICE O/P EST MOD 30 MIN: CPT | Performed by: INTERNAL MEDICINE

## 2022-03-25 PROCEDURE — 83036 HEMOGLOBIN GLYCOSYLATED A1C: CPT | Performed by: INTERNAL MEDICINE

## 2022-03-25 PROCEDURE — 3725F SCREEN DEPRESSION PERFORMED: CPT | Performed by: INTERNAL MEDICINE

## 2022-03-25 PROCEDURE — 3008F BODY MASS INDEX DOCD: CPT | Performed by: INTERNAL MEDICINE

## 2022-03-25 RX ORDER — TRAMADOL HYDROCHLORIDE 50 MG/1
TABLET ORAL
Status: CANCELLED | OUTPATIENT
Start: 2022-03-25

## 2022-03-25 RX ORDER — METFORMIN HYDROCHLORIDE 500 MG/1
TABLET, EXTENDED RELEASE ORAL
Qty: 180 TABLET | Refills: 0 | Status: SHIPPED | OUTPATIENT
Start: 2022-03-25 | End: 2022-04-25

## 2022-03-25 RX ORDER — INSULIN LISPRO 100 [IU]/ML
INJECTION, SUSPENSION SUBCUTANEOUS
Qty: 225 ML | Refills: 0 | Status: SHIPPED | OUTPATIENT
Start: 2022-03-25

## 2022-03-25 RX ORDER — PRAVASTATIN SODIUM 80 MG/1
80 TABLET ORAL DAILY
Qty: 90 TABLET | Refills: 0 | Status: SHIPPED | OUTPATIENT
Start: 2022-03-25 | End: 2022-05-23

## 2022-03-25 RX ORDER — GLIPIZIDE 10 MG/1
TABLET ORAL
Qty: 180 TABLET | Refills: 0 | Status: SHIPPED | OUTPATIENT
Start: 2022-03-25 | End: 2022-03-28

## 2022-03-25 RX ORDER — OMEPRAZOLE 20 MG/1
20 CAPSULE, DELAYED RELEASE ORAL DAILY
Qty: 90 CAPSULE | Refills: 0 | Status: SHIPPED | OUTPATIENT
Start: 2022-03-25 | End: 2022-05-25 | Stop reason: SDUPTHER

## 2022-03-25 RX ORDER — METOPROLOL SUCCINATE 50 MG/1
50 TABLET, EXTENDED RELEASE ORAL DAILY
Qty: 90 TABLET | Refills: 0 | Status: SHIPPED | OUTPATIENT
Start: 2022-03-25 | End: 2022-07-14

## 2022-03-25 RX ORDER — SILDENAFIL CITRATE 20 MG/1
TABLET ORAL
Qty: 30 TABLET | Refills: 0 | Status: SHIPPED | OUTPATIENT
Start: 2022-03-25

## 2022-03-25 RX ORDER — LOSARTAN POTASSIUM AND HYDROCHLOROTHIAZIDE 25; 100 MG/1; MG/1
1 TABLET ORAL DAILY
Qty: 90 TABLET | Refills: 0 | Status: SHIPPED | OUTPATIENT
Start: 2022-03-25

## 2022-03-25 NOTE — PROGRESS NOTES
BMI Counseling: There is no height or weight on file to calculate BMI  The BMI is above normal  Nutrition recommendations include reducing portion sizes, decreasing overall calorie intake, 3-5 servings of fruits/vegetables daily and reducing fast food intake  Exercise recommendations include exercising 3-5 times per week

## 2022-03-25 NOTE — PROGRESS NOTES
Assessment/Plan:  Poorly control diabetes see her endocrinologist follow-up with hematology is going to have an EGD repeat colonoscopy poor prep needs to be check in 5 years back pain from a work injury off work now on able to exercise        1  Diabetes poorly controlled hemoglobin A1c is 11 1 he says he was injury at work he has some back discomfort is see the workmen compensation doctor that is why he has not been able to exercise he is going to see the endocrinologist soon I did not make any changes on his medications for diabetes  2  Diabetic retinopathy needs to follow-up with the ophthalmologist these to have better diabetic control so the vision does not worsen    3  Blood pressure little labile today we checked the when he was getting hemoglobin A1c tested  Denies any chest palpitation or headache    4  Chronic lymphocytic leukemia needs to get the CBC done a follow-up with Hematology denies any night sweats fevers    5  Mix hyperlipidemia will check a lipid profile with the next labs  Which were order continue on pravastatin 80 mg per day    Results for orders placed or performed during the hospital encounter of 03/01/22   Tissue Exam   Result Value Ref Range    Case Report       Surgical Pathology Report                         Case: [id number]                                   Authorizing Provider:  Tanner Mcfarlnad MD           Collected:           03/01/2022 0809              Ordering Location:     [facility]        Received:            03/01/2022 [address] Operating Room                                                     Pathologist:           Sabrina Wiseman MD                                                                 Specimen:    Foreskin                                                                                   Final Diagnosis       A  Foreskin:  - Chronic psoriasiform dermatitis  See comment  - PAS stain for fungus is negative  - Negative for malignancy  Comment: The skin demonstrates parakeratosis, focal orthokeratosis and loss of the granular layer, and surface acute inflammation and crusting  The differential includes, but is not limited to psoriasis, contact dematitis, and lichen simplex chronicus  Clinical correlation is required for further characterization  Additional Information       All reported additional testing was performed with appropriately reactive controls  These tests were developed and their performance characteristics determined by [facility] or appropriate performing facility, though some tests may be performed on tissues which have not been validated for performance characteristics (such as staining performed on alcohol exposed cell blocks and decalcified tissues)  Results should be interpreted with caution and in the context of the patients clinical condition  These tests may not be cleared or approved by the [place]  Food and Drug Administration, though the FDA has determined that such clearance or approval is not necessary  These tests are used for clinical purposes and they should not be regarded as investigational or for research  This laboratory has been approved by [facility], designated as a high-complexity laboratory and is qualified to perform these tests     Interpretation performed at [facility], [address] [place] [address]      Gross Description          A  The specimen is received in formalin, labeled with the patient's name and hospital number, and is designated "foreskin"  The specimen consists of 2 tan pink-purple portions of irregularly-shaped skin measuring in aggregate of 6 3 x 3 8 x 1 2 cm  The epithelial surfaces appear wrinkly, focally hemorrhagic but otherwise grossly unremarkable    The margins of resection are inked green and the tissue is serially sectioned revealing pink-red focally hemorrhagic, rubbery grossly unremarkable cut surfaces  Representative sections  One cassette  Note: The estimated total formalin fixation time based upon information provided by the submitting clinician and the standard processing schedule is under 72 hours  Nandini Montero         Fingerstick Glucose (POCT)   Result Value Ref Range    POC Glucose 318 (H) 65 - 140 mg/dl   Fingerstick Glucose (POCT)   Result Value Ref Range    POC Glucose 273 (H) 65 - 140 mg/dl   Fingerstick Glucose (POCT)   Result Value Ref Range    POC Glucose 258 (H) 65 - 140 mg/dl   Fingerstick Glucose (POCT)   Result Value Ref Range    POC Glucose 309 (H) 65 - 140 mg/dl   Fingerstick Glucose (POCT)   Result Value Ref Range    POC Glucose 328 (H) 65 - 140 mg/dl   Fingerstick Glucose (POCT)   Result Value Ref Range    POC Glucose 276 (H) 65 - 140 mg/dl        No problem-specific Assessment & Plan notes found for this encounter  Diagnoses and all orders for this visit:    Gastroesophageal reflux disease without esophagitis    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, macular edema presence unspecified, unspecified laterality ([facility] )    Type 2 diabetes mellitus with hyperglycemia, with long-term current use of insulin ([facility])    Essential hypertension    Phimosis    Chronic lymphocytic leukemia ([facility] )    Mixed hyperlipidemia          Subjective:      Patient ID: Farideh Hartmann is a 49 y o  male  No chief complaint on file          Current Outpatient Medications:     BD Pen Needle Marielos 2nd Gen 32G X 4 MM MISC, USE THREE TIMES A DAY, Disp: 300 each, Rfl: 3    Blood Glucose Monitoring Suppl (FreeStyle Freedom Lite) w/Device KIT, Use Test blood sugars 4 times daily, Disp: , Rfl:     celecoxib (CeleBREX) 200 mg capsule, Take 1 capsule (200 mg total) by mouth daily for 30 days (Patient not taking: Reported on 3/16/2022 ), Disp: 30 capsule, Rfl: 1   Cholecalciferol (VITAMIN D3) 5000 units CAPS, Take by mouth, Disp: , Rfl:     ciclopirox (PENLAC) 8 % solution, Apply topically daily at bedtime (Patient not taking: Reported on 12/22/2021 ), Disp: 6 6 mL, Rfl: 0    Continuous Blood Gluc  (Dexcom G6 ) ROBYN, Disp 1 Kit, use as directed, Disp: 1 each, Rfl: 1    Continuous Blood Gluc Sensor (Dexcom G6 Sensor) MISC, Change every 10 days, Disp: 1 each, Rfl: 12    Continuous Blood Gluc Transmit (Dexcom G6 Transmitter) MISC, Dispense 1 kit, change every 90 days, Disp: 1 each, Rfl: 3    glipiZIDE (GLUCOTROL) 10 mg tablet, TAKE 1 TABLET(10 MG) BY MOUTH TWICE DAILY BEFORE MEALS, Disp: 180 tablet, Rfl: 1    glucose blood (FREESTYLE LITE) test strip, Use as instructed, Disp: 90 each, Rfl: 3    Insulin Lispro Prot & Lispro (HumaLOG Mix 75/25 KwikPen) (75-25) 100 units/mL injection pen, INJECT 25 UNITS AT BREAKFAST AND LUNCH AND 35 UNITS AT DINNER, Disp: 225 mL, Rfl: 0    Insulin Syringe-Needle U-100 (B-D INS SYR ULTRAFINE  5CC/30G) 30G X 1/2" 0 5 ML MISC, Inject insulin 3 times daily, Disp: 300 each, Rfl: 1    Jardiance 10 MG TABS, TAKE 1 TABLET(10 MG) BY MOUTH EVERY MORNING, Disp: 90 tablet, Rfl: 0    Lancets (freestyle) lancets, Use 1 each 3 (three) times a day Test blood sugars 4 times daily, Disp: 300 each, Rfl: 0    losartan-hydrochlorothiazide (HYZAAR) 100-25 MG per tablet, TAKE 1 TABLET BY MOUTH DAILY, Disp: 30 tablet, Rfl: 5    metFORMIN (GLUCOPHAGE-XR) 500 mg 24 hr tablet, TAKE 1 TABLET BY MOUTH WITH BREAKFAST AND 2 TABLETS WITH DINNER, Disp: 180 tablet, Rfl: 1    metoprolol succinate (TOPROL-XL) 50 mg 24 hr tablet, TAKE 1 TABLET(50 MG) BY MOUTH DAILY, Disp: 90 tablet, Rfl: 0    omeprazole (PriLOSEC) 20 mg delayed release capsule, TAKE 1 CAPSULE(20 MG) BY MOUTH DAILY, Disp: 90 capsule, Rfl: 0    pravastatin (PRAVACHOL) 80 mg tablet, TAKE 1 TABLET(80 MG) BY MOUTH DAILY, Disp: 30 tablet, Rfl: 1    sildenafil (REVATIO) 20 mg tablet, Take 1 or 2 tablets 1 hour prior to relation, Disp: 30 tablet, Rfl: 0    traMADol (ULTRAM) 50 mg tablet, TAKE 1 TABLET BY MOUTH EVERY 6 HOURS AS NEEDED AS DIRECTED, Disp: , Rfl:     HPI    The following portions of the patient's history were reviewed and updated as appropriate: allergies, current medications, past family history, past medical history, past social history, past surgical history and problem list     Review of Systems      Objective:      Patient's shoes and socks removed  Right Foot/Ankle   Right Foot Inspection  Skin Exam: skin normal  Skin not intact, no dry skin, no warmth, no callus, no erythema, no maceration, no abnormal color, no pre-ulcer, no ulcer and no callus  Toe Exam: ROM and strength within normal limits  No swelling, no tenderness, erythema and  no right toe deformity    Sensory   Vibration: intact  Proprioception: intact  Monofilament testing: intact    Vascular  The right DP pulse is 2+  Left Foot/Ankle  Left Foot Inspection  Skin Exam: skin normal  Skin not intact, no dry skin, no warmth, no erythema, no maceration, normal color, no pre-ulcer, no ulcer and no callus  Toe Exam: ROM and strength within normal limits  No swelling, no tenderness, no erythema and no left toe deformity  Sensory   Vibration: intact  Proprioception: intact  Monofilament testing: intact    Vascular  The left DP pulse is 2+  Assign Risk Category  No deformity present  No loss of protective sensation  No weak pulses  Risk: 0          There were no vitals taken for this visit  Physical Exam  Vitals and nursing note reviewed  Constitutional:       Appearance: He is well-developed  HENT:      Head: Normocephalic  Right Ear: External ear normal       Left Ear: External ear normal       Nose: Nose normal       Mouth/Throat:      Pharynx: No oropharyngeal exudate  Eyes:      Conjunctiva/sclera: Conjunctivae normal       Pupils: Pupils are equal, round, and reactive to light     Neck: Thyroid: No thyromegaly  Cardiovascular:      Rate and Rhythm: Normal rate and regular rhythm  Pulses: no weak pulses          Dorsalis pedis pulses are 2+ on the right side and 2+ on the left side  Heart sounds: Normal heart sounds  No murmur heard  No friction rub  No gallop  Comments: S1-S2 regular rhythm  Extremities no edema    Pulmonary:      Effort: Pulmonary effort is normal  No respiratory distress  Breath sounds: Normal breath sounds  No wheezing or rales  Comments: Lungs are clear no wheezing rales or rhonchi  Abdominal:      General: Bowel sounds are normal  There is no distension  Palpations: Abdomen is soft  There is no mass  Tenderness: There is no abdominal tenderness  There is no guarding or rebound  Comments: Abdomen soft nontender obese   Musculoskeletal:         General: Normal range of motion  Cervical back: Normal range of motion and neck supple  Feet:    Feet:      Right foot:      Skin integrity: No ulcer, skin breakdown, erythema, warmth, callus or dry skin  Left foot:      Skin integrity: No ulcer, skin breakdown, erythema, warmth, callus or dry skin  Lymphadenopathy:      Cervical: No cervical adenopathy  Skin:     General: Skin is warm and dry  Neurological:      Mental Status: He is alert and oriented to person, place, and time     Psychiatric:         Behavior: Behavior normal          Judgment: Judgment normal

## 2022-03-25 NOTE — PATIENT INSTRUCTIONS
diabetes is poorly control the were 7 hemoglobin A1c is 11 1 see the endocrinologist for adjustment of the medications    Continue follow-up with the ophthalmologist for your retinopathy

## 2022-03-26 DIAGNOSIS — I10 ESSENTIAL HYPERTENSION: ICD-10-CM

## 2022-03-26 DIAGNOSIS — E11.9 TYPE 2 DIABETES MELLITUS WITHOUT COMPLICATION, WITHOUT LONG-TERM CURRENT USE OF INSULIN (HCC): ICD-10-CM

## 2022-03-28 RX ORDER — GLIPIZIDE 10 MG/1
TABLET ORAL
Qty: 180 TABLET | Refills: 0 | Status: SHIPPED | OUTPATIENT
Start: 2022-03-28 | End: 2022-08-09

## 2022-04-22 ENCOUNTER — ANESTHESIA (OUTPATIENT)
Dept: GASTROENTEROLOGY | Facility: HOSPITAL | Age: 52
End: 2022-04-22

## 2022-04-22 ENCOUNTER — ANESTHESIA EVENT (OUTPATIENT)
Dept: GASTROENTEROLOGY | Facility: HOSPITAL | Age: 52
End: 2022-04-22

## 2022-04-22 ENCOUNTER — HOSPITAL ENCOUNTER (OUTPATIENT)
Dept: GASTROENTEROLOGY | Facility: HOSPITAL | Age: 52
Setting detail: OUTPATIENT SURGERY
Discharge: HOME | End: 2022-04-22
Attending: INTERNAL MEDICINE | Admitting: INTERNAL MEDICINE
Payer: COMMERCIAL

## 2022-04-22 VITALS
SYSTOLIC BLOOD PRESSURE: 127 MMHG | OXYGEN SATURATION: 96 % | TEMPERATURE: 96.6 F | RESPIRATION RATE: 18 BRPM | DIASTOLIC BLOOD PRESSURE: 76 MMHG | HEART RATE: 86 BPM

## 2022-04-22 DIAGNOSIS — K31.9 GASTRIC LESION: ICD-10-CM

## 2022-04-22 PROCEDURE — 43239 EGD BIOPSY SINGLE/MULTIPLE: CPT | Performed by: INTERNAL MEDICINE

## 2022-04-22 PROCEDURE — 43259 EGD US EXAM DUODENUM/JEJUNUM: CPT | Performed by: INTERNAL MEDICINE

## 2022-04-22 PROCEDURE — 88305 TISSUE EXAM BY PATHOLOGIST: CPT | Performed by: PATHOLOGY

## 2022-04-22 RX ORDER — LIDOCAINE HYDROCHLORIDE 20 MG/ML
INJECTION, SOLUTION EPIDURAL; INFILTRATION; INTRACAUDAL; PERINEURAL AS NEEDED
Status: DISCONTINUED | OUTPATIENT
Start: 2022-04-22 | End: 2022-04-22

## 2022-04-22 RX ORDER — ONDANSETRON 2 MG/ML
4 INJECTION INTRAMUSCULAR; INTRAVENOUS ONCE AS NEEDED
Status: CANCELLED | OUTPATIENT
Start: 2022-04-22

## 2022-04-22 RX ORDER — SODIUM CHLORIDE 9 MG/ML
INJECTION, SOLUTION INTRAVENOUS CONTINUOUS PRN
Status: DISCONTINUED | OUTPATIENT
Start: 2022-04-22 | End: 2022-04-22

## 2022-04-22 RX ORDER — PROPOFOL 10 MG/ML
INJECTION, EMULSION INTRAVENOUS AS NEEDED
Status: DISCONTINUED | OUTPATIENT
Start: 2022-04-22 | End: 2022-04-22

## 2022-04-22 RX ORDER — DEXAMETHASONE SODIUM PHOSPHATE 10 MG/ML
INJECTION, SOLUTION INTRAMUSCULAR; INTRAVENOUS AS NEEDED
Status: DISCONTINUED | OUTPATIENT
Start: 2022-04-22 | End: 2022-04-22

## 2022-04-22 RX ORDER — PROPOFOL 10 MG/ML
INJECTION, EMULSION INTRAVENOUS CONTINUOUS PRN
Status: DISCONTINUED | OUTPATIENT
Start: 2022-04-22 | End: 2022-04-22

## 2022-04-22 RX ORDER — GLYCOPYRROLATE 0.2 MG/ML
INJECTION INTRAMUSCULAR; INTRAVENOUS AS NEEDED
Status: DISCONTINUED | OUTPATIENT
Start: 2022-04-22 | End: 2022-04-22

## 2022-04-22 RX ORDER — SODIUM CHLORIDE 9 MG/ML
100 INJECTION, SOLUTION INTRAVENOUS CONTINUOUS
Status: CANCELLED | OUTPATIENT
Start: 2022-04-22

## 2022-04-22 RX ADMIN — PROPOFOL 50 MG: 10 INJECTION, EMULSION INTRAVENOUS at 11:34

## 2022-04-22 RX ADMIN — GLYCOPYRROLATE 0.1 MG: 0.2 INJECTION, SOLUTION INTRAMUSCULAR; INTRAVENOUS at 11:14

## 2022-04-22 RX ADMIN — PROPOFOL 120 MG: 10 INJECTION, EMULSION INTRAVENOUS at 11:12

## 2022-04-22 RX ADMIN — DEXAMETHASONE SODIUM PHOSPHATE 8 MG: 10 INJECTION, SOLUTION INTRAMUSCULAR; INTRAVENOUS at 11:14

## 2022-04-22 RX ADMIN — PROPOFOL 200 MCG/KG/MIN: 10 INJECTION, EMULSION INTRAVENOUS at 11:14

## 2022-04-22 RX ADMIN — SODIUM CHLORIDE: 0.9 INJECTION, SOLUTION INTRAVENOUS at 10:56

## 2022-04-22 RX ADMIN — LIDOCAINE HYDROCHLORIDE 100 MG: 20 INJECTION, SOLUTION EPIDURAL; INFILTRATION; INTRACAUDAL; PERINEURAL at 11:12

## 2022-04-22 NOTE — H&P
History and Physical -  [facility]  Javan Medel Clementine 49 y o  male MRN: [id number]                  HPI: Jose Ugarte is a 49y o  year old male who presents for EUS to evaluate antral lesion seen on recent EGD  REVIEW OF SYSTEMS: Per the HPI, and otherwise unremarkable  Historical Information   Past Medical History:   Diagnosis Date    [facility] [address]     Diabetes mellitus ([facility] [address] [facility]     Fatty liver     Hyperlipidemia     Hypertension     Intertrigo     resolved 10/26/17    Sebaceous cyst     Sexual disorder     Sexual dysfunction     last assessed 03/04/14    Snoring     last assessed 12/08/14    Somnolence, daytime     last assessed 12/08/14    Thoracic disc herniation     last assessed 05/02/14    Vision problem     Vitamin D deficiency      Past Surgical History:   Procedure Laterality Date    COLONOSCOPY      last assessed 01/22/14    LYMPHADENECTOMY      Axillary; last assessed 05/30/14    [place] CIRCUMCISION,OTHR N/A 3/1/2022    Procedure: CIRCUMCISION ADULT;  Surgeon: Sujata Santana MD;  Location: [facility];  Service: Urology     Social History   Social History     Substance and Sexual Activity   Alcohol Use Yes    Comment: social     Social History     Substance and Sexual Activity   Drug Use No     Social History     Tobacco Use   Smoking Status Never Smoker   Smokeless Tobacco Never Used     Family History   Problem Relation Age of Onset    Diabetes Mother     Kidney cancer Mother     Diabetes Father     Diabetes Sister     Diabetes Family     Hypertension Family        Meds/Allergies     (Not in a hospital admission)      Allergies   Allergen Reactions    Ace Inhibitors Cough       Objective     Blood pressure 139/80, pulse 79, temperature 97 6 °F (36 4 °C), temperature source Tympanic, resp  rate 18, SpO2 95 %        PHYSICAL EXAM    Gen: NAD  CV: RRR  CHEST: Clear  ABD: soft, NT/ND  EXT: no edema      ASSESSMENT/PLAN:  Jose Ugarte is a 49 y o  year old male who presents for EUS to evaluate antral lesion seen on recent EGD  The patient is stable and optimized for the procedure, we reviewed risk and benefits  Risk include but not limited to infection, bleeding, perforation and missing a lesion

## 2022-04-22 NOTE — ANESTHESIA PREPROCEDURE EVALUATION
Procedure:  ENDOSCOPIC ULTRASOUND (UPPER)    Relevant Problems   CARDIO   (+) Essential hypertension   (+) Mixed hyperlipidemia      ENDO   (+) Type 2 diabetes mellitus with hyperglycemia, with long-term current use of insulin (HCC)      GI/HEPATIC   (+) Gastroesophageal reflux disease without esophagitis      NEURO/PSYCH   (+) Continuous opioid dependence (HCC)      Other   (+) Chronic lymphocytic leukemia (HCC)        Physical Exam    Airway    Mallampati score: III  TM Distance: >3 FB  Neck ROM: full     Dental       Cardiovascular      Pulmonary      Other Findings        Anesthesia Plan  ASA Score- 3     Anesthesia Type- IV sedation with anesthesia with ASA Monitors  Additional Monitors:   Airway Plan:           Plan Factors-Exercise tolerance (METS): >4 METS  Chart reviewed  EKG reviewed  Imaging results reviewed  Existing labs reviewed  Patient summary reviewed  Induction- intravenous  Postoperative Plan-     Informed Consent- Anesthetic plan and risks discussed with patient  I personally reviewed this patient with the CRNA  Discussed and agreed on the Anesthesia Plan with the CRNA           NPO appropriate  Discussed benefits/risks of monitored anesthetic care and discussed providing a dynamic level of mild to deep sedation  Risks include awareness, airway obstruction, aspiration which may necessitate conversion to general anesthesia  All questions answered  Patient understands and wishes to proceed  Anesthesia plan and consent discussed with Javan Desai used as ) who expressed understanding and agreement  Risks/benefits and alternatives discussed with patient including possible PONV, sore throat, damage to teeth/lips/gums and possibility of rare anesthetic and surgical emergencies

## 2022-04-22 NOTE — ANESTHESIA POSTPROCEDURE EVALUATION
Post-Op Assessment Note    CV Status:  Stable  Pain Score: 0    Pain management: adequate     Mental Status:  Sleepy and arousable   Hydration Status:  Euvolemic   PONV Controlled:  Controlled   Airway Patency:  Patent      Post Op Vitals Reviewed: Yes      Staff: Anesthesiologist, CRNA         No complications documented      BP   146/72   Temp 96 5   Pulse 86   Resp 12   SpO2 96

## 2022-04-24 DIAGNOSIS — I10 ESSENTIAL HYPERTENSION: ICD-10-CM

## 2022-04-24 DIAGNOSIS — E11.9 TYPE 2 DIABETES MELLITUS WITHOUT COMPLICATION, WITHOUT LONG-TERM CURRENT USE OF INSULIN (HCC): ICD-10-CM

## 2022-04-25 RX ORDER — METFORMIN HYDROCHLORIDE 500 MG/1
TABLET, EXTENDED RELEASE ORAL
Qty: 180 TABLET | Refills: 0 | Status: SHIPPED | OUTPATIENT
Start: 2022-04-25 | End: 2022-07-01

## 2022-05-09 ENCOUNTER — TELEPHONE (OUTPATIENT)
Dept: OTHER | Facility: OTHER | Age: 52
End: 2022-05-09

## 2022-05-09 NOTE — TELEPHONE ENCOUNTER
Patient called saying that he is returning a phone that he had got from the office regarding his teste result and is asking if the office can give him back a call

## 2022-05-09 NOTE — RESULT ENCOUNTER NOTE
Dear staff,     I called the patient today   With help of  but was not able to talk patient  Left a voicemail patient to call back GI office at [phone]  Patient had biopsy taken from the stomach which did not show any significant disease  Continue to follow up in GI clinic and call us with any questions  Please kindly communicate this with patient  Thank you

## 2022-05-13 ENCOUNTER — OFFICE VISIT (OUTPATIENT)
Dept: ENDOCRINOLOGY | Facility: CLINIC | Age: 52
End: 2022-05-13
Payer: COMMERCIAL

## 2022-05-13 VITALS
BODY MASS INDEX: 35.2 KG/M2 | SYSTOLIC BLOOD PRESSURE: 120 MMHG | HEIGHT: 66 IN | HEART RATE: 76 BPM | WEIGHT: 219 LBS | DIASTOLIC BLOOD PRESSURE: 84 MMHG

## 2022-05-13 DIAGNOSIS — E66.01 CLASS 2 SEVERE OBESITY DUE TO EXCESS CALORIES WITH SERIOUS COMORBIDITY AND BODY MASS INDEX (BMI) OF 35.0 TO 35.9 IN ADULT (HCC): ICD-10-CM

## 2022-05-13 DIAGNOSIS — E55.9 VITAMIN D DEFICIENCY: ICD-10-CM

## 2022-05-13 DIAGNOSIS — Z79.4 TYPE 2 DIABETES MELLITUS WITH HYPERGLYCEMIA, WITH LONG-TERM CURRENT USE OF INSULIN (HCC): ICD-10-CM

## 2022-05-13 DIAGNOSIS — E78.2 MIXED HYPERLIPIDEMIA: ICD-10-CM

## 2022-05-13 DIAGNOSIS — E11.65 TYPE 2 DIABETES MELLITUS WITH HYPERGLYCEMIA, WITH LONG-TERM CURRENT USE OF INSULIN (HCC): ICD-10-CM

## 2022-05-13 DIAGNOSIS — E11.3299 MILD NONPROLIFERATIVE DIABETIC RETINOPATHY ASSOCIATED WITH TYPE 2 DIABETES MELLITUS, MACULAR EDEMA PRESENCE UNSPECIFIED, UNSPECIFIED LATERALITY (HCC): Primary | ICD-10-CM

## 2022-05-13 DIAGNOSIS — I10 ESSENTIAL HYPERTENSION: ICD-10-CM

## 2022-05-13 PROBLEM — E66.812 CLASS 2 SEVERE OBESITY DUE TO EXCESS CALORIES WITH SERIOUS COMORBIDITY AND BODY MASS INDEX (BMI) OF 35.0 TO 35.9 IN ADULT (HCC): Status: ACTIVE | Noted: 2022-05-13

## 2022-05-13 LAB — SL AMB POCT HEMOGLOBIN AIC: 10.8 (ref ?–6.5)

## 2022-05-13 PROCEDURE — 83036 HEMOGLOBIN GLYCOSYLATED A1C: CPT | Performed by: INTERNAL MEDICINE

## 2022-05-13 PROCEDURE — 3046F HEMOGLOBIN A1C LEVEL >9.0%: CPT | Performed by: INTERNAL MEDICINE

## 2022-05-13 PROCEDURE — 99214 OFFICE O/P EST MOD 30 MIN: CPT | Performed by: INTERNAL MEDICINE

## 2022-05-13 NOTE — PROGRESS NOTES
He Barney 49 y o  male MRN: [id number]    Encounter: [id number]      Assessment/Plan     Problem List Items Addressed This Visit        Endocrine    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 ([facility] [address] ) - Primary       Lab Results   Component Value Date    HGBA1C 10 8 (A) 05/13/2022   continue f/u with optho           Type 2 diabetes mellitus with hyperglycemia, with long-term current use of insulin ([facility] [address] )       Lab Results   Component Value Date    HGBA1C 10 8 (A) 05/13/2022   log reviewed with patient - erratic blood sugars due to inconsistent carbohydrate intake at meals , no specific  patterns to blood sugars   Suggest f/u with nutritionist that he has declined for now   Advised to watch diet , continue current regimen and send over log in 2 weeks            Relevant Orders    POCT hemoglobin A1c (Completed)    Comprehensive metabolic panel Lab Collect    HEMOGLOBIN A1C W/ EAG ESTIMATION Lab Collect    Microalbumin / creatinine urine ratio Lab Collect    Lipid Panel with Direct LDL reflex Lab Collect    TSH, 3rd generation Lab Collect    T4, free Lab Collect       Cardiovascular and Mediastinum    Essential hypertension    Relevant Orders    Microalbumin / creatinine urine ratio Lab Collect       Other    Class 2 severe obesity due to excess calories with serious comorbidity and body mass index (BMI) of 35 0 to 35 9 in adult [facility])    Mixed hyperlipidemia     Continue statins             Other Visit Diagnoses     Vitamin D deficiency        Relevant Orders    Vitamin D 25 hydroxy Lab Collect        CC: Diabetes    History of Present Illness     HPI:  31-year-old male with type 2 diabetes on insulin therapy seen in follow-up- diabetes is complicated by retinopathy       Current regimen:   Humalog 75/25-- 30 before breakfast, 30  before lunch at noon and dinner at 6PM  Jardiance 10mg daily   Metformin  Glipizide     Checks f s 3/day   Fasting 140-200  prelunch- 150-230  predinner 150-250    Denies any hypoglycemia     C/o blurry vision , occasional pain and numbness and tingling in feet     Review of Systems    Historical Information   Past Medical History:   Diagnosis Date    Diabetes ([facility] [address] [facility]     Diabetes mellitus ([facility] [address] )     Fatty liver     Hyperlipidemia     Hypertension     Intertrigo     resolved 10/26/17    Sebaceous cyst     Sexual disorder     Sexual dysfunction     last assessed 03/04/14    Snoring     last assessed 12/08/14    Somnolence, daytime     last assessed 12/08/14    Thoracic disc herniation     last assessed 05/02/14    Vision problem     Vitamin D deficiency      Past Surgical History:   Procedure Laterality Date    COLONOSCOPY      last assessed 01/22/14    LYMPHADENECTOMY      Axillary; last assessed 05/30/14    [place] CIRCUMCISION,OTHR N/A 3/1/2022    Procedure: CIRCUMCISION ADULT;  Surgeon: Cristóbal Roche MD;  Location: [facility];  Service: Urology     Social History   Social History     Substance and Sexual Activity   Alcohol Use Yes    Comment: social     Social History     Substance and Sexual Activity   Drug Use No     Social History     Tobacco Use   Smoking Status Never Smoker   Smokeless Tobacco Never Used     Family History:   Family History   Problem Relation Age of Onset    Diabetes Mother     Kidney cancer Mother     Diabetes Father     Diabetes Sister     Diabetes Family     Hypertension Family        Meds/Allergies   Current Outpatient Medications   Medication Sig Dispense Refill    BD Pen Needle Marielos 2nd Gen 32G X 4 MM MISC USE THREE TIMES A  each 3    Blood Glucose Monitoring Suppl (FreeStyle Freedom Lite) w/Device KIT Use Test blood sugars 4 times daily      Cholecalciferol (VITAMIN D3) 5000 units CAPS Take by mouth      Continuous Blood Gluc  (Dexcom G6 ) ROBYN Disp 1 Kit, use as directed 1 each 1    Continuous Blood Gluc Sensor (Dexcom G6 Sensor) MISC Change every 10 days 1 each 12    Continuous Blood Gluc Transmit (Dexcom G6 Transmitter) MISC Dispense 1 kit, change every 90 days 1 each 3    Empagliflozin (Jardiance) 10 MG TABS 1 tab daily 90 tablet 0    glipiZIDE (GLUCOTROL) 10 mg tablet TAKE 1 TABLET BY MOUTH BEFORE BREAKFAST AND DINNER 180 tablet 0    glucose blood (FREESTYLE LITE) test strip Use as instructed 90 each 3    Insulin Lispro Prot & Lispro (HumaLOG Mix 75/25 KwikPen) (75-25) 100 units/mL injection pen INJECT 25 UNITS AT BREAKFAST AND LUNCH AND 35 UNITS AT DINNER 225 mL 0    Insulin Syringe-Needle U-100 (B-D INS SYR ULTRAFINE  5CC/30G) 30G X 1/2" 0 5 ML MISC Inject insulin 3 times daily 300 each 1    Lancets (freestyle) lancets Use 1 each 3 (three) times a day Test blood sugars 4 times daily 300 each 0    losartan-hydrochlorothiazide (HYZAAR) 100-25 MG per tablet Take 1 tablet by mouth daily 90 tablet 0    metFORMIN (GLUCOPHAGE-XR) 500 mg 24 hr tablet TAKE 1 TABLET BY MOUTH EVERY MORNING WUHT BREAKFAST AND 2 TABLETS WITH DINNER 180 tablet 0    metoprolol succinate (TOPROL-XL) 50 mg 24 hr tablet Take 1 tablet (50 mg total) by mouth daily 90 tablet 0    omeprazole (PriLOSEC) 20 mg delayed release capsule Take 1 capsule (20 mg total) by mouth daily 90 capsule 0    pravastatin (PRAVACHOL) 80 mg tablet Take 1 tablet (80 mg total) by mouth daily 90 tablet 0    sildenafil (REVATIO) 20 mg tablet Take 1 or 2 tablets 1 hour prior to relation 30 tablet 0    traMADol (ULTRAM) 50 mg tablet TAKE 1 TABLET BY MOUTH EVERY 6 HOURS AS NEEDED AS DIRECTED      celecoxib (CeleBREX) 200 mg capsule Take 1 capsule (200 mg total) by mouth daily for 30 days (Patient not taking: Reported on 3/16/2022 ) 30 capsule 1    ciclopirox (PENLAC) 8 % solution Apply topically daily at bedtime (Patient not taking: No sig reported) 6 6 mL 0     No current facility-administered medications for this visit       Allergies   Allergen Reactions    Ace Inhibitors Cough       Objective   Vitals: Blood pressure 120/84, pulse 76, height 5' 6" (1 676 m), weight 99 3 kg (219 lb)  Physical Exam  Vitals reviewed  Constitutional:       Appearance: Normal appearance  He is obese  He is not ill-appearing or diaphoretic  HENT:      Head: Normocephalic and atraumatic  Eyes:      General: No scleral icterus  Extraocular Movements: Extraocular movements intact  Cardiovascular:      Rate and Rhythm: Normal rate and regular rhythm  Heart sounds: Normal heart sounds  No murmur heard  Pulmonary:      Effort: Pulmonary effort is normal  No respiratory distress  Breath sounds: Normal breath sounds  No wheezing  Abdominal:      General: There is no distension  Palpations: Abdomen is soft  Tenderness: There is no abdominal tenderness  Musculoskeletal:      Cervical back: Neck supple  Right lower leg: No edema  Left lower leg: No edema  Lymphadenopathy:      Cervical: No cervical adenopathy  Skin:     General: Skin is warm and dry  Neurological:      General: No focal deficit present  Mental Status: He is alert and oriented to person, place, and time  Psychiatric:         Mood and Affect: Mood normal          Behavior: Behavior normal          Thought Content: Thought content normal          Judgment: Judgment normal          The history was obtained from the review of the chart, patient      Lab Results:   Lab Results   Component Value Date/Time    Hemoglobin A1C 10 8 (A) 05/13/2022 01:38 PM    Hemoglobin A1C 11 1 (A) 03/25/2022 09:36 AM    Hemoglobin A1C 8 9 (H) 11/13/2021 08:20 AM    WBC 14 02 (H) 11/13/2021 08:20 AM    Hemoglobin 13 8 11/13/2021 08:20 AM    Hematocrit 45 2 11/13/2021 08:20 AM    MCV 90 11/13/2021 08:20 AM    Platelets 090 77/70/9752 08:20 AM    BUN 23 11/13/2021 08:20 AM    Potassium 4 4 11/13/2021 08:20 AM    Chloride 107 11/13/2021 08:20 AM    CO2 28 11/13/2021 08:20 AM    Creatinine 1 27 11/13/2021 08:20 AM    AST 34 11/13/2021 08:20 AM    ALT 49 11/13/2021 08:20 AM    Albumin 3 7 11/13/2021 08:20 AM    HDL, Direct 29 (L) 11/13/2021 08:20 AM    Triglycerides 139 11/13/2021 08:20 AM               Portions of the record may have been created with voice recognition software  Occasional wrong word or "sound a like" substitutions may have occurred due to the inherent limitations of voice recognition software  Read the chart carefully and recognize, using context, where substitutions have occurred

## 2022-05-15 NOTE — ASSESSMENT & PLAN NOTE
Lab Results   Component Value Date    HGBA1C 10 8 (A) 05/13/2022   log reviewed with patient - erratic blood sugars due to inconsistent carbohydrate intake at meals , no specific  patterns to blood sugars   Suggest f/u with nutritionist that he has declined for now    Advised to watch diet , continue current regimen and send over log in 2 weeks

## 2022-05-22 DIAGNOSIS — E78.00 PURE HYPERCHOLESTEROLEMIA: ICD-10-CM

## 2022-05-23 RX ORDER — PRAVASTATIN SODIUM 80 MG/1
TABLET ORAL
Qty: 90 TABLET | Refills: 0 | Status: SHIPPED | OUTPATIENT
Start: 2022-05-23

## 2022-05-25 ENCOUNTER — OFFICE VISIT (OUTPATIENT)
Dept: INTERNAL MEDICINE CLINIC | Facility: CLINIC | Age: 52
End: 2022-05-25
Payer: COMMERCIAL

## 2022-05-25 VITALS
RESPIRATION RATE: 16 BRPM | TEMPERATURE: 97.9 F | OXYGEN SATURATION: 98 % | HEART RATE: 82 BPM | DIASTOLIC BLOOD PRESSURE: 90 MMHG | WEIGHT: 218 LBS | HEIGHT: 66 IN | BODY MASS INDEX: 35.03 KG/M2 | SYSTOLIC BLOOD PRESSURE: 130 MMHG

## 2022-05-25 DIAGNOSIS — M25.559 HIP PAIN: ICD-10-CM

## 2022-05-25 DIAGNOSIS — K21.9 GASTROESOPHAGEAL REFLUX DISEASE WITHOUT ESOPHAGITIS: ICD-10-CM

## 2022-05-25 DIAGNOSIS — M25.569 KNEE PAIN, UNSPECIFIED CHRONICITY, UNSPECIFIED LATERALITY: ICD-10-CM

## 2022-05-25 DIAGNOSIS — E11.65 TYPE 2 DIABETES MELLITUS WITH HYPERGLYCEMIA, WITH LONG-TERM CURRENT USE OF INSULIN (HCC): Primary | ICD-10-CM

## 2022-05-25 DIAGNOSIS — E11.3299 MILD NONPROLIFERATIVE DIABETIC RETINOPATHY ASSOCIATED WITH TYPE 2 DIABETES MELLITUS, MACULAR EDEMA PRESENCE UNSPECIFIED, UNSPECIFIED LATERALITY (HCC): ICD-10-CM

## 2022-05-25 DIAGNOSIS — Z79.4 TYPE 2 DIABETES MELLITUS WITH HYPERGLYCEMIA, WITH LONG-TERM CURRENT USE OF INSULIN (HCC): Primary | ICD-10-CM

## 2022-05-25 DIAGNOSIS — G89.29 CHRONIC LOW BACK PAIN WITH LEFT-SIDED SCIATICA, UNSPECIFIED BACK PAIN LATERALITY: ICD-10-CM

## 2022-05-25 DIAGNOSIS — C91.10 CHRONIC LYMPHOCYTIC LEUKEMIA (HCC): ICD-10-CM

## 2022-05-25 DIAGNOSIS — E66.01 CLASS 2 SEVERE OBESITY DUE TO EXCESS CALORIES WITH SERIOUS COMORBIDITY AND BODY MASS INDEX (BMI) OF 35.0 TO 35.9 IN ADULT (HCC): ICD-10-CM

## 2022-05-25 DIAGNOSIS — M54.42 CHRONIC LOW BACK PAIN WITH LEFT-SIDED SCIATICA, UNSPECIFIED BACK PAIN LATERALITY: ICD-10-CM

## 2022-05-25 DIAGNOSIS — F41.9 ANXIETY: ICD-10-CM

## 2022-05-25 DIAGNOSIS — E78.2 MIXED HYPERLIPIDEMIA: ICD-10-CM

## 2022-05-25 PROCEDURE — 3075F SYST BP GE 130 - 139MM HG: CPT | Performed by: INTERNAL MEDICINE

## 2022-05-25 PROCEDURE — 3080F DIAST BP >= 90 MM HG: CPT | Performed by: INTERNAL MEDICINE

## 2022-05-25 PROCEDURE — 99213 OFFICE O/P EST LOW 20 MIN: CPT | Performed by: INTERNAL MEDICINE

## 2022-05-25 PROCEDURE — 1036F TOBACCO NON-USER: CPT | Performed by: INTERNAL MEDICINE

## 2022-05-25 PROCEDURE — 3008F BODY MASS INDEX DOCD: CPT | Performed by: INTERNAL MEDICINE

## 2022-05-25 RX ORDER — DULOXETIN HYDROCHLORIDE 30 MG/1
30 CAPSULE, DELAYED RELEASE ORAL DAILY
Qty: 30 CAPSULE | Refills: 5 | Status: SHIPPED | OUTPATIENT
Start: 2022-05-25

## 2022-05-25 RX ORDER — TRAMADOL HYDROCHLORIDE 50 MG/1
TABLET ORAL
Qty: 30 TABLET | Refills: 0 | Status: SHIPPED | OUTPATIENT
Start: 2022-05-25 | End: 2022-07-26

## 2022-05-25 RX ORDER — OMEPRAZOLE 20 MG/1
20 CAPSULE, DELAYED RELEASE ORAL DAILY
Qty: 90 CAPSULE | Refills: 0 | Status: SHIPPED | OUTPATIENT
Start: 2022-05-25

## 2022-05-25 NOTE — PATIENT INSTRUCTIONS
We went ahead and Consul orthopedic and pain management and the Comprehensive back program at [facility] to help you    For your stress and pain I started Cymbalta 30 mg daily  Keep the appointment scheduled for me in the future    Get her labs done before the next office visit

## 2022-05-25 NOTE — PROGRESS NOTES
Assessment/Plan:  Has been refer to orthopedic and pain management for his back pain knee pain and hip pain not occur on work in January this was new to me and he came to this office visit phos he could have the referrals I did give him a prescription for tramadol    Needs to take care of his diabetes will going to see him back in a few weeks he also shows talk to the endocrinologist recently    He needs to follow-up with ophthalmology        Forms for me to review he had an MRI of thoracic spine order by Wade Vyas     Moderate degenerative changes which child all disc protrusions at T7 TA to [place] a stent a T 67 and T8-T9 with mild to moderate kyphosis  No evidence of fracture or dislocation narrowing replacing process or interested thoracic abnormalities    MRI of the left hip early arthritic changes no acute ostial abnormalities    MRI of the left knee intact meniscus ligaments and osteoarthritis sugars  Early retro patella chondromalacia and osteoarthritis    He was evaluated that [facility] occupational medicine by Dr Roxanna Urbano MD visits were on January 12th and January 19, 2022 after his work injury      The patient also brought a form that he has seen pain management in [place] from [place] a group of Denisse Reddy pain management [facility]  physical medicine and Dr Loren Patel    He was evaluated by  Dr Jeanette Saez this is a note here day the office visit was on April 8 the 2022   out of work  May 6, 2022    He had an accident in January at work is all documented by occupational medicine Dr Iris Feng up with an ophthalmologist he has some visual disturbance      Undergoing a lot of stress due to his injuries at work will going to start him on duloxetine 30 mg daily    Due to the back pain is requesting tramadol which I will give him a prescription until he has contact with pain management  No problem-specific Assessment & Plan notes found for this encounter  Diagnoses and all orders for this visit:    Type 2 diabetes mellitus with hyperglycemia, with long-term current use of insulin (HCC)    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, macular edema presence unspecified, unspecified laterality (HCC)    Mixed hyperlipidemia    Chronic lymphocytic leukemia (HCC)    Class 2 severe obesity due to excess calories with serious comorbidity and body mass index (BMI) of 35 0 to 35 9 in adult [facility])          Subjective:      Patient ID: Jasiel Chamberlain is a 49 y o  male  No chief complaint on file          Current Outpatient Medications:     BD Pen Needle Marielos 2nd Gen 32G X 4 MM MISC, USE THREE TIMES A DAY, Disp: 300 each, Rfl: 3    Blood Glucose Monitoring Suppl (FreeStyle Freedom Lite) w/Device KIT, Use Test blood sugars 4 times daily, Disp: , Rfl:     celecoxib (CeleBREX) 200 mg capsule, Take 1 capsule (200 mg total) by mouth daily for 30 days (Patient not taking: Reported on 3/16/2022 ), Disp: 30 capsule, Rfl: 1    Cholecalciferol (VITAMIN D3) 5000 units CAPS, Take by mouth, Disp: , Rfl:     ciclopirox (PENLAC) 8 % solution, Apply topically daily at bedtime (Patient not taking: No sig reported), Disp: 6 6 mL, Rfl: 0    Continuous Blood Gluc  (Dexcom G6 ) ROBYN, Disp 1 Kit, use as directed, Disp: 1 each, Rfl: 1    Continuous Blood Gluc Sensor (Dexcom G6 Sensor) MISC, Change every 10 days, Disp: 1 each, Rfl: 12    Continuous Blood Gluc Transmit (Dexcom G6 Transmitter) MISC, Dispense 1 kit, change every 90 days, Disp: 1 each, Rfl: 3    Empagliflozin (Jardiance) 10 MG TABS, 1 tab daily, Disp: 90 tablet, Rfl: 0    glipiZIDE (GLUCOTROL) 10 mg tablet, TAKE 1 TABLET BY MOUTH BEFORE BREAKFAST AND DINNER, Disp: 180 tablet, Rfl: 0    glucose blood (FREESTYLE LITE) test strip, Use as instructed, Disp: 90 each, Rfl: 3    Insulin Lispro Prot & Lispro (HumaLOG Mix 75/25 KwikPen) (75-25) 100 units/mL injection pen, INJECT 25 UNITS AT BREAKFAST AND LUNCH AND 35 UNITS AT DINNER, Disp: 225 mL, Rfl: 0    Insulin Syringe-Needle U-100 (B-D INS SYR ULTRAFINE  5CC/30G) 30G X 1/2" 0 5 ML MISC, Inject insulin 3 times daily, Disp: 300 each, Rfl: 1    Lancets (freestyle) lancets, Use 1 each 3 (three) times a day Test blood sugars 4 times daily, Disp: 300 each, Rfl: 0    losartan-hydrochlorothiazide (HYZAAR) 100-25 MG per tablet, Take 1 tablet by mouth daily, Disp: 90 tablet, Rfl: 0    metFORMIN (GLUCOPHAGE-XR) 500 mg 24 hr tablet, TAKE 1 TABLET BY MOUTH EVERY MORNING WUHT BREAKFAST AND 2 TABLETS WITH DINNER, Disp: 180 tablet, Rfl: 0    metoprolol succinate (TOPROL-XL) 50 mg 24 hr tablet, Take 1 tablet (50 mg total) by mouth daily, Disp: 90 tablet, Rfl: 0    omeprazole (PriLOSEC) 20 mg delayed release capsule, Take 1 capsule (20 mg total) by mouth daily, Disp: 90 capsule, Rfl: 0    pravastatin (PRAVACHOL) 80 mg tablet, TAKE 1 TABLET(80 MG) BY MOUTH DAILY, Disp: 90 tablet, Rfl: 0    sildenafil (REVATIO) 20 mg tablet, Take 1 or 2 tablets 1 hour prior to relation, Disp: 30 tablet, Rfl: 0    traMADol (ULTRAM) 50 mg tablet, TAKE 1 TABLET BY MOUTH EVERY 6 HOURS AS NEEDED AS DIRECTED, Disp: , Rfl:     HPI    The following portions of the patient's history were reviewed and updated as appropriate: allergies, current medications, past family history, past medical history, past social history, past surgical history and problem list     Review of Systems   Constitutional: Negative  Negative for activity change, appetite change, fatigue, fever and unexpected weight change  HENT: Negative for congestion, ear pain, hearing loss, mouth sores, postnasal drip, rhinorrhea, sore throat, trouble swallowing and voice change  Eyes: Positive for visual disturbance  Negative for pain and redness          Decreasing vision due to his diabetic retinopathy follow-up with the ophthalmology   Respiratory: Negative for cough, chest tightness, shortness of breath and wheezing  Cardiovascular: Negative for chest pain, palpitations and leg swelling  Gastrointestinal: Negative for abdominal distention, abdominal pain, blood in stool, constipation, diarrhea and nausea  Endocrine: Negative for cold intolerance, heat intolerance, polydipsia, polyphagia and polyuria  Genitourinary: Negative for difficulty urinating, dysuria, flank pain, frequency, hematuria and urgency  Musculoskeletal: Positive for back pain  Negative for arthralgias, gait problem, joint swelling and myalgias  Knee and hip pain   Skin: Negative for color change and pallor  Neurological: Negative for dizziness, tremors, seizures, syncope, weakness, numbness and headaches  Hematological: Negative for adenopathy  Does not bruise/bleed easily  Psychiatric/Behavioral: Negative  Negative for sleep disturbance  The patient is not nervous/anxious  Objective: There were no vitals taken for this visit  Physical Exam  Constitutional:       Appearance: He is obese  Cardiovascular:      Rate and Rhythm: Normal rate  Comments: S1-S2 regular rhythm  Extremities no edema calf tenderness  Pulmonary:      Effort: Pulmonary effort is normal       Comments: Lungs are clear no wheezing rales or rhonchi  Musculoskeletal:      Comments: Ambulating without any assistive device some discomfort when he sits down and gets up from a chair   Neurological:      Mental Status: He is alert

## 2022-05-26 ENCOUNTER — NURSE TRIAGE (OUTPATIENT)
Dept: PHYSICAL THERAPY | Facility: OTHER | Age: 52
End: 2022-05-26

## 2022-05-26 DIAGNOSIS — M54.50 CHRONIC BILATERAL LOW BACK PAIN, UNSPECIFIED WHETHER SCIATICA PRESENT: ICD-10-CM

## 2022-05-26 DIAGNOSIS — G89.29 CHRONIC BILATERAL LOW BACK PAIN WITH LEFT-SIDED SCIATICA: Primary | ICD-10-CM

## 2022-05-26 DIAGNOSIS — M54.42 CHRONIC BILATERAL LOW BACK PAIN WITH LEFT-SIDED SCIATICA: Primary | ICD-10-CM

## 2022-05-26 DIAGNOSIS — G89.29 CHRONIC BILATERAL LOW BACK PAIN, UNSPECIFIED WHETHER SCIATICA PRESENT: ICD-10-CM

## 2022-05-26 NOTE — TELEPHONE ENCOUNTER
S I  # [id number]    Additional Information   Negative: Has the patient had unexplained weight loss?  Negative: Does the patient have a fever?  Negative: Is the patient experiencing blood in sputum?  Negative: Is the patient experiencing urine retention?  Negative: Is the patient experiencing acute drop foot or paralysis?  Negative: Has the patient experienced major trauma? (fall from height, high speed collision, direct blow to spine) and is also experiencing nausea, light-headedness, or loss of consciousness?  Affirmative: Is this a chronic condition? Protocols used: [facility] COMPREHENSIVE SPINE PROGRAM PROTOCOL    This RN did review in detail the Comprehensive Spine Program and what we can provide for their back pain  Patient is agreeable to being triaged by this RN and would like to proceed with Physical Therapy  Referral was placed for Physical Therapy at the [facility] site  Patients information was sent to the  to make evaluation appointment  Patient made aware that the PT office  will be calling to schedule the appointment  Patient was provided with the phone number to the PT office  No further questions and/or concerns were voiced by the patient at this time  Patient states understanding of the referral that was placed  Referral Closed

## 2022-05-26 NOTE — TELEPHONE ENCOUNTER
Additional Information   Negative: Is this related to a work injury?  Negative: Is this related to an MVA?  Negative: Are you currently recieving homecare services? Background - Initial Assessment  Clinical complaint: Pain is bilat low back pain,  concentrated in the center of spine/back  Radiates down into left hip and left knee  Numbness and tingling in the bilat toes  Started in Jan 2022  Was seen by PCP,  PM, and Physical Med (Dr Palak Strickland, and Dr Daquan Zhu)  Has new ref to Ethan Lemos and PM  This is not WC Claim, it was denied  Going through [employer]  Patient interested in PT as well    Date of onset: 1/2022  Frequency of pain: constant  Quality of pain: shooting and stabbing    Used RASHMI Dos Santos #[id number]    Protocols used: [facility] AM[facility] COMPREHENSIVE SPINE PROGRAM PROTOCOL

## 2022-05-27 DIAGNOSIS — E11.9 TYPE 2 DIABETES MELLITUS WITHOUT COMPLICATION, WITHOUT LONG-TERM CURRENT USE OF INSULIN (HCC): ICD-10-CM

## 2022-06-08 ENCOUNTER — CONSULT (OUTPATIENT)
Dept: PAIN MEDICINE | Facility: CLINIC | Age: 52
End: 2022-06-08
Payer: COMMERCIAL

## 2022-06-08 VITALS
HEART RATE: 56 BPM | DIASTOLIC BLOOD PRESSURE: 100 MMHG | SYSTOLIC BLOOD PRESSURE: 150 MMHG | OXYGEN SATURATION: 98 % | TEMPERATURE: 98.7 F | WEIGHT: 215 LBS | BODY MASS INDEX: 34.55 KG/M2 | HEIGHT: 66 IN

## 2022-06-08 DIAGNOSIS — M47.814 THORACIC SPONDYLOSIS: Primary | ICD-10-CM

## 2022-06-08 DIAGNOSIS — M25.559 HIP PAIN: ICD-10-CM

## 2022-06-08 DIAGNOSIS — M25.569 KNEE PAIN, UNSPECIFIED CHRONICITY, UNSPECIFIED LATERALITY: ICD-10-CM

## 2022-06-08 DIAGNOSIS — M79.18 MYOFASCIAL PAIN SYNDROME OF THORACIC SPINE: ICD-10-CM

## 2022-06-08 PROCEDURE — 99204 OFFICE O/P NEW MOD 45 MIN: CPT | Performed by: ANESTHESIOLOGY

## 2022-06-08 NOTE — PROGRESS NOTES
Assessment  1  Thoracic spondylosis    2  Myofascial pain syndrome of thoracic spine    3  Hip pain    4  Knee pain, unspecified chronicity, unspecified laterality        Plan    Pleasant 45-year-old gentleman with thoracic paraspinal pain thoracic disc degeneration status post fall while at work on January 3rd of this year  Saw previous pain management physician and chiropractor who discontinued their care after his legal fees ran out  (Dr Nirmal Hooks), as per patient    Patient with persistent mid back pain  I believe there is a myofascial element and will initially schedule the patient for trigger point injections  This was discussed in detail with the patient using a  as well as provided literature for home review  I do not believe she is a candidate for neuro axial techniques his pain is mainly axial in nature  In my medical opinion he is not a candidate for long-term opioid therapy  My impressions and treatment recommendations were discussed in detail with the patient who verbalized understanding and had no further questions  Discharge instructions were provided  I personally saw and examined the patient and I agree with the above discussed plan of care  This note is created using dictation transcription  It may contain typographical errors, grammatical errors, improperly dictated words, background noise and other errors  Referred By: Adams Boxer, MD    History of Present Illness    Javan Boothe is a 49 y o  male who had his foot stuck in a [place] and fell backwards on January 3rd of this year while at work  Since then he is undergone chiropractic treatment, apparent physical therapy and medical evaluation  His pain is moderate to severe rates as 7/10 on the visual analog scale interfering with daily living activities  His pain is constant worse at night describes sharp with subjective weakness of his lower limbs    Reports that sitting decreases symptoms while standing and bending aggravates his pain  I have personally reviewed and/or updated the patient's past medical history, past surgical history, family history, social history, current medications, allergies, and vital signs today  Review of Systems   Constitutional: Positive for unexpected weight change  Negative for fever  HENT: Negative for trouble swallowing  Eyes: Positive for visual disturbance  Respiratory: Negative for shortness of breath and wheezing  Cardiovascular: Positive for leg swelling  Negative for chest pain and palpitations  Gastrointestinal: Positive for abdominal pain  Negative for constipation, diarrhea, nausea and vomiting  Endocrine: Negative for cold intolerance, heat intolerance and polydipsia  Genitourinary: Negative for difficulty urinating and frequency  Musculoskeletal: Positive for arthralgias, joint swelling and myalgias  Negative for gait problem  Skin: Negative for rash  Neurological: Positive for numbness  Negative for dizziness, seizures, syncope, weakness and headaches  Hematological: Does not bruise/bleed easily  Psychiatric/Behavioral: Positive for decreased concentration and dysphoric mood  All other systems reviewed and are negative        Patient Active Problem List   Diagnosis    Essential hypertension    Mixed hyperlipidemia    Gastroesophageal reflux disease without esophagitis    Chronic lymphocytic leukemia (HCC)    Low testosterone    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 (HCC)    Type 2 diabetes mellitus with hyperglycemia, with long-term current use of insulin (HCC)    Other hemorrhoids    Phimosis    Continuous opioid dependence (HCC)    Class 2 severe obesity due to excess calories with serious comorbidity and body mass index (BMI) of 35 0 to 35 9 in adult [facility])       Past Medical History:   Diagnosis Date    Anxiety     Arthritis     [facility] )     Depression     Diabetes ([facility] )     Diabetes mellitus ([facility] )     Fatty liver     GERD (gastroesophageal reflux disease)     Hyperlipidemia     Hypertension     Intertrigo     resolved 10/26/17    Sebaceous cyst     Sexual disorder     Sexual dysfunction     last assessed 03/04/14    Snoring     last assessed 12/08/14    Somnolence, daytime     last assessed 12/08/14    Thoracic disc herniation     last assessed 05/02/14    Vision problem     Vitamin D deficiency        Past Surgical History:   Procedure Laterality Date    COLONOSCOPY      last assessed 01/22/14    LYMPHADENECTOMY      Axillary; last assessed 05/30/14    [place] CIRCUMCISION,OTHR N/A 3/1/2022    Procedure: CIRCUMCISION ADULT;  Surgeon: Eliza Don MD;  [facility];  Service: Urology       Family History   Problem Relation Age of Onset    Diabetes Mother     Kidney cancer Mother     Diabetes Father     Diabetes Sister     Diabetes Family     Hypertension Family        Social History     Occupational History    Not on file   Tobacco Use    Smoking status: Never Smoker    Smokeless tobacco: Never Used   Vaping Use    Vaping Use: Never used   Substance and Sexual Activity    Alcohol use: Yes     Comment: social    Drug use: No    Sexual activity: Not on file       Current Outpatient Medications on File Prior to Visit   Medication Sig    BD Pen Needle Marielos 2nd Gen 32G X 4 MM MISC USE THREE TIMES A DAY    Blood Glucose Monitoring Suppl (FreeStyle Freedom Lite) w/Device KIT Use Test blood sugars 4 times daily    Cholecalciferol (VITAMIN D3) 5000 units CAPS Take by mouth    ciclopirox (PENLAC) 8 % solution Apply topically daily at bedtime    DULoxetine (Cymbalta) 30 mg delayed release capsule Take 1 capsule (30 mg total) by mouth in the morning      Empagliflozin (Jardiance) 10 MG TABS TAKE 1 TABLET BY MOUTH DAILY    glipiZIDE (GLUCOTROL) 10 mg tablet TAKE 1 TABLET BY MOUTH BEFORE BREAKFAST AND DINNER    glucose blood (FREESTYLE LITE) test strip Use as instructed    Insulin Lispro Prot & Lispro (HumaLOG Mix 75/25 KwikPen) (75-25) 100 units/mL injection pen INJECT 25 UNITS AT BREAKFAST AND LUNCH AND 35 UNITS AT DINNER    Insulin Syringe-Needle U-100 (B-D INS SYR ULTRAFINE  5CC/30G) 30G X 1/2" 0 5 ML MISC Inject insulin 3 times daily    Lancets (freestyle) lancets Use 1 each 3 (three) times a day Test blood sugars 4 times daily    losartan-hydrochlorothiazide (HYZAAR) 100-25 MG per tablet Take 1 tablet by mouth daily    metFORMIN (GLUCOPHAGE-XR) 500 mg 24 hr tablet TAKE 1 TABLET BY MOUTH EVERY MORNING WUHT BREAKFAST AND 2 TABLETS WITH DINNER    metoprolol succinate (TOPROL-XL) 50 mg 24 hr tablet Take 1 tablet (50 mg total) by mouth daily    omeprazole (PriLOSEC) 20 mg delayed release capsule Take 1 capsule (20 mg total) by mouth in the morning   pravastatin (PRAVACHOL) 80 mg tablet TAKE 1 TABLET(80 MG) BY MOUTH DAILY    sildenafil (REVATIO) 20 mg tablet Take 1 or 2 tablets 1 hour prior to relation    traMADol (ULTRAM) 50 mg tablet One tablet twice a day as needed for back pain    celecoxib (CeleBREX) 200 mg capsule Take 1 capsule (200 mg total) by mouth daily for 30 days    Continuous Blood Gluc  (Dexcom G6 ) ROBYN Disp 1 Kit, use as directed (Patient not taking: No sig reported)    Continuous Blood Gluc Sensor (Dexcom G6 Sensor) MISC Change every 10 days (Patient not taking: No sig reported)    Continuous Blood Gluc Transmit (Dexcom G6 Transmitter) MISC Dispense 1 kit, change every 90 days (Patient not taking: No sig reported)     No current facility-administered medications on file prior to visit         Allergies   Allergen Reactions    Ace Inhibitors Cough       Physical Exam    /100 (BP Location: Left arm, Patient Position: Sitting, Cuff Size: Adult)   Pulse 56   Temp 98 7 °F (37 1 °C)   Ht 5' 6" (1 676 m)   Wt 97 5 kg (215 lb)   SpO2 98%   BMI 34 70 kg/m²     Constitutional: normal, well developed, well nourished, alert, in no distress and non-toxic and no overt pain behavior  and obese  Eyes: anicteric  HEENT: grossly intact  Neck: supple, symmetric, trachea midline and no masses   Pulmonary:even and unlabored  Cardiovascular:No edema or pitting edema present  Skin:Normal without rashes or lesions and well hydrated  Psychiatric:Mood and affect appropriate  Neurologic:Cranial Nerves II-XII grossly intact  Musculoskeletal:normal, no weakness of the upper lower limbs, no obvious skin lesions or erythema, there is tenderness to the patient on a thoracic paraspinals no sacroiliac or greater trochanteric tenderness, negative cervical Spurling maneuver, negative bilateral straight leg raising, no sensory deficit appreciated the upper lower limbs  Deep tendon reflexes symmetrical bilateral upper lower limbs  Imaging  MRI Thoracic Spine @ [facility] 5-5-22  Impression:  1  Moderate degenerative changes with shallow disc protrusions at T7-8 and to a lesser degree at T6-7 and T8-9 with mild to moderate kyphosis  2  No evidence of fracture/dislocation, marrow relacing process or intrinsic thoracic cord abnormality  MRI Left Hip @ [facility] 5-5-22  Impression:   Early arthritic changes, no acute osseous abnormality  MRI Left Knee@ [facility] 5-5-22  Impression:  1  Intact meniscl- ligaments- osseous structures  2  Early retropatellar chrondromalacia and osteoarthritis    I have personally reviewed pertinent films in PACS and my interpretation is Thoracic spondylosis

## 2022-06-18 DIAGNOSIS — M25.552 LEFT HIP PAIN: Primary | ICD-10-CM

## 2022-06-22 ENCOUNTER — OFFICE VISIT (OUTPATIENT)
Dept: OBGYN CLINIC | Facility: HOSPITAL | Age: 52
End: 2022-06-22
Payer: COMMERCIAL

## 2022-06-22 ENCOUNTER — TELEPHONE (OUTPATIENT)
Dept: PAIN MEDICINE | Facility: CLINIC | Age: 52
End: 2022-06-22

## 2022-06-22 ENCOUNTER — HOSPITAL ENCOUNTER (OUTPATIENT)
Dept: RADIOLOGY | Facility: HOSPITAL | Age: 52
Discharge: HOME | End: 2022-06-22
Attending: ORTHOPAEDIC SURGERY
Payer: COMMERCIAL

## 2022-06-22 VITALS
DIASTOLIC BLOOD PRESSURE: 40 MMHG | HEIGHT: 66 IN | HEART RATE: 123 BPM | WEIGHT: 24 LBS | BODY MASS INDEX: 3.86 KG/M2 | SYSTOLIC BLOOD PRESSURE: 74 MMHG

## 2022-06-22 DIAGNOSIS — M25.559 HIP PAIN: Primary | ICD-10-CM

## 2022-06-22 DIAGNOSIS — M79.605 LEFT LEG PAIN: ICD-10-CM
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
## 2022-06-22 DIAGNOSIS — M25.552 LEFT HIP PAIN: ICD-10-CM

## 2022-06-22 DIAGNOSIS — M25.552 PAIN IN LEFT HIP: ICD-10-CM

## 2022-06-22 DIAGNOSIS — M54.6 THORACOLUMBAR BACK PAIN: ICD-10-CM

## 2022-06-22 DIAGNOSIS — M54.50 THORACOLUMBAR BACK PAIN: ICD-10-CM

## 2022-06-22 PROCEDURE — 3074F SYST BP LT 130 MM HG: CPT | Performed by: ORTHOPAEDIC SURGERY

## 2022-06-22 PROCEDURE — 1036F TOBACCO NON-USER: CPT | Performed by: ORTHOPAEDIC SURGERY

## 2022-06-22 PROCEDURE — 99203 OFFICE O/P NEW LOW 30 MIN: CPT | Performed by: ORTHOPAEDIC SURGERY

## 2022-06-22 PROCEDURE — 3078F DIAST BP <80 MM HG: CPT | Performed by: ORTHOPAEDIC SURGERY

## 2022-06-22 PROCEDURE — 3008F BODY MASS INDEX DOCD: CPT | Performed by: ORTHOPAEDIC SURGERY

## 2022-06-22 PROCEDURE — 73502 X-RAY EXAM HIP UNI 2-3 VIEWS: CPT

## 2022-06-22 RX ORDER — COVID-19 MOLECULAR TEST ASSAY
KIT MISCELLANEOUS
COMMUNITY
Start: 2022-06-01

## 2022-06-22 NOTE — TELEPHONE ENCOUNTER
PRE OP INSTRUCTIONS:     -If you are on prescription blood thinners, you may have to hold the medication for several days before the procedure  Please call the office to    discuss medication holds at [phone]   -Do not eat or drink ONE HOUR prior to your procedure  If you are diabetic, may follow regular breakfast/lunch schedule and take usual    diabetic medications   -Lumbar( low back) procedure, please wear comfortable slacks/pants   -Cervical (neck) procedure, please wear a shirt/blouse that is easy to remove   -A  is required to take you home form your procedure   -Continue all to take prescribed medication the day of your procedure, including blood pressure medications   -If you are prescribe antibiotics, have an active infection or have an open wound, please contact the office at [phone]   -Please refrain from any vaccinations two weeks before and two weeks after injection   -Insurance authorization received in not a guarantee of payment per your insurance company's authorization disclaimer and it is    your responsibility to verify your benefits  -If you have any questions about the instructions, please call me at [phone]    Hold medication  x _ full days prior, last dose on _  Patient advised to hold medication from Date till their appointment at that time instructions to restart will be given  Patient stated verbal understanding  Aware that nursing will call her to review hold dates as well

## 2022-06-22 NOTE — TELEPHONE ENCOUNTER
----- Message from Renata Philip DO sent at 6/22/2022 12:29 PM EDT -----  Please schedule patient for a direct left hip joint injection with 0 25% bupivacaine and pain diary as LAWRENCE Lomeli

## 2022-06-22 NOTE — PROGRESS NOTES
Chief complaint;  left hip pain after a work related accident    History;  60-year-old male who was in his usual state health prior to January 3rd of this year  He got his foot and ankle caught in a [place] fell backwards hyperextending at at above the waist with resultant back pain and left hip pain  He openly illustrates points to with his hand and diagrams on a paper with pen the anterior lateral and posterior thigh and buttock as well as down to from the hips groin crease to the level of the knee  He has been unable to return to work    He has had x-rays of his hip as well as MRI imaging of his thoracic spine, MRI of his left hip an MRI of the left knee  Now 6 months after the injury he continues to have pain or discomfort whether he is at rest or with weight-bearing  He has received physical therapy modalities    He has an appointment pending with spine and pain regarding the potential for injections to the thoraco lumbar spine      He presents the office alone for today's experience    Past Medical History:   Diagnosis Date    Anxiety     Arthritis     [facility]     Depression     [facility] ([facility] )     [facility]     Fatty liver     GERD (gastroesophageal reflux disease)     Hyperlipidemia     Hypertension     Intertrigo     resolved 10/26/17    Sebaceous cyst     Sexual disorder     Sexual dysfunction     last assessed 03/04/14    Snoring     last assessed 12/08/14    Somnolence, daytime     last assessed 12/08/14    Thoracic disc herniation     last assessed 05/02/14    Vision problem     Vitamin D deficiency        Past Surgical History:   Procedure Laterality Date    COLONOSCOPY      last assessed 01/22/14    LYMPHADENECTOMY      Axillary; last assessed 05/30/14    [place] CIRCUMCISION,OTHR N/A 3/1/2022    Procedure: CIRCUMCISION ADULT;  Surgeon: Rickie Tuttle MD;  [facility];  Service: Urology       Family History   Problem Relation Age of Onset    Diabetes Mother     Kidney cancer Mother     Diabetes Father     Diabetes Sister     Diabetes Family     Hypertension Family        Social History     Tobacco Use    Smoking status: Never Smoker    Smokeless tobacco: Never Used   Vaping Use    Vaping Use: Never used   Substance Use Topics    Alcohol use: Yes     Comment: social    Drug use: No     Exam;  his exam was done standing as well as seated    Standing he is able to maintain his balance  He has reproducible pain to light touch and palpation of the thoracic spine in the midline as well as the lumbar spine  He has positive Irasema's testing for reproducible pain with truncal rotation with his feet firmly planted  He is intolerant of posterior extension of the thoracolumbar spine with reproducible midline pain  He is able to stand on his toes he can stand on his heels without imbalance  He was then permitted to set  In the seated position he has discomfort over the ileo inguinal crease of a modest intensity lateral based discomfort adjacent to the iliac crest as well as posterior buttock discomfort located lateral to the sacroiliac joint and at the level of the iliac crest   With 90° of hip flexion and then and attempted internal rotation he exhibits and reports pain in the anterior thigh from the lateral iliac crest down to the level of the knee and describes her points to an L3 dermatome    Motor exam lower extremities hip flexors knee extensors TA EHL and peroneal intact symmetrical   DTRs performed with a percussion hammer are intact into over for or normal reflexive at the knee and at the ankle  Sensorium is maintained L3-L4 L5 dermatomes both lower extremity    AP pelvis and left hip series show an age-appropriate silhouette of the femoral head the acetabulum and the pelvis in general     MRI of the left hip report sites very mild degenerative changes age-appropriate for this patient and without significant fracture or soft tissue injury    Impression; Work related injury, January 3rd  Left hip pain, ? Etiology  Thoracolumbar back pain    Plan;    He has ongoing care with pain management, Dr Cody Goodman    They are asked to consider fluoroscopic guided injection of steroid to the left hip  He is allowed to be weight-bearing as tolerated on the left lower extremity    He will return in several weeks and we will be glad to address left knee discomfort, also questionable etiology      His entire experience was supervised by and plan formulated by the attending surgeon it was my privilege to assist him in the delivery of his care

## 2022-06-27 LAB
LEFT EYE DIABETIC RETINOPATHY: NORMAL
RIGHT EYE DIABETIC RETINOPATHY: NORMAL
SEVERITY (EYE EXAM): NORMAL

## 2022-07-01 ENCOUNTER — TELEPHONE (OUTPATIENT)
Dept: RADIOLOGY | Facility: CLINIC | Age: 52
End: 2022-07-01

## 2022-07-01 DIAGNOSIS — I10 ESSENTIAL HYPERTENSION: ICD-10-CM

## 2022-07-01 DIAGNOSIS — E11.9 TYPE 2 DIABETES MELLITUS WITHOUT COMPLICATION, WITHOUT LONG-TERM CURRENT USE OF INSULIN (HCC): ICD-10-CM

## 2022-07-01 RX ORDER — METFORMIN HYDROCHLORIDE 500 MG/1
TABLET, EXTENDED RELEASE ORAL
Qty: 180 TABLET | Refills: 0 | Status: SHIPPED | OUTPATIENT
Start: 2022-07-01 | End: 2022-08-30

## 2022-07-01 NOTE — TELEPHONE ENCOUNTER
[facility] for patient to call back regarding procedure scheduled for 7-2-22

## 2022-07-14 DIAGNOSIS — I10 ESSENTIAL HYPERTENSION: ICD-10-CM

## 2022-07-14 RX ORDER — METOPROLOL SUCCINATE 50 MG/1
TABLET, EXTENDED RELEASE ORAL
Qty: 90 TABLET | Refills: 0 | Status: SHIPPED | OUTPATIENT
Start: 2022-07-14 | End: 2022-10-04

## 2022-07-15 ENCOUNTER — HOSPITAL ENCOUNTER (OUTPATIENT)
Dept: RADIOLOGY | Facility: CLINIC | Age: 52
Discharge: HOME | End: 2022-07-15
Admitting: ANESTHESIOLOGY
Payer: COMMERCIAL

## 2022-07-15 VITALS
TEMPERATURE: 97.7 F | RESPIRATION RATE: 20 BRPM | DIASTOLIC BLOOD PRESSURE: 77 MMHG | SYSTOLIC BLOOD PRESSURE: 124 MMHG | OXYGEN SATURATION: 95 % | HEART RATE: 73 BPM

## 2022-07-15 DIAGNOSIS — M25.559 HIP PAIN: ICD-10-CM

## 2022-07-15 PROCEDURE — 77002 NEEDLE LOCALIZATION BY XRAY: CPT

## 2022-07-15 PROCEDURE — 77002 NEEDLE LOCALIZATION BY XRAY: CPT | Performed by: ANESTHESIOLOGY

## 2022-07-15 PROCEDURE — 20610 DRAIN/INJ JOINT/BURSA W/O US: CPT | Performed by: ANESTHESIOLOGY

## 2022-07-15 RX ORDER — METHYLPREDNISOLONE ACETATE 80 MG/ML
80 INJECTION, SUSPENSION INTRA-ARTICULAR; INTRALESIONAL; INTRAMUSCULAR; PARENTERAL; SOFT TISSUE ONCE
Status: COMPLETED | OUTPATIENT
Start: 2022-07-15 | End: 2022-07-15

## 2022-07-15 RX ORDER — BUPIVACAINE HCL/PF 2.5 MG/ML
10 VIAL (ML) INJECTION ONCE
Status: COMPLETED | OUTPATIENT
Start: 2022-07-15 | End: 2022-07-15

## 2022-07-15 RX ADMIN — BUPIVACAINE HYDROCHLORIDE 3 ML: 2.5 INJECTION, SOLUTION EPIDURAL; INFILTRATION; INTRACAUDAL at 13:56

## 2022-07-15 RX ADMIN — METHYLPREDNISOLONE ACETATE 80 MG: 80 INJECTION, SUSPENSION INTRA-ARTICULAR; INTRALESIONAL; INTRAMUSCULAR; SOFT TISSUE at 13:57

## 2022-07-15 RX ADMIN — IOHEXOL 1 ML: 300 INJECTION, SOLUTION INTRAVENOUS at 13:56

## 2022-07-15 NOTE — DISCHARGE INSTRUCTIONS
INSTRUCCIONES PARA EL DOMINIC POSTERIOR AL PROCEDIMIENTO DE INYECCIÓN EN MURIEL ARTICULACIÓN    No aplique calor en ninguna área que esté entumecida  Si siente molestias o dolor en el lugar de la inyección, por hoy puede colocar hielo israel 20 minutos y retirarlo israel otros 20 minutos  A partir de [place], podrá utilizar hielo o calor húmedo  No aplique hielo o calor directo sobre la piel  Puede experimentar un aumento o cambio en el malestar israel las 36-48 horas posteriores al tratamiento  Aplique hielo y continúe tomando cualquier analgésico que le hayan recetado  No realice ninguna actividad extenuante, por hoy  Puede ducharse, roberto no se bañe en tinas ni en jacuzzis por hoy  Puede retomar toño actividades normales mañana, roberto "no se exceda"  Retome toño actividades normales gradualmente a medida que se sienta mejor  Si nota enrojecimiento, secreción o inflamación en el sitio donde lo inyectaron, o si presenta fiebre superior a 100 grados, llame a [facility] al [phone] o acuda a la sonya de [place]  Continúe tomando todos los [employer] de rutina que le haya recetado maldonado médico de cabecera, a menos que nuestro personal le indique lo contrario  Jonda Helms a kelsie la mayoría de los anticoagulantes de acuerdo con la dosis que tiene programada regularmente  Si tiene [employer], llame a [employer]  Si tiene algún problema relacionado específicamente con el procedimiento, llame a nuestro consultorio al [phone]  Si tiene problemas que no estén relacionados con maldonado procedimiento, debe comunicarse con maldonado médico de cabecera

## 2022-07-15 NOTE — H&P
History of Present Illness: The patient is a 49 y o  male who presents with complaints of hip pain      Patient Active Problem List   Diagnosis    Essential hypertension    Mixed hyperlipidemia    Gastroesophageal reflux disease without esophagitis    [facility]    Low testosterone    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 (HCC)    Type 2 diabetes mellitus with hyperglycemia, with long-term current use of insulin (HCC)    Other hemorrhoids    Phimosis    Continuous opioid dependence (HCC)    Class 2 severe obesity due to excess calories with serious comorbidity and body mass index (BMI) of 35 0 to 35 9 in adult [facility])    Hip pain       Past Medical History:   Diagnosis Date    Anxiety     Arthritis     Cancer ([facility] )     Depression     Diabetes ([facility] )     Diabetes mellitus ([place] [address] )     Fatty liver     GERD (gastroesophageal reflux disease)     Hyperlipidemia     Hypertension     Intertrigo     resolved 10/26/17    Sebaceous cyst     Sexual disorder     Sexual dysfunction     last assessed 03/04/14    Snoring     last assessed 12/08/14    Somnolence, daytime     last assessed 12/08/14    Thoracic disc herniation     last assessed 05/02/14    Vision problem     Vitamin D deficiency        Past Surgical History:   Procedure Laterality Date    COLONOSCOPY      last assessed 01/22/14    LYMPHADENECTOMY      Axillary; last assessed 05/30/14    [place] CIRCUMCISION,OTHR N/A 3/1/2022    Procedure: CIRCUMCISION ADULT;  Surgeon: Angela Webb MD;  [facility];  Service: Urology         Current Outpatient Medications:     BD Pen Needle Marielos 2nd Gen 32G X 4 MM MISC, USE THREE TIMES A DAY, Disp: 300 each, Rfl: 3    BinaxNOW COVID-19 Ag Home Test KIT, TEST AS DIRECTED TODAY, Disp: , Rfl:     Blood Glucose Monitoring Suppl (FreeStyle Freedom Lite) w/Device KIT, Use Test blood sugars 4 times daily, Disp: , Rfl:     celecoxib (CeleBREX) 200 mg capsule, Take 1 capsule (200 mg total) by mouth daily for 30 days, Disp: 30 capsule, Rfl: 1    Cholecalciferol (VITAMIN D3) 5000 units CAPS, Take by mouth, Disp: , Rfl:     ciclopirox (PENLAC) 8 % solution, Apply topically daily at bedtime, Disp: 6 6 mL, Rfl: 0    Continuous Blood Gluc  (Dexcom G6 ) ROBYN, Disp 1 Kit, use as directed (Patient not taking: No sig reported), Disp: 1 each, Rfl: 1    Continuous Blood Gluc Sensor (Dexcom G6 Sensor) MISC, Change every 10 days (Patient not taking: No sig reported), Disp: 1 each, Rfl: 12    Continuous Blood Gluc Transmit (Dexcom G6 Transmitter) MISC, Dispense 1 kit, change every 90 days (Patient not taking: No sig reported), Disp: 1 each, Rfl: 3    DULoxetine (Cymbalta) 30 mg delayed release capsule, Take 1 capsule (30 mg total) by mouth in the morning , Disp: 30 capsule, Rfl: 5    Empagliflozin (Jardiance) 10 MG TABS, TAKE 1 TABLET BY MOUTH DAILY, Disp: 90 tablet, Rfl: 0    glipiZIDE (GLUCOTROL) 10 mg tablet, TAKE 1 TABLET BY MOUTH BEFORE BREAKFAST AND DINNER, Disp: 180 tablet, Rfl: 0    glucose blood (FREESTYLE LITE) test strip, Use as instructed, Disp: 90 each, Rfl: 3    Insulin Lispro Prot & Lispro (HumaLOG Mix 75/25 KwikPen) (75-25) 100 units/mL injection pen, INJECT 25 UNITS AT BREAKFAST AND LUNCH AND 35 UNITS AT DINNER, Disp: 225 mL, Rfl: 0    Insulin Syringe-Needle U-100 (B-D INS SYR ULTRAFINE  5CC/30G) 30G X 1/2" 0 5 ML MISC, Inject insulin 3 times daily, Disp: 300 each, Rfl: 1    Lancets (freestyle) lancets, Use 1 each 3 (three) times a day Test blood sugars 4 times daily, Disp: 300 each, Rfl: 0    losartan-hydrochlorothiazide (HYZAAR) 100-25 MG per tablet, Take 1 tablet by mouth daily, Disp: 90 tablet, Rfl: 0    metFORMIN (GLUCOPHAGE-XR) 500 mg 24 hr tablet, TAKE 1 TABLET BY MOUTH EVERY MORNING WITH BREAKFAST AND 2 TABLETS BY MOUTH WITH DINNER, Disp: 180 tablet, Rfl: 0    metoprolol succinate (TOPROL-XL) 50 mg 24 hr tablet, TAKE 1 TABLET(50 MG) BY MOUTH DAILY, Disp: 90 tablet, Rfl: 0    omeprazole (PriLOSEC) 20 mg delayed release capsule, Take 1 capsule (20 mg total) by mouth in the morning , Disp: 90 capsule, Rfl: 0    pravastatin (PRAVACHOL) 80 mg tablet, TAKE 1 TABLET(80 MG) BY MOUTH DAILY, Disp: 90 tablet, Rfl: 0    sildenafil (REVATIO) 20 mg tablet, Take 1 or 2 tablets 1 hour prior to relation, Disp: 30 tablet, Rfl: 0    traMADol (ULTRAM) 50 mg tablet, One tablet twice a day as needed for back pain, Disp: 30 tablet, Rfl: 0    Current Facility-Administered Medications:     bupivacaine (PF) (MARCAINE) 0 25 % injection 10 mL, 10 mL, Intra-articular, Once, Venkat Martinez DO    iohexol (OMNIPAQUE) 300 mg/mL injection 50 mL, 50 mL, Intra-articular, Once, Venkat Martinez DO    methylPREDNISolone acetate (DEPO-MEDROL) injection 80 mg, 80 mg, Intra-articular, Once, Venkat Martinez DO    Allergies   Allergen Reactions    Ace Inhibitors Cough       Physical Exam:   General: Awake, Alert, Oriented x 3, Mood and affect appropriate  Respiratory: Respirations even and unlabored  Cardiovascular: Peripheral pulses intact; no edema  Musculoskeletal Exam:  Decreased range of motion left    ASA Score: III         Assessment:   1   Hip pain        Plan: Left hip injection

## 2022-07-22 ENCOUNTER — TELEPHONE (OUTPATIENT)
Dept: PAIN MEDICINE | Facility: CLINIC | Age: 52
End: 2022-07-22

## 2022-07-22 NOTE — TELEPHONE ENCOUNTER
1st attempt  Lm to cb with % improvement and pain level.       left hip joint injection 7/14, No F/u

## 2022-07-25 DIAGNOSIS — M25.569 KNEE PAIN, UNSPECIFIED CHRONICITY, UNSPECIFIED LATERALITY: ICD-10-CM

## 2022-07-25 DIAGNOSIS — G89.29 CHRONIC LOW BACK PAIN WITH LEFT-SIDED SCIATICA, UNSPECIFIED BACK PAIN LATERALITY: ICD-10-CM

## 2022-07-25 DIAGNOSIS — M25.559 HIP PAIN: ICD-10-CM

## 2022-07-25 DIAGNOSIS — M54.42 CHRONIC LOW BACK PAIN WITH LEFT-SIDED SCIATICA, UNSPECIFIED BACK PAIN LATERALITY: ICD-10-CM

## 2022-07-26 RX ORDER — TRAMADOL HYDROCHLORIDE 50 MG/1
TABLET ORAL
Qty: 30 TABLET | Refills: 0 | Status: SHIPPED | OUTPATIENT
Start: 2022-07-26 | End: 2022-08-22

## 2022-07-27 NOTE — TELEPHONE ENCOUNTER
Pt reports 90% improvement post inj   Pt has no pain     He has an appt coming up in Sept, he wants to know if he can be seen for his knees as well.

## 2022-07-30 ENCOUNTER — APPOINTMENT (OUTPATIENT)
Dept: LAB | Facility: HOSPITAL | Age: 52
End: 2022-07-30
Payer: COMMERCIAL

## 2022-07-30 DIAGNOSIS — E78.2 MIXED HYPERLIPIDEMIA: ICD-10-CM

## 2022-07-30 DIAGNOSIS — Z79.4 TYPE 2 DIABETES MELLITUS WITH HYPERGLYCEMIA, WITH LONG-TERM CURRENT USE OF INSULIN (HCC): ICD-10-CM

## 2022-07-30 DIAGNOSIS — I10 ESSENTIAL HYPERTENSION: ICD-10-CM

## 2022-07-30 DIAGNOSIS — N47.1 PHIMOSIS: ICD-10-CM

## 2022-07-30 DIAGNOSIS — E11.65 TYPE 2 DIABETES MELLITUS WITH HYPERGLYCEMIA, WITH LONG-TERM CURRENT USE OF INSULIN (HCC): ICD-10-CM

## 2022-07-30 DIAGNOSIS — C91.10 CHRONIC LYMPHOCYTIC LEUKEMIA (HCC): ICD-10-CM

## 2022-07-30 DIAGNOSIS — E11.3299 MILD NONPROLIFERATIVE DIABETIC RETINOPATHY ASSOCIATED WITH TYPE 2 DIABETES MELLITUS, MACULAR EDEMA PRESENCE UNSPECIFIED, UNSPECIFIED LATERALITY (HCC): ICD-10-CM

## 2022-07-30 DIAGNOSIS — K21.9 GASTROESOPHAGEAL REFLUX DISEASE WITHOUT ESOPHAGITIS: ICD-10-CM

## 2022-07-30 LAB
25(OH)D3 SERPL-MCNC: 32.3 NG/ML (ref 30–100)
ALBUMIN SERPL BCP-MCNC: 3.6 G/DL (ref 3.5–5)
ALP SERPL-CCNC: 123 U/L (ref 46–116)
ALT SERPL W P-5'-P-CCNC: 71 U/L (ref 12–78)
ANION GAP SERPL CALCULATED.3IONS-SCNC: 14 MMOL/L (ref 4–13)
AST SERPL W P-5'-P-CCNC: 22 U/L (ref 5–45)
BACTERIA UR QL AUTO: NORMAL /HPF
BASOPHILS # BLD MANUAL: 0 THOUSAND/UL (ref 0–0.1)
BASOPHILS NFR MAR MANUAL: 0 % (ref 0–1)
BILIRUB SERPL-MCNC: 0.3 MG/DL (ref 0.2–1)
BILIRUB UR QL STRIP: NEGATIVE
BUN SERPL-MCNC: 17 MG/DL (ref 5–25)
CALCIUM SERPL-MCNC: 9 MG/DL (ref 8.3–10.1)
CHLORIDE SERPL-SCNC: 101 MMOL/L (ref 96–108)
CHOLEST SERPL-MCNC: 179 MG/DL
CLARITY UR: CLEAR
CO2 SERPL-SCNC: 23 MMOL/L (ref 21–32)
COLOR UR: YELLOW
CREAT SERPL-MCNC: 1.32 MG/DL (ref 0.6–1.3)
CREAT UR-MCNC: 158 MG/DL
EOSINOPHIL # BLD MANUAL: 0 THOUSAND/UL (ref 0–0.4)
EOSINOPHIL NFR BLD MANUAL: 0 % (ref 0–6)
ERYTHROCYTE [DISTWIDTH] IN BLOOD BY AUTOMATED COUNT: 12.3 % (ref 11.6–15.1)
GFR SERPL CREATININE-BSD FRML MDRD: 62 ML/MIN/1.73SQ M
GGT SERPL-CCNC: 101 U/L (ref 5–85)
GLUCOSE P FAST SERPL-MCNC: 189 MG/DL (ref 65–99)
GLUCOSE UR STRIP-MCNC: ABNORMAL MG/DL
HCT VFR BLD AUTO: 46.1 % (ref 36.5–49.3)
HDLC SERPL-MCNC: 21 MG/DL
HGB BLD-MCNC: 14.3 G/DL (ref 12–17)
HGB UR QL STRIP.AUTO: NEGATIVE
KETONES UR STRIP-MCNC: NEGATIVE MG/DL
LDLC SERPL DIRECT ASSAY-MCNC: 51 MG/DL (ref 0–100)
LEUKOCYTE ESTERASE UR QL STRIP: ABNORMAL
LYMPHOCYTES # BLD AUTO: 64 % (ref 14–44)
LYMPHOCYTES # BLD AUTO: 9.98 THOUSAND/UL (ref 0.6–4.47)
MAGNESIUM SERPL-MCNC: 1.9 MG/DL (ref 1.6–2.6)
MCH RBC QN AUTO: 27.2 PG (ref 26.8–34.3)
MCHC RBC AUTO-ENTMCNC: 31 G/DL (ref 31.4–37.4)
MCV RBC AUTO: 88 FL (ref 82–98)
MICROALBUMIN UR-MCNC: <5 MG/L (ref 0–20)
MICROALBUMIN/CREAT 24H UR: <3 MG/G CREATININE (ref 0–30)
MONOCYTES # BLD AUTO: 0.62 THOUSAND/UL (ref 0–1.22)
MONOCYTES NFR BLD: 4 % (ref 4–12)
NEUTROPHILS # BLD MANUAL: 4.68 THOUSAND/UL (ref 1.85–7.62)
NEUTS BAND NFR BLD MANUAL: 1 % (ref 0–8)
NEUTS SEG NFR BLD AUTO: 29 % (ref 43–75)
NITRITE UR QL STRIP: NEGATIVE
NON-SQ EPI CELLS URNS QL MICRO: NORMAL /HPF
PH UR STRIP.AUTO: 6 [PH]
PLATELET # BLD AUTO: 174 THOUSANDS/UL (ref 149–390)
PLATELET BLD QL SMEAR: ADEQUATE
PMV BLD AUTO: 9.5 FL (ref 8.9–12.7)
POTASSIUM SERPL-SCNC: 3.5 MMOL/L (ref 3.5–5.3)
PROT SERPL-MCNC: 6.7 G/DL (ref 6.4–8.4)
PROT UR STRIP-MCNC: NEGATIVE MG/DL
PSA SERPL-MCNC: 1.3 NG/ML (ref 0–4)
RBC # BLD AUTO: 5.25 MILLION/UL (ref 3.88–5.62)
RBC #/AREA URNS AUTO: NORMAL /HPF
RBC MORPH BLD: NORMAL
SODIUM SERPL-SCNC: 138 MMOL/L (ref 135–147)
SP GR UR STRIP.AUTO: 1.01 (ref 1–1.03)
TRIGL SERPL-MCNC: 665 MG/DL
TSH SERPL DL<=0.05 MIU/L-ACNC: 2.05 UIU/ML (ref 0.45–4.5)
UROBILINOGEN UR QL STRIP.AUTO: 0.2 E.U./DL
VARIANT LYMPHS # BLD AUTO: 2 %
WBC # BLD AUTO: 15.6 THOUSAND/UL (ref 4.31–10.16)
WBC #/AREA URNS AUTO: NORMAL /HPF

## 2022-07-30 PROCEDURE — 85027 COMPLETE CBC AUTOMATED: CPT

## 2022-07-30 PROCEDURE — 85007 BL SMEAR W/DIFF WBC COUNT: CPT

## 2022-07-30 PROCEDURE — 36415 COLL VENOUS BLD VENIPUNCTURE: CPT

## 2022-07-30 PROCEDURE — 81001 URINALYSIS AUTO W/SCOPE: CPT | Performed by: INTERNAL MEDICINE

## 2022-07-30 PROCEDURE — 84443 ASSAY THYROID STIM HORMONE: CPT

## 2022-07-30 PROCEDURE — 80061 LIPID PANEL: CPT

## 2022-07-30 PROCEDURE — 82306 VITAMIN D 25 HYDROXY: CPT

## 2022-07-30 PROCEDURE — 83735 ASSAY OF MAGNESIUM: CPT

## 2022-07-30 PROCEDURE — 82570 ASSAY OF URINE CREATININE: CPT | Performed by: INTERNAL MEDICINE

## 2022-07-30 PROCEDURE — G0103 PSA SCREENING: HCPCS

## 2022-07-30 PROCEDURE — 82977 ASSAY OF GGT: CPT

## 2022-07-30 PROCEDURE — 80053 COMPREHEN METABOLIC PANEL: CPT

## 2022-07-30 PROCEDURE — 83721 ASSAY OF BLOOD LIPOPROTEIN: CPT

## 2022-07-30 PROCEDURE — 82043 UR ALBUMIN QUANTITATIVE: CPT | Performed by: INTERNAL MEDICINE

## 2022-08-03 ENCOUNTER — TELEPHONE (OUTPATIENT)
Dept: INTERNAL MEDICINE CLINIC | Facility: CLINIC | Age: 52
End: 2022-08-03

## 2022-08-03 NOTE — TELEPHONE ENCOUNTER
----- Message from Matheus Rosa MD sent at 7/31/2022  1:38 PM EDT -----  Please call the patient regarding his abnormal result  Triglycerides  &  ggpt  elev  ?   Etoh  or fatty liver  cut fat  & etoh diet  save  ov

## 2022-08-09 DIAGNOSIS — I10 ESSENTIAL HYPERTENSION: ICD-10-CM

## 2022-08-09 DIAGNOSIS — E11.9 TYPE 2 DIABETES MELLITUS WITHOUT COMPLICATION, WITHOUT LONG-TERM CURRENT USE OF INSULIN (HCC): ICD-10-CM

## 2022-08-09 RX ORDER — GLIPIZIDE 10 MG/1
TABLET ORAL
Qty: 180 TABLET | Refills: 0 | Status: SHIPPED | OUTPATIENT
Start: 2022-08-09 | End: 2022-10-10 | Stop reason: SDUPTHER

## 2022-08-20 DIAGNOSIS — M25.569 KNEE PAIN, UNSPECIFIED CHRONICITY, UNSPECIFIED LATERALITY: ICD-10-CM

## 2022-08-20 DIAGNOSIS — M25.559 HIP PAIN: ICD-10-CM

## 2022-08-20 DIAGNOSIS — M54.42 CHRONIC LOW BACK PAIN WITH LEFT-SIDED SCIATICA, UNSPECIFIED BACK PAIN LATERALITY: ICD-10-CM

## 2022-08-20 DIAGNOSIS — G89.29 CHRONIC LOW BACK PAIN WITH LEFT-SIDED SCIATICA, UNSPECIFIED BACK PAIN LATERALITY: ICD-10-CM

## 2022-08-23 ENCOUNTER — TELEPHONE (OUTPATIENT)
Dept: INTERNAL MEDICINE CLINIC | Facility: CLINIC | Age: 52
End: 2022-08-23

## 2022-08-23 RX ORDER — TRAMADOL HYDROCHLORIDE 50 MG/1
TABLET ORAL
Qty: 30 TABLET | Refills: 0 | Status: SHIPPED | OUTPATIENT
Start: 2022-08-23 | End: 2022-10-10 | Stop reason: SDUPTHER

## 2022-08-23 NOTE — TELEPHONE ENCOUNTER
Request received for tramadol refill  PDMP and chart reviewed  He was evaluated by Dr Sejal Christina of  pain management on 06/0 8/2022 who documented that in his opinion patient was not candidate for opiate therapy  Will refill prescription in for this message to Dr Minor Rob further discussion with patient

## 2022-08-24 DIAGNOSIS — K21.9 GASTROESOPHAGEAL REFLUX DISEASE WITHOUT ESOPHAGITIS: ICD-10-CM

## 2022-08-24 RX ORDER — OMEPRAZOLE 20 MG/1
CAPSULE, DELAYED RELEASE ORAL
Qty: 90 CAPSULE | Refills: 0 | Status: SHIPPED | OUTPATIENT
Start: 2022-08-24 | End: 2022-10-10 | Stop reason: SDUPTHER

## 2022-08-27 DIAGNOSIS — I10 ESSENTIAL HYPERTENSION: ICD-10-CM

## 2022-08-27 DIAGNOSIS — E11.9 TYPE 2 DIABETES MELLITUS WITHOUT COMPLICATION, WITHOUT LONG-TERM CURRENT USE OF INSULIN (HCC): ICD-10-CM

## 2022-08-30 RX ORDER — METFORMIN HYDROCHLORIDE 500 MG/1
TABLET, EXTENDED RELEASE ORAL
Qty: 180 TABLET | Refills: 0 | Status: SHIPPED | OUTPATIENT
Start: 2022-08-30 | End: 2022-10-10 | Stop reason: SDUPTHER

## 2022-09-22 DIAGNOSIS — E11.9 TYPE 2 DIABETES MELLITUS WITHOUT COMPLICATION, WITHOUT LONG-TERM CURRENT USE OF INSULIN (HCC): ICD-10-CM

## 2022-09-22 RX ORDER — INSULIN LISPRO 100 [IU]/ML
INJECTION, SUSPENSION SUBCUTANEOUS
Qty: 15 ML | Refills: 0 | Status: SHIPPED | OUTPATIENT
Start: 2022-09-22 | End: 2022-09-26

## 2022-09-26 DIAGNOSIS — E11.9 TYPE 2 DIABETES MELLITUS WITHOUT COMPLICATION, WITHOUT LONG-TERM CURRENT USE OF INSULIN (HCC): ICD-10-CM

## 2022-09-26 DIAGNOSIS — E78.00 PURE HYPERCHOLESTEROLEMIA: ICD-10-CM

## 2022-09-26 RX ORDER — INSULIN LISPRO 100 [IU]/ML
INJECTION, SUSPENSION SUBCUTANEOUS
Qty: 225 ML | Refills: 0 | Status: SHIPPED | OUTPATIENT
Start: 2022-09-26 | End: 2022-10-04 | Stop reason: SDUPTHER

## 2022-09-26 RX ORDER — PRAVASTATIN SODIUM 80 MG/1
TABLET ORAL
Qty: 90 TABLET | Refills: 0 | Status: SHIPPED | OUTPATIENT
Start: 2022-09-26 | End: 2022-10-10 | Stop reason: SDUPTHER

## 2022-10-04 DIAGNOSIS — I10 ESSENTIAL HYPERTENSION: ICD-10-CM

## 2022-10-04 DIAGNOSIS — E11.9 TYPE 2 DIABETES MELLITUS WITHOUT COMPLICATION, WITHOUT LONG-TERM CURRENT USE OF INSULIN (HCC): ICD-10-CM

## 2022-10-04 RX ORDER — INSULIN LISPRO 100 [IU]/ML
INJECTION, SUSPENSION SUBCUTANEOUS
Qty: 18 ML | Refills: 0 | Status: SHIPPED | OUTPATIENT
Start: 2022-10-04 | End: 2022-10-10 | Stop reason: SDUPTHER

## 2022-10-04 RX ORDER — METOPROLOL SUCCINATE 50 MG/1
TABLET, EXTENDED RELEASE ORAL
Qty: 30 TABLET | Refills: 0 | Status: SHIPPED | OUTPATIENT
Start: 2022-10-04 | End: 2022-10-10 | Stop reason: SDUPTHER

## 2022-10-10 DIAGNOSIS — M25.569 KNEE PAIN, UNSPECIFIED CHRONICITY, UNSPECIFIED LATERALITY: ICD-10-CM

## 2022-10-10 DIAGNOSIS — F41.9 ANXIETY: ICD-10-CM

## 2022-10-10 DIAGNOSIS — M54.42 CHRONIC LOW BACK PAIN WITH LEFT-SIDED SCIATICA, UNSPECIFIED BACK PAIN LATERALITY: ICD-10-CM

## 2022-10-10 DIAGNOSIS — K21.9 GASTROESOPHAGEAL REFLUX DISEASE WITHOUT ESOPHAGITIS: ICD-10-CM

## 2022-10-10 DIAGNOSIS — G89.29 CHRONIC LOW BACK PAIN WITH LEFT-SIDED SCIATICA, UNSPECIFIED BACK PAIN LATERALITY: ICD-10-CM

## 2022-10-10 DIAGNOSIS — M25.559 HIP PAIN: ICD-10-CM

## 2022-10-10 DIAGNOSIS — I10 ESSENTIAL HYPERTENSION: ICD-10-CM

## 2022-10-10 DIAGNOSIS — E11.9 TYPE 2 DIABETES MELLITUS WITHOUT COMPLICATION, WITHOUT LONG-TERM CURRENT USE OF INSULIN (HCC): ICD-10-CM

## 2022-10-10 DIAGNOSIS — E78.00 PURE HYPERCHOLESTEROLEMIA: ICD-10-CM

## 2022-10-10 RX ORDER — PRAVASTATIN SODIUM 80 MG/1
80 TABLET ORAL DAILY
Qty: 90 TABLET | Refills: 0 | Status: SHIPPED | OUTPATIENT
Start: 2022-10-10

## 2022-10-10 RX ORDER — OMEPRAZOLE 20 MG/1
20 CAPSULE, DELAYED RELEASE ORAL DAILY
Qty: 90 CAPSULE | Refills: 0 | Status: SHIPPED | OUTPATIENT
Start: 2022-10-10

## 2022-10-10 RX ORDER — LOSARTAN POTASSIUM AND HYDROCHLOROTHIAZIDE 25; 100 MG/1; MG/1
1 TABLET ORAL DAILY
Qty: 90 TABLET | Refills: 0 | Status: SHIPPED | OUTPATIENT
Start: 2022-10-10

## 2022-10-10 RX ORDER — METOPROLOL SUCCINATE 50 MG/1
50 TABLET, EXTENDED RELEASE ORAL DAILY
Qty: 90 TABLET | Refills: 0 | Status: SHIPPED | OUTPATIENT
Start: 2022-10-10

## 2022-10-10 RX ORDER — GLIPIZIDE 10 MG/1
TABLET ORAL
Qty: 180 TABLET | Refills: 0 | Status: SHIPPED | OUTPATIENT
Start: 2022-10-10

## 2022-10-10 RX ORDER — METFORMIN HYDROCHLORIDE 500 MG/1
TABLET, EXTENDED RELEASE ORAL
Qty: 180 TABLET | Refills: 0 | Status: SHIPPED | OUTPATIENT
Start: 2022-10-10

## 2022-10-10 RX ORDER — TRAMADOL HYDROCHLORIDE 50 MG/1
TABLET ORAL
Qty: 60 TABLET | Refills: 0 | Status: SHIPPED | OUTPATIENT
Start: 2022-10-10 | End: 2022-10-17

## 2022-10-10 RX ORDER — INSULIN LISPRO 100 [IU]/ML
INJECTION, SUSPENSION SUBCUTANEOUS
Qty: 18 ML | Refills: 0 | Status: SHIPPED | OUTPATIENT
Start: 2022-10-10 | End: 2022-10-11

## 2022-10-10 RX ORDER — DULOXETIN HYDROCHLORIDE 30 MG/1
30 CAPSULE, DELAYED RELEASE ORAL DAILY
Qty: 90 CAPSULE | Refills: 0 | Status: SHIPPED | OUTPATIENT
Start: 2022-10-10

## 2022-10-10 RX ORDER — PEN NEEDLE, DIABETIC 32GX 5/32"
NEEDLE, DISPOSABLE MISCELLANEOUS 3 TIMES DAILY
Qty: 300 EACH | Refills: 3 | Status: SHIPPED | OUTPATIENT
Start: 2022-10-10

## 2022-10-10 NOTE — TELEPHONE ENCOUNTER
Patient changed to [facility] needs all new scripts sent to new pharmacy

## 2022-10-11 DIAGNOSIS — Z79.4 TYPE 2 DIABETES MELLITUS WITH HYPERGLYCEMIA, WITH LONG-TERM CURRENT USE OF INSULIN (HCC): Primary | ICD-10-CM

## 2022-10-11 DIAGNOSIS — E11.65 TYPE 2 DIABETES MELLITUS WITH HYPERGLYCEMIA, WITH LONG-TERM CURRENT USE OF INSULIN (HCC): Primary | ICD-10-CM

## 2022-10-11 RX ORDER — INSULIN ASPART 100 [IU]/ML
INJECTION, SUSPENSION SUBCUTANEOUS
Qty: 15 ML | Refills: 0 | Status: CANCELLED | OUTPATIENT
Start: 2022-10-11

## 2022-10-11 RX ORDER — INSULIN ASPART 100 [IU]/ML
INJECTION, SUSPENSION SUBCUTANEOUS
Qty: 15 ML | Refills: 1 | Status: SHIPPED | OUTPATIENT
Start: 2022-10-11 | End: 2022-10-13 | Stop reason: SDUPTHER

## 2022-10-11 NOTE — TELEPHONE ENCOUNTER
Humalog is not covered by patient new insurance Novolog is what's preferred by the insurance to cover

## 2022-10-13 DIAGNOSIS — Z79.4 TYPE 2 DIABETES MELLITUS WITH HYPERGLYCEMIA, WITH LONG-TERM CURRENT USE OF INSULIN (HCC): ICD-10-CM

## 2022-10-13 DIAGNOSIS — E11.65 TYPE 2 DIABETES MELLITUS WITH HYPERGLYCEMIA, WITH LONG-TERM CURRENT USE OF INSULIN (HCC): ICD-10-CM

## 2022-10-13 RX ORDER — INSULIN ASPART 100 [IU]/ML
INJECTION, SUSPENSION SUBCUTANEOUS
Qty: 80 ML | Refills: 0 | Status: SHIPPED | OUTPATIENT
Start: 2022-10-13

## 2022-10-17 ENCOUNTER — OFFICE VISIT (OUTPATIENT)
Dept: INTERNAL MEDICINE CLINIC | Facility: CLINIC | Age: 52
End: 2022-10-17
Payer: COMMERCIAL

## 2022-10-17 ENCOUNTER — TELEPHONE (OUTPATIENT)
Dept: OBGYN CLINIC | Facility: HOSPITAL | Age: 52
End: 2022-10-17

## 2022-10-17 VITALS
TEMPERATURE: 97.9 F | HEIGHT: 66 IN | WEIGHT: 219 LBS | BODY MASS INDEX: 35.2 KG/M2 | HEART RATE: 80 BPM | DIASTOLIC BLOOD PRESSURE: 84 MMHG | RESPIRATION RATE: 13 BRPM | SYSTOLIC BLOOD PRESSURE: 152 MMHG

## 2022-10-17 DIAGNOSIS — Z79.4 TYPE 2 DIABETES MELLITUS WITH HYPERGLYCEMIA, WITH LONG-TERM CURRENT USE OF INSULIN (HCC): ICD-10-CM

## 2022-10-17 DIAGNOSIS — E66.01 CLASS 2 SEVERE OBESITY DUE TO EXCESS CALORIES WITH SERIOUS COMORBIDITY AND BODY MASS INDEX (BMI) OF 35.0 TO 35.9 IN ADULT (HCC): ICD-10-CM

## 2022-10-17 DIAGNOSIS — F11.20 CONTINUOUS OPIOID DEPENDENCE (HCC): ICD-10-CM

## 2022-10-17 DIAGNOSIS — M25.552 CHRONIC PAIN OF BOTH HIPS: ICD-10-CM

## 2022-10-17 DIAGNOSIS — K21.9 GASTROESOPHAGEAL REFLUX DISEASE WITHOUT ESOPHAGITIS: ICD-10-CM

## 2022-10-17 DIAGNOSIS — Z23 ENCOUNTER FOR IMMUNIZATION: ICD-10-CM

## 2022-10-17 DIAGNOSIS — C91.10 CHRONIC LYMPHOCYTIC LEUKEMIA (HCC): ICD-10-CM

## 2022-10-17 DIAGNOSIS — E11.3299 MILD NONPROLIFERATIVE DIABETIC RETINOPATHY ASSOCIATED WITH TYPE 2 DIABETES MELLITUS, MACULAR EDEMA PRESENCE UNSPECIFIED, UNSPECIFIED LATERALITY (HCC): Primary | ICD-10-CM

## 2022-10-17 DIAGNOSIS — I10 ESSENTIAL HYPERTENSION: ICD-10-CM

## 2022-10-17 DIAGNOSIS — G89.29 CHRONIC PAIN OF BOTH HIPS: ICD-10-CM

## 2022-10-17 DIAGNOSIS — E78.2 MIXED HYPERLIPIDEMIA: ICD-10-CM

## 2022-10-17 DIAGNOSIS — E11.65 TYPE 2 DIABETES MELLITUS WITH HYPERGLYCEMIA, WITH LONG-TERM CURRENT USE OF INSULIN (HCC): ICD-10-CM

## 2022-10-17 DIAGNOSIS — M25.551 CHRONIC PAIN OF BOTH HIPS: ICD-10-CM

## 2022-10-17 LAB
SL AMB POCT GLUCOSE BLD: 372
SL AMB POCT HEMOGLOBIN AIC: 11 (ref ?–6.5)

## 2022-10-17 PROCEDURE — 82948 REAGENT STRIP/BLOOD GLUCOSE: CPT | Performed by: INTERNAL MEDICINE

## 2022-10-17 PROCEDURE — 83036 HEMOGLOBIN GLYCOSYLATED A1C: CPT | Performed by: INTERNAL MEDICINE

## 2022-10-17 PROCEDURE — 99396 PREV VISIT EST AGE 40-64: CPT | Performed by: INTERNAL MEDICINE

## 2022-10-17 NOTE — TELEPHONE ENCOUNTER
Caller: karina  Doctor: n/a    Reason for call: patient called to make an appt with one of our orthopedic specialist, referral in place  Patient requested  and call was disconnected before call could be conference      Call back#: [phone]

## 2022-10-17 NOTE — PROGRESS NOTES
Assessment/Plan:      ONE DIABETES POORLY CONTROLLED HEMOGLOBIN A1C IS 11 HE DID NOT BRING THE BLOOD SUGARS TO REVIEW BUT HE DOES HAVE ENDOCRINOLOGY CONSULTATION WHICH THEY SAW HIM LAST YEAR  HE NEEDS TO MAKE AN APPOINTMENT WITH THE ENDOCRINOLOGIST AT THAT TIME HE NEEDS TO BRING THE BLOOD SUGARS      COMPLAINING OF MULTIPLE JOINT PAINS BOTH HIPS AND BOTH KNEES  ALSO HAS BACK PAIN PAIN IS ONGOING HAS PROBLEMS SLEEPING I TOLD HIM TO AVOID TRAMADOL BECAUSE IT PUT IS A HAD A RISK FOR ADDITION TOLD HIM TO TAKE THE CELEBREX DULOXETINE 30 MG TWICE A DAY CONTINUE TAKING THE CELEBREX 200 MG DAILY I SEND HIM TO ORTHOPEDIC AND PHYSICAL THERAPY    DIABETIC RETINOPATHY HE NEEDS TO CONTINUE FOLLOW-UP WITH HIS OPHTHALMOLOGIST  CHRONIC LYMPHOCYTIC LEUKEMIA CBC WAS STABLE BACK IN JULY HE DOES FOLLOW-UP WITH HEMATOLOGY ONCE A YEAR    AS FAR AS MEDICATIONS FOR THE DIABETES THE NEW INSURANCE PLAN DOES NOT COVER JARDIANCE BUT I TOLD HIM TO CALL THE PHARMACY TRY IF THEY COVER FAXIGA OR INVOKANA    HE MOST RIGHT HIS BLOOD SUGARS SO WE CAN HELP HIM WHEN HE COMES FOR FOLLOW-UP VISIT  HE IS HERE WITH HIS WIFE TODAY  HE LOOKS GOOD OTHERWISE  HE DID GAINED SOME WEIGHT HE IS OBESE HE NEEDS TO LOSE WEIGHT TO IMPROVE HIS DIABETES CONTROL AND HIS COMORBIDITIES        No problem-specific Assessment & Plan notes found for this encounter         Diagnoses and all orders for this visit:    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, macular edema presence unspecified, unspecified laterality ([facility] [address] )    Type 2 diabetes mellitus with hyperglycemia, with long-term current use of insulin (HCC)    Essential hypertension    Mixed hyperlipidemia    Chronic lymphocytic leukemia (HCC)    Gastroesophageal reflux disease without esophagitis    Class 2 severe obesity due to excess calories with serious comorbidity and body mass index (BMI) of 35 0 to 35 9 in adult [facility])    Continuous opioid dependence ([facility] [address] ) Subjective:      Patient ID: Padmaja Jerry is a 47 y o  male  No chief complaint on file          Current Outpatient Medications:   •  BinaxNOW COVID-19 Ag Home Test KIT, TEST AS DIRECTED TODAY, Disp: , Rfl:   •  Blood Glucose Monitoring Suppl (FreeStyle Freedom Lite) w/Device KIT, Use Test blood sugars 4 times daily, Disp: , Rfl:   •  celecoxib (CeleBREX) 200 mg capsule, Take 1 capsule (200 mg total) by mouth daily for 30 days, Disp: 30 capsule, Rfl: 1  •  Cholecalciferol (VITAMIN D3) 5000 units CAPS, Take by mouth, Disp: , Rfl:   •  ciclopirox (PENLAC) 8 % solution, Apply topically daily at bedtime, Disp: 6 6 mL, Rfl: 0  •  Continuous Blood Gluc  (Dexcom G6 ) ROBYN, Disp 1 Kit, use as directed (Patient not taking: No sig reported), Disp: 1 each, Rfl: 1  •  Continuous Blood Gluc Sensor (Dexcom G6 Sensor) MISC, Change every 10 days (Patient not taking: No sig reported), Disp: 1 each, Rfl: 12  •  Continuous Blood Gluc Transmit (Dexcom G6 Transmitter) MISC, Dispense 1 kit, change every 90 days (Patient not taking: No sig reported), Disp: 1 each, Rfl: 3  •  DULoxetine (Cymbalta) 30 mg delayed release capsule, Take 1 capsule (30 mg total) by mouth daily, Disp: 90 capsule, Rfl: 0  •  Empagliflozin (Jardiance) 10 MG TABS tablet, TAKE 1 TABLET BY MOUTH DAILY, Disp: 90 tablet, Rfl: 0  •  glipiZIDE (GLUCOTROL) 10 mg tablet, Take 1 Tablet By Mouth Before Breakfast and Dinner, Disp: 180 tablet, Rfl: 0  •  glucose blood (FREESTYLE LITE) test strip, Use as instructed, Disp: 90 each, Rfl: 3  •  insulin aspart protamine-insulin aspart (NovoLOG Mix 70/30 FlexPen) 100 Units/mL injection pen, Inject 25 units at Breakfast and Lunch and 35 units at [employer], Disp: 80 mL, Rfl: 0  •  Insulin Pen Needle (BD Pen Needle Marielos 2nd Gen) 32G X 4 MM MISC, Inject as directed 3 (three) times a day, Disp: 300 each, Rfl: 3  •  Insulin Syringe-Needle U-100 (B-D INS SYR ULTRAFINE  5CC/30G) 30G X 1/2" 0 5 ML MISC, Inject insulin 3 times daily, Disp: 300 each, Rfl: 1  •  Lancets (freestyle) lancets, Use 1 each 3 (three) times a day Test blood sugars 4 times daily, Disp: 300 each, Rfl: 0  •  losartan-hydrochlorothiazide (HYZAAR) 100-25 MG per tablet, Take 1 tablet by mouth daily, Disp: 90 tablet, Rfl: 0  •  metFORMIN (GLUCOPHAGE-XR) 500 mg 24 hr tablet, TAKE 1 TABLET(500 MG) BY MOUTH TWICE DAILY WITH MEALS, Disp: 180 tablet, Rfl: 0  •  metoprolol succinate (TOPROL-XL) 50 mg 24 hr tablet, Take 1 tablet (50 mg total) by mouth daily, Disp: 90 tablet, Rfl: 0  •  omeprazole (PriLOSEC) 20 mg delayed release capsule, Take 1 capsule (20 mg total) by mouth daily, Disp: 90 capsule, Rfl: 0  •  pravastatin (PRAVACHOL) 80 mg tablet, Take 1 tablet (80 mg total) by mouth daily, Disp: 90 tablet, Rfl: 0  •  sildenafil (REVATIO) 20 mg tablet, Take 1 or 2 tablets 1 hour prior to relation, Disp: 30 tablet, Rfl: 0  •  traMADol (ULTRAM) 50 mg tablet, Take 1 Tablet By Mouth Twice a Daily As Needed for Back Pain, Disp: 60 tablet, Rfl: 0    HPI    The following portions of the patient's history were reviewed and updated as appropriate: allergies, current medications, past family history, past medical history, past social history, past surgical history and problem list     Review of Systems   Constitutional: Negative  Negative for activity change, appetite change, fatigue, fever and unexpected weight change  HENT: Negative for congestion, ear pain, hearing loss, mouth sores, postnasal drip, rhinorrhea, sore throat, trouble swallowing and voice change  Eyes: Negative for pain, redness and visual disturbance  Respiratory: Negative for cough, chest tightness, shortness of breath and wheezing  Cardiovascular: Negative for chest pain, palpitations and leg swelling  Gastrointestinal: Negative for abdominal distention, abdominal pain, blood in stool, constipation, diarrhea and nausea     Endocrine: Negative for cold intolerance, heat intolerance, polydipsia, polyphagia and polyuria  Genitourinary: Negative for difficulty urinating, dysuria, flank pain, frequency, hematuria and urgency  Musculoskeletal: Positive for arthralgias and back pain  Negative for gait problem, joint swelling and myalgias  HIP PAIN AND KNEE PAIN   Skin: Negative for color change and pallor  Neurological: Negative for dizziness, tremors, seizures, syncope, weakness, numbness and headaches  Hematological: Negative for adenopathy  Does not bruise/bleed easily  Psychiatric/Behavioral: Negative  Negative for sleep disturbance  The patient is not nervous/anxious  Objective: There were no vitals taken for this visit  Physical Exam  Constitutional:       General: He is not in acute distress  Appearance: He is well-developed  He is obese  He is not ill-appearing  HENT:      Head: Normocephalic  Right Ear: External ear normal       Left Ear: External ear normal       Nose: Nose normal       Mouth/Throat:      Pharynx: No oropharyngeal exudate  Eyes:      Conjunctiva/sclera: Conjunctivae normal       Pupils: Pupils are equal, round, and reactive to light  Neck:      Thyroid: No thyromegaly  Cardiovascular:      Rate and Rhythm: Normal rate and regular rhythm  Heart sounds: Normal heart sounds  No murmur heard  No friction rub  No gallop  Comments: S1-S2 REGULAR RHYTHM  EXTREMITIES NO EDEMA  Pulmonary:      Effort: Pulmonary effort is normal  No respiratory distress  Breath sounds: Normal breath sounds  No wheezing or rales  Comments: LUNGS ARE CLEAR NO WHEEZING RALES OR RHONCHI  Abdominal:      General: Bowel sounds are normal  There is no distension  Palpations: Abdomen is soft  There is no mass  Tenderness: There is no abdominal tenderness  There is no guarding or rebound  Comments: ABDOMEN SOFT NONTENDER   Musculoskeletal:         General: Normal range of motion        Cervical back: Normal range of motion and neck supple  Lymphadenopathy:      Cervical: No cervical adenopathy  Skin:     General: Skin is warm and dry  Neurological:      Mental Status: He is alert and oriented to person, place, and time     Psychiatric:         Behavior: Behavior normal          Judgment: Judgment normal

## 2022-10-17 NOTE — PATIENT INSTRUCTIONS
FOR PAIN CONTROL WE DO NOT WITH TRY TO AVOID NARCOTICS    YOUR TAKING CELEBREX 200 MG DAILY TO HELP YOU WITH YOUR GENERALIZED ARTHRITIS AT IS ANTI-INFLAMMATORY    DULOXETINE 30 MG DAILY IS ALSO PAIN MEDICATION YOU GET INCREASE THAT TO TWICE A DAY

## 2022-12-12 DIAGNOSIS — N30.00 ACUTE CYSTITIS WITHOUT HEMATURIA: ICD-10-CM

## 2022-12-12 RX ORDER — CEFUROXIME AXETIL 250 MG/1
TABLET ORAL
Qty: 10 TABLET | Refills: 0 | Status: SHIPPED | OUTPATIENT
Start: 2022-12-12 | End: 2022-12-17

## 2023-01-05 DIAGNOSIS — E11.9 TYPE 2 DIABETES MELLITUS WITHOUT COMPLICATION, WITHOUT LONG-TERM CURRENT USE OF INSULIN (HCC): ICD-10-CM

## 2023-01-05 DIAGNOSIS — I10 ESSENTIAL HYPERTENSION: ICD-10-CM

## 2023-01-05 RX ORDER — METFORMIN HYDROCHLORIDE 500 MG/1
TABLET, EXTENDED RELEASE ORAL
Qty: 180 TABLET | Refills: 0 | Status: SHIPPED | OUTPATIENT
Start: 2023-01-05

## 2023-01-05 RX ORDER — GLIPIZIDE 10 MG/1
TABLET ORAL
Qty: 180 TABLET | Refills: 0 | Status: SHIPPED | OUTPATIENT
Start: 2023-01-05

## 2023-01-08 DIAGNOSIS — I10 ESSENTIAL HYPERTENSION: ICD-10-CM

## 2023-01-08 DIAGNOSIS — E78.00 PURE HYPERCHOLESTEROLEMIA: ICD-10-CM

## 2023-01-09 RX ORDER — PRAVASTATIN SODIUM 80 MG/1
TABLET ORAL
Qty: 90 TABLET | Refills: 0 | Status: SHIPPED | OUTPATIENT
Start: 2023-01-09

## 2023-01-09 RX ORDER — LOSARTAN POTASSIUM AND HYDROCHLOROTHIAZIDE 25; 100 MG/1; MG/1
TABLET ORAL
Qty: 90 TABLET | Refills: 0 | Status: SHIPPED | OUTPATIENT
Start: 2023-01-09

## 2023-01-17 ENCOUNTER — OFFICE VISIT (OUTPATIENT)
Dept: INTERNAL MEDICINE CLINIC | Facility: CLINIC | Age: 53
End: 2023-01-17

## 2023-01-17 ENCOUNTER — APPOINTMENT (OUTPATIENT)
Dept: LAB | Facility: HOSPITAL | Age: 53
End: 2023-01-17

## 2023-01-17 ENCOUNTER — TELEPHONE (OUTPATIENT)
Dept: ADMINISTRATIVE | Facility: OTHER | Age: 53
End: 2023-01-17

## 2023-01-17 VITALS
SYSTOLIC BLOOD PRESSURE: 134 MMHG | RESPIRATION RATE: 13 BRPM | DIASTOLIC BLOOD PRESSURE: 82 MMHG | HEART RATE: 84 BPM | BODY MASS INDEX: 35.84 KG/M2 | WEIGHT: 223 LBS | TEMPERATURE: 97.9 F | HEIGHT: 66 IN

## 2023-01-17 DIAGNOSIS — E11.9 TYPE 2 DIABETES MELLITUS WITHOUT COMPLICATION, WITHOUT LONG-TERM CURRENT USE OF INSULIN (HCC): ICD-10-CM

## 2023-01-17 DIAGNOSIS — R10.12 LEFT UPPER QUADRANT PAIN: ICD-10-CM

## 2023-01-17 DIAGNOSIS — G89.29 CHRONIC PAIN OF BOTH HIPS: ICD-10-CM

## 2023-01-17 DIAGNOSIS — K21.9 GASTROESOPHAGEAL REFLUX DISEASE WITHOUT ESOPHAGITIS: ICD-10-CM

## 2023-01-17 DIAGNOSIS — C91.10 CHRONIC LYMPHOCYTIC LEUKEMIA (HCC): ICD-10-CM

## 2023-01-17 DIAGNOSIS — E78.2 MIXED HYPERLIPIDEMIA: ICD-10-CM

## 2023-01-17 DIAGNOSIS — M25.551 CHRONIC PAIN OF BOTH HIPS: ICD-10-CM

## 2023-01-17 DIAGNOSIS — E66.01 CLASS 2 SEVERE OBESITY DUE TO EXCESS CALORIES WITH SERIOUS COMORBIDITY AND BODY MASS INDEX (BMI) OF 35.0 TO 35.9 IN ADULT (HCC): ICD-10-CM

## 2023-01-17 DIAGNOSIS — Z79.4 TYPE 2 DIABETES MELLITUS WITH HYPERGLYCEMIA, WITH LONG-TERM CURRENT USE OF INSULIN (HCC): Primary | ICD-10-CM

## 2023-01-17 DIAGNOSIS — I10 ESSENTIAL HYPERTENSION: ICD-10-CM

## 2023-01-17 DIAGNOSIS — Z79.4 TYPE 2 DIABETES MELLITUS WITH HYPERGLYCEMIA, WITH LONG-TERM CURRENT USE OF INSULIN (HCC): ICD-10-CM

## 2023-01-17 DIAGNOSIS — E11.3299 MILD NONPROLIFERATIVE DIABETIC RETINOPATHY ASSOCIATED WITH TYPE 2 DIABETES MELLITUS, MACULAR EDEMA PRESENCE UNSPECIFIED, UNSPECIFIED LATERALITY (HCC): ICD-10-CM

## 2023-01-17 DIAGNOSIS — E11.65 TYPE 2 DIABETES MELLITUS WITH HYPERGLYCEMIA, WITH LONG-TERM CURRENT USE OF INSULIN (HCC): Primary | ICD-10-CM

## 2023-01-17 DIAGNOSIS — E11.65 TYPE 2 DIABETES MELLITUS WITH HYPERGLYCEMIA, WITH LONG-TERM CURRENT USE OF INSULIN (HCC): ICD-10-CM

## 2023-01-17 DIAGNOSIS — F11.20 CONTINUOUS OPIOID DEPENDENCE (HCC): ICD-10-CM

## 2023-01-17 DIAGNOSIS — M25.552 CHRONIC PAIN OF BOTH HIPS: ICD-10-CM

## 2023-01-17 LAB
25(OH)D3 SERPL-MCNC: 39.3 NG/ML (ref 30–100)
ALBUMIN SERPL BCP-MCNC: 4.4 G/DL (ref 3.5–5)
ALP SERPL-CCNC: 77 U/L (ref 34–104)
ALT SERPL W P-5'-P-CCNC: 47 U/L (ref 7–52)
ANION GAP SERPL CALCULATED.3IONS-SCNC: 8 MMOL/L (ref 4–13)
AST SERPL W P-5'-P-CCNC: 30 U/L (ref 13–39)
BASOPHILS # BLD AUTO: 0.09 THOUSANDS/ÂΜL (ref 0–0.1)
BASOPHILS NFR BLD AUTO: 1 % (ref 0–1)
BILIRUB SERPL-MCNC: 0.75 MG/DL (ref 0.2–1)
BILIRUB UR QL STRIP: NEGATIVE
BUN SERPL-MCNC: 17 MG/DL (ref 5–25)
CALCIUM SERPL-MCNC: 9.8 MG/DL (ref 8.4–10.2)
CHLORIDE SERPL-SCNC: 96 MMOL/L (ref 96–108)
CHOLEST SERPL-MCNC: 154 MG/DL
CLARITY UR: CLEAR
CO2 SERPL-SCNC: 30 MMOL/L (ref 21–32)
COLOR UR: YELLOW
CREAT SERPL-MCNC: 1.18 MG/DL (ref 0.6–1.3)
CREAT UR-MCNC: 228 MG/DL
CRP SERPL QL: 2 MG/L
EOSINOPHIL # BLD AUTO: 0.09 THOUSAND/ÂΜL (ref 0–0.61)
EOSINOPHIL NFR BLD AUTO: 1 % (ref 0–6)
ERYTHROCYTE [DISTWIDTH] IN BLOOD BY AUTOMATED COUNT: 12.1 % (ref 11.6–15.1)
ERYTHROCYTE [SEDIMENTATION RATE] IN BLOOD: 2 MM/HOUR (ref 0–19)
EST. AVERAGE GLUCOSE BLD GHB EST-MCNC: 252 MG/DL
GFR SERPL CREATININE-BSD FRML MDRD: 71 ML/MIN/1.73SQ M
GGT SERPL-CCNC: 91 U/L (ref 5–85)
GLUCOSE P FAST SERPL-MCNC: 180 MG/DL (ref 65–99)
GLUCOSE UR STRIP-MCNC: NEGATIVE MG/DL
HBA1C MFR BLD: 10.4 %
HCT VFR BLD AUTO: 45.9 % (ref 36.5–49.3)
HDLC SERPL-MCNC: 30 MG/DL
HGB BLD-MCNC: 14.8 G/DL (ref 12–17)
HGB UR QL STRIP.AUTO: NEGATIVE
IMM GRANULOCYTES # BLD AUTO: 0.07 THOUSAND/UL (ref 0–0.2)
IMM GRANULOCYTES NFR BLD AUTO: 0 % (ref 0–2)
KETONES UR STRIP-MCNC: NEGATIVE MG/DL
LDH SERPL-CCNC: 195 U/L (ref 140–271)
LDLC SERPL CALC-MCNC: 54 MG/DL (ref 0–100)
LEUKOCYTE ESTERASE UR QL STRIP: NEGATIVE
LYMPHOCYTES # BLD AUTO: 13.46 THOUSANDS/ÂΜL (ref 0.6–4.47)
LYMPHOCYTES NFR BLD AUTO: 69 % (ref 14–44)
MAGNESIUM SERPL-MCNC: 1.8 MG/DL (ref 1.9–2.7)
MCH RBC QN AUTO: 27.5 PG (ref 26.8–34.3)
MCHC RBC AUTO-ENTMCNC: 32.2 G/DL (ref 31.4–37.4)
MCV RBC AUTO: 85 FL (ref 82–98)
MICROALBUMIN UR-MCNC: 9.6 MG/L (ref 0–20)
MICROALBUMIN/CREAT 24H UR: 4 MG/G CREATININE (ref 0–30)
MONOCYTES # BLD AUTO: 0.92 THOUSAND/ÂΜL (ref 0.17–1.22)
MONOCYTES NFR BLD AUTO: 5 % (ref 4–12)
NEUTROPHILS # BLD AUTO: 4.65 THOUSANDS/ÂΜL (ref 1.85–7.62)
NEUTS SEG NFR BLD AUTO: 24 % (ref 43–75)
NITRITE UR QL STRIP: NEGATIVE
NRBC BLD AUTO-RTO: 0 /100 WBCS
PH UR STRIP.AUTO: 6.5 [PH]
PLATELET # BLD AUTO: 202 THOUSANDS/UL (ref 149–390)
PMV BLD AUTO: 9.9 FL (ref 8.9–12.7)
POTASSIUM SERPL-SCNC: 4.2 MMOL/L (ref 3.5–5.3)
PROT SERPL-MCNC: 6.8 G/DL (ref 6.4–8.4)
PROT UR STRIP-MCNC: NEGATIVE MG/DL
RBC # BLD AUTO: 5.39 MILLION/UL (ref 3.88–5.62)
SODIUM SERPL-SCNC: 134 MMOL/L (ref 135–147)
SP GR UR STRIP.AUTO: 1.01 (ref 1–1.03)
TRIGL SERPL-MCNC: 349 MG/DL
TSH SERPL DL<=0.05 MIU/L-ACNC: 3.53 UIU/ML (ref 0.45–4.5)
UROBILINOGEN UR QL STRIP.AUTO: 0.2 E.U./DL
WBC # BLD AUTO: 19.28 THOUSAND/UL (ref 4.31–10.16)

## 2023-01-17 RX ORDER — BLOOD-GLUCOSE METER
KIT MISCELLANEOUS
Qty: 100 EACH | Refills: 1 | Status: SHIPPED | OUTPATIENT
Start: 2023-01-17

## 2023-01-17 NOTE — PROGRESS NOTES
Assessment/Plan:      Diabetes poorly controlled he is not taking the Jardiance I sent the Jardiance to the pharmacy and we consult endocrinology for follow-up we will continue glipizide 10 mg  Metformin extended release 500 mg twice daily  NovoLog Mix 70/30  Blood sugars review mostly in the 150 and 200    2  Blood pressure well controlled no chest pain palpitation shortness of breath he is on losartan hydrochlorothiazide 100/25 1 tablet daily with metoprolol succinate 50 mg daily    3  Hyperlipidemia he is overdue for lab work is on pravastatin 80 mg/day  4   Chronic lymphocytic leukemia he needs to go for lab work I reconsult hematology oncology for follow-up    5  Left upper quadrant discomfort no changes in bowel movement he does have chronic lymphocytic leukemia we will check an ultrasound of the abdomen to rule out splenomegaly I did not feel the spleen tip during my abdominal examination      No problem-specific Assessment & Plan notes found for this encounter  Diagnoses and all orders for this visit:    Type 2 diabetes mellitus with hyperglycemia, with long-term current use of insulin (HCC)    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, macular edema presence unspecified, unspecified laterality (HCC)    Essential hypertension    Chronic lymphocytic leukemia (HCC)    Class 2 severe obesity due to excess calories with serious comorbidity and body mass index (BMI) of 35 0 to 35 9 in adult [facility])    Mixed hyperlipidemia    Gastroesophageal reflux disease without esophagitis          Subjective:      Patient ID: Derek Fonseca is a 47 y o  male  No chief complaint on file          Current Outpatient Medications:   •  BinaxNOW COVID-19 Ag Home Test KIT, TEST AS DIRECTED TODAY, Disp: , Rfl:   •  Blood Glucose Monitoring Suppl (FreeStyle Freedom Lite) w/Device KIT, Use Test blood sugars 4 times daily, Disp: , Rfl:   •  celecoxib (CeleBREX) 200 mg capsule, Take 1 capsule (200 mg total) by mouth daily for 30 days, Disp: 30 capsule, Rfl: 1  •  Cholecalciferol (VITAMIN D3) 5000 units CAPS, Take by mouth, Disp: , Rfl:   •  ciclopirox (PENLAC) 8 % solution, Apply topically daily at bedtime, Disp: 6 6 mL, Rfl: 0  •  Continuous Blood Gluc  (Dexcom G6 ) ROBYN, Disp 1 Kit, use as directed (Patient not taking: No sig reported), Disp: 1 each, Rfl: 1  •  Continuous Blood Gluc Sensor (Dexcom G6 Sensor) MISC, Change every 10 days (Patient not taking: No sig reported), Disp: 1 each, Rfl: 12  •  Continuous Blood Gluc Transmit (Dexcom G6 Transmitter) MISC, Dispense 1 kit, change every 90 days (Patient not taking: No sig reported), Disp: 1 each, Rfl: 3  •  DULoxetine (Cymbalta) 30 mg delayed release capsule, Take 1 capsule (30 mg total) by mouth daily, Disp: 90 capsule, Rfl: 0  •  Empagliflozin (Jardiance) 10 MG TABS tablet, TAKE 1 TABLET BY MOUTH DAILY, Disp: 90 tablet, Rfl: 0  •  glipiZIDE (GLUCOTROL) 10 mg tablet, TAKE 1 TABLET BY MOUTH BEFORE BREAKFAST AND DINNER, Disp: 180 tablet, Rfl: 0  •  glucose blood (FREESTYLE LITE) test strip, Use as instructed, Disp: 90 each, Rfl: 3  •  insulin aspart protamine-insulin aspart (NovoLOG Mix 70/30 FlexPen) 100 Units/mL injection pen, Inject 25 units at Breakfast and Lunch and 35 units at [facility], Disp: 80 mL, Rfl: 0  •  Insulin Pen Needle (BD Pen Needle Marielos 2nd Gen) 32G X 4 MM MISC, Inject as directed 3 (three) times a day, Disp: 300 each, Rfl: 3  •  Insulin Syringe-Needle U-100 (B-D INS SYR ULTRAFINE  5CC/30G) 30G X 1/2" 0 5 ML MISC, Inject insulin 3 times daily, Disp: 300 each, Rfl: 1  •  Lancets (freestyle) lancets, Use 1 each 3 (three) times a day Test blood sugars 4 times daily, Disp: 300 each, Rfl: 0  •  losartan-hydrochlorothiazide (HYZAAR) 100-25 MG per tablet, TAKE 1 TABLET BY MOUTH EVERY DAY, Disp: 90 tablet, Rfl: 0  •  metFORMIN (GLUCOPHAGE-XR) 500 mg 24 hr tablet, TAKE 1 TABLET BY MOUTH TWICE A DAY WITH MEALS, Disp: 180 tablet, Rfl: 0  •  metoprolol succinate (TOPROL-XL) 50 mg 24 hr tablet, Take 1 tablet (50 mg total) by mouth daily, Disp: 90 tablet, Rfl: 0  •  omeprazole (PriLOSEC) 20 mg delayed release capsule, Take 1 capsule (20 mg total) by mouth daily, Disp: 90 capsule, Rfl: 0  •  pravastatin (PRAVACHOL) 80 mg tablet, TAKE 1 TABLET BY MOUTH EVERY DAY, Disp: 90 tablet, Rfl: 0  •  sildenafil (REVATIO) 20 mg tablet, Take 1 or 2 tablets 1 hour prior to relation, Disp: 30 tablet, Rfl: 0    HPI    The following portions of the patient's history were reviewed and updated as appropriate: allergies, current medications, past family history, past medical history, past social history, past surgical history and problem list     Review of Systems      Objective:    Diabetic Foot Exam    Patient's shoes and socks removed  Right Foot/Ankle   Right Foot Inspection  Skin Exam: skin normal  Skin not intact, no dry skin, no warmth, no callus, no erythema, no maceration, no abnormal color, no pre-ulcer, no ulcer and no callus  Toe Exam: ROM and strength within normal limits  No swelling, no tenderness, erythema and  no right toe deformity    Sensory   Vibration: intact  Proprioception: intact  Monofilament testing: intact    Vascular  The right DP pulse is 1+  The right PT pulse is 1+  Left Foot/Ankle  Left Foot Inspection  Skin Exam: skin normal  Skin not intact, no dry skin, no warmth, no erythema, no maceration, normal color, no pre-ulcer, no ulcer and no callus  Toe Exam: ROM and strength within normal limits  No swelling, no tenderness, no erythema and no left toe deformity  Sensory   Vibration: intact  Proprioception: intact  Monofilament testing: intact    Vascular  The left DP pulse is 2+  The left PT pulse is 2+  Assign Risk Category  No deformity present  No loss of protective sensation  No weak pulses  Risk: 0      There were no vitals taken for this visit       Physical Exam  Vitals and nursing note reviewed  Constitutional:       Appearance: He is well-developed  HENT:      Head: Normocephalic  Right Ear: External ear normal       Left Ear: External ear normal       Nose: Nose normal       Mouth/Throat:      Pharynx: No oropharyngeal exudate  Eyes:      Conjunctiva/sclera: Conjunctivae normal       Pupils: Pupils are equal, round, and reactive to light  Neck:      Thyroid: No thyromegaly  Cardiovascular:      Rate and Rhythm: Normal rate and regular rhythm  Pulses: no weak pulses          Dorsalis pedis pulses are 1+ on the right side and 2+ on the left side  Posterior tibial pulses are 1+ on the right side and 2+ on the left side  Heart sounds: Normal heart sounds  No murmur heard  No friction rub  No gallop  Comments: S1-S2 regular rhythm  Extremities no edema  Pulmonary:      Effort: Pulmonary effort is normal  No respiratory distress  Breath sounds: Normal breath sounds  No wheezing or rales  Comments: Lungs are clear no wheezing rales or rhonchi  Abdominal:      General: Bowel sounds are normal  There is no distension  Palpations: Abdomen is soft  There is no mass  Tenderness: There is no abdominal tenderness  There is no guarding or rebound  Comments: Elvie obese soft tenderness in the left upper quadrant no spleen tip palpable    No lymphadenopathy in the axillary supraclavicular   Musculoskeletal:         General: Normal range of motion  Cervical back: Normal range of motion and neck supple  Feet:      Right foot:      Skin integrity: No ulcer, skin breakdown, erythema, warmth, callus or dry skin  Left foot:      Skin integrity: No ulcer, skin breakdown, erythema, warmth, callus or dry skin  Lymphadenopathy:      Cervical: No cervical adenopathy  Skin:     General: Skin is warm and dry  Neurological:      Mental Status: He is alert and oriented to person, place, and time     Psychiatric:         Behavior: Behavior normal          Judgment: Judgment normal

## 2023-01-17 NOTE — TELEPHONE ENCOUNTER
----- Message from Farida Bullard MA sent at 1/17/2023  9:37 AM EST -----  Regarding: eye exam  01/17/23 9:37 AM    Hello, our patient Clarita Barajas has had Diabetic Eye Exam completed/performed  Please assist in updating the patient chart by pulling the document from the Media Tab  The date of service is 6 30 2022       Thank you,  Farida Bullard MA   INTERNAL MED [facility]

## 2023-01-17 NOTE — PATIENT INSTRUCTIONS
Start taking your Jardiance 10 mg daily I sent the prescription to the pharmacy it would help your diabetic control  Make a visit with the endocrinologist to help you with control your diabetes  Visit with your hematologist for your leukemia follow-up  You can go for your lab work today you do not need to fast

## 2023-01-17 NOTE — TELEPHONE ENCOUNTER
Upon review of the In Basket request we were able to locate, review, and update the patient chart as requested for Diabetic Eye Exam     Any additional questions or concerns should be emailed to the Practice Liaisons via the appropriate education email address, please do not reply via In Basket      Thank you  Lorne Duron

## 2023-01-18 ENCOUNTER — TELEPHONE (OUTPATIENT)
Dept: INTERNAL MEDICINE CLINIC | Facility: CLINIC | Age: 53
End: 2023-01-18

## 2023-01-18 NOTE — TELEPHONE ENCOUNTER
----- Message from Denisse Otoole MD sent at 1/17/2023 12:03 PM EST -----  Normal urine normal results

## 2023-01-21 ENCOUNTER — HOSPITAL ENCOUNTER (OUTPATIENT)
Dept: ULTRASOUND IMAGING | Facility: HOSPITAL | Age: 53
Discharge: HOME | End: 2023-01-21
Attending: INTERNAL MEDICINE

## 2023-01-21 DIAGNOSIS — C91.10 CHRONIC LYMPHOCYTIC LEUKEMIA (HCC): ICD-10-CM

## 2023-01-21 DIAGNOSIS — R10.12 LEFT UPPER QUADRANT PAIN: ICD-10-CM

## 2023-01-23 ENCOUNTER — TELEPHONE (OUTPATIENT)
Dept: HEMATOLOGY ONCOLOGY | Facility: CLINIC | Age: 53
End: 2023-01-23

## 2023-01-25 DIAGNOSIS — E11.9 TYPE 2 DIABETES MELLITUS WITHOUT COMPLICATION, WITHOUT LONG-TERM CURRENT USE OF INSULIN (HCC): ICD-10-CM

## 2023-01-25 RX ORDER — PEN NEEDLE, DIABETIC 32GX 5/32"
NEEDLE, DISPOSABLE MISCELLANEOUS 3 TIMES DAILY
Qty: 300 EACH | Refills: 3 | Status: SHIPPED | OUTPATIENT
Start: 2023-01-25

## 2023-01-26 DIAGNOSIS — F41.9 ANXIETY: ICD-10-CM

## 2023-01-26 DIAGNOSIS — G89.29 CHRONIC LOW BACK PAIN WITH LEFT-SIDED SCIATICA, UNSPECIFIED BACK PAIN LATERALITY: ICD-10-CM

## 2023-01-26 DIAGNOSIS — M54.42 CHRONIC LOW BACK PAIN WITH LEFT-SIDED SCIATICA, UNSPECIFIED BACK PAIN LATERALITY: ICD-10-CM

## 2023-01-26 DIAGNOSIS — Z79.4 TYPE 2 DIABETES MELLITUS WITH HYPERGLYCEMIA, WITH LONG-TERM CURRENT USE OF INSULIN (HCC): ICD-10-CM

## 2023-01-26 DIAGNOSIS — E11.9 TYPE 2 DIABETES MELLITUS WITHOUT COMPLICATION, WITHOUT LONG-TERM CURRENT USE OF INSULIN (HCC): ICD-10-CM

## 2023-01-26 DIAGNOSIS — I10 ESSENTIAL HYPERTENSION: ICD-10-CM

## 2023-01-26 DIAGNOSIS — E78.00 PURE HYPERCHOLESTEROLEMIA: ICD-10-CM

## 2023-01-26 DIAGNOSIS — E11.65 TYPE 2 DIABETES MELLITUS WITH HYPERGLYCEMIA, WITH LONG-TERM CURRENT USE OF INSULIN (HCC): ICD-10-CM

## 2023-01-26 RX ORDER — DORZOLAMIDE HYDROCHLORIDE AND TIMOLOL MALEATE 20; 5 MG/ML; MG/ML
SOLUTION/ DROPS OPHTHALMIC
Qty: 10 ML | Refills: 1
Start: 2023-01-26

## 2023-01-26 RX ORDER — PRAVASTATIN SODIUM 80 MG/1
TABLET ORAL
Qty: 90 TABLET | Refills: 0 | Status: SHIPPED | OUTPATIENT
Start: 2023-01-26

## 2023-01-26 RX ORDER — DULOXETIN HYDROCHLORIDE 30 MG/1
CAPSULE, DELAYED RELEASE ORAL
Qty: 90 CAPSULE | Refills: 0
Start: 2023-01-26

## 2023-01-26 RX ORDER — METFORMIN HYDROCHLORIDE 500 MG/1
TABLET, EXTENDED RELEASE ORAL
Qty: 180 TABLET | Refills: 0
Start: 2023-01-26

## 2023-01-26 RX ORDER — GLIPIZIDE 10 MG/1
TABLET ORAL
Qty: 180 TABLET | Refills: 0 | Status: SHIPPED | OUTPATIENT
Start: 2023-01-26

## 2023-01-26 RX ORDER — METOPROLOL SUCCINATE 50 MG/1
TABLET, EXTENDED RELEASE ORAL
Qty: 90 TABLET | Refills: 0 | Status: SHIPPED | OUTPATIENT
Start: 2023-01-26

## 2023-01-26 RX ORDER — INSULIN ASPART 100 [IU]/ML
INJECTION, SUSPENSION SUBCUTANEOUS
Refills: 1
Start: 2023-01-26

## 2023-01-26 RX ORDER — LOSARTAN POTASSIUM AND HYDROCHLOROTHIAZIDE 25; 100 MG/1; MG/1
TABLET ORAL
Qty: 90 TABLET | Refills: 0 | Status: SHIPPED | OUTPATIENT
Start: 2023-01-26

## 2023-01-27 ENCOUNTER — OFFICE VISIT (OUTPATIENT)
Dept: ENDOCRINOLOGY | Facility: CLINIC | Age: 53
End: 2023-01-27

## 2023-01-27 ENCOUNTER — TELEPHONE (OUTPATIENT)
Dept: INTERNAL MEDICINE CLINIC | Facility: CLINIC | Age: 53
End: 2023-01-27

## 2023-01-27 ENCOUNTER — TELEPHONE (OUTPATIENT)
Dept: ENDOCRINOLOGY | Facility: CLINIC | Age: 53
End: 2023-01-27

## 2023-01-27 VITALS
OXYGEN SATURATION: 98 % | SYSTOLIC BLOOD PRESSURE: 134 MMHG | BODY MASS INDEX: 36.32 KG/M2 | DIASTOLIC BLOOD PRESSURE: 80 MMHG | HEIGHT: 66 IN | WEIGHT: 226 LBS | HEART RATE: 68 BPM

## 2023-01-27 DIAGNOSIS — E11.9 TYPE 2 DIABETES MELLITUS WITHOUT COMPLICATION, WITHOUT LONG-TERM CURRENT USE OF INSULIN (HCC): ICD-10-CM

## 2023-01-27 DIAGNOSIS — E11.65 TYPE 2 DIABETES MELLITUS WITH HYPERGLYCEMIA, WITH LONG-TERM CURRENT USE OF INSULIN (HCC): ICD-10-CM

## 2023-01-27 DIAGNOSIS — E11.3299 MILD NONPROLIFERATIVE DIABETIC RETINOPATHY ASSOCIATED WITH TYPE 2 DIABETES MELLITUS, MACULAR EDEMA PRESENCE UNSPECIFIED, UNSPECIFIED LATERALITY (HCC): Primary | ICD-10-CM

## 2023-01-27 DIAGNOSIS — Z79.4 TYPE 2 DIABETES MELLITUS WITH HYPERGLYCEMIA, WITH LONG-TERM CURRENT USE OF INSULIN (HCC): ICD-10-CM

## 2023-01-27 DIAGNOSIS — I10 ESSENTIAL HYPERTENSION: ICD-10-CM

## 2023-01-27 RX ORDER — BLOOD-GLUCOSE TRANSMITTER
EACH MISCELLANEOUS
Qty: 1 EACH | Refills: 3 | Status: SHIPPED | OUTPATIENT
Start: 2023-01-27

## 2023-01-27 RX ORDER — BLOOD-GLUCOSE,RECEIVER,CONT
EACH MISCELLANEOUS
Qty: 1 EACH | Refills: 1 | Status: SHIPPED | OUTPATIENT
Start: 2023-01-27

## 2023-01-27 RX ORDER — BLOOD-GLUCOSE SENSOR
EACH MISCELLANEOUS
Qty: 1 EACH | Refills: 12 | Status: SHIPPED | OUTPATIENT
Start: 2023-01-27

## 2023-01-27 NOTE — ASSESSMENT & PLAN NOTE
Lab Results   Component Value Date    HGBA1C 10 4 (H) 01/17/2023     Follows regularly with optho  Recommendations include improved diabetes control

## 2023-01-27 NOTE — PATIENT INSTRUCTIONS
Hypoglycemia in a Person with Diabetes   WHAT YOU NEED TO KNOW:   Hypoglycemia is a serious condition that happens when your blood glucose (sugar) level drops too low  The blood sugar level is usually too high in a person with diabetes, but the level can also drop too low  It is important to follow your diabetes management plan to keep your blood sugar level steady  DISCHARGE INSTRUCTIONS:   Have someone call your local emergency number (911 in the [address] [place] [address],[address]) if:   You have a seizure or pass out  Hypoglycemia in a Person with Diabetes   WHAT YOU NEED TO KNOW:   Hypoglycemia is a serious condition that happens when your blood glucose (sugar) level drops too low  The blood sugar level is usually too high in a person with diabetes, but the level can also drop too low  It is important to follow your diabetes management plan to keep your blood sugar level steady  DISCHARGE INSTRUCTIONS:   Have someone call your local emergency number (911 in the [address] [place] [address],[address]) if:   You have a seizure or pass out  Your blood sugar is less than 50 mg/dL and does not respond to treatment  You feel you are going to pass out  You have trouble thinking clearly  Call your doctor or diabetes care team if:   You have had symptoms of low blood sugar several times  You have questions about the amount of insulin or diabetes medicine you are taking  You have questions or concerns about your condition or care  Medicines:   Insulin or diabetes medicine  help to keep your blood sugar under control  Glucagon  may be needed if you have severe hypoglycemia  Take your medicine as directed  Contact your healthcare provider if you think your medicine is not helping or if you have side effects  Tell him or her if you are allergic to any medicine  Keep a list of the medicines, vitamins, and herbs you take  Include the amounts, and when and why you take them  Bring the list or the pill bottles to follow-up visits   Carry your medicine list with you in case of an emergency  Manage hypoglycemia:   Check your blood sugar level right away if you have symptoms of hypoglycemia  Hypoglycemia usually happens when your blood sugar level is 70 mg/dL or below  Ask your diabetes care team what blood sugar level is too low for you  If your blood sugar level is too low, eat or drink 15 grams of fast-acting carbohydrate  Examples of this amount of fast-acting carbohydrate are 4 ounces (½ cup) of fruit juice or 4 ounces of regular soda  Other examples are 2 tablespoons of raisins or 1 tube of glucose gel  Check your blood sugar level 15 minutes later  Sit still as you wait  If the level is still low (less than 100 mg/dL), have another 15 grams of carbohydrate  When the level returns to 100 mg/dL, eat a meal if it is time  If your meal time is more than 1 hour away, eat a snack  The snack should contain carbohydrates, such as the following:    3/4 cup of cereal    1 cup of skim or low fat milk    6 soda crackers    1/2 of a turkey sandwich    15 fat-free chips  This will help prevent another drop in blood sugar  Always carefully follow your diabetes care team's instructions on how to treat low blood sugar levels  Always carry a source of fast-acting carbohydrate  If you have symptoms of hypoglycemia and you do not have a blood glucose meter, have a source of fast-acting carbohydrate anyway  Avoid carbohydrate foods that are high in fat  The fat content may make the carbohydrate take longer to increase your blood sugar level  Ask your diabetes care team if you should carry a glucagon kit  Glucagon is a medicine that is injected when you develop severe hypoglycemia and become unconscious  Check the expiration date every month and replace it before it expires  Teach others how to help you if you have symptoms of hypoglycemia  Tell them about the symptoms of hypoglycemia  Ask them to give you a source of fast-acting carbohydrate if you cannot get it yourself   Ask them to give you a glucagon injection if you have signs of hypoglycemia and you become unconscious or have a seizure  Ask them to call the local emergency number (911 in the [address])   This is an emergency  Tell them never to try to make you swallow anything if you faint or have a seizure  Wear medical alert jewelry  or carry a card that says you have diabetes  Ask where to get these items  Prevent hypoglycemia:   Take diabetes medicine as directed  Take your medicine at the right time and in the right amount  Do not  double the amount of medicine you take unless instructed by your diabetes care team  Conception Pinedo may need oral diabetes medicine, insulin, or both to help control your blood sugar levels  Your healthcare provider will teach you how and when to take oral diabetes medicine  You will also be taught about side effects oral diabetes medicine can cause  Insulin may be added if oral diabetes medicine becomes less effective over time  Insulin may be injected, or given through a pump or pen  You and your care team will discuss which method is best for you  An insulin pump  is an implanted device that gives your insulin 24 hours a day  An insulin pump prevents the need for multiple insulin injections in a day  An insulin pen  is a device prefilled with the right amount of insulin  Eat meals and snacks as directed  Talk to your dietitian or diabetes care team about a meal plan that is right for you  Do not skip meals  Check your blood sugar level as directed  Ask your diabetes care team what your blood sugar levels should be before and after you eat  Ask when and how often to check your blood sugar level  You may need to check a drop of blood in a glucose test machine  You may need to check at least 3 times each day  Record your blood sugar level results and take the record with you when you see your care team  They may use it to make changes to your medicine, food, or exercise schedules  Your care team provider may recommend a continuous glucose monitor (CGM)  A CGM is a device that is worn at all times  The CGM checks your blood sugar every 5 minutes  It sends results to an electronic device such as a smart phone  Check your blood sugar level before you exercise  Physical activity, such as exercise, can decrease your blood sugar level  If your blood sugar level is less than 100 mg/dL, have a carbohydrate snack  Examples are 4 to 6 crackers, ½ banana, 8 ounces (1 cup) of nonfat or 1% milk, or 4 ounces (½ cup) of juice  If you will be active for more than 1 hour, you may need to check your blood sugar level every 30 minutes  Your diabetes care team may also recommend that you check your blood sugar level after your activity  Know the risks if you choose to drink alcohol  Alcohol can cause your blood sugar levels to be low if you use insulin  Alcohol can cause high blood sugar levels and weight gain if you drink too much  Women 21 years or older and men 72 years or older should limit alcohol to 1 drink a day  Men aged 24 to 59 years should limit alcohol to 2 drinks a day  A drink of alcohol is 12 ounces of beer, 5 ounces of wine, or 1½ ounces of liquor  Follow up with your doctor or diabetes care team or specialist as directed: You may need your insulin or diabetes medicine changed if you continue to have hypoglycemia episodes  Write down your questions so you remember to ask them during your visits  © [employer] 2022 Information is for End User's use only and may not be sold, redistributed or otherwise used for commercial purposes  All illustrations and images included in CareNotes® are the copyrighted property of [employer]  or [facility] Junaid Bolden   The above information is an  only  It is not intended as medical advice for individual conditions or treatments   Talk to your doctor, nurse or pharmacist before following any medical regimen to see if it is safe and effective for you  Your blood sugar is less than 50 mg/dL and does not respond to treatment  You feel you are going to pass out  You have trouble thinking clearly  Call your doctor or diabetes care team if:   You have had symptoms of low blood sugar several times  You have questions about the amount of insulin or diabetes medicine you are taking  You have questions or concerns about your condition or care  Medicines:   Insulin or diabetes medicine  help to keep your blood sugar under control  Glucagon  may be needed if you have severe hypoglycemia  Take your medicine as directed  Contact your healthcare provider if you think your medicine is not helping or if you have side effects  Tell him or her if you are allergic to any medicine  Keep a list of the medicines, vitamins, and herbs you take  Include the amounts, and when and why you take them  Bring the list or the pill bottles to follow-up visits  Carry your medicine list with you in case of an emergency  Manage hypoglycemia:   Check your blood sugar level right away if you have symptoms of hypoglycemia  Hypoglycemia usually happens when your blood sugar level is 70 mg/dL or below  Ask your diabetes care team what blood sugar level is too low for you  If your blood sugar level is too low, eat or drink 15 grams of fast-acting carbohydrate  Examples of this amount of fast-acting carbohydrate are 4 ounces (½ cup) of fruit juice or 4 ounces of regular soda  Other examples are 2 tablespoons of raisins or 1 tube of glucose gel  Check your blood sugar level 15 minutes later  Sit still as you wait  If the level is still low (less than 100 mg/dL), have another 15 grams of carbohydrate  When the level returns to 100 mg/dL, eat a meal if it is time  If your meal time is more than 1 hour away, eat a snack   The snack should contain carbohydrates, such as the following:    3/4 cup of cereal    1 cup of skim or low fat milk    6 soda crackers    1/2 of a turkey sandwich    15 fat-free chips  This will help prevent another drop in blood sugar  Always carefully follow your diabetes care team's instructions on how to treat low blood sugar levels  Always carry a source of fast-acting carbohydrate  If you have symptoms of hypoglycemia and you do not have a blood glucose meter, have a source of fast-acting carbohydrate anyway  Avoid carbohydrate foods that are high in fat  The fat content may make the carbohydrate take longer to increase your blood sugar level  Ask your diabetes care team if you should carry a glucagon kit  Glucagon is a medicine that is injected when you develop severe hypoglycemia and become unconscious  Check the expiration date every month and replace it before it expires  Teach others how to help you if you have symptoms of hypoglycemia  Tell them about the symptoms of hypoglycemia  Ask them to give you a source of fast-acting carbohydrate if you cannot get it yourself  Ask them to give you a glucagon injection if you have signs of hypoglycemia and you become unconscious or have a seizure  Ask them to call the local emergency number (911 in the [address] [place] [address],[address])   This is an emergency  Tell them never to try to make you swallow anything if you faint or have a seizure  Wear medical alert jewelry  or carry a card that says you have diabetes  Ask where to get these items  Prevent hypoglycemia:   Take diabetes medicine as directed  Take your medicine at the right time and in the right amount  Do not  double the amount of medicine you take unless instructed by your diabetes care team  Homero Oviedo may need oral diabetes medicine, insulin, or both to help control your blood sugar levels  Your healthcare provider will teach you how and when to take oral diabetes medicine  You will also be taught about side effects oral diabetes medicine can cause  Insulin may be added if oral diabetes medicine becomes less effective over time   Insulin may be injected, or given through a pump or pen  You and your care team will discuss which method is best for you  An insulin pump  is an implanted device that gives your insulin 24 hours a day  An insulin pump prevents the need for multiple insulin injections in a day  An insulin pen  is a device prefilled with the right amount of insulin  Eat meals and snacks as directed  Talk to your dietitian or diabetes care team about a meal plan that is right for you  Do not skip meals  Check your blood sugar level as directed  Ask your diabetes care team what your blood sugar levels should be before and after you eat  Ask when and how often to check your blood sugar level  You may need to check a drop of blood in a glucose test machine  You may need to check at least 3 times each day  Record your blood sugar level results and take the record with you when you see your care team  They may use it to make changes to your medicine, food, or exercise schedules  Your care team provider may recommend a continuous glucose monitor (CGM)  A CGM is a device that is worn at all times  The CGM checks your blood sugar every 5 minutes  It sends results to an electronic device such as a smart phone  Check your blood sugar level before you exercise  Physical activity, such as exercise, can decrease your blood sugar level  If your blood sugar level is less than 100 mg/dL, have a carbohydrate snack  Examples are 4 to 6 crackers, ½ banana, 8 ounces (1 cup) of nonfat or 1% milk, or 4 ounces (½ cup) of juice  If you will be active for more than 1 hour, you may need to check your blood sugar level every 30 minutes  Your diabetes care team may also recommend that you check your blood sugar level after your activity  Know the risks if you choose to drink alcohol  Alcohol can cause your blood sugar levels to be low if you use insulin   Alcohol can cause high blood sugar levels and weight gain if you drink too much  Women 21 years or older and men 72 years or older should limit alcohol to 1 drink a day  Men aged 24 to 59 years should limit alcohol to 2 drinks a day  A drink of alcohol is 12 ounces of beer, 5 ounces of wine, or 1½ ounces of liquor  Follow up with your doctor or diabetes care team or specialist as directed: You may need your insulin or diabetes medicine changed if you continue to have hypoglycemia episodes  Write down your questions so you remember to ask them during your visits  © Copyright [employer] 2022 Information is for End User's use only and may not be sold, redistributed or otherwise used for commercial purposes  All illustrations and images included in CareNotes® are the copyrighted property of [employer]  or [facility] Junaid Bolden   The above information is an  only  It is not intended as medical advice for individual conditions or treatments  Talk to your doctor, nurse or pharmacist before following any medical regimen to see if it is safe and effective for you

## 2023-01-27 NOTE — TELEPHONE ENCOUNTER
Called patient gave results and patient understood    ----- Message from [facility] - MD MONICA sent at 1/26/2023  1:22 PM EST -----  Please call the patient regarding his abnormal result    Ultrasound fatty liver otherwise unremarkable continue trying to lose weight no alcohol intake low-fat diet

## 2023-01-27 NOTE — ASSESSMENT & PLAN NOTE
Lab Results   Component Value Date    HGBA1C 10 4 (H) 01/17/2023     HGA1C decreased mildly but still well above range  BGL Reviewed: Blood glucose levels reviewed but still show considerable variability  Suggested re-ordering CGM therapy to see if it is covered with change in insurance  Treatment regimen: Will reorder Jardiance 10 mg and assess blood sugar control since he does have some blood glucose results in low 100s  Could continue to increase Jardiance or increase metformin as tolerated  Could also consider starting a GLP-1 RA with indications for NAFLD as patient recently had ultrasound consistent with hepatic steatosis despite normal LFTs  Cost is a concern for this patient, will see improvement with restarting Jardiance and titration of dose  May be a candidate for patient assistance for GLP-1 RA  Discussed risks/complications associated with uncontrolled diabetes including organ involvement, heart attack, stroke, death  Recommended referral to diabetes education-- declined at this time  Advised lifestyle modifications including attention to diet including the amount and types of carbohydrates consumed and regular activity  Call for blood sugars less than 70 mg/dl or over 300 mg/dl  Monitor blood glucose levels at least 2 times a day  Discussed symptoms and treatment of hypoglycemia  Routine follow up for diabetic eye and foot exams  Ordered blood work to complete prior to next visit  Follow up in 6 weeks for closer follow up and regimen adjustment

## 2023-01-27 NOTE — PROGRESS NOTES
Established Patient Progress Note      CC: Diabetes Mellitus, Type 2      History of Present Illness:   Javan Carrillo is a 47 y o  male with a history of type 2 diabetes with long term use of insulin  Reports complications of retinopathy  Most recent hemoglobin A1c was 10 4% from January 2023  Denies recent illness or hospitalizations  Denies recent severe hypoglycemic or severe hyperglycemic episodes  Denies any issues with his current regimen  home glucose monitoring: are performed regularly    Home blood glucose readings:   Before breakfast: 111-262 mg/dL  Before lunch: 131-237 mg/dL  Before dinner: 211-353 mg/dL  Bedtime: X     Current regimen:   Novolog 70/30-- 30 before breakfast, 30 before lunch at noon and dinner at 6PM  Jardiance 10mg daily  (has not been taking due to insurance coverage)   Metformin 500 mg BID with meals  Glipizide 10 mg twice daily with meals    No side effects or adverse reactions to the above regimen        Last Eye Exam: 6/27/2022, Moderate proliferative retinopathy  Last Foot Exam: 1/17/2023    Has hypertension: Taking Toprol-XL, HCTZ  Has hyperlipidemia: Taking pravastatin      HX of pancreatitis: none   Denies history of thyroid cancer, medullary thyroid cancer, multiple endocrine neoplasia    Patient Active Problem List   Diagnosis   • Essential hypertension   • Mixed hyperlipidemia   • Gastroesophageal reflux disease without esophagitis   • Chronic lymphocytic leukemia (HCC)   • Low testosterone   • 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 (HCC)   • Type 2 diabetes mellitus with hyperglycemia, with long-term current use of insulin (HCC)   • Other hemorrhoids   • Phimosis   • Continuous opioid dependence (HCC)   • Class 2 severe obesity due to excess calories with serious comorbidity and body mass index (BMI) of 35 0 to 35 9 in adult [facility])   • Hip pain      Past Medical History:   Diagnosis Date   • Anxiety    • Arthritis    • Cancer [facility])    • Depression    • Diabetes ([facility] )    • Diabetes mellitus ([facility] )    • Fatty liver    • GERD (gastroesophageal reflux disease)    • Hyperlipidemia    • Hypertension    • Intertrigo     resolved 10/26/17   • Sebaceous cyst    • Sexual disorder    • Sexual dysfunction     last assessed 03/04/14   • Snoring     last assessed 12/08/14   • Somnolence, daytime     last assessed 12/08/14   • Thoracic disc herniation     last assessed 05/02/14   • Vision problem    • Vitamin D deficiency       Past Surgical History:   Procedure Laterality Date   • COLONOSCOPY      last assessed 01/22/14   • LYMPHADENECTOMY      Axillary; last assessed 05/30/14   • OK CIRCUMCISION AGE >28 DAYS N/A 3/1/2022    Procedure: CIRCUMCISION ADULT;  Surgeon: Claudia Sharp MD;  Location: [facility];  Service: Urology      Family History   Problem Relation Age of Onset   • Diabetes Mother    • Kidney cancer Mother    • Diabetes Father    • Diabetes Sister    • Diabetes Family    • Hypertension Family      Social History     Tobacco Use   • Smoking status: Never   • Smokeless tobacco: Never   Substance Use Topics   • Alcohol use: Yes     Comment: social     Allergies   Allergen Reactions   • Ace Inhibitors Cough         Current Outpatient Medications:   •  Blood Glucose Monitoring Suppl (FreeStyle Freedom Lite) w/Device KIT, Use Test blood sugars 4 times daily, Disp: , Rfl:   •  celecoxib (CeleBREX) 200 mg capsule, Take 1 capsule (200 mg total) by mouth daily for 30 days, Disp: 30 capsule, Rfl: 1  •  Cholecalciferol (VITAMIN D3) 5000 units CAPS, Take by mouth, Disp: , Rfl:   •  ciclopirox (PENLAC) 8 % solution, Apply topically daily at bedtime, Disp: 6 6 mL, Rfl: 0  •  Continuous Blood Gluc  (Dexcom G6 ) ROBYN, Disp 1 Kit, use as directed, Disp: 1 each, Rfl: 1  •  Continuous Blood Gluc Sensor (Dexcom G6 Sensor) MISC, Change every 10 days, Disp: 1 each, Rfl: 12  •  Continuous Blood Gluc Transmit (Dexcom G6 Transmitter) MISC, Dispense 1 kit, change every 90 days, Disp: 1 each, Rfl: 3  •  dorzolamide-timolol (COSOPT) 22 3-6 8 MG/ML ophthalmic solution, INSTILL 1 DROP IN RIGHT EYE TWICE DAILY, Disp: 10 mL, Rfl: 1  •  DULoxetine (CYMBALTA) 30 mg delayed release capsule, TAKE 1 CAPSULE BY MOUTH EVERY DAY, Disp: 90 capsule, Rfl: 0  •  Empagliflozin (Jardiance) 10 MG TABS tablet, TAKE 1 TABLET BY MOUTH DAILY, Disp: 90 tablet, Rfl: 0  •  glipiZIDE (GLUCOTROL) 10 mg tablet, TAKE 1 TABLET BY MOUTH BEFORE BREAKFAST AND DINNER, Disp: 180 tablet, Rfl: 0  •  glucose blood (FREESTYLE LITE) test strip, Test blood sugar 3 times daily, Disp: 100 each, Rfl: 1  •  Insulin Pen Needle (BD Pen Needle Marielos 2nd Gen) 32G X 4 MM MISC, Inject as directed 3 (three) times a day, Disp: 300 each, Rfl: 3  •  Insulin Syringe-Needle U-100 (B-D INS SYR ULTRAFINE  5CC/30G) 30G X 1/2" 0 5 ML MISC, Inject insulin 3 times daily, Disp: 300 each, Rfl: 1  •  Lancets (freestyle) lancets, Use 1 each 3 (three) times a day Test blood sugars 4 times daily, Disp: 300 each, Rfl: 0  •  losartan-hydrochlorothiazide (HYZAAR) 100-25 MG per tablet, TAKE 1 TABLET BY MOUTH EVERY DAY, Disp: 90 tablet, Rfl: 0  •  metFORMIN (GLUCOPHAGE-XR) 500 mg 24 hr tablet, TAKE 1 TABLET BY MOUTH TWICE A DAY WITH MEALS, Disp: 180 tablet, Rfl: 0  •  metoprolol succinate (TOPROL-XL) 50 mg 24 hr tablet, TAKE 1 TABLET BY MOUTH EVERY DAY, Disp: 90 tablet, Rfl: 0  •  NovoLOG Mix 70/30 FlexPen (70-30) 100 units/mL injection pen, INJECT 25 UNITS AT BREAKFAST AND LUNCH, AND 35 UNITS AT DINNER, Disp: , Rfl: 1  •  omeprazole (PriLOSEC) 20 mg delayed release capsule, Take 1 capsule (20 mg total) by mouth daily, Disp: 90 capsule, Rfl: 0  •  pravastatin (PRAVACHOL) 80 mg tablet, TAKE 1 TABLET BY MOUTH EVERY DAY, Disp: 90 tablet, Rfl: 0  •  sildenafil (REVATIO) 20 mg tablet, Take 1 or 2 tablets 1 hour prior to relation, Disp: 30 tablet, Rfl: 0  •  BinaxNOW COVID-19 Ag Home Test KIT, TEST AS DIRECTED TODAY (Patient not taking: Reported on 1/17/2023), Disp: , Rfl:     Review of Systems   Constitutional: Negative for activity change, appetite change and unexpected weight change  HENT: Negative for sore throat, trouble swallowing and voice change  Eyes: Negative for visual disturbance  Respiratory: Negative for cough and shortness of breath  Cardiovascular: Negative for chest pain and palpitations  Gastrointestinal: Negative for abdominal distention, abdominal pain and vomiting  Endocrine: Negative for cold intolerance, heat intolerance, polydipsia and polyuria  Musculoskeletal: Negative for arthralgias and back pain  Skin: Negative for color change, pallor and rash  Neurological: Negative for dizziness, light-headedness and headaches  All other systems reviewed and are negative  Physical Exam:  Body mass index is 36 48 kg/m²  /80 (BP Location: Left arm, Patient Position: Sitting, Cuff Size: Extra-Large)   Pulse 68   Ht 5' 6" (1 676 m)   Wt 103 kg (226 lb)   SpO2 98%   BMI 36 48 kg/m²    Wt Readings from Last 3 Encounters:   01/27/23 103 kg (226 lb)   01/17/23 101 kg (223 lb)   10/17/22 99 3 kg (219 lb)       Physical Exam  Vitals reviewed  Constitutional:       Appearance: He is obese  Cardiovascular:      Rate and Rhythm: Normal rate and regular rhythm  Pulses: Normal pulses  Pulmonary:      Effort: Pulmonary effort is normal    Musculoskeletal:      Cervical back: Neck supple  Comments: Walks without assistance   Skin:     General: Skin is warm and dry  Capillary Refill: Capillary refill takes less than 2 seconds  Neurological:      General: No focal deficit present  Mental Status: He is alert and oriented to person, place, and time     Psychiatric:         Mood and Affect: Mood normal          Behavior: Behavior normal          Labs:   Lab Results   Component Value Date    HGBA1C 10 4 (H) 01/17/2023    HGBA1C 11 (A) 10/17/2022    HGBA1C 10 8 (A) 05/13/2022     Lab Results   Component Value Date    CREATININE 1 18 01/17/2023    CREATININE 1 32 (H) 07/30/2022    CREATININE 1 27 11/13/2021    BUN 17 01/17/2023     (L) 12/06/2014    K 4 2 01/17/2023    CL 96 01/17/2023    CO2 30 01/17/2023     eGFR   Date Value Ref Range Status   01/17/2023 71 ml/min/1 73sq m Final     Lab Results   Component Value Date    CHOL 133 01/28/2014    HDL 30 (L) 01/17/2023    TRIG 349 (H) 01/17/2023     Lab Results   Component Value Date    ALT 47 01/17/2023    AST 30 01/17/2023    GGT 91 (H) 01/17/2023    ALKPHOS 77 01/17/2023    BILITOT 0 4 12/06/2014     Lab Results   Component Value Date    TUJ2JPOYCSGD 3 527 01/17/2023    HII1OUANSXSY 2 048 07/30/2022    OTT9POMPFKYQ 2 176 11/13/2021     Lab Results   Component Value Date    FREET4 0 92 11/13/2021       Impression & Plan:    Problem List Items Addressed This Visit        Endocrine    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 ([facility] [address] ) - Primary       Lab Results   Component Value Date    HGBA1C 10 4 (H) 01/17/2023     Follows regularly with optho  Recommendations include improved diabetes control  Relevant Medications    Empagliflozin (Jardiance) 10 MG TABS tablet    Type 2 diabetes mellitus with hyperglycemia, with long-term current use of insulin (HCC)       Lab Results   Component Value Date    HGBA1C 10 4 (H) 01/17/2023     HGA1C decreased mildly but still well above range  BGL Reviewed: Blood glucose levels reviewed but still show considerable variability  Suggested re-ordering CGM therapy to see if it is covered with change in insurance  Treatment regimen: Will reorder Jardiance 10 mg and assess blood sugar control since he does have some blood glucose results in low 100s  Could continue to increase Jardiance or increase metformin as tolerated    Could also consider starting a GLP-1 RA with indications for NAFLD as patient recently had ultrasound consistent with hepatic steatosis despite normal LFTs  Cost is a concern for this patient, will see improvement with restarting Jardiance and titration of dose  May be a candidate for patient assistance for GLP-1 RA  Discussed risks/complications associated with uncontrolled diabetes including organ involvement, heart attack, stroke, death  Recommended referral to diabetes education-- declined at this time  Advised lifestyle modifications including attention to diet including the amount and types of carbohydrates consumed and regular activity  Call for blood sugars less than 70 mg/dl or over 300 mg/dl  Monitor blood glucose levels at least 2 times a day  Discussed symptoms and treatment of hypoglycemia  Routine follow up for diabetic eye and foot exams  Ordered blood work to complete prior to next visit  Follow up in 6 weeks for closer follow up and regimen adjustment  Relevant Medications    Empagliflozin (Jardiance) 10 MG TABS tablet       Cardiovascular and Mediastinum    Essential hypertension     BP stable on current regimen  Other Visit Diagnoses     Type 2 diabetes mellitus without complication, without long-term current use of insulin (HCC)        Relevant Medications    Continuous Blood Gluc Transmit (Dexcom G6 Transmitter) MISC    Continuous Blood Gluc Sensor (Dexcom G6 Sensor) MISC    Continuous Blood Gluc  (Dexcom G6 ) ROBYN    Empagliflozin (Jardiance) 10 MG TABS tablet    Other Relevant Orders    Comprehensive metabolic panel    HEMOGLOBIN A1C W/ EAG ESTIMATION Lab Collect          Discussed with the patient and all questioned fully answered  He will call me if any problems arise  Follow-up appointment in 6 weeks       Counseled patient on diagnostic results, prognosis, risk and benefit of treatment options, instruction for management, importance of treatment compliance, Risk  factor reduction and impressions    TONY Sethi

## 2023-02-20 ENCOUNTER — TELEPHONE (OUTPATIENT)
Dept: HEMATOLOGY ONCOLOGY | Facility: CLINIC | Age: 53
End: 2023-02-20

## 2023-02-20 NOTE — TELEPHONE ENCOUNTER
I left a message for patient to call back to reschedule his visit he missed today with Dr Shawn Wheeler

## 2023-03-10 ENCOUNTER — TELEPHONE (OUTPATIENT)
Dept: HEMATOLOGY ONCOLOGY | Facility: CLINIC | Age: 53
End: 2023-03-10

## 2023-04-25 DIAGNOSIS — I10 ESSENTIAL HYPERTENSION: ICD-10-CM

## 2023-04-25 RX ORDER — METOPROLOL SUCCINATE 50 MG/1
TABLET, EXTENDED RELEASE ORAL
Qty: 90 TABLET | Refills: 0 | Status: SHIPPED | OUTPATIENT
Start: 2023-04-25 | End: 2023-05-01

## 2023-04-29 DIAGNOSIS — E11.65 TYPE 2 DIABETES MELLITUS WITH HYPERGLYCEMIA, WITH LONG-TERM CURRENT USE OF INSULIN (HCC): ICD-10-CM

## 2023-04-29 DIAGNOSIS — F41.9 ANXIETY: ICD-10-CM

## 2023-04-29 DIAGNOSIS — I10 ESSENTIAL HYPERTENSION: ICD-10-CM

## 2023-04-29 DIAGNOSIS — E11.9 TYPE 2 DIABETES MELLITUS WITHOUT COMPLICATION, WITHOUT LONG-TERM CURRENT USE OF INSULIN (HCC): ICD-10-CM

## 2023-04-29 DIAGNOSIS — M54.42 CHRONIC LOW BACK PAIN WITH LEFT-SIDED SCIATICA, UNSPECIFIED BACK PAIN LATERALITY: ICD-10-CM

## 2023-04-29 DIAGNOSIS — G89.29 CHRONIC LOW BACK PAIN WITH LEFT-SIDED SCIATICA, UNSPECIFIED BACK PAIN LATERALITY: ICD-10-CM

## 2023-04-29 DIAGNOSIS — Z79.4 TYPE 2 DIABETES MELLITUS WITH HYPERGLYCEMIA, WITH LONG-TERM CURRENT USE OF INSULIN (HCC): ICD-10-CM

## 2023-05-01 RX ORDER — DULOXETIN HYDROCHLORIDE 30 MG/1
CAPSULE, DELAYED RELEASE ORAL
Qty: 90 CAPSULE | Refills: 0 | Status: SHIPPED | OUTPATIENT
Start: 2023-05-01

## 2023-05-01 RX ORDER — METFORMIN HYDROCHLORIDE 500 MG/1
TABLET, EXTENDED RELEASE ORAL
Qty: 180 TABLET | Refills: 0 | Status: SHIPPED | OUTPATIENT
Start: 2023-05-01

## 2023-05-01 RX ORDER — DORZOLAMIDE HYDROCHLORIDE AND TIMOLOL MALEATE 20; 5 MG/ML; MG/ML
SOLUTION/ DROPS OPHTHALMIC
Qty: 1 ML | Refills: 1
Start: 2023-05-01

## 2023-05-01 RX ORDER — INSULIN ASPART 100 [IU]/ML
INJECTION, SUSPENSION SUBCUTANEOUS
Qty: 15 ML | Refills: 0 | Status: SHIPPED | OUTPATIENT
Start: 2023-05-01

## 2023-05-01 RX ORDER — METOPROLOL SUCCINATE 50 MG/1
TABLET, EXTENDED RELEASE ORAL
Qty: 90 TABLET | Refills: 0 | Status: SHIPPED | OUTPATIENT
Start: 2023-05-01

## 2023-05-05 DIAGNOSIS — I10 ESSENTIAL HYPERTENSION: ICD-10-CM

## 2023-05-05 DIAGNOSIS — E78.00 PURE HYPERCHOLESTEROLEMIA: ICD-10-CM

## 2023-05-05 DIAGNOSIS — E11.9 TYPE 2 DIABETES MELLITUS WITHOUT COMPLICATION, WITHOUT LONG-TERM CURRENT USE OF INSULIN (HCC): ICD-10-CM

## 2023-05-05 RX ORDER — PRAVASTATIN SODIUM 80 MG/1
80 TABLET ORAL DAILY
Qty: 90 TABLET | Refills: 0 | Status: SHIPPED | OUTPATIENT
Start: 2023-05-05

## 2023-05-05 RX ORDER — GLIPIZIDE 10 MG/1
TABLET ORAL
Qty: 180 TABLET | Refills: 0 | Status: SHIPPED | OUTPATIENT
Start: 2023-05-05

## 2023-05-16 ENCOUNTER — LAB (OUTPATIENT)
Dept: LAB | Facility: HOSPITAL | Age: 53
End: 2023-05-16

## 2023-05-16 DIAGNOSIS — E11.9 TYPE 2 DIABETES MELLITUS WITHOUT COMPLICATION, WITHOUT LONG-TERM CURRENT USE OF INSULIN (HCC): ICD-10-CM

## 2023-05-16 LAB
ALBUMIN SERPL BCP-MCNC: 4.2 G/DL (ref 3.5–5)
ALP SERPL-CCNC: 91 U/L (ref 34–104)
ALT SERPL W P-5'-P-CCNC: 60 U/L (ref 7–52)
ANION GAP SERPL CALCULATED.3IONS-SCNC: 5 MMOL/L (ref 4–13)
AST SERPL W P-5'-P-CCNC: 35 U/L (ref 13–39)
BILIRUB SERPL-MCNC: 0.6 MG/DL (ref 0.2–1)
BUN SERPL-MCNC: 17 MG/DL (ref 5–25)
CALCIUM SERPL-MCNC: 9.8 MG/DL (ref 8.4–10.2)
CHLORIDE SERPL-SCNC: 100 MMOL/L (ref 96–108)
CO2 SERPL-SCNC: 29 MMOL/L (ref 21–32)
CREAT SERPL-MCNC: 1.14 MG/DL (ref 0.6–1.3)
EST. AVERAGE GLUCOSE BLD GHB EST-MCNC: 283 MG/DL
GFR SERPL CREATININE-BSD FRML MDRD: 74 ML/MIN/1.73SQ M
GLUCOSE P FAST SERPL-MCNC: 287 MG/DL (ref 65–99)
HBA1C MFR BLD: 11.5 %
POTASSIUM SERPL-SCNC: 4.5 MMOL/L (ref 3.5–5.3)
PROT SERPL-MCNC: 6.4 G/DL (ref 6.4–8.4)
SODIUM SERPL-SCNC: 134 MMOL/L (ref 135–147)

## 2023-05-16 RX ORDER — PROCHLORPERAZINE 25 MG/1
SUPPOSITORY RECTAL
Qty: 1 EACH | Refills: 1 | Status: SHIPPED | OUTPATIENT
Start: 2023-05-16

## 2023-05-16 RX ORDER — PROCHLORPERAZINE 25 MG/1
SUPPOSITORY RECTAL
Qty: 1 EACH | Refills: 12 | Status: SHIPPED | OUTPATIENT
Start: 2023-05-16

## 2023-05-17 ENCOUNTER — OFFICE VISIT (OUTPATIENT)
Dept: INTERNAL MEDICINE CLINIC | Facility: CLINIC | Age: 53
End: 2023-05-17

## 2023-05-17 VITALS
TEMPERATURE: 98 F | HEIGHT: 66 IN | BODY MASS INDEX: 35.68 KG/M2 | RESPIRATION RATE: 13 BRPM | HEART RATE: 80 BPM | SYSTOLIC BLOOD PRESSURE: 130 MMHG | WEIGHT: 222 LBS | DIASTOLIC BLOOD PRESSURE: 80 MMHG

## 2023-05-17 DIAGNOSIS — C91.10 CHRONIC LYMPHOCYTIC LEUKEMIA (HCC): ICD-10-CM

## 2023-05-17 DIAGNOSIS — R79.89 LOW TESTOSTERONE: ICD-10-CM

## 2023-05-17 DIAGNOSIS — E66.01 CLASS 2 SEVERE OBESITY DUE TO EXCESS CALORIES WITH SERIOUS COMORBIDITY AND BODY MASS INDEX (BMI) OF 35.0 TO 35.9 IN ADULT (HCC): ICD-10-CM

## 2023-05-17 DIAGNOSIS — I10 ESSENTIAL HYPERTENSION: ICD-10-CM

## 2023-05-17 DIAGNOSIS — E11.3299 MILD NONPROLIFERATIVE DIABETIC RETINOPATHY ASSOCIATED WITH TYPE 2 DIABETES MELLITUS, MACULAR EDEMA PRESENCE UNSPECIFIED, UNSPECIFIED LATERALITY (HCC): ICD-10-CM

## 2023-05-17 DIAGNOSIS — E11.65 TYPE 2 DIABETES MELLITUS WITH HYPERGLYCEMIA, WITH LONG-TERM CURRENT USE OF INSULIN (HCC): Primary | ICD-10-CM

## 2023-05-17 DIAGNOSIS — Z79.4 TYPE 2 DIABETES MELLITUS WITH HYPERGLYCEMIA, WITH LONG-TERM CURRENT USE OF INSULIN (HCC): Primary | ICD-10-CM

## 2023-05-17 DIAGNOSIS — E11.9 TYPE 2 DIABETES MELLITUS WITHOUT COMPLICATION, WITHOUT LONG-TERM CURRENT USE OF INSULIN (HCC): ICD-10-CM

## 2023-05-17 DIAGNOSIS — E78.2 MIXED HYPERLIPIDEMIA: ICD-10-CM

## 2023-05-17 DIAGNOSIS — K21.9 GASTROESOPHAGEAL REFLUX DISEASE WITHOUT ESOPHAGITIS: ICD-10-CM

## 2023-05-17 DIAGNOSIS — R37 SEXUAL DYSFUNCTION: ICD-10-CM

## 2023-05-17 RX ORDER — SILDENAFIL CITRATE 20 MG/1
TABLET ORAL
Qty: 30 TABLET | Refills: 0 | Status: SHIPPED | OUTPATIENT
Start: 2023-05-17

## 2023-05-17 NOTE — PROGRESS NOTES
Assessment/Plan:        #1 diabetes poorly controlled I ordered diabetic school he sees endocrinology he changed his insurance plan so I did send the Jardiance to see if they cover it I sent him to diabetic school also  Hemoglobin A1c is 11 5    2  Diabetic retinopathy want to make sure he goes to the ophthalmologist with order ophthalmology evaluation  3   Leukemia he needs to see the hematologist   A CBC was not done with the last lab work I ordered a CBC with the next lab work he has no fever chills or night sweats    4  Blood pressure is well controlled no chest pain palpitation shortness of breath he is Hyzaar 100/25 1 tablet daily with metoprolol succinate 50 mg/day continue the same    Sexual dysfunction he requested Viagra which I gave him a prescription  He is here with the wife today in good spirits I again encouraged him to watch his diet because diabetes poorly controlled can affect the eye with more retinopathy and comorbidities in the future and heart kidney and so for an cardiovascular complication    No problem-specific Assessment & Plan notes found for this encounter  Diagnoses and all orders for this visit:    Type 2 diabetes mellitus with hyperglycemia, with long-term current use of insulin (HCC)    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, macular edema presence unspecified, unspecified laterality (HCC)    Essential hypertension    Chronic lymphocytic leukemia (HCC)    Class 2 severe obesity due to excess calories with serious comorbidity and body mass index (BMI) of 35 0 to 35 9 in [facility])    Mixed hyperlipidemia    Low testosterone    Gastroesophageal reflux disease without esophagitis          Subjective:      Patient ID: Kenisha Olmedo is a 47 y o  male  No chief complaint on file          Current Outpatient Medications:   •  BinaxNOW COVID-19 Ag Home Test KIT, TEST AS DIRECTED TODAY (Patient not taking: Reported on "1/17/2023), Disp: , Rfl:   •  celecoxib (CeleBREX) 200 mg capsule, Take 1 capsule (200 mg total) by mouth daily for 30 days, Disp: 30 capsule, Rfl: 1  •  Cholecalciferol (VITAMIN D3) 5000 units CAPS, Take by mouth, Disp: , Rfl:   •  ciclopirox (PENLAC) 8 % solution, Apply topically daily at bedtime, Disp: 6 6 mL, Rfl: 0  •  Continuous Blood Gluc  (Dexcom G6 ) ROBYN, Disp 1 Kit, use as directed, Disp: 1 each, Rfl: 1  •  Continuous Blood Gluc Sensor (Dexcom G6 Sensor) MISC, Change every 10 days, Disp: 1 each, Rfl: 12  •  Continuous Blood Gluc Transmit (Dexcom G6 Transmitter) MISC, Dispense 1 kit, change every 90 days, Disp: 1 each, Rfl: 3  •  dorzolamide-timolol (COSOPT) 22 3-6 8 MG/ML ophthalmic solution, INSTILL 1 DROP IN RIGHT EYE TWICE DAILY, Disp: 1 mL, Rfl: 1  •  DULoxetine (CYMBALTA) 30 mg delayed release capsule, TAKE 1 CAPSULE BY MOUTH EVERY DAY, Disp: 90 capsule, Rfl: 0  •  Empagliflozin (Jardiance) 10 MG TABS tablet, TAKE 1 TABLET BY MOUTH DAILY, Disp: 90 tablet, Rfl: 0  •  glipiZIDE (GLUCOTROL) 10 mg tablet, Take 1 Tablet By Mouth Before Breakfast and Dinner, Disp: 180 tablet, Rfl: 0  •  Insulin Pen Needle (BD Pen Needle Marielos 2nd Gen) 32G X 4 MM MISC, Inject as directed 3 (three) times a day, Disp: 300 each, Rfl: 3  •  Insulin Syringe-Needle U-100 (B-D INS SYR ULTRAFINE  5CC/30G) 30G X 1/2\" 0 5 ML MISC, Inject insulin 3 times daily, Disp: 300 each, Rfl: 1  •  Lancets (freestyle) lancets, Use 1 each 3 (three) times a day Test blood sugars 4 times daily, Disp: 300 each, Rfl: 0  •  losartan-hydrochlorothiazide (HYZAAR) 100-25 MG per tablet, TAKE 1 TABLET BY MOUTH EVERY DAY, Disp: 90 tablet, Rfl: 0  •  metFORMIN (GLUCOPHAGE-XR) 500 mg 24 hr tablet, TAKE 1 TABLET BY MOUTH TWICE A DAY WITH MEALS, Disp: 180 tablet, Rfl: 0  •  metoprolol succinate (TOPROL-XL) 50 mg 24 hr tablet, TAKE 1 TABLET BY MOUTH EVERY DAY, Disp: 90 tablet, Rfl: 0  •  NovoLOG Mix 70/30 FlexPen (70-30) 100 units/mL injection " pen, INJECT 25 UNITS AT BREAKFAST AND LUNCH, AND 35 UNITS AT DINNER, Disp: 15 mL, Rfl: 0  •  omeprazole (PriLOSEC) 20 mg delayed release capsule, Take 1 capsule (20 mg total) by mouth daily, Disp: 90 capsule, Rfl: 0  •  pravastatin (PRAVACHOL) 80 mg tablet, Take 1 tablet (80 mg total) by mouth daily, Disp: 90 tablet, Rfl: 0  •  sildenafil (REVATIO) 20 mg tablet, Take 1 or 2 tablets 1 hour prior to relation, Disp: 30 tablet, Rfl: 0    HPI    The following portions of the patient's history were reviewed and updated as appropriate: allergies, current medications, past family history, past medical history, past social history, past surgical history and problem list     Review of Systems   Constitutional: Negative  Negative for activity change, appetite change, fatigue, fever and unexpected weight change  HENT: Negative for congestion, ear pain, hearing loss, mouth sores, postnasal drip, rhinorrhea, sore throat, trouble swallowing and voice change  Eyes: Negative for pain, redness and visual disturbance  Respiratory: Negative for cough, chest tightness, shortness of breath and wheezing  Cardiovascular: Negative for chest pain, palpitations and leg swelling  Gastrointestinal: Negative for abdominal distention, abdominal pain, blood in stool, constipation, diarrhea and nausea  Endocrine: Negative for cold intolerance, heat intolerance, polydipsia, polyphagia and polyuria  Genitourinary: Negative for difficulty urinating, dysuria, flank pain, frequency, hematuria and urgency  Musculoskeletal: Negative for arthralgias, back pain, gait problem, joint swelling and myalgias  Skin: Negative for color change and pallor  Neurological: Negative for dizziness, tremors, seizures, syncope, weakness, numbness and headaches  Hematological: Negative for adenopathy  Does not bruise/bleed easily  Psychiatric/Behavioral: Negative  Negative for sleep disturbance  The patient is not nervous/anxious  Objective: There were no vitals taken for this visit  Physical Exam  Constitutional:       Appearance: He is well-developed  HENT:      Head: Normocephalic  Right Ear: External ear normal       Left Ear: External ear normal       Nose: Nose normal       Mouth/Throat:      Pharynx: No oropharyngeal exudate  Eyes:      Conjunctiva/sclera: Conjunctivae normal       Pupils: Pupils are equal, round, and reactive to light  Neck:      Thyroid: No thyromegaly  Cardiovascular:      Rate and Rhythm: Normal rate and regular rhythm  Heart sounds: Normal heart sounds  No murmur heard  No friction rub  No gallop  Comments: S1-S2 regular rhythm  Extremities no edema  Pulmonary:      Effort: Pulmonary effort is normal  No respiratory distress  Breath sounds: Normal breath sounds  No wheezing or rales  Comments: Lungs are clear no wheezing rales or rhonchi  Abdominal:      General: Bowel sounds are normal  There is no distension  Palpations: Abdomen is soft  There is no mass  Tenderness: There is no abdominal tenderness  There is no guarding or rebound  Comments: Abdomen obese soft nontender   Musculoskeletal:         General: Normal range of motion  Cervical back: Normal range of motion and neck supple  Lymphadenopathy:      Cervical: No cervical adenopathy  Skin:     General: Skin is warm and dry  Neurological:      Mental Status: He is alert and oriented to person, place, and time     Psychiatric:         Behavior: Behavior normal          Judgment: Judgment normal

## 2023-05-17 NOTE — PATIENT INSTRUCTIONS
Follow-up with diabetic education follow-up with your endocrinologist your diabetes is poorly controlled your hemoglobin A1c is 11  Your blood pressure is well controlled  Ongoing to check your cholesterol with the next lab work

## 2023-05-25 ENCOUNTER — TELEPHONE (OUTPATIENT)
Dept: ENDOCRINOLOGY | Facility: CLINIC | Age: 53
End: 2023-05-25

## 2023-06-16 DIAGNOSIS — E11.9 TYPE 2 DIABETES MELLITUS WITHOUT COMPLICATION, WITHOUT LONG-TERM CURRENT USE OF INSULIN (HCC): ICD-10-CM

## 2023-06-16 DIAGNOSIS — I10 ESSENTIAL HYPERTENSION: ICD-10-CM

## 2023-06-16 DIAGNOSIS — R37 SEXUAL DYSFUNCTION: ICD-10-CM

## 2023-06-16 RX ORDER — PEN NEEDLE, DIABETIC 32GX 5/32"
NEEDLE, DISPOSABLE MISCELLANEOUS 3 TIMES DAILY
Qty: 300 EACH | Refills: 3 | Status: SHIPPED | OUTPATIENT
Start: 2023-06-16

## 2023-06-16 RX ORDER — SILDENAFIL CITRATE 20 MG/1
TABLET ORAL
Qty: 30 TABLET | Refills: 0 | Status: SHIPPED | OUTPATIENT
Start: 2023-06-16

## 2023-06-16 RX ORDER — LOSARTAN POTASSIUM AND HYDROCHLOROTHIAZIDE 25; 100 MG/1; MG/1
1 TABLET ORAL DAILY
Qty: 90 TABLET | Refills: 0 | Status: SHIPPED | OUTPATIENT
Start: 2023-06-16

## 2023-06-25 DIAGNOSIS — E11.9 TYPE 2 DIABETES MELLITUS WITHOUT COMPLICATION, WITHOUT LONG-TERM CURRENT USE OF INSULIN (HCC): ICD-10-CM

## 2023-06-25 DIAGNOSIS — I10 ESSENTIAL HYPERTENSION: ICD-10-CM

## 2023-06-26 RX ORDER — GLIPIZIDE 10 MG/1
TABLET ORAL
Qty: 180 TABLET | Refills: 0 | Status: SHIPPED | OUTPATIENT
Start: 2023-06-26

## 2023-07-19 DIAGNOSIS — E11.65 TYPE 2 DIABETES MELLITUS WITH HYPERGLYCEMIA, WITH LONG-TERM CURRENT USE OF INSULIN (HCC): ICD-10-CM

## 2023-07-19 DIAGNOSIS — E11.9 TYPE 2 DIABETES MELLITUS WITHOUT COMPLICATION, WITHOUT LONG-TERM CURRENT USE OF INSULIN (HCC): ICD-10-CM

## 2023-07-19 DIAGNOSIS — I10 ESSENTIAL HYPERTENSION: ICD-10-CM

## 2023-07-19 DIAGNOSIS — Z79.4 TYPE 2 DIABETES MELLITUS WITH HYPERGLYCEMIA, WITH LONG-TERM CURRENT USE OF INSULIN (HCC): ICD-10-CM

## 2023-07-19 RX ORDER — INSULIN ASPART 100 [IU]/ML
INJECTION, SUSPENSION SUBCUTANEOUS
Qty: 15 ML | Refills: 1 | Status: SHIPPED | OUTPATIENT
Start: 2023-07-19 | End: 2023-07-24 | Stop reason: SDUPTHER

## 2023-07-19 RX ORDER — METFORMIN HYDROCHLORIDE 500 MG/1
500 TABLET, EXTENDED RELEASE ORAL 2 TIMES DAILY WITH MEALS
Qty: 180 TABLET | Refills: 1 | Status: SHIPPED | OUTPATIENT
Start: 2023-07-19

## 2023-07-24 DIAGNOSIS — E11.65 TYPE 2 DIABETES MELLITUS WITH HYPERGLYCEMIA, WITH LONG-TERM CURRENT USE OF INSULIN (HCC): ICD-10-CM

## 2023-07-24 DIAGNOSIS — Z79.4 TYPE 2 DIABETES MELLITUS WITH HYPERGLYCEMIA, WITH LONG-TERM CURRENT USE OF INSULIN (HCC): ICD-10-CM

## 2023-07-24 RX ORDER — INSULIN ASPART 100 [IU]/ML
INJECTION, SUSPENSION SUBCUTANEOUS
Qty: 75 ML | Refills: 1 | Status: SHIPPED | OUTPATIENT
Start: 2023-07-24

## 2023-08-09 DIAGNOSIS — E11.9 TYPE 2 DIABETES MELLITUS WITHOUT COMPLICATION, WITHOUT LONG-TERM CURRENT USE OF INSULIN (HCC): ICD-10-CM

## 2023-08-09 DIAGNOSIS — I10 ESSENTIAL HYPERTENSION: ICD-10-CM

## 2023-08-09 DIAGNOSIS — R37 SEXUAL DYSFUNCTION: ICD-10-CM

## 2023-08-09 DIAGNOSIS — E78.00 PURE HYPERCHOLESTEROLEMIA: ICD-10-CM

## 2023-08-09 RX ORDER — METOPROLOL SUCCINATE 50 MG/1
50 TABLET, EXTENDED RELEASE ORAL DAILY
Qty: 90 TABLET | Refills: 0 | Status: SHIPPED | OUTPATIENT
Start: 2023-08-09

## 2023-08-09 RX ORDER — PRAVASTATIN SODIUM 80 MG/1
80 TABLET ORAL DAILY
Qty: 90 TABLET | Refills: 0 | Status: SHIPPED | OUTPATIENT
Start: 2023-08-09

## 2023-08-09 RX ORDER — SILDENAFIL CITRATE 20 MG/1
TABLET ORAL
Qty: 30 TABLET | Refills: 0 | Status: SHIPPED | OUTPATIENT
Start: 2023-08-09

## 2023-09-14 DIAGNOSIS — I10 ESSENTIAL HYPERTENSION: ICD-10-CM

## 2023-09-14 RX ORDER — LOSARTAN POTASSIUM AND HYDROCHLOROTHIAZIDE 25; 100 MG/1; MG/1
1 TABLET ORAL DAILY
Qty: 90 TABLET | Refills: 0 | Status: SHIPPED | OUTPATIENT
Start: 2023-09-14

## 2023-09-20 ENCOUNTER — APPOINTMENT (OUTPATIENT)
Dept: LAB | Facility: HOSPITAL | Age: 53
End: 2023-09-20
Payer: COMMERCIAL

## 2023-09-20 ENCOUNTER — OFFICE VISIT (OUTPATIENT)
Dept: INTERNAL MEDICINE CLINIC | Facility: CLINIC | Age: 53
End: 2023-09-20
Payer: COMMERCIAL

## 2023-09-20 VITALS
TEMPERATURE: 98 F | RESPIRATION RATE: 13 BRPM | SYSTOLIC BLOOD PRESSURE: 126 MMHG | BODY MASS INDEX: 35.2 KG/M2 | HEIGHT: 66 IN | HEART RATE: 80 BPM | WEIGHT: 219 LBS | DIASTOLIC BLOOD PRESSURE: 76 MMHG

## 2023-09-20 DIAGNOSIS — K21.9 GASTROESOPHAGEAL REFLUX DISEASE WITHOUT ESOPHAGITIS: ICD-10-CM

## 2023-09-20 DIAGNOSIS — I10 ESSENTIAL HYPERTENSION: ICD-10-CM

## 2023-09-20 DIAGNOSIS — G89.29 CHRONIC PAIN OF BOTH KNEES: ICD-10-CM

## 2023-09-20 DIAGNOSIS — E11.3299 MILD NONPROLIFERATIVE DIABETIC RETINOPATHY ASSOCIATED WITH TYPE 2 DIABETES MELLITUS, MACULAR EDEMA PRESENCE UNSPECIFIED, UNSPECIFIED LATERALITY (HCC): ICD-10-CM

## 2023-09-20 DIAGNOSIS — Z79.4 TYPE 2 DIABETES MELLITUS WITH HYPERGLYCEMIA, WITH LONG-TERM CURRENT USE OF INSULIN (HCC): Primary | ICD-10-CM

## 2023-09-20 DIAGNOSIS — E11.9 TYPE 2 DIABETES MELLITUS WITHOUT COMPLICATION, WITHOUT LONG-TERM CURRENT USE OF INSULIN (HCC): ICD-10-CM

## 2023-09-20 DIAGNOSIS — C91.10 CHRONIC LYMPHOCYTIC LEUKEMIA (HCC): ICD-10-CM

## 2023-09-20 DIAGNOSIS — Z23 ENCOUNTER FOR IMMUNIZATION: ICD-10-CM

## 2023-09-20 DIAGNOSIS — R79.89 LOW TESTOSTERONE: ICD-10-CM

## 2023-09-20 DIAGNOSIS — Z79.4 TYPE 2 DIABETES MELLITUS WITH HYPERGLYCEMIA, WITH LONG-TERM CURRENT USE OF INSULIN (HCC): ICD-10-CM

## 2023-09-20 DIAGNOSIS — E66.01 CLASS 2 SEVERE OBESITY DUE TO EXCESS CALORIES WITH SERIOUS COMORBIDITY AND BODY MASS INDEX (BMI) OF 35.0 TO 35.9 IN ADULT: ICD-10-CM

## 2023-09-20 DIAGNOSIS — E11.65 TYPE 2 DIABETES MELLITUS WITH HYPERGLYCEMIA, WITH LONG-TERM CURRENT USE OF INSULIN (HCC): Primary | ICD-10-CM

## 2023-09-20 DIAGNOSIS — E11.65 TYPE 2 DIABETES MELLITUS WITH HYPERGLYCEMIA, WITH LONG-TERM CURRENT USE OF INSULIN (HCC): ICD-10-CM

## 2023-09-20 DIAGNOSIS — M25.562 CHRONIC PAIN OF BOTH KNEES: ICD-10-CM

## 2023-09-20 DIAGNOSIS — M25.561 CHRONIC PAIN OF BOTH KNEES: ICD-10-CM

## 2023-09-20 DIAGNOSIS — E78.2 MIXED HYPERLIPIDEMIA: ICD-10-CM

## 2023-09-20 DIAGNOSIS — M54.6 THORACIC BACK PAIN, UNSPECIFIED BACK PAIN LATERALITY, UNSPECIFIED CHRONICITY: ICD-10-CM

## 2023-09-20 LAB
25(OH)D3 SERPL-MCNC: 37 NG/ML (ref 30–100)
ALBUMIN SERPL BCP-MCNC: 4.4 G/DL (ref 3.5–5)
ALP SERPL-CCNC: 84 U/L (ref 34–104)
ALT SERPL W P-5'-P-CCNC: 49 U/L (ref 7–52)
ANION GAP SERPL CALCULATED.3IONS-SCNC: 6 MMOL/L
AST SERPL W P-5'-P-CCNC: 39 U/L (ref 13–39)
BACTERIA UR QL AUTO: NORMAL /HPF
BASOPHILS # BLD MANUAL: 0 THOUSAND/UL (ref 0–0.1)
BASOPHILS NFR MAR MANUAL: 0 % (ref 0–1)
BILIRUB SERPL-MCNC: 0.66 MG/DL (ref 0.2–1)
BILIRUB UR QL STRIP: NEGATIVE
BUN SERPL-MCNC: 22 MG/DL (ref 5–25)
CALCIUM SERPL-MCNC: 10.1 MG/DL (ref 8.4–10.2)
CHLORIDE SERPL-SCNC: 100 MMOL/L (ref 96–108)
CHOLEST SERPL-MCNC: 163 MG/DL
CLARITY UR: CLEAR
CO2 SERPL-SCNC: 29 MMOL/L (ref 21–32)
COLOR UR: ABNORMAL
CREAT SERPL-MCNC: 1.21 MG/DL (ref 0.6–1.3)
CREAT UR-MCNC: 93.2 MG/DL
EOSINOPHIL # BLD MANUAL: 0 THOUSAND/UL (ref 0–0.4)
EOSINOPHIL NFR BLD MANUAL: 0 % (ref 0–6)
ERYTHROCYTE [DISTWIDTH] IN BLOOD BY AUTOMATED COUNT: 12.4 % (ref 11.6–15.1)
EST. AVERAGE GLUCOSE BLD GHB EST-MCNC: 272 MG/DL
GFR SERPL CREATININE-BSD FRML MDRD: 68 ML/MIN/1.73SQ M
GGT SERPL-CCNC: 67 U/L (ref 9–64)
GLUCOSE P FAST SERPL-MCNC: 213 MG/DL (ref 65–99)
GLUCOSE UR STRIP-MCNC: ABNORMAL MG/DL
HBA1C MFR BLD: 11.1 %
HCT VFR BLD AUTO: 47.4 % (ref 36.5–49.3)
HDLC SERPL-MCNC: 34 MG/DL
HGB BLD-MCNC: 14.9 G/DL (ref 12–17)
HGB UR QL STRIP.AUTO: NEGATIVE
KETONES UR STRIP-MCNC: NEGATIVE MG/DL
LDLC SERPL CALC-MCNC: 79 MG/DL (ref 0–100)
LEFT EYE DIABETIC RETINOPATHY: ABNORMAL
LEFT EYE IMAGE QUALITY: ABNORMAL
LEFT EYE MACULAR EDEMA: ABNORMAL
LEFT EYE OTHER RETINOPATHY: ABNORMAL
LEUKOCYTE ESTERASE UR QL STRIP: ABNORMAL
LYMPHOCYTES # BLD AUTO: 15.14 THOUSAND/UL (ref 0.6–4.47)
LYMPHOCYTES # BLD AUTO: 85 % (ref 14–44)
MAGNESIUM SERPL-MCNC: 2.1 MG/DL (ref 1.9–2.7)
MCH RBC QN AUTO: 27.3 PG (ref 26.8–34.3)
MCHC RBC AUTO-ENTMCNC: 31.4 G/DL (ref 31.4–37.4)
MCV RBC AUTO: 87 FL (ref 82–98)
MICROALBUMIN UR-MCNC: <7 MG/L
MICROALBUMIN/CREAT 24H UR: <8 MG/G CREATININE (ref 0–30)
MONOCYTES # BLD AUTO: 0.36 THOUSAND/UL (ref 0–1.22)
MONOCYTES NFR BLD: 2 % (ref 4–12)
NEUTROPHILS # BLD MANUAL: 2.32 THOUSAND/UL (ref 1.85–7.62)
NEUTS SEG NFR BLD AUTO: 13 % (ref 43–75)
NITRITE UR QL STRIP: NEGATIVE
NON-SQ EPI CELLS URNS QL MICRO: NORMAL /HPF
PH UR STRIP.AUTO: 5.5 [PH]
PLATELET # BLD AUTO: 205 THOUSANDS/UL (ref 149–390)
PLATELET BLD QL SMEAR: ADEQUATE
PMV BLD AUTO: 9.5 FL (ref 8.9–12.7)
POTASSIUM SERPL-SCNC: 4 MMOL/L (ref 3.5–5.3)
PROT SERPL-MCNC: 6.7 G/DL (ref 6.4–8.4)
PROT UR STRIP-MCNC: NEGATIVE MG/DL
PSA SERPL-MCNC: 0.57 NG/ML (ref 0–4)
RBC # BLD AUTO: 5.45 MILLION/UL (ref 3.88–5.62)
RBC #/AREA URNS AUTO: NORMAL /HPF
RBC MORPH BLD: NORMAL
RIGHT EYE DIABETIC RETINOPATHY: ABNORMAL
RIGHT EYE IMAGE QUALITY: ABNORMAL
RIGHT EYE MACULAR EDEMA: ABNORMAL
RIGHT EYE OTHER RETINOPATHY: ABNORMAL
SEVERITY (EYE EXAM): ABNORMAL
SODIUM SERPL-SCNC: 135 MMOL/L (ref 135–147)
SP GR UR STRIP.AUTO: 1.03 (ref 1–1.03)
TRIGL SERPL-MCNC: 251 MG/DL
TSH SERPL DL<=0.05 MIU/L-ACNC: 3.37 UIU/ML (ref 0.45–4.5)
UROBILINOGEN UR STRIP-ACNC: <2 MG/DL
WBC # BLD AUTO: 17.81 THOUSAND/UL (ref 4.31–10.16)
WBC #/AREA URNS AUTO: NORMAL /HPF

## 2023-09-20 PROCEDURE — 83036 HEMOGLOBIN GLYCOSYLATED A1C: CPT

## 2023-09-20 PROCEDURE — 90471 IMMUNIZATION ADMIN: CPT

## 2023-09-20 PROCEDURE — 92250 FUNDUS PHOTOGRAPHY W/I&R: CPT | Performed by: INTERNAL MEDICINE

## 2023-09-20 PROCEDURE — G0103 PSA SCREENING: HCPCS

## 2023-09-20 PROCEDURE — 82977 ASSAY OF GGT: CPT

## 2023-09-20 PROCEDURE — 99214 OFFICE O/P EST MOD 30 MIN: CPT | Performed by: INTERNAL MEDICINE

## 2023-09-20 PROCEDURE — 80053 COMPREHEN METABOLIC PANEL: CPT

## 2023-09-20 PROCEDURE — 83735 ASSAY OF MAGNESIUM: CPT

## 2023-09-20 PROCEDURE — 80061 LIPID PANEL: CPT

## 2023-09-20 PROCEDURE — 85007 BL SMEAR W/DIFF WBC COUNT: CPT

## 2023-09-20 PROCEDURE — 84443 ASSAY THYROID STIM HORMONE: CPT

## 2023-09-20 PROCEDURE — 90686 IIV4 VACC NO PRSV 0.5 ML IM: CPT

## 2023-09-20 PROCEDURE — 85027 COMPLETE CBC AUTOMATED: CPT

## 2023-09-20 PROCEDURE — 36415 COLL VENOUS BLD VENIPUNCTURE: CPT

## 2023-09-20 PROCEDURE — 82306 VITAMIN D 25 HYDROXY: CPT

## 2023-09-20 RX ORDER — CELECOXIB 200 MG/1
200 CAPSULE ORAL DAILY
Qty: 30 CAPSULE | Refills: 1 | Status: SHIPPED | OUTPATIENT
Start: 2023-09-20 | End: 2023-09-20 | Stop reason: SDUPTHER

## 2023-09-20 RX ORDER — CELECOXIB 200 MG/1
200 CAPSULE ORAL DAILY
Qty: 30 CAPSULE | Refills: 1 | Status: SHIPPED | OUTPATIENT
Start: 2023-09-20 | End: 2023-10-20

## 2023-09-20 NOTE — PATIENT INSTRUCTIONS
Give you the influenza vaccine today  A month from now you should get the COVID-19 booster forearm across from your local pharmacy  We will see you back in 3 to 4 months next year  Get your lab work done before the office visit like you did  Continue follow-up with your endocrinologist and ophthalmology    For your knee pain we will refer you to orthopedic for viscosupplementation of your knees

## 2023-09-20 NOTE — PROGRESS NOTES
Assessment/Plan:        #1 diabetes not really well controlled (happy that he is taking the Jardiance 10 mg we will increase that to 25 mg continue follow-up with your endocrinologist we are waiting for the hemoglobin A1c he takes  Glipizide 10 mg daily  Jardiance 25 mg recently increased  NovoLog Mix 70/30 25 units at breakfast and lunch and 35 units at dinner  Metformin 500 mg twice daily    2. Hyperlipidemia is on pravastatin 80 mg/day lipid profile is acceptable continue the same    3. High blood pressure well controlled with normal renal function and electrolytes he is on the following  Losartan hydrochlorothiazide 100/25 taking 1 tablet daily  Metoprolol succinate 50 mg daily    Problem #4 complaining of knee pain wanted a medication has been taking Celebrex he was not familiar with it I renewed the prescription and I refer him to orthopedic for viscosupplementation I order an x-ray of both knees    For health maintenance he will get the flu vaccine today  He had a retinal screen  Recommended to get the COVID-19 vaccine booster in 3 to 4 weeks    No problem-specific Assessment & Plan notes found for this encounter. 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 male. No chief complaint on file.         Current Outpatient Medications:   •  BinaxNOW COVID-19 Ag Home Test KIT, TEST AS DIRECTED TODAY (Patient not taking: Reported on 1/17/2023), Disp: , Rfl:   •  celecoxib (CeleBREX) 200 mg capsule, Take 1 capsule (200 mg total) by mouth daily for 30 days, Disp: 30 capsule, Rfl: 1  •  Cholecalciferol (VITAMIN D3) 5000 units CAPS, Take by mouth, Disp: , Rfl:   •  ciclopirox (PENLAC) 8 % solution, Apply topically daily at bedtime, Disp: 6.6 mL, Rfl: 0  •  Continuous Blood Gluc  (Dexcom G6 ) ROBYN, Disp 1 Kit, use as directed, Disp: 1 each, Rfl: 1  •  Continuous Blood Gluc Sensor (Dexcom G6 Sensor) MISC, Change every 10 days, Disp: 1 each, Rfl: 12  •  Continuous Blood Gluc Transmit (Dexcom G6 Transmitter) MISC, Dispense 1 kit, change every 90 days, Disp: 1 each, Rfl: 3  •  dorzolamide-timolol (COSOPT) 22.3-6.8 MG/ML ophthalmic solution, INSTILL 1 DROP IN RIGHT EYE TWICE DAILY, Disp: 1 mL, Rfl: 1  •  DULoxetine (CYMBALTA) 30 mg delayed release capsule, TAKE 1 CAPSULE BY MOUTH EVERY DAY, Disp: 90 capsule, Rfl: 0  •  Empagliflozin (Jardiance) 10 MG TABS tablet, TAKE 1 TABLET BY MOUTH DAILY, Disp: 90 tablet, Rfl: 0  •  glipiZIDE (GLUCOTROL) 10 mg tablet, take 1 tablet by mouth twice a day BEFORE BREAKFAST AND DINNER, Disp: 180 tablet, Rfl: 0  •  insulin aspart protamine-insulin aspart (NovoLOG Mix 70/30 FlexPen) 100 Units/mL injection pen, Inject 25 Units At Breakfast and Lunch, and 35 Units At "[employer]", Disp: 75 mL, Rfl: 1  •  Insulin Pen Needle (BD Pen Needle Marielos 2nd Gen) 32G X 4 MM MISC, Inject as directed 3 (three) times a day, Disp: 300 each, Rfl: 3  •  Insulin Syringe-Needle U-100 (B-D INS SYR ULTRAFINE .5CC/30G) 30G X 1/2" 0.5 ML MISC, Inject insulin 3 times daily, Disp: 300 each, Rfl: 1  •  Lancets (freestyle) lancets, Use 1 each 3 (three) times a day Test blood sugars 4 times daily, Disp: 300 each, Rfl: 0  •  losartan-hydrochlorothiazide (HYZAAR) 100-25 MG per tablet, Take 1 tablet by mouth daily, Disp: 90 tablet, Rfl: 0  •  metFORMIN (GLUCOPHAGE-XR) 500 mg 24 hr tablet, Take 1 tablet (500 mg total) by mouth 2 (two) times a day with meals, Disp: 180 tablet, Rfl: 1  •  metoprolol succinate (TOPROL-XL) 50 mg 24 hr tablet, Take 1 tablet (50 mg total) by mouth daily, Disp: 90 tablet, Rfl: 0  •  omeprazole (PriLOSEC) 20 mg delayed release capsule, Take 1 capsule (20 mg total) by mouth daily, Disp: 90 capsule, Rfl: 0  •  pravastatin (PRAVACHOL) 80 mg tablet, Take 1 tablet (80 mg total) by mouth daily, Disp: 90 tablet, Rfl: 0  •  sildenafil (REVATIO) 20 mg tablet, Take 1 or 2 tablets 1 hour prior to relation, Disp: 30 tablet, Rfl: 0    HPI    The following portions of the patient's history were reviewed and updated as appropriate: allergies, current medications, past family history, past medical history, past social history, past surgical history and problem list.    Review of Systems   Constitutional: Negative. Negative for activity change, appetite change, fatigue, fever and unexpected weight change. HENT: Negative for congestion, ear pain, hearing loss, mouth sores, postnasal drip, rhinorrhea, sore throat, trouble swallowing and voice change. Eyes: Negative for pain, redness and visual disturbance. Respiratory: Negative for cough, chest tightness, shortness of breath and wheezing. Cardiovascular: Negative for chest pain, palpitations and leg swelling. Gastrointestinal: Negative for abdominal distention, abdominal pain, blood in stool, constipation, diarrhea and nausea. Endocrine: Negative for cold intolerance, heat intolerance, polydipsia, polyphagia and polyuria. Genitourinary: Negative for difficulty urinating, dysuria, flank pain, frequency, hematuria and urgency. Musculoskeletal: Positive for arthralgias. Negative for back pain, gait problem, joint swelling and myalgias. Lateral knee pain   Skin: Negative for color change and pallor. Neurological: Negative for dizziness, tremors, seizures, syncope, weakness, numbness and headaches. Hematological: Negative for adenopathy. Does not bruise/bleed easily. Psychiatric/Behavioral: Negative. Negative for sleep disturbance. The patient is not nervous/anxious. Objective: There were no vitals taken for this visit. Physical Exam  Vitals and nursing note reviewed. Constitutional:       Appearance: He is well-developed. HENT:      Head: Normocephalic. Right Ear: External ear normal.      Left Ear: External ear normal.      Nose: Nose normal.      Mouth/Throat:      Pharynx: No oropharyngeal exudate. Eyes:      Conjunctiva/sclera: Conjunctivae normal.      Pupils: Pupils are equal, round, and reactive to light. Neck:      Thyroid: No thyromegaly. Cardiovascular:      Rate and Rhythm: Normal rate and regular rhythm. Heart sounds: Normal heart sounds. No murmur heard. No friction rub. No gallop. Comments: S1-S2 regular rhythm  Extremities no edema  Pulmonary:      Effort: Pulmonary effort is normal. No respiratory distress. Breath sounds: Normal breath sounds. No wheezing or rales. Comments: Lungs are clear no wheezing rales or rhonchi  Abdominal:      General: Bowel sounds are normal. There is no distension. Palpations: Abdomen is soft. There is no mass. Tenderness: There is no abdominal tenderness. There is no guarding or rebound. Comments: Abdomen soft nontender   Musculoskeletal:         General: No swelling, tenderness or deformity. Normal range of motion. Cervical back: Normal range of motion and neck supple. Lymphadenopathy:      Cervical: No cervical adenopathy. Skin:     General: Skin is warm and dry. Neurological:      Mental Status: He is alert and oriented to person, place, and time.    Psychiatric:         Behavior: Behavior normal.         Judgment: Judgment normal.

## 2023-10-04 ENCOUNTER — OFFICE VISIT (OUTPATIENT)
Dept: ENDOCRINOLOGY | Facility: CLINIC | Age: 53
End: 2023-10-04
Payer: COMMERCIAL

## 2023-10-04 VITALS — DIASTOLIC BLOOD PRESSURE: 78 MMHG | HEIGHT: 66 IN | BODY MASS INDEX: 35.35 KG/M2 | SYSTOLIC BLOOD PRESSURE: 124 MMHG

## 2023-10-04 DIAGNOSIS — E78.2 MIXED HYPERLIPIDEMIA: ICD-10-CM

## 2023-10-04 DIAGNOSIS — E11.65 TYPE 2 DIABETES MELLITUS WITH HYPERGLYCEMIA, WITH LONG-TERM CURRENT USE OF INSULIN (HCC): ICD-10-CM

## 2023-10-04 DIAGNOSIS — E11.9 TYPE 2 DIABETES MELLITUS WITHOUT COMPLICATION, WITHOUT LONG-TERM CURRENT USE OF INSULIN (HCC): Primary | ICD-10-CM

## 2023-10-04 DIAGNOSIS — I10 ESSENTIAL HYPERTENSION: ICD-10-CM

## 2023-10-04 DIAGNOSIS — Z79.4 TYPE 2 DIABETES MELLITUS WITH HYPERGLYCEMIA, WITH LONG-TERM CURRENT USE OF INSULIN (HCC): ICD-10-CM

## 2023-10-04 PROCEDURE — 99215 OFFICE O/P EST HI 40 MIN: CPT | Performed by: PHYSICIAN ASSISTANT

## 2023-10-04 RX ORDER — BLOOD-GLUCOSE SENSOR
EACH MISCELLANEOUS
Qty: 2 EACH | Refills: 5 | Status: SHIPPED | OUTPATIENT
Start: 2023-10-04

## 2023-10-04 NOTE — PATIENT INSTRUCTIONS
Ozempic  0.25mg weekly x 2 weeks, then if no side effects or low sugars increase to 0.5mg weekly  Call with update on blood sugar readings in 2 weeks and will review sensor data. Semaglutida (Por inyección)   Semaglutide (wot-v-BPBA-tide)  Se [place] para tratar la diabetes tipo 2. Reduce el riesgo de ataque cardíaco, derrame cerebral o muerte en los pacientes con diabetes tipo 2 y enfermedad cardiovascular. También se [place] para ayudar a perder peso y a [employer] con obesidad causada por ciertas afecciones. Edgardo(s) : Ozempic 0.25 MG or 0.5 MG Doses, Ozempic 1 MG Doses, Ozempic 2 MG Doses, Wegovy   Existen muchas otras marcas de Kathy swati. Marta medicamento no debe ser usado cuando:   Marta medicamento no es adecuado para todas las personas. No lo use si alguna vez tuvo zakiya reacción alérgica a la semaglutida, o si tiene síndrome de neoplasia endocrina múltiple tipo 2 (MEN 2), o si usted o alguien de maldonado rosalba ha tenido cáncer medular tiroideo. Forma de usar marta medicamento:   Inyectable  Maldonado médico le recetará maldonado dosis exacta y le indicará la frecuencia con la que debe administrarse. Marta medicamento se administra en forma de inyección inmediatamente debajo de la piel. Se aplica en maldonado estómago, muslo, o parte superior del brazo. Es posible que le enseñen a aplicar el medicamento en maldonado casa. Asegúrese de entender todas las instrucciones antes de aplicarse zakiya inyección. No use más cantidad de medicamento ni lo use con más frecuencia de la indicada por el médico.  Si usted [place] insulina además de Kathy swati, no los mezcle en la misma [place]. Usted puede aplicarse las inyecciones en la misma je (incluso el estómago), roberto no se aplique las inyecciones zakiya al lado de la [place]. Revise el líquido de la pluma. Debe ser transparente e incolora. No lo use si está turbio, descolorido o si contiene partículas. Le mostrarán las áreas de maldonado cuerpo donde puede aplicarse la inyección.  Use un sitio distinto del cuerpo cada vez que se ponga la inyección. Lleve un registro del sitio donde usted se aplica cada inyección, para asegurarse de rotar las áreas de maldonado cuerpo. Use zakiya aguja y [place] nuevas cada vez que inyecte el medicamento. Nunca comparta plumas con otras personas, bajo ninguna circunstancia. Compartir las [employer] o lapiceros puede resultar en la transmisión de zakiya infección. Rosemarie medicamento debe venir con zakiya Guía del medicamento. Solicite zakiya copia con maldonado farmacéutico en magaly de no tener la guía. Si olvida zakiya dosis:   Ozempic®: Si olvida zakiya dosis de [address], úsela tan pronto le sea posible dentro de 5 días  después de [facility]. Si olvida zakiya dosis [id number] de 5 días, omita la dosis [place] y vuelva a maldonado horario habitual de dosis. Wegovy®: Si se olvida zakiya dosis, y faltan más de 2 días para la siguiente dosis programada, utilícela lo antes posible. Si omite zakiya dosis y Greg de 2 días para la siguiente dosis programada, omita la dosis [place] y vuelva a maldonado horario habitual. Si se olvida zakiya dosis de rosemarie medicamento [place] más de 2 semanas, utilícelo en la siguiente dosis programada. Pregunte a maldonado médico sobre cómo reiniciar el tratamiento. Guarde la pluma de medicamento nueva, sin usar en maldonado caja original en el refrigerador. No lo congele. Puede conservar la pluma de Ozempic® abierta en el refrigerador o a temperatura ambiente israel 64 días o la pluma de [employer] abierta en el refrigerador o a temperatura ambiente israel 28 días. Deseche la pluma después de 64 días en el magaly de Ozempic® o 29 días en el magaly de [place], aunque todavía contenga medicamento. Medicamentos y [employer] que debe evitar:   Consulte con maldonado médico o farmacéutico antes de usar cualquier medicamento, incluyendo los que compra sin receta médica, las vitaminas y los productos herbales. Algunos medicamentos pueden afectar el efecto de la semaglutida.  Infórmele a maldonado médico si está usando otro medicamento para la diabetes (roseanne glimepirida, glipizida, gliburida o insulina) o medicamentos orales. Precauciones israel el uso de marta medicamento:   Informe a maldonado médico si está embarazada o planifica quedar embarazada. No use marta medicamento [place], al menos, 2 meses antes de que usted planifique quedar embarazada. Informe a maldonado médico si usted está amamantando, o si tiene enfermedad renal, problemas en el páncreas, diabetes, problemas digestivos o antecedentes de retinopatía diabética o depresión. Marta medicamento puede causar los siguientes problemas:  Mayor riesgo de tener un tumor en la tiroides  Pancreatitis (inflamación del páncreas)  Problemas de la vesícula biliar, incluyendo colelitiasis, colecistitis  Bajo nivel de azúcar en la sherif (cuando es usado junto con otros medicamentos para la diabetes)  Problemas renales  Problemas oculares o de la visión, incluyendo retinopatía diabética  Ritmo cardíaco acelerado  [employer]  de depresión o ideas de suicidio  El médico solicitará exámenes de laboratorio israel las citas de rutina para revisar los efectos de Kathy swati. Asista a todas toño citas. Guarde todos los medicamentos fuera del alcance de los niños. Nunca comparta toño medicamentos con [employer].   Efectos secundarios que pueden presentarse israel el uso de marta medicamento:   Consulte inmediatamente con el médico si nota cualquiera de estos efectos secundarios:  Reacción alérgica: Comezón o ronchas, hinchazón del giovany o las hector, hinchazón u hormigueo en la boca o garganta, opresión en el pecho, dificultad para respirar  Visión borrosa u otros cambios en la visión  Cambio en la cantidad o la frecuencia de la orina, dolor en la parte baja de la espalda o en un costado, orina con sherif  Temblores, agitación, sudor, ritmo cardíaco acelerado o saltado, desmayo, hambre, confusión  Dolor de [place] repentino e intenso, náuseas, vómitos, fiebre, gases, malestar o hinchazón estomacal, ojos o piel amarillos  Estados de ánimo o comportamientos inusuales, depresión, pensamientos de hacerse daño a sí mismo o a otros  Consulte con el médico si nota los siguientes efectos secundarios menos graves:   Estreñimiento, diarrea  Dolor de dunia o mareos  Enrojecimiento, picazón, abultamiento, inflamación, o cualquier cambio en maldonado piel en el área donde se administró la inyección  [place]ansancio  Consulte con el médico si nota otros efectos secundarios que jossie son causados por rosemarie medicamento. Llame a maldonado médico para consultarle Akbar. Usted puede notificar toño efectos secundarios al FDA al [id number]. © Copyright [employer]rel[employer] 2023 Information is for End User's use only and may not be sold, redistributed or otherwise used for commercial purposes. Esta información es sólo para uso en educación. Maldonado intención no es darle un consejo médico sobre enfermedades o tratamientos. Colsulte con maldonado [place] farmacéutico antes de seguir cualquier régimen médico para saber si es seguro y efectivo para usted.

## 2023-10-04 NOTE — ASSESSMENT & PLAN NOTE
Diabetes remains very poorly controlled. Discussed importance of improving control of diabetes to slow progression of complications. Recommend referral to dietician but he declines. He did not bring log/meter to visit today  He is no longer using Dexcom G6 as transmitter is  and cost of medication is too high. He is interested in [address] [place] [address], which appears to be covered. Samir 3 sensor sample placed at visit and Cell phone marielena set up/linked to office at time of visit. Advised him to contact the office in 2 weeks for review of CGM Data. Discussed adding GLP-1 agonist to regimen. Ozempic appears to be covered. Discussed risks/side effects including GI side effects, risk of pancreatitis, and possible worsening of Retinopathy. Start Ozempic 0.25mg weekly x2 weeks then if tolerated increase to 0.5mg weekly.   Advised him to continue all of the rest of the medications  Lab Results   Component Value Date    HGBA1C 11.1 (H) 2023

## 2023-10-04 NOTE — PROGRESS NOTES
Established Patient Progress Note      Chief Complaint   Patient presents with   • Diabetes Type 2        Impression & Plan:    Problem List Items Addressed This Visit        Endocrine    Type 2 diabetes mellitus with hyperglycemia, with long-term current use of insulin ([address])     Diabetes remains very poorly controlled. Discussed importance of improving control of diabetes to slow progression of complications. Recommend referral to dietician but he declines. He did not bring log/meter to visit today  He is no longer using Dexcom G6 as transmitter is  and cost of medication is too high. He is interested in [address] [place] [address], which appears to be covered. Samir 3 sensor sample placed at visit and Cell phone marielena set up/linked to office at time of visit. Advised him to contact the office in 2 weeks for review of CGM Data. Discussed adding GLP-1 agonist to regimen. Ozempic appears to be covered. Discussed risks/side effects including GI side effects, risk of pancreatitis, and possible worsening of Retinopathy. Start Ozempic 0.25mg weekly x2 weeks then if tolerated increase to 0.5mg weekly. Advised him to continue all of the rest of the medications  Lab Results   Component Value Date    HGBA1C 11.1 (H) 2023            Relevant Medications    semaglutide, 0.25 or 0.5 mg/dose, (Ozempic, 0.25 or 0.5 MG/DOSE,) 2 mg/3 mL injection pen       Cardiovascular and Mediastinum    Essential hypertension     Well controlled on current regimen. Other    Mixed hyperlipidemia     Continue pravastatin        Other Visit Diagnoses     Type 2 diabetes mellitus without complication, without long-term current use of insulin ([facility])    -  Primary    Relevant Medications    Continuous Blood Gluc Sensor (FreeStyle Samir 3 Sensor) MISC    semaglutide, 0.25 or 0.5 mg/dose, (Ozempic, 0.25 or 0.5 MG/DOSE,) 2 mg/3 mL injection pen          No orders of the defined types were placed in this encounter.       History of Present Illness:   Arlin José is a 47 y.o. male with a history of type 2 diabetes with long term use of insulin since over 10 years ago. Reports complications of retinopathy and neuropathy. Denies recent illness or hospitalizations. Denies recent severe hypoglycemic or severe hyperglycemic episodes. Denies any issues with his current regimen. home glucose monitoring: are performed regularly once per day in the morning and reports readings around 200  Was using dexcom G6 but no longer working. (Tried to insert supplies that he brought at visit today but was unsuccessful as  transmitter was ). Reports high cost of dexcom G6. Exercises daily-- cardio and weights.    Has seen dietician in past, eats small portions  Last seen by endocrinology 2023    Current regimen:   Glipizide 10mg twice per day  Novolog 70/30-- 30 units before each meal  Jardiance 25mg daily     Last Eye Exam: UTD  2023, moderate NPDR  Last Foot Exam: UTD, reports burning in feet    Has hypertension: Taking losartan HCTZ and toprol  Has hyperlipidemia: Taking pravastatin      HX of pancreatitis: none   Denies history of thyroid cancer, medullary thyroid cancer, multiple endocrine neoplasia        Patient Active Problem List   Diagnosis   • Essential hypertension   • Mixed hyperlipidemia   • Gastroesophageal reflux disease without esophagitis   • Chronic lymphocytic leukemia ([address])   • Low testosterone   • 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 ([address])   • Type 2 diabetes mellitus with hyperglycemia, with long-term current use of insulin (HCC)   • Other hemorrhoids   • Phimosis   • Continuous opioid dependence (HCC)   • Class 2 severe obesity due to excess calories with serious comorbidity and body mass index (BMI) of 35.0 to 35.9 in adult    • Hip pain      Past Medical History:   Diagnosis Date   • Anxiety    • Arthritis    • Cancer ([address])    • Depression    • Diabetes ([address])    • Diabetes mellitus ([address])    • Fatty liver    • GERD (gastroesophageal reflux disease)    • Hyperlipidemia    • Hypertension    • Intertrigo     resolved 10/26/17   • Sebaceous cyst    • Sexual disorder    • Sexual dysfunction     last assessed 03/04/14   • Snoring     last assessed 12/08/14   • Somnolence, daytime     last assessed 12/08/14   • Thoracic disc herniation     last assessed 05/02/14   • Vision problem    • Vitamin D deficiency       Past Surgical History:   Procedure Laterality Date   • COLONOSCOPY      last assessed 01/22/14   • LYMPHADENECTOMY      Axillary; last assessed 05/30/14   • TX CIRCUMCISION AGE >28 DAYS N/A 3/1/2022    Procedure: CIRCUMCISION ADULT;  Surgeon: Byron Hawthorne MD;  Location: [facility];  Service: Urology      Family History   Problem Relation Age of Onset   • Diabetes Mother    • Kidney cancer Mother    • Diabetes Father    • Diabetes Sister    • Diabetes Family    • Hypertension Family      Social History     Tobacco Use   • Smoking status: Never   • Smokeless tobacco: Never   Substance Use Topics   • Alcohol use: Yes     Comment: social     Allergies   Allergen Reactions   • Ace Inhibitors Cough         Current Outpatient Medications:   •  celecoxib (CeleBREX) 200 mg capsule, Take 1 capsule (200 mg total) by mouth daily, Disp: 30 capsule, Rfl: 1  •  Cholecalciferol (VITAMIN D3) 5000 units CAPS, Take by mouth, Disp: , Rfl:   •  Continuous Blood Gluc Sensor (FreeStyle Samir 3 Sensor) MISC, Use 1 sensor every 2 weeks. , Disp: 2 each, Rfl: 5  •  dorzolamide-timolol (COSOPT) 22.3-6.8 MG/ML ophthalmic solution, INSTILL 1 DROP IN RIGHT EYE TWICE DAILY, Disp: 1 mL, Rfl: 1  •  Empagliflozin 25 MG TABS, Take 1 tablet (25 mg total) by mouth daily, Disp: 90 tablet, Rfl: 1  •  glipiZIDE (GLUCOTROL) 10 mg tablet, take 1 tablet by mouth twice a day BEFORE BREAKFAST AND DINNER, Disp: 180 tablet, Rfl: 0  •  insulin aspart protamine-insulin aspart (NovoLOG Mix 70/30 FlexPen) 100 Units/mL injection pen, Inject 25 Units At Breakfast and Lunch, and 35 Units At [facility], Disp: 75 mL, Rfl: 1  •  Insulin Pen Needle (BD Pen Needle Marielos 2nd Gen) 32G X 4 MM MISC, Inject as directed 3 (three) times a day, Disp: 300 each, Rfl: 3  •  Insulin Syringe-Needle U-100 (B-D INS SYR ULTRAFINE .5CC/30G) 30G X 1/2" 0.5 ML MISC, Inject insulin 3 times daily, Disp: 300 each, Rfl: 1  •  Lancets (freestyle) lancets, Use 1 each 3 (three) times a day Test blood sugars 4 times daily, Disp: 300 each, Rfl: 0  •  losartan-hydrochlorothiazide (HYZAAR) 100-25 MG per tablet, Take 1 tablet by mouth daily, Disp: 90 tablet, Rfl: 0  •  metFORMIN (GLUCOPHAGE-XR) 500 mg 24 hr tablet, Take 1 tablet (500 mg total) by mouth 2 (two) times a day with meals, Disp: 180 tablet, Rfl: 1  •  metoprolol succinate (TOPROL-XL) 50 mg 24 hr tablet, Take 1 tablet (50 mg total) by mouth daily, Disp: 90 tablet, Rfl: 0  •  pravastatin (PRAVACHOL) 80 mg tablet, Take 1 tablet (80 mg total) by mouth daily, Disp: 90 tablet, Rfl: 0  •  semaglutide, 0.25 or 0.5 mg/dose, (Ozempic, 0.25 or 0.5 MG/DOSE,) 2 mg/3 mL injection pen, 0.25mg per week x  2 weeks then 0.5mg weekly after that, Disp: 3 mL, Rfl: 1  •  BinaxNOW COVID-19 Ag Home Test KIT, TEST AS DIRECTED TODAY (Patient not taking: Reported on 1/17/2023), Disp: , Rfl:   •  ciclopirox (PENLAC) 8 % solution, Apply topically daily at bedtime (Patient not taking: Reported on 9/20/2023), Disp: 6.6 mL, Rfl: 0  •  sildenafil (REVATIO) 20 mg tablet, Take 1 or 2 tablets 1 hour prior to relation (Patient not taking: Reported on 10/4/2023), Disp: 30 tablet, Rfl: 0    Review of Systems   Constitutional: Negative for activity change, appetite change and fatigue. HENT: Negative for sore throat, trouble swallowing and voice change. Eyes: Negative for visual disturbance. Respiratory: Negative for choking, chest tightness and shortness of breath. Cardiovascular: Negative for chest pain, palpitations and leg swelling. Gastrointestinal: Negative for abdominal pain, constipation and diarrhea. Endocrine: Negative for cold intolerance, heat intolerance, polydipsia, polyphagia and polyuria. Genitourinary: Negative for frequency. Musculoskeletal: Negative for arthralgias and myalgias. Skin: Negative for rash. Neurological: Negative for dizziness and syncope. Hematological: Negative for adenopathy. Psychiatric/Behavioral: Negative for sleep disturbance. All other systems reviewed and are negative. Physical Exam:  Body mass index is 35.35 kg/m². /78 (BP Location: Left arm, Patient Position: Sitting)   Ht 5' 6" (1.676 m)   BMI 35.35 kg/m²    Wt Readings from Last 3 Encounters:   09/20/23 99.3 kg (219 lb)   05/17/23 101 kg (222 lb)   01/27/23 103 kg (226 lb)       Physical Exam  Vitals reviewed. Constitutional:       General: He is not in acute distress. Appearance: He is well-developed. HENT:      Head: Normocephalic and atraumatic. Eyes:      Conjunctiva/sclera: Conjunctivae normal.      Pupils: Pupils are equal, round, and reactive to light. Neck:      Thyroid: No thyromegaly. Cardiovascular:      Rate and Rhythm: Normal rate and regular rhythm. Heart sounds: Normal heart sounds. No murmur heard. Pulmonary:      Effort: Pulmonary effort is normal. No respiratory distress. Breath sounds: Normal breath sounds. No wheezing or rales. Abdominal:      General: Bowel sounds are normal. There is no distension. Palpations: Abdomen is soft. Tenderness: There is no abdominal tenderness. Musculoskeletal:         General: Normal range of motion. Cervical back: Normal range of motion and neck supple. Lymphadenopathy:      Cervical: No cervical adenopathy. Skin:     General: Skin is warm and dry. Neurological:      Mental Status: He is alert and oriented to person, place, and time.          Labs:   Lab Results   Component Value Date    HGBA1C 11.1 (H) 09/20/2023    HGBA1C 11. 5 (H) 05/16/2023    HGBA1C 10.4 (H) 01/17/2023     Lab Results   Component Value Date    CREATININE 1.21 09/20/2023    CREATININE 1.14 05/16/2023    CREATININE 1.18 01/17/2023    BUN 22 09/20/2023     (L) 12/06/2014    K 4.0 09/20/2023     09/20/2023    CO2 29 09/20/2023     eGFR   Date Value Ref Range Status   09/20/2023 68 ml/min/1.73sq m Final     Lab Results   Component Value Date    CHOL 133 01/28/2014    HDL 34 (L) 09/20/2023    TRIG 251 (H) 09/20/2023     Lab Results   Component Value Date    ALT 49 09/20/2023    AST 39 09/20/2023    GGT 67 (H) 09/20/2023    ALKPHOS 84 09/20/2023    BILITOT 0.4 12/06/2014     Lab Results   Component Value Date    YSB7SUGSUPRA 3.366 09/20/2023    FNK3QWCONMJI 3.527 01/17/2023    PMP3ZZOBELWF 2.048 07/30/2022     Lab Results   Component Value Date    FREET4 0.92 11/13/2021             Discussed with the patient and all questioned fully answered. He will call me if any problems arise. Follow-up appointment in 6 weeks. Counseled patient on diagnostic results, prognosis, risk and benefit of treatment options, instruction for management, importance of treatment compliance, Risk  factor reduction and impressions    Patient Instructions   Ozempic  0.25mg weekly x 2 weeks, then if no side effects or low sugars increase to 0.5mg weekly  Call with update on blood sugar readings in 2 weeks and will review sensor data. Semaglutida (Por inyección)   Semaglutide (uev-b-KPFT-tide)  Se [place] para tratar la diabetes tipo 2. Reduce el riesgo de ataque cardíaco, derrame cerebral o muerte en los pacientes con diabetes tipo 2 y enfermedad cardiovascular. También se [place] para ayudar a perder peso y a [employer] con obesidad causada por ciertas afecciones. Edgardo(s) : Ozempic 0.25 MG or 0.5 MG Doses, Ozempic 1 MG Doses, Ozempic 2 MG Doses, Wegovy   Existen muchas otras marcas de Kathy swati.   Marta medicamento no debe ser usado cuando:   Marta medicamento no es adecuado para [employer]. No lo use si alguna vez tuvo zakiya reacción alérgica a la semaglutida, o si tiene síndrome de neoplasia endocrina múltiple tipo 2 (MEN 2), o si usted o alguien de maldonado rosalba ha tenido cáncer medular tiroideo. Forma de usar rosemarie medicamento:   Inyectable  Amldonado médico le recetará maldonado dosis exacta y le indicará la frecuencia con la que debe administrarse. Rosemarie medicamento se administra en forma de inyección inmediatamente debajo de la piel. Se aplica en maldonado estómago, muslo, o parte superior del brazo. Es posible que le enseñen a aplicar el medicamento en maldonado casa. Asegúrese de entender todas las instrucciones antes de aplicarse zakiya inyección. No use más cantidad de medicamento ni lo use con más frecuencia de la indicada por el médico.  Si usted [place] insulina además de [employer], no los mezcle en la misma [place]. Usted puede aplicarse las inyecciones en la misma je (incluso el estómago), roberto no se aplique las inyecciones zakiya al lado de la [place]. Revise el líquido de la pluma. Debe ser transparente e incolora. No lo use si está turbio, descolorido o si contiene partículas. Le mostrarán las áreas de maldonado cuerpo donde puede aplicarse la inyección. Use un sitio distinto del cuerpo cada vez que se ponga la inyección. Lleve un registro del sitio donde usted se aplica cada inyección, para asegurarse de rotar las áreas de maldonado cuerpo. Use zakiya aguja y [place] nuevas cada vez que inyecte el medicamento. Nunca comparta plumas con otras personas, bajo ninguna circunstancia. Compartir las [employer] o lapiceros puede resultar en la transmisión de zakyia infección. Rosemarie medicamento debe venir con zakiya Guía del medicamento. Solicite zakiya copia con maldonado farmacéutico en magaly de no tener la guía. Si olvida zakiya dosis:   Ozempic®: Si olvida zakiya dosis de [employer], úsela tan pronto le sea posible dentro de 5 días  después de [place].  Si olvida zakiya dosis [employer] de 5 días, omita la dosis [place] y vuelva a maldonado horario habitual de dosis. Wegovy®: Si se olvida zakiya dosis, y faltan más de 2 días para la siguiente dosis programada, utilícela lo antes posible. Si omite zakiya dosis y Greg de 2 días para la siguiente dosis programada, omita la dosis [place] y vuelva a maldonado horario habitual. Si se olvida zakiya dosis de rosemarie medicamento [place] más de 2 semanas, utilícelo en la siguiente dosis programada. Pregunte a maldonado médico sobre cómo reiniciar el tratamiento. Guarde la pluma de medicamento nueva, sin usar en maldonado caja original en el refrigerador. No lo congele. Puede conservar la pluma de Ozempic® abierta en el refrigerador o a temperatura ambiente israel 64 días o la pluma de [employer] abierta en el refrigerador o a temperatura ambiente israel 28 días. Deseche la pluma después de 64 días en el magaly de Ozempic® o 29 días en el magaly de [place], aunque todavía contenga medicamento. Medicamentos y [employer] que debe evitar:   Consulte con maldonado médico o farmacéutico antes de usar cualquier medicamento, incluyendo los que compra sin receta médica, las vitaminas y los productos herbales. Algunos medicamentos pueden afectar el efecto de la semaglutida. Infórmele a maldonado médico si está usando otro medicamento para la diabetes (roseanne glimepirida, glipizida, gliburida o insulina) o medicamentos orales. Precauciones israel el uso de rosemarie medicamento:   Informe a maldonado médico si está embarazada o planifica quedar embarazada. No use rosemarie medicamento [place], al menos, 2 meses antes de que usted planifique quedar embarazada. Informe a maldonado médico si usted está amamantando, o si tiene enfermedad renal, problemas en el páncreas, diabetes, problemas digestivos o antecedentes de retinopatía diabética o depresión.   Rosemarie medicamento puede causar los siguientes problemas:  Mayor riesgo de tener un tumor en la tiroides  Pancreatitis (inflamación del páncreas)  Problemas de la vesícula biliar, incluyendo colelitiasis, colecistitis  [employer] de azúcar en la sherif (cuando es usado junto con otros medicamentos para la diabetes)  Problemas renales  Problemas oculares o de la visión, incluyendo retinopatía diabética  Ritmo cardíaco acelerado  [employer]  de depresión o ideas de suicidio  El médico solicitará exámenes de laboratorio israel las citas de rutina para revisar los efectos de Kathy swati. Asista a todas toño citas. Guarde todos los medicamentos fuera del alcance de los niños. Nunca comparta toño medicamentos con [facility] [place] [facility]. Efectos secundarios que pueden presentarse israel el uso de rosemarie medicamento:   Consulte inmediatamente con el médico si nota cualquiera de estos efectos secundarios:  Reacción alérgica: Comezón o ronchas, hinchazón del giovany o las hector, hinchazón u hormigueo en la boca o garganta, opresión en el pecho, dificultad para respirar  Visión borrosa u otros cambios en la visión  Cambio en la cantidad o la frecuencia de la orina, dolor en la parte baja de la espalda o en un costado, orina con sherif  Temblores, agitación, sudor, ritmo cardíaco acelerado o saltado, desmayo, hambre, confusión  Dolor de [address] repentino e intenso, náuseas, vómitos, fiebre, gases, malestar o hinchazón estomacal, ojos o piel amarillos  Estados de ánimo o comportamientos inusuales, depresión, pensamientos de Stanley daño a sí mismo o a otros  Consulte con el médico si nota los siguientes efectos secundarios menos graves:   Estreñimiento, diarrea  Dolor de dunia o mareos  Enrojecimiento, picazón, abultamiento, inflamación, o cualquier cambio en maldonado piel en el área donde se administró la inyección  Cansancio  Consulte con el médico si nota otros efectos secundarios que jossie son causados por rosemarie medicamento. Llame a maldonado médico para consultarle Akbar. Usted puede notificar toño efectos secundarios al FDA al [phone].   © Copyright [facility] 2023 Information is for End User's use only and may not be sold, redistributed or otherwise used for commercial purposes. Esta información es sólo para uso en educación. Maldonado intención no es darle un consejo médico sobre enfermedades o tratamientos. Colsulte con maldonado Latrelle Tiffany farmacéutico antes de seguir cualquier régimen médico para saber si es seguro y efectivo para usted.       Amelia Ortiz PA-C

## 2023-10-06 DIAGNOSIS — F32.9 REACTIVE DEPRESSION: Primary | ICD-10-CM

## 2023-10-06 RX ORDER — BUPROPION HYDROCHLORIDE 150 MG/1
150 TABLET ORAL 2 TIMES DAILY
Qty: 60 TABLET | Refills: 0 | Status: SHIPPED | OUTPATIENT
Start: 2023-10-06 | End: 2024-04-03

## 2023-10-10 DIAGNOSIS — F32.9 REACTIVE DEPRESSION: Primary | ICD-10-CM

## 2023-10-10 RX ORDER — BUPROPION HYDROCHLORIDE 150 MG/1
150 TABLET, EXTENDED RELEASE ORAL 2 TIMES DAILY
Qty: 180 TABLET | Refills: 0 | Status: SHIPPED | OUTPATIENT
Start: 2023-10-10

## 2023-10-11 ENCOUNTER — OFFICE VISIT (OUTPATIENT)
Dept: OBGYN CLINIC | Facility: MEDICAL CENTER | Age: 53
End: 2023-10-11

## 2023-10-11 ENCOUNTER — APPOINTMENT (OUTPATIENT)
Dept: RADIOLOGY | Facility: MEDICAL CENTER | Age: 53
End: 2023-10-11
Payer: COMMERCIAL

## 2023-10-11 VITALS
SYSTOLIC BLOOD PRESSURE: 126 MMHG | HEIGHT: 66 IN | BODY MASS INDEX: 35.2 KG/M2 | WEIGHT: 219 LBS | HEART RATE: 79 BPM | DIASTOLIC BLOOD PRESSURE: 76 MMHG

## 2023-10-11 DIAGNOSIS — G89.29 CHRONIC PAIN OF BOTH KNEES: Primary | ICD-10-CM

## 2023-10-11 DIAGNOSIS — G89.29 CHRONIC PAIN OF BOTH HIPS: ICD-10-CM

## 2023-10-11 DIAGNOSIS — M25.562 CHRONIC PAIN OF BOTH KNEES: ICD-10-CM

## 2023-10-11 DIAGNOSIS — G89.29 CHRONIC PAIN OF BOTH KNEES: ICD-10-CM

## 2023-10-11 DIAGNOSIS — M25.562 CHRONIC PAIN OF BOTH KNEES: Primary | ICD-10-CM

## 2023-10-11 DIAGNOSIS — M25.561 CHRONIC PAIN OF BOTH KNEES: ICD-10-CM

## 2023-10-11 DIAGNOSIS — M25.552 CHRONIC PAIN OF BOTH HIPS: ICD-10-CM

## 2023-10-11 DIAGNOSIS — M22.2X1 PATELLOFEMORAL DISORDER OF RIGHT KNEE: ICD-10-CM

## 2023-10-11 DIAGNOSIS — M22.2X2 PATELLOFEMORAL DISORDER OF LEFT KNEE: ICD-10-CM

## 2023-10-11 DIAGNOSIS — M25.551 CHRONIC PAIN OF BOTH HIPS: ICD-10-CM

## 2023-10-11 DIAGNOSIS — M25.561 CHRONIC PAIN OF BOTH KNEES: Primary | ICD-10-CM

## 2023-10-11 PROCEDURE — 73562 X-RAY EXAM OF KNEE 3: CPT

## 2023-10-11 PROCEDURE — 73564 X-RAY EXAM KNEE 4 OR MORE: CPT

## 2023-10-11 RX ORDER — BUPIVACAINE HYDROCHLORIDE 2.5 MG/ML
2 INJECTION, SOLUTION INFILTRATION; PERINEURAL
Status: COMPLETED | OUTPATIENT
Start: 2023-10-11 | End: 2023-10-11

## 2023-10-11 RX ORDER — METHYLPREDNISOLONE ACETATE 40 MG/ML
40 INJECTION, SUSPENSION INTRA-ARTICULAR; INTRALESIONAL; INTRAMUSCULAR; SOFT TISSUE
Status: COMPLETED | OUTPATIENT
Start: 2023-10-11 | End: 2023-10-11

## 2023-10-11 RX ADMIN — BUPIVACAINE HYDROCHLORIDE 2 ML: 2.5 INJECTION, SOLUTION INFILTRATION; PERINEURAL at 10:30

## 2023-10-11 RX ADMIN — METHYLPREDNISOLONE ACETATE 40 MG: 40 INJECTION, SUSPENSION INTRA-ARTICULAR; INTRALESIONAL; INTRAMUSCULAR; SOFT TISSUE at 10:30

## 2023-10-11 NOTE — PROGRESS NOTES
Ortho Sports Medicine Knee New Patient Visit     Assesment:   47 y.o. male bilateral knee patellofemoral pain. Plan:    Conservative treatment:    Ice to knee for 20 minutes at least 1-2 times daily. PT for ROM/strengthening to knee, hip and core. OTC NSAIDS prn for pain. Let pain guide gradual return activities. Imaging: All imaging from today was reviewed by myself and explained to the patient. Injection:    The risks and benefits of the injection (which include but are not limited to: infection, bleeding,damage to nerve/artery, need for further intervention), as well as the risks and benefits of all alternative treatments were explained and understood. The patient elected to proceed with injection. The procedure was done with aseptic technique, and the patient tolerated the procedure well with no complications. A corticosteroid injection was performed. The patient should take 1-2 days off of activity, with gradual return to activity as tolerated. Ice to the knee 1-2 times daily for 20 minutes, for next 24-48 hrs. Large joint arthrocentesis: L knee  Universal Protocol:  Consent: Verbal consent obtained. Consent given by: patient  Timeout called at: 10/11/2023 10:21 AM.  Supporting Documentation  Indications: pain   Procedure Details  Location: knee - L knee  Needle size: 22 G  Ultrasound guidance: no  Approach: anterolateral  Medications administered: 2 mL bupivacaine 0.25 %; 40 mg methylPREDNISolone acetate 40 mg/mL    Patient tolerance: patient tolerated the procedure well with no immediate complications      Large joint arthrocentesis: R knee  Universal Protocol:  Consent: Verbal consent obtained.   Consent given by: patient  Timeout called at: 10/11/2023 10:22 AM.  Supporting Documentation  Indications: pain   Procedure Details  Location: knee - R knee  Needle size: 22 G  Ultrasound guidance: no  Approach: lateral  Medications administered: 40 mg methylPREDNISolone acetate 40 mg/mL; 2 mL bupivacaine 0.25 %    Patient tolerance: patient tolerated the procedure well with no immediate complications        Surgery:     No surgery is recommended at this point, continue with conservative treatment plan as noted. Follow up:    Return in about 6 weeks (around 11/22/2023) for Recheck with Dr. Giovany Dudley. Chief Complaint   Patient presents with    Left Knee - Pain     Pain when walking and going up steps. Trying to straighten worse    Right Knee - Pain       History of Present Illness: The patient is a 47 y.o. male whose occupation is a , referred to me by their primary care physician, seen in clinic for consultation of bilateral knee pain. Pain is located anterior. The patient rates the pain as a 5/10. The pain has been present for 1 year that is worsening. The mechanism of injury was uknown. The pain is characterized as dull, achy. The pain is present daily. Pain is improved by rest.  Pain is aggravated by stairs, weight bearing, and walking. Symptoms include pain. The patient has tried rest. The patient presents today requesting a CSI of the bilateral knees.       Knee Surgical History:  None    Past Medical, Social and Family History:  Past Medical History:   Diagnosis Date    Anxiety     Arthritis     Cancer ([address])     Depression     Diabetes ([address])     Diabetes mellitus ([address])     Fatty liver     GERD (gastroesophageal reflux disease)     Hyperlipidemia     Hypertension     Intertrigo     resolved 10/26/17    Sebaceous cyst     Sexual disorder     Sexual dysfunction     last assessed 03/04/14    Snoring     last assessed 12/08/14    Somnolence, daytime     last assessed 12/08/14    Thoracic disc herniation     last assessed 05/02/14    Vision problem     Vitamin D deficiency      Past Surgical History:   Procedure Laterality Date    COLONOSCOPY      last assessed 01/22/14    LYMPHADENECTOMY      Axillary; last assessed 05/30/14    SC CIRCUMCISION AGE >28 DAYS N/A 3/1/2022    Procedure: CIRCUMCISION ADULT;  Surgeon: Mojgan Peñaloza MD;  Location: [facility];  Service: Urology     Allergies   Allergen Reactions    Ace Inhibitors Cough     Current Outpatient Medications on File Prior to Visit   Medication Sig Dispense Refill    buPROPion (Wellbutrin SR) 150 mg 12 hr tablet Take 1 tablet (150 mg total) by mouth 2 (two) times a day 180 tablet 0    celecoxib (CeleBREX) 200 mg capsule Take 1 capsule (200 mg total) by mouth daily 30 capsule 1    Cholecalciferol (VITAMIN D3) 5000 units CAPS Take by mouth      dorzolamide-timolol (COSOPT) 22.3-6.8 MG/ML ophthalmic solution INSTILL 1 DROP IN RIGHT EYE TWICE DAILY 1 mL 1    Empagliflozin 25 MG TABS Take 1 tablet (25 mg total) by mouth daily 90 tablet 1    glipiZIDE (GLUCOTROL) 10 mg tablet take 1 tablet by mouth twice a day BEFORE BREAKFAST AND DINNER 180 tablet 0    insulin aspart protamine-insulin aspart (NovoLOG Mix 70/30 FlexPen) 100 Units/mL injection pen Inject 25 Units At Breakfast and Lunch, and 35 Units At Dinner 75 mL 1    losartan-hydrochlorothiazide (HYZAAR) 100-25 MG per tablet Take 1 tablet by mouth daily 90 tablet 0    metFORMIN (GLUCOPHAGE-XR) 500 mg 24 hr tablet Take 1 tablet (500 mg total) by mouth 2 (two) times a day with meals 180 tablet 1    metoprolol succinate (TOPROL-XL) 50 mg 24 hr tablet Take 1 tablet (50 mg total) by mouth daily 90 tablet 0    pravastatin (PRAVACHOL) 80 mg tablet Take 1 tablet (80 mg total) by mouth daily 90 tablet 0    semaglutide, 0.25 or 0.5 mg/dose, (Ozempic, 0.25 or 0.5 MG/DOSE,) 2 mg/3 mL injection pen 0.25mg per week x  2 weeks then 0.5mg weekly after that 3 mL 1    BinaxNOW COVID-19 Ag Home Test KIT TEST AS DIRECTED TODAY (Patient not taking: Reported on 1/17/2023)      ciclopirox (PENLAC) 8 % solution Apply topically daily at bedtime (Patient not taking: Reported on 9/20/2023) 6.6 mL 0    Continuous Blood Gluc Sensor (FreeStyle Samir 3 Sensor) MISC Use 1 sensor every 2 weeks. (Patient not taking: Reported on 10/11/2023) 2 each 5    Insulin Pen Needle (BD Pen Needle Marielos 2nd Gen) 32G X 4 MM MISC Inject as directed 3 (three) times a day (Patient not taking: Reported on 10/11/2023) 300 each 3    Insulin Syringe-Needle U-100 (B-D INS SYR ULTRAFINE .5CC/30G) 30G X 1/2" 0.5 ML MISC Inject insulin 3 times daily (Patient not taking: Reported on 10/11/2023) 300 each 1    Lancets (freestyle) lancets Use 1 each 3 (three) times a day Test blood sugars 4 times daily (Patient not taking: Reported on 10/11/2023) 300 each 0    sildenafil (REVATIO) 20 mg tablet Take 1 or 2 tablets 1 hour prior to relation (Patient not taking: Reported on 10/4/2023) 30 tablet 0     No current facility-administered medications on file prior to visit. Social History     Socioeconomic History    Marital status: Single     Spouse name: Not on file    Number of children: Not on file    Years of education: Not on file    Highest education level: Not on file   Occupational History    Not on file   Tobacco Use    Smoking status: Never    Smokeless tobacco: Never   Vaping Use    Vaping Use: Never used   Substance and Sexual Activity    Alcohol use: Yes     Comment: social    Drug use: No    Sexual activity: Yes     Partners: Female   Other Topics Concern    Not on file   Social History Narrative    Not on file     Social Determinants of Health     Financial Resource Strain: Not on file   Food Insecurity: Not on file   Transportation Needs: Not on file   Physical Activity: Not on file   Stress: Not on file   Social Connections: Not on file   Intimate Partner Violence: Not on file   Housing Stability: Not on file         I have reviewed the past medical, surgical, social and family history, medications and allergies as documented in the EMR. Review of systems: ROS is negative other than that noted in the HPI. Constitutional: Negative for fatigue and fever. HENT: Negative for sore throat.     Respiratory: Negative for shortness of breath. Cardiovascular: Negative for chest pain. Gastrointestinal: Negative for abdominal pain. Endocrine: Negative for cold intolerance and heat intolerance. Genitourinary: Negative for flank pain. Musculoskeletal: Negative for back pain. Skin: Negative for rash. Allergic/Immunologic: Negative for immunocompromised state. Neurological: Negative for dizziness. Psychiatric/Behavioral: Negative for agitation. Physical Exam:    Blood pressure 126/76, pulse 79, height 5' 6" (1.676 m), weight 99.3 kg (219 lb). General/Constitutional: NAD, well developed, well nourished  HENT: Normocephalic, atraumatic  CV: Intact distal pulses, regular rate  Resp: No respiratory distress or labored breathing  GI: Soft and non-tender   Lymphatic: No lymphadenopathy palpated  Neuro: Alert and Oriented x 3, no focal deficits  Psych: Normal mood, normal affect, normal judgement, normal behavior  Skin: Warm, dry, no rashes, no erythema      Knee Exam (focused): RIGHT LEFT   ROM:   0-130 0-130   Palpation: Effusion negative negative     MJL tenderness Negative Negative     LJL tenderness Negative Negative   Meniscus:  Tyesha Negative Negative    Apley's Compression Negative Negative   Instability: Varus stable stable     Valgus stable stable   Special Tests: Lachman Negative Negative     Posterior drawer Negative Negative     Anterior drawer Negative Negative     Pivot shift not tested not tested     Dial not tested not tested   Patella: Palpation no tenderness no tenderness     Mobility 1/4 1/4     Apprehension Negative Negative   Other: Single leg 1/4 squat not tested not tested      LE NV Exam: +2 DP/PT pulses bilaterally  Sensation intact to light touch L2-S1 bilaterally     Bilateral hip ROM demonstrates no pain actively or passively    No calf tenderness to palpation bilaterally    Knee Imaging    X-rays of the bilateral knee from 10/11/2023 were reviewed, which demonstrate no acute fracture or dislocation. No significant degenerative changes. I have reviewed the radiology report and do not currently have a radiology reading from [facility], but will check the result once the reading is performed.         Scribe Attestation      I,:  Irina Driscoll am acting as a scribe while in the presence of the attending physician.:       I,:  Jaqueline Hooks, DO personally performed the services described in this documentation    as scribed in my presence.:

## 2023-11-01 DIAGNOSIS — E11.9 TYPE 2 DIABETES MELLITUS WITHOUT COMPLICATION, WITHOUT LONG-TERM CURRENT USE OF INSULIN (HCC): ICD-10-CM

## 2023-11-01 DIAGNOSIS — I10 ESSENTIAL HYPERTENSION: ICD-10-CM

## 2023-11-01 RX ORDER — METFORMIN HYDROCHLORIDE 500 MG/1
500 TABLET, EXTENDED RELEASE ORAL 2 TIMES DAILY WITH MEALS
Qty: 180 TABLET | Refills: 0 | Status: SHIPPED | OUTPATIENT
Start: 2023-11-01

## 2023-11-02 DIAGNOSIS — I10 ESSENTIAL HYPERTENSION: ICD-10-CM

## 2023-11-02 DIAGNOSIS — E11.9 TYPE 2 DIABETES MELLITUS WITHOUT COMPLICATION, WITHOUT LONG-TERM CURRENT USE OF INSULIN (HCC): ICD-10-CM

## 2023-11-09 DIAGNOSIS — E78.00 PURE HYPERCHOLESTEROLEMIA: ICD-10-CM

## 2023-11-09 DIAGNOSIS — E11.9 TYPE 2 DIABETES MELLITUS WITHOUT COMPLICATION, WITHOUT LONG-TERM CURRENT USE OF INSULIN (HCC): ICD-10-CM

## 2023-11-09 DIAGNOSIS — I10 ESSENTIAL HYPERTENSION: ICD-10-CM

## 2023-11-09 RX ORDER — PRAVASTATIN SODIUM 80 MG/1
80 TABLET ORAL DAILY
Qty: 90 TABLET | Refills: 0 | Status: SHIPPED | OUTPATIENT
Start: 2023-11-09

## 2023-11-09 RX ORDER — METOPROLOL SUCCINATE 50 MG/1
50 TABLET, EXTENDED RELEASE ORAL DAILY
Qty: 90 TABLET | Refills: 0 | Status: SHIPPED | OUTPATIENT
Start: 2023-11-09

## 2023-11-09 RX ORDER — GLIPIZIDE 10 MG/1
TABLET ORAL
Qty: 180 TABLET | Refills: 0 | Status: SHIPPED | OUTPATIENT
Start: 2023-11-09

## 2023-11-22 ENCOUNTER — OFFICE VISIT (OUTPATIENT)
Dept: OBGYN CLINIC | Facility: MEDICAL CENTER | Age: 53
End: 2023-11-22
Payer: COMMERCIAL

## 2023-11-22 VITALS
WEIGHT: 218 LBS | BODY MASS INDEX: 35.03 KG/M2 | SYSTOLIC BLOOD PRESSURE: 135 MMHG | HEIGHT: 66 IN | HEART RATE: 80 BPM | DIASTOLIC BLOOD PRESSURE: 73 MMHG

## 2023-11-22 DIAGNOSIS — M23.91 ACUTE INTERNAL DERANGEMENT OF RIGHT KNEE: Primary | ICD-10-CM

## 2023-11-22 PROCEDURE — 99213 OFFICE O/P EST LOW 20 MIN: CPT | Performed by: ORTHOPAEDIC SURGERY

## 2023-11-22 NOTE — PROGRESS NOTES
Ortho Sports Medicine Knee New Patient Visit     Assesment:   47 y.o. male bilateral knee patellofemoral pain. Plan:    Conservative treatment:    Ice to knee for 20 minutes at least 1-2 times daily. PT for ROM/strengthening to knee, hip and core. OTC NSAIDS prn for pain. Let pain guide gradual return activities. Imaging:    Mri of right knee ordered to rule out internal derangement      Injection:    No injections      Surgery:     No surgery is recommended at this point, continue with conservative treatment plan as noted. Follow up:    No follow-ups on file. Chief Complaint   Patient presents with    Right Knee - Follow-up     No good    Left Knee - Follow-up     Good        History of Present Illness: The patient is a 47 y.o. male whose occupation is a , referred to me by their primary care physician, seen in clinic for consultation of bilateral knee pain. Pain is located anterior. The patient rates the pain as a 5/10. The pain has been present for 1 year that is worsening. The mechanism of injury was uknown. The pain is characterized as dull, achy. The pain is present daily. Pain is improved by rest.  Pain is aggravated by stairs, weight bearing, and walking. Symptoms include pain. No new injury. No instability. Injections did help. Pain worse in right knee anteriorly.      The patient has tried rest..      Knee Surgical History:  None    Past Medical, Social and Family History:  Past Medical History:   Diagnosis Date    Anxiety     Arthritis     Cancer ([address])     Depression     Diabetes ([address])     Diabetes mellitus ([address])     Fatty liver     GERD (gastroesophageal reflux disease)     Hyperlipidemia     Hypertension     Intertrigo     resolved 10/26/17    Sebaceous cyst     Sexual disorder     Sexual dysfunction     last assessed 03/04/14    Snoring     last assessed 12/08/14    Somnolence, daytime     last assessed 12/08/14    Thoracic disc herniation last assessed 05/02/14    Vision problem     Vitamin D deficiency      Past Surgical History:   Procedure Laterality Date    COLONOSCOPY      last assessed 01/22/14    LYMPHADENECTOMY      Axillary; last assessed 05/30/14    MA CIRCUMCISION AGE >28 DAYS N/A 3/1/2022    Procedure: CIRCUMCISION ADULT;  Surgeon: Luiz Singh MD;  Location: [facility];  Service: Urology     Allergies   Allergen Reactions    Ace Inhibitors Cough     Current Outpatient Medications on File Prior to Visit   Medication Sig Dispense Refill    buPROPion (Wellbutrin SR) 150 mg 12 hr tablet Take 1 tablet (150 mg total) by mouth 2 (two) times a day 180 tablet 0    Cholecalciferol (VITAMIN D3) 5000 units CAPS Take by mouth      dorzolamide-timolol (COSOPT) 22.3-6.8 MG/ML ophthalmic solution INSTILL 1 DROP IN RIGHT EYE TWICE DAILY 1 mL 1    Empagliflozin 25 MG TABS Take 1 tablet (25 mg total) by mouth daily 90 tablet 1    glipiZIDE (GLUCOTROL) 10 mg tablet take 1 tablet by mouth twice a day BEFORE BREAKFAST AND DINNER 180 tablet 0    insulin aspart protamine-insulin aspart (NovoLOG Mix 70/30 FlexPen) 100 Units/mL injection pen Inject 25 Units At Breakfast and Lunch, and 35 Units At Dinner 75 mL 1    losartan-hydrochlorothiazide (HYZAAR) 100-25 MG per tablet Take 1 tablet by mouth daily 90 tablet 0    metFORMIN (GLUCOPHAGE-XR) 500 mg 24 hr tablet TAKE 1 TABLET BY MOUTH TWICE DAILY WITH MEALS 180 tablet 0    metoprolol succinate (TOPROL-XL) 50 mg 24 hr tablet Take 1 tablet (50 mg total) by mouth daily 90 tablet 0    pravastatin (PRAVACHOL) 80 mg tablet Take 1 tablet (80 mg total) by mouth daily 90 tablet 0    semaglutide, 0.25 or 0.5 mg/dose, (Ozempic, 0.25 or 0.5 MG/DOSE,) 2 mg/3 mL injection pen 0.25mg per week x  2 weeks then 0.5mg weekly after that 3 mL 1    BinaxNOW COVID-19 Ag Home Test KIT TEST AS DIRECTED TODAY (Patient not taking: Reported on 1/17/2023)      celecoxib (CeleBREX) 200 mg capsule Take 1 capsule (200 mg total) by mouth daily 30 capsule 1    ciclopirox (PENLAC) 8 % solution Apply topically daily at bedtime (Patient not taking: Reported on 9/20/2023) 6.6 mL 0    Continuous Blood Gluc Sensor (FreeStyle Samir 3 Sensor) MISC Use 1 sensor every 2 weeks. (Patient not taking: Reported on 10/11/2023) 2 each 5    Insulin Pen Needle (BD Pen Needle Marielos 2nd Gen) 32G X 4 MM MISC Inject as directed 3 (three) times a day (Patient not taking: Reported on 10/11/2023) 300 each 3    Insulin Syringe-Needle U-100 (B-D INS SYR ULTRAFINE .5CC/30G) 30G X 1/2" 0.5 ML MISC Inject insulin 3 times daily (Patient not taking: Reported on 10/11/2023) 300 each 1    Lancets (freestyle) lancets Use 1 each 3 (three) times a day Test blood sugars 4 times daily (Patient not taking: Reported on 10/11/2023) 300 each 0    sildenafil (REVATIO) 20 mg tablet Take 1 or 2 tablets 1 hour prior to relation (Patient not taking: Reported on 10/4/2023) 30 tablet 0     No current facility-administered medications on file prior to visit.      Social History     Socioeconomic History    Marital status: Single     Spouse name: Not on file    Number of children: Not on file    Years of education: Not on file    Highest education level: Not on file   Occupational History    Not on file   Tobacco Use    Smoking status: Never    Smokeless tobacco: Never   Vaping Use    Vaping Use: Never used   Substance and Sexual Activity    Alcohol use: Yes     Comment: social    Drug use: No    Sexual activity: Yes     Partners: Female   Other Topics Concern    Not on file   Social History Narrative    Not on file     Social Determinants of Health     Financial Resource Strain: Not on file   Food Insecurity: Not on file   Transportation Needs: Not on file   Physical Activity: Not on file   Stress: Not on file   Social Connections: Not on file   Intimate Partner Violence: Not on file   Housing Stability: Not on file         I have reviewed the past medical, surgical, social and family history, medications and allergies as documented in the EMR. Review of systems: ROS is negative other than that noted in the HPI. Constitutional: Negative for fatigue and fever. HENT: Negative for sore throat. Respiratory: Negative for shortness of breath. Cardiovascular: Negative for chest pain. Gastrointestinal: Negative for abdominal pain. Endocrine: Negative for cold intolerance and heat intolerance. Genitourinary: Negative for flank pain. Musculoskeletal: Negative for back pain. Skin: Negative for rash. Allergic/Immunologic: Negative for immunocompromised state. Neurological: Negative for dizziness. Psychiatric/Behavioral: Negative for agitation. Physical Exam:    Blood pressure 135/73, pulse 80, height 5' 6" (1.676 m), weight 98.9 kg (218 lb). General/Constitutional: NAD, well developed, well nourished  HENT: Normocephalic, atraumatic  CV: Intact distal pulses, regular rate  Resp: No respiratory distress or labored breathing  GI: Soft and non-tender   Lymphatic: No lymphadenopathy palpated  Neuro: Alert and Oriented x 3, no focal deficits  Psych: Normal mood, normal affect, normal judgement, normal behavior  Skin: Warm, dry, no rashes, no erythema      Knee Exam (focused): RIGHT LEFT   ROM:   0-130 0-130   Palpation: Effusion negative negative     MJL tenderness Negative Negative     LJL tenderness Negative Negative   Meniscus:  Tyesha Negative Negative    Apley's Compression Negative Negative   Instability: Varus stable stable     Valgus stable stable   Special Tests: Lachman Negative Negative     Posterior drawer Negative Negative     Anterior drawer Negative Negative     Pivot shift not tested not tested     Dial not tested not tested   Patella: Palpation no tenderness no tenderness     Mobility 1/4 1/4     Apprehension Negative Negative   Other: Single leg 1/4 squat not tested not tested      LE NV Exam: +2 DP/PT pulses bilaterally  Sensation intact to light touch L2-S1 bilaterally     Bilateral hip ROM demonstrates no pain actively or passively    No calf tenderness to palpation bilaterally    Knee Imaging    X-rays of the bilateral knee from 10/11/2023 were reviewed, which demonstrate no acute fracture or dislocation. No significant degenerative changes. I have reviewed the radiology report and do not currently have a radiology reading from [facility], but will check the result once the reading is performed.         Scribe Attestation      I,:   am acting as a scribe while in the presence of the attending physician.:       I,:   personally performed the services described in this documentation    as scribed in my presence.:

## 2023-11-28 ENCOUNTER — TELEPHONE (OUTPATIENT)
Dept: ENDOCRINOLOGY | Facility: CLINIC | Age: 53
End: 2023-11-28

## 2023-11-29 DIAGNOSIS — E11.9 TYPE 2 DIABETES MELLITUS WITHOUT COMPLICATION, WITHOUT LONG-TERM CURRENT USE OF INSULIN (HCC): ICD-10-CM

## 2023-12-01 DIAGNOSIS — E11.9 TYPE 2 DIABETES MELLITUS WITHOUT COMPLICATION, WITHOUT LONG-TERM CURRENT USE OF INSULIN (HCC): ICD-10-CM

## 2023-12-01 LAB
LEFT EYE DIABETIC RETINOPATHY: POSITIVE
RIGHT EYE DIABETIC RETINOPATHY: POSITIVE

## 2023-12-04 ENCOUNTER — TELEPHONE (OUTPATIENT)
Dept: OBGYN CLINIC | Facility: MEDICAL CENTER | Age: 53
End: 2023-12-04

## 2023-12-04 NOTE — TELEPHONE ENCOUNTER
LVM in regards to appt  for an MRI review with DR. Adame. Stated that Pt will have to reschedule appt for after MRI is complete as Pt canceled appt for MRI . If Pt returns call please provide phone number to central scheduling to reschedule MRI.     Central [phone]

## 2023-12-07 ENCOUNTER — OFFICE VISIT (OUTPATIENT)
Dept: ENDOCRINOLOGY | Facility: CLINIC | Age: 53
End: 2023-12-07
Payer: COMMERCIAL

## 2023-12-07 VITALS
SYSTOLIC BLOOD PRESSURE: 140 MMHG | BODY MASS INDEX: 34.97 KG/M2 | WEIGHT: 217.6 LBS | DIASTOLIC BLOOD PRESSURE: 80 MMHG | HEIGHT: 66 IN | HEART RATE: 77 BPM

## 2023-12-07 DIAGNOSIS — E11.9 TYPE 2 DIABETES MELLITUS WITHOUT COMPLICATION, WITHOUT LONG-TERM CURRENT USE OF INSULIN (HCC): ICD-10-CM

## 2023-12-07 DIAGNOSIS — Z79.4 TYPE 2 DIABETES MELLITUS WITH HYPERGLYCEMIA, WITH LONG-TERM CURRENT USE OF INSULIN (HCC): ICD-10-CM

## 2023-12-07 DIAGNOSIS — I10 ESSENTIAL HYPERTENSION: ICD-10-CM

## 2023-12-07 DIAGNOSIS — E66.01 CLASS 2 SEVERE OBESITY DUE TO EXCESS CALORIES WITH SERIOUS COMORBIDITY AND BODY MASS INDEX (BMI) OF 35.0 TO 35.9 IN ADULT: ICD-10-CM

## 2023-12-07 DIAGNOSIS — E11.3299 MILD NONPROLIFERATIVE DIABETIC RETINOPATHY ASSOCIATED WITH TYPE 2 DIABETES MELLITUS, MACULAR EDEMA PRESENCE UNSPECIFIED, UNSPECIFIED LATERALITY (HCC): Primary | ICD-10-CM

## 2023-12-07 DIAGNOSIS — E11.65 TYPE 2 DIABETES MELLITUS WITH HYPERGLYCEMIA, WITH LONG-TERM CURRENT USE OF INSULIN (HCC): ICD-10-CM

## 2023-12-07 DIAGNOSIS — E78.2 MIXED HYPERLIPIDEMIA: ICD-10-CM

## 2023-12-07 PROCEDURE — 99214 OFFICE O/P EST MOD 30 MIN: CPT | Performed by: INTERNAL MEDICINE

## 2023-12-07 RX ORDER — METFORMIN HYDROCHLORIDE 500 MG/1
TABLET, EXTENDED RELEASE ORAL
Qty: 360 TABLET | Refills: 1 | Status: SHIPPED | OUTPATIENT
Start: 2023-12-07

## 2023-12-07 RX ORDER — INSULIN ASPART 100 [IU]/ML
INJECTION, SUSPENSION SUBCUTANEOUS
Qty: 75 ML | Refills: 1 | Status: SHIPPED | OUTPATIENT
Start: 2023-12-07

## 2023-12-07 NOTE — ASSESSMENT & PLAN NOTE
Lab Results   Component Value Date    HGBA1C 11.1 (H) 09/20/2023   Continue follow-up with ophthalmologist-he had a recent eye exam done and reports that was told that retinopathy is stable

## 2023-12-07 NOTE — PROGRESS NOTES
Mihir Kelly 47 y.o. 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 ([address]) - Primary       Lab Results   Component Value Date    HGBA1C 11.1 (H) 09/20/2023   Continue follow-up with ophthalmologist-he had a recent eye exam done and reports that was told that retinopathy is stable         Relevant Medications    metFORMIN (GLUCOPHAGE-XR) 500 mg 24 hr tablet    semaglutide, 0.25 or 0.5 mg/dose, (Ozempic, 0.25 or 0.5 MG/DOSE,) 2 mg/3 mL injection pen    insulin aspart protamine-insulin aspart (NovoLOG Mix 70/30 FlexPen) 100 Units/mL injection pen    Other Relevant Orders    Comprehensive metabolic panel Lab Collect    Type 2 diabetes mellitus with hyperglycemia, with long-term current use of insulin ([facility])       Lab Results   Component Value Date    HGBA1C 11.1 (H) 09/20/2023   Diabetes remains poorly controlled-currently do not have much information however for now I have advised him to increase metformin to 1000 mg twice daily with meals. Discontinue Glucotrol. Advised to take NovoLog 70/30 insulin before eating rather than  30 minutes after eating. Take 30 units before breakfast and lunch and 20 before dinner which is his smaller meal.  Was given a sample of CGM-advised to make sure that he is monitoring his fingersticks 3-4 times a day when he is not using a CGM.   Referred for medical nutrition therapy         Relevant Medications    metFORMIN (GLUCOPHAGE-XR) 500 mg 24 hr tablet    semaglutide, 0.25 or 0.5 mg/dose, (Ozempic, 0.25 or 0.5 MG/DOSE,) 2 mg/3 mL injection pen    insulin aspart protamine-insulin aspart (NovoLOG Mix 70/30 FlexPen) 100 Units/mL injection pen    Other Relevant Orders    Comprehensive metabolic panel Lab Collect    HEMOGLOBIN A1C W/ EAG ESTIMATION Lab Collect    Ambulatory referral to Diabetic Education Cardiovascular and Mediastinum    Essential hypertension     Blood pressure almost at goal-continue current regimen         Relevant Medications    metFORMIN (GLUCOPHAGE-XR) 500 mg 24 hr tablet       Other    Class 2 severe obesity due to excess calories with serious comorbidity and body mass index (BMI) of 35.0 to 35.9 in adult     Relevant Orders    Ambulatory referral to Diabetic Education    Mixed hyperlipidemia     Continue statins         Relevant Orders    Ambulatory referral to Diabetic Education     Other Visit Diagnoses       Type 2 diabetes mellitus without complication, without long-term current use of insulin (HCC)        Relevant Medications    metFORMIN (GLUCOPHAGE-XR) 500 mg 24 hr tablet    semaglutide, 0.25 or 0.5 mg/dose, (Ozempic, 0.25 or 0.5 MG/DOSE,) 2 mg/3 mL injection pen    insulin aspart protamine-insulin aspart (NovoLOG Mix 70/30 FlexPen) 100 Units/mL injection pen    Other Relevant Orders    Comprehensive metabolic panel Lab Collect             CC: Diabetes    History of Present Illness     HPI:  81-year-old male with type 2 diabetes on insulin therapy seen in follow-up   Current regimen:   Glipizide 10mg twice per day  Novolog 70/30-- 30 units  30 mins after  each meal -   Jardiance 25mg daily  Ozempic 0.5 mg weekly   Metformin       Has not been able to afford sensor-was checking fingersticks and reports by memory most sugars are between 100- 250 , hypos twice a week at work around 3 pm -  3 hours after lunch   Recent eye exam - according to him exam a few back was stable   No blurry vision   No numbness and tingling in feet       Review of Systems    Historical Information   Past Medical History:   Diagnosis Date    Anxiety     Arthritis     Cancer ([address])     Depression     Diabetes ([address])     Diabetes mellitus ([address])     Fatty liver     GERD (gastroesophageal reflux disease)     Hyperlipidemia     Hypertension     Intertrigo     resolved 10/26/17    Sebaceous cyst     Sexual disorder Sexual dysfunction     last assessed 03/04/14    Snoring     last assessed 12/08/14    Somnolence, daytime     last assessed 12/08/14    Thoracic disc herniation     last assessed 05/02/14    Vision problem     Vitamin D deficiency      Past Surgical History:   Procedure Laterality Date    COLONOSCOPY      last assessed 01/22/14    LYMPHADENECTOMY      Axillary; last assessed 05/30/14    [place] CIRCUMCISION AGE >28 DAYS N/A 3/1/2022    Procedure: CIRCUMCISION ADULT;  Surgeon: Luis Eduardo Crane MD;  Location: [facility];  Service: Urology     Social History   Social History     Substance and Sexual Activity   Alcohol Use Yes    Comment: social     Social History     Substance and Sexual Activity   Drug Use No     Social History     Tobacco Use   Smoking Status Never   Smokeless Tobacco Never     Family History:   Family History   Problem Relation Age of Onset    Diabetes Mother     Kidney cancer Mother     Diabetes Father     Diabetes Sister     Diabetes Family     Hypertension Family        Meds/Allergies   Current Outpatient Medications   Medication Sig Dispense Refill    buPROPion (Wellbutrin SR) 150 mg 12 hr tablet Take 1 tablet (150 mg total) by mouth 2 (two) times a day 180 tablet 0    celecoxib (CeleBREX) 200 mg capsule Take 1 capsule (200 mg total) by mouth daily 30 capsule 1    Cholecalciferol (VITAMIN D3) 5000 units CAPS Take by mouth      dorzolamide-timolol (COSOPT) 22.3-6.8 MG/ML ophthalmic solution INSTILL 1 DROP IN RIGHT EYE TWICE DAILY 1 mL 1    Empagliflozin 25 MG TABS Take 1 tablet (25 mg total) by mouth daily 90 tablet 1    insulin aspart protamine-insulin aspart (NovoLOG Mix 70/30 FlexPen) 100 Units/mL injection pen Inject 30 Units  before  Breakfast and Lunch, and 20 Units At Dinner 75 mL 1    losartan-hydrochlorothiazide (HYZAAR) 100-25 MG per tablet Take 1 tablet by mouth daily 90 tablet 0    metFORMIN (GLUCOPHAGE-XR) 500 mg 24 hr tablet 2 tabs twice daily with meals 360 tablet 1    metoprolol succinate (TOPROL-XL) 50 mg 24 hr tablet Take 1 tablet (50 mg total) by mouth daily 90 tablet 0    pravastatin (PRAVACHOL) 80 mg tablet Take 1 tablet (80 mg total) by mouth daily 90 tablet 0    semaglutide, 0.25 or 0.5 mg/dose, (Ozempic, 0.25 or 0.5 MG/DOSE,) 2 mg/3 mL injection pen Inject 0.75 mL (0.5 mg total) under the skin every 7 days 9 mL 3    BinaxNOW COVID-19 Ag Home Test KIT TEST AS DIRECTED TODAY (Patient not taking: Reported on 1/17/2023)      ciclopirox (PENLAC) 8 % solution Apply topically daily at bedtime (Patient not taking: Reported on 9/20/2023) 6.6 mL 0    Continuous Blood Gluc Sensor (FreeStyle Samir 3 Sensor) MISC Use 1 sensor every 2 weeks. (Patient not taking: Reported on 10/11/2023) 2 each 5    Insulin Pen Needle (BD Pen Needle Marielos 2nd Gen) 32G X 4 MM MISC Inject as directed 3 (three) times a day (Patient not taking: Reported on 10/11/2023) 300 each 3    Insulin Syringe-Needle U-100 (B-D INS SYR ULTRAFINE .5CC/30G) 30G X 1/2" 0.5 ML MISC Inject insulin 3 times daily (Patient not taking: Reported on 12/7/2023) 300 each 1    Lancets (freestyle) lancets Use 1 each 3 (three) times a day Test blood sugars 4 times daily (Patient not taking: Reported on 10/11/2023) 300 each 0    sildenafil (REVATIO) 20 mg tablet Take 1 or 2 tablets 1 hour prior to relation (Patient not taking: Reported on 10/4/2023) 30 tablet 0     No current facility-administered medications for this visit. Allergies   Allergen Reactions    Ace Inhibitors Cough       Objective   Vitals: Blood pressure 140/80, pulse 77, height 5' 6" (1.676 m), weight 98.7 kg (217 lb 9.6 oz). Physical Exam  Vitals reviewed. Constitutional:       General: He is not in acute distress. Appearance: Normal appearance. He is obese. He is not ill-appearing, toxic-appearing or diaphoretic. HENT:      Head: Normocephalic and atraumatic. Eyes:      General: No scleral icterus. Extraocular Movements: Extraocular movements intact. Cardiovascular:      Rate and Rhythm: Normal rate and regular rhythm. Heart sounds: Normal heart sounds. No murmur heard. Pulmonary:      Effort: Pulmonary effort is normal. No respiratory distress. Breath sounds: Normal breath sounds. No wheezing or rales. Abdominal:      General: There is no distension. Palpations: Abdomen is soft. Tenderness: There is no abdominal tenderness. Musculoskeletal:      Cervical back: Neck supple. Right lower leg: No edema. Left lower leg: No edema. Lymphadenopathy:      Cervical: No cervical adenopathy. Skin:     General: Skin is warm and dry. Neurological:      General: No focal deficit present. Mental Status: He is alert and oriented to person, place, and time. Gait: Gait normal.   Psychiatric:         Mood and Affect: Mood normal.         Behavior: Behavior normal.         Thought Content: Thought content normal.         Judgment: Judgment normal.         The history was obtained from the review of the chart, patient and family.     Lab Results:   Lab Results   Component Value Date/Time    Hemoglobin A1C 11.1 (H) 09/20/2023 09:04 AM    Hemoglobin A1C 11.5 (H) 05/16/2023 09:00 AM    Hemoglobin A1C 10.4 (H) 01/17/2023 10:30 AM    WBC 17.81 (H) 09/20/2023 09:04 AM    WBC 19.28 (H) 01/17/2023 10:30 AM    Hemoglobin 14.9 09/20/2023 09:04 AM    Hemoglobin 14.8 01/17/2023 10:30 AM    Hematocrit 47.4 09/20/2023 09:04 AM    Hematocrit 45.9 01/17/2023 10:30 AM    MCV 87 09/20/2023 09:04 AM    MCV 85 01/17/2023 10:30 AM    Platelets 351 37/70/3382 09:04 AM    Platelets 815 10/52/9975 10:30 AM    BUN 22 09/20/2023 09:04 AM    BUN 17 05/16/2023 09:00 AM    BUN 17 01/17/2023 10:30 AM    Potassium 4.0 09/20/2023 09:04 AM    Potassium 4.5 05/16/2023 09:00 AM    Potassium 4.2 01/17/2023 10:30 AM    Chloride 100 09/20/2023 09:04 AM    Chloride 100 05/16/2023 09:00 AM    Chloride 96 01/17/2023 10:30 AM    CO2 29 09/20/2023 09:04 AM    CO2 29 05/16/2023 09:00 AM    CO2 30 01/17/2023 10:30 AM    Creatinine 1.21 09/20/2023 09:04 AM    Creatinine 1.14 05/16/2023 09:00 AM    Creatinine 1.18 01/17/2023 10:30 AM    AST 39 09/20/2023 09:04 AM    AST 35 05/16/2023 09:00 AM    AST 30 01/17/2023 10:30 AM    ALT 49 09/20/2023 09:04 AM    ALT 60 (H) 05/16/2023 09:00 AM    ALT 47 01/17/2023 10:30 AM    Total Protein 6.7 09/20/2023 09:04 AM    Total Protein 6.4 05/16/2023 09:00 AM    Total Protein 6.8 01/17/2023 10:30 AM    Albumin 4.4 09/20/2023 09:04 AM    Albumin 4.2 05/16/2023 09:00 AM    Albumin 4.4 01/17/2023 10:30 AM    HDL, Direct 34 (L) 09/20/2023 09:04 AM    HDL, Direct 30 (L) 01/17/2023 10:30 AM    Triglycerides 251 (H) 09/20/2023 09:04 AM    Triglycerides 349 (H) 01/17/2023 10:30 AM           Imaging Studies:     Portions of the record may have been created with voice recognition software. Occasional wrong word or "sound a like" substitutions may have occurred due to the inherent limitations of voice recognition software. Read the chart carefully and recognize, using context, where substitutions have occurred.

## 2023-12-07 NOTE — ASSESSMENT & PLAN NOTE
Lab Results   Component Value Date    HGBA1C 11.1 (H) 09/20/2023   Diabetes remains poorly controlled-currently do not have much information however for now I have advised him to increase metformin to 1000 mg twice daily with meals. Discontinue Glucotrol. Advised to take NovoLog 70/30 insulin before eating rather than  30 minutes after eating. Take 30 units before breakfast and lunch and 20 before dinner which is his smaller meal.  Was given a sample of CGM-advised to make sure that he is monitoring his fingersticks 3-4 times a day when he is not using a CGM.   Referred for medical nutrition therapy

## 2023-12-15 DIAGNOSIS — I10 ESSENTIAL HYPERTENSION: ICD-10-CM

## 2023-12-15 RX ORDER — LOSARTAN POTASSIUM AND HYDROCHLOROTHIAZIDE 25; 100 MG/1; MG/1
1 TABLET ORAL DAILY
Qty: 90 TABLET | Refills: 0 | Status: SHIPPED | OUTPATIENT
Start: 2023-12-15

## 2023-12-18 DIAGNOSIS — E11.9 TYPE 2 DIABETES MELLITUS WITHOUT COMPLICATION, WITHOUT LONG-TERM CURRENT USE OF INSULIN (HCC): ICD-10-CM

## 2023-12-18 RX ORDER — PEN NEEDLE, DIABETIC 32GX 5/32"
NEEDLE, DISPOSABLE MISCELLANEOUS 3 TIMES DAILY
Qty: 300 EACH | Refills: 3 | Status: SHIPPED | OUTPATIENT
Start: 2023-12-18

## 2023-12-21 DIAGNOSIS — E11.9 TYPE 2 DIABETES MELLITUS WITHOUT COMPLICATION, WITHOUT LONG-TERM CURRENT USE OF INSULIN (HCC): ICD-10-CM

## 2023-12-21 RX ORDER — BLOOD-GLUCOSE SENSOR
EACH MISCELLANEOUS
Qty: 6 EACH | Refills: 5 | Status: SHIPPED | OUTPATIENT
Start: 2023-12-21

## 2024-01-16 ENCOUNTER — TELEPHONE (OUTPATIENT)
Age: 54
End: 2024-01-16

## 2024-01-16 DIAGNOSIS — Z79.4 TYPE 2 DIABETES MELLITUS WITH HYPERGLYCEMIA, WITH LONG-TERM CURRENT USE OF INSULIN (HCC): ICD-10-CM

## 2024-01-16 DIAGNOSIS — E11.65 TYPE 2 DIABETES MELLITUS WITH HYPERGLYCEMIA, WITH LONG-TERM CURRENT USE OF INSULIN (HCC): ICD-10-CM

## 2024-01-16 NOTE — TELEPHONE ENCOUNTER
Javan Montgomery    Patient is asking to have medication refilled    Empagliflozin (Jardiance) 10 MG TABS tablet     Pharmacy:  [id number] - [place] - [address]   [place] [id number]     CB: [phone]

## 2024-01-25 ENCOUNTER — OFFICE VISIT (OUTPATIENT)
Dept: INTERNAL MEDICINE CLINIC | Facility: CLINIC | Age: 54
End: 2024-01-25
Payer: COMMERCIAL

## 2024-01-25 VITALS
OXYGEN SATURATION: 97 % | DIASTOLIC BLOOD PRESSURE: 80 MMHG | BODY MASS INDEX: 35.2 KG/M2 | HEART RATE: 83 BPM | SYSTOLIC BLOOD PRESSURE: 135 MMHG | WEIGHT: 219 LBS | TEMPERATURE: 97.5 F | HEIGHT: 66 IN

## 2024-01-25 DIAGNOSIS — R79.89 LOW TESTOSTERONE: ICD-10-CM

## 2024-01-25 DIAGNOSIS — E11.65 TYPE 2 DIABETES MELLITUS WITH HYPERGLYCEMIA, WITH LONG-TERM CURRENT USE OF INSULIN (HCC): ICD-10-CM

## 2024-01-25 DIAGNOSIS — B35.1 ONYCHOMYCOSIS: ICD-10-CM

## 2024-01-25 DIAGNOSIS — M54.50 CHRONIC BILATERAL LOW BACK PAIN WITHOUT SCIATICA: ICD-10-CM

## 2024-01-25 DIAGNOSIS — E66.01 CLASS 2 SEVERE OBESITY DUE TO EXCESS CALORIES WITH SERIOUS COMORBIDITY AND BODY MASS INDEX (BMI) OF 35.0 TO 35.9 IN ADULT: ICD-10-CM

## 2024-01-25 DIAGNOSIS — C91.10 CHRONIC LYMPHOCYTIC LEUKEMIA (HCC): ICD-10-CM

## 2024-01-25 DIAGNOSIS — Z79.4 TYPE 2 DIABETES MELLITUS WITH HYPERGLYCEMIA, WITH LONG-TERM CURRENT USE OF INSULIN (HCC): ICD-10-CM

## 2024-01-25 DIAGNOSIS — I10 ESSENTIAL HYPERTENSION: ICD-10-CM

## 2024-01-25 DIAGNOSIS — F11.20 CONTINUOUS OPIOID DEPENDENCE (HCC): ICD-10-CM

## 2024-01-25 DIAGNOSIS — E78.2 MIXED HYPERLIPIDEMIA: ICD-10-CM

## 2024-01-25 DIAGNOSIS — E78.00 PURE HYPERCHOLESTEROLEMIA: ICD-10-CM

## 2024-01-25 DIAGNOSIS — G89.29 CHRONIC BILATERAL LOW BACK PAIN WITHOUT SCIATICA: ICD-10-CM

## 2024-01-25 DIAGNOSIS — E11.3299 MILD NONPROLIFERATIVE DIABETIC RETINOPATHY ASSOCIATED WITH TYPE 2 DIABETES MELLITUS, MACULAR EDEMA PRESENCE UNSPECIFIED, UNSPECIFIED LATERALITY (HCC): Primary | ICD-10-CM

## 2024-01-25 DIAGNOSIS — K21.9 GASTROESOPHAGEAL REFLUX DISEASE WITHOUT ESOPHAGITIS: ICD-10-CM

## 2024-01-25 PROCEDURE — 99214 OFFICE O/P EST MOD 30 MIN: CPT | Performed by: INTERNAL MEDICINE

## 2024-01-25 RX ORDER — PRAVASTATIN SODIUM 80 MG/1
80 TABLET ORAL DAILY
Qty: 90 TABLET | Refills: 0 | Status: SHIPPED | OUTPATIENT
Start: 2024-01-25

## 2024-01-25 RX ORDER — LOSARTAN POTASSIUM AND HYDROCHLOROTHIAZIDE 25; 100 MG/1; MG/1
1 TABLET ORAL DAILY
Qty: 90 TABLET | Refills: 0 | Status: SHIPPED | OUTPATIENT
Start: 2024-01-25

## 2024-01-25 RX ORDER — METOPROLOL SUCCINATE 50 MG/1
50 TABLET, EXTENDED RELEASE ORAL DAILY
Qty: 90 TABLET | Refills: 0 | Status: SHIPPED | OUTPATIENT
Start: 2024-01-25

## 2024-01-25 RX ORDER — GLIPIZIDE 10 MG/1
10 TABLET ORAL
Status: CANCELLED | OUTPATIENT
Start: 2024-01-25

## 2024-01-25 RX ORDER — GLIPIZIDE 10 MG/1
10 TABLET ORAL
COMMUNITY
End: 2024-01-25

## 2024-01-25 NOTE — PATIENT INSTRUCTIONS
You are not supposed to be taking Glucotrol I deleted that from the list.  Continue follow-up with endocrinology  Your blood pressure is control  You need to get your lab work done especially the hemoglobin A1c to see the control of the diabetes    Follow-up with [facility] Po[facility] in 4 months Dr. Bryson Posey

## 2024-01-25 NOTE — PROGRESS NOTES
Assessment/Plan:      #1 diabetes I encouraged him to go for lab work and check the hemoglobin A1c he was taking Glucotrol I told him to stop it was recommended by the endocrinologist on the last visit I took that off the list.  He will continue NovoLog insulin 30 units before breakfast and lunch and 20 units before dinner  Metformin extended release 500 mg 2 tablets twice a day  Ozempic started recently.  Plus Jardiance 25 mg daily    2.  Blood pressure is well-controlled no chest pain palpitation shortness of breath he is on the following medications losartan hydrochlorothiazide 100/25 1 tablet daily metoprolol succinate 50 mg daily      #3 hyperlipidemia is on pravastatin 80 mg/day tolerating the medication no myalgias.    4.  Back pain persist going to refer him to a back specialist for further evaluation and treatment.    5.  Retinopathy does follow-up with ophthalmology.  Also has glaucoma      #6 persistent back pain will refer to pain management for further evaluation and treatment for the lower back nonradiating    He has onychomycosis of the toenail great toe on the left foot I ordered some Penlac to use at bedtime      The send diabetic foot exam for diabetes control follow-up with endocrinology visit on December 7, 2023 hemoglobin A1c 11.1 dated September 20, 2023  Medication metformin 500 mg 24-hour  Ozempic 1254.5 mg dose  NovoLog 70/30 30 units before breakfast and lunch and 20 units before dinner  Discontinue Glucotrol  Jardiance 25 mg daily was refilled in January 16, 2024      Results for orders placed or performed in visit on 12/01/23    Diabetes Eye Exam   Result Value Ref Range    Right Eye Diabetic Retinopathy Positive     Left Eye Diabetic Retinopathy Positive         No problem-specific Assessment & Plan notes found for this encounter.         Problem List Items Addressed This Visit          Digestive    Gastroesophageal reflux disease without esophagitis       Endocrine    Mild  "nonproliferative diabetic retinopathy associated with type 2 diabetes mellitus (HCC) - Primary    Type 2 diabetes mellitus with hyperglycemia, with long-term current use of insulin (HCC)       Cardiovascular and Mediastinum    Essential hypertension       Other    Mixed hyperlipidemia    Chronic lymphocytic leukemia (HCC)    Low testosterone    Class 2 severe obesity due to excess calories with serious comorbidity and body mass index (BMI) of 35.0 to 35.9 in adult          Subjective:      Patient ID: Javan Zamudio is a 53 y.o. male.    No chief complaint on file.        Current Outpatient Medications:     BinaxNOW COVID-19 Ag Home Test KIT, TEST AS DIRECTED TODAY (Patient not taking: Reported on 1/17/2023), Disp: , Rfl:     buPROPion (Wellbutrin SR) 150 mg 12 hr tablet, Take 1 tablet (150 mg total) by mouth 2 (two) times a day, Disp: 180 tablet, Rfl: 0    celecoxib (CeleBREX) 200 mg capsule, Take 1 capsule (200 mg total) by mouth daily, Disp: 30 capsule, Rfl: 1    Cholecalciferol (VITAMIN D3) 5000 units CAPS, Take by mouth, Disp: , Rfl:     ciclopirox (PENLAC) 8 % solution, Apply topically daily at bedtime (Patient not taking: Reported on 9/20/2023), Disp: 6.6 mL, Rfl: 0    Continuous Blood Gluc Sensor (FreeStyle Samir 3 Sensor) MISC, Use 1 sensor every 2 weeks., Disp: 6 each, Rfl: 5    dorzolamide-timolol (COSOPT) 22.3-6.8 MG/ML ophthalmic solution, INSTILL 1 DROP IN RIGHT EYE TWICE DAILY, Disp: 1 mL, Rfl: 1    Empagliflozin 25 MG TABS, Take 1 tablet (25 mg total) by mouth daily, Disp: 90 tablet, Rfl: 1    insulin aspart protamine-insulin aspart (NovoLOG Mix 70/30 FlexPen) 100 Units/mL injection pen, Inject 30 Units  before  Breakfast and Lunch, and 20 Units At Dinner, Disp: 75 mL, Rfl: 1    Insulin Pen Needle (BD Pen Needle Marielos 2nd Gen) 32G X 4 MM MISC, Inject as directed 3 (three) times a day, Disp: 300 each, Rfl: 3    Insulin Syringe-Needle U-100 (B-D INS SYR ULTRAFINE .5CC/30G) 30G X 1/2\" 0.5 ML " MISC, Inject insulin 3 times daily (Patient not taking: Reported on 12/7/2023), Disp: 300 each, Rfl: 1    Lancets (freestyle) lancets, Use 1 each 3 (three) times a day Test blood sugars 4 times daily (Patient not taking: Reported on 10/11/2023), Disp: 300 each, Rfl: 0    losartan-hydrochlorothiazide (HYZAAR) 100-25 MG per tablet, Take 1 tablet by mouth daily, Disp: 90 tablet, Rfl: 0    metFORMIN (GLUCOPHAGE-XR) 500 mg 24 hr tablet, 2 tabs twice daily with meals, Disp: 360 tablet, Rfl: 1    metoprolol succinate (TOPROL-XL) 50 mg 24 hr tablet, Take 1 tablet (50 mg total) by mouth daily, Disp: 90 tablet, Rfl: 0    pravastatin (PRAVACHOL) 80 mg tablet, Take 1 tablet (80 mg total) by mouth daily, Disp: 90 tablet, Rfl: 0    semaglutide, 0.25 or 0.5 mg/dose, (Ozempic, 0.25 or 0.5 MG/DOSE,) 2 mg/3 mL injection pen, Inject 0.75 mL (0.5 mg total) under the skin every 7 days, Disp: 9 mL, Rfl: 3    sildenafil (REVATIO) 20 mg tablet, Take 1 or 2 tablets 1 hour prior to relation (Patient not taking: Reported on 10/4/2023), Disp: 30 tablet, Rfl: 0    HPI    The following portions of the patient's history were reviewed and updated as appropriate: allergies, current medications, past family history, past medical history, past social history, past surgical history, and problem list.    Review of Systems   Constitutional: Negative.  Negative for activity change, appetite change, fatigue, fever and unexpected weight change.   HENT:  Negative for congestion, ear pain, hearing loss, mouth sores, postnasal drip, rhinorrhea, sore throat, trouble swallowing and voice change.    Eyes:  Negative for pain, redness and visual disturbance.   Respiratory:  Negative for cough, chest tightness, shortness of breath and wheezing.    Cardiovascular:  Negative for chest pain, palpitations and leg swelling.   Gastrointestinal:  Negative for abdominal distention, abdominal pain, blood in stool, constipation, diarrhea and nausea.   Endocrine: Negative for  cold intolerance, heat intolerance, polydipsia, polyphagia and polyuria.   Genitourinary:  Negative for difficulty urinating, dysuria, flank pain, frequency, hematuria and urgency.   Musculoskeletal:  Negative for arthralgias, back pain, gait problem, joint swelling and myalgias.   Skin:  Negative for color change and pallor.   Neurological:  Negative for dizziness, tremors, seizures, syncope, weakness, numbness and headaches.   Hematological:  Negative for adenopathy. Does not bruise/bleed easily.   Psychiatric/Behavioral: Negative.  Negative for sleep disturbance. The patient is not nervous/anxious.          Objective:      Diabetic Foot Exam    Patient's shoes and socks removed.    Right Foot/Ankle   Right Foot Inspection  Skin Exam: skin normal. Skin not intact, no dry skin, no warmth, no callus, no erythema, no maceration, no abnormal color, no pre-ulcer, no ulcer and no callus.     Toe Exam: ROM and strength within normal limits. No swelling, no tenderness, erythema and  no right toe deformity    Sensory   Vibration: intact  Proprioception: intact  Monofilament testing: intact    Vascular  The right DP pulse is 2+. The right PT pulse is 2+.     Left Foot/Ankle  Left Foot Inspection  Skin Exam: skin normal. Skin not intact, no dry skin, no warmth, no erythema, no maceration, normal color, no pre-ulcer, no ulcer and no callus.     Toe Exam: ROM and strength within normal limits. No swelling, no tenderness, no erythema and no left toe deformity.     Sensory   Vibration: intact  Proprioception: intact  Monofilament testing: intact    Vascular  The left DP pulse is 2+. The left PT pulse is 2+.     Assign Risk Category  No deformity present  No loss of protective sensation  No weak pulses  Risk: 0      There were no vitals taken for this visit.     Physical Exam  Constitutional:       Appearance: He is well-developed.   HENT:      Head: Normocephalic.      Right Ear: Tympanic membrane, ear canal and external ear  normal.      Left Ear: Tympanic membrane, ear canal and external ear normal.      Nose: Nose normal.      Mouth/Throat:      Mouth: Mucous membranes are moist.      Pharynx: Oropharynx is clear. No oropharyngeal exudate.   Eyes:      Conjunctiva/sclera: Conjunctivae normal.      Pupils: Pupils are equal, round, and reactive to light.   Neck:      Thyroid: No thyromegaly.   Cardiovascular:      Rate and Rhythm: Normal rate and regular rhythm.      Pulses: no weak pulses          Dorsalis pedis pulses are 2+ on the right side and 2+ on the left side.        Posterior tibial pulses are 2+ on the right side and 2+ on the left side.      Heart sounds: Normal heart sounds. No murmur heard.     No friction rub. No gallop.      Comments: S1-S2 regular rhythm  Extremities no edema  Pulmonary:      Effort: Pulmonary effort is normal. No respiratory distress.      Breath sounds: Normal breath sounds. No wheezing or rales.      Comments: Lungs are clear no wheezing rales or rhonchi  Abdominal:      General: Bowel sounds are normal. There is no distension.      Palpations: Abdomen is soft. There is no mass.      Tenderness: There is no abdominal tenderness. There is no guarding or rebound.      Comments: Abdomen soft nontender   Musculoskeletal:         General: Normal range of motion.      Cervical back: Normal range of motion and neck supple.   Feet:      Right foot:      Skin integrity: No ulcer, skin breakdown, erythema, warmth, callus or dry skin.      Left foot:      Skin integrity: No ulcer, skin breakdown, erythema, warmth, callus or dry skin.   Lymphadenopathy:      Cervical: No cervical adenopathy.   Skin:     General: Skin is warm and dry.   Neurological:      Mental Status: He is alert and oriented to person, place, and time.   Psychiatric:         Behavior: Behavior normal.         Judgment: Judgment normal.

## 2024-01-27 ENCOUNTER — APPOINTMENT (OUTPATIENT)
Dept: LAB | Facility: HOSPITAL | Age: 54
End: 2024-01-27
Payer: COMMERCIAL

## 2024-01-27 DIAGNOSIS — K21.9 GASTROESOPHAGEAL REFLUX DISEASE WITHOUT ESOPHAGITIS: ICD-10-CM

## 2024-01-27 DIAGNOSIS — C91.10 CHRONIC LYMPHOCYTIC LEUKEMIA (HCC): ICD-10-CM

## 2024-01-27 DIAGNOSIS — Z79.4 TYPE 2 DIABETES MELLITUS WITH HYPERGLYCEMIA, WITH LONG-TERM CURRENT USE OF INSULIN (HCC): ICD-10-CM

## 2024-01-27 DIAGNOSIS — E11.3299 MILD NONPROLIFERATIVE DIABETIC RETINOPATHY ASSOCIATED WITH TYPE 2 DIABETES MELLITUS, MACULAR EDEMA PRESENCE UNSPECIFIED, UNSPECIFIED LATERALITY (HCC): ICD-10-CM

## 2024-01-27 DIAGNOSIS — E78.2 MIXED HYPERLIPIDEMIA: ICD-10-CM

## 2024-01-27 DIAGNOSIS — E11.9 TYPE 2 DIABETES MELLITUS WITHOUT COMPLICATION, WITHOUT LONG-TERM CURRENT USE OF INSULIN (HCC): ICD-10-CM

## 2024-01-27 DIAGNOSIS — E11.65 TYPE 2 DIABETES MELLITUS WITH HYPERGLYCEMIA, WITH LONG-TERM CURRENT USE OF INSULIN (HCC): ICD-10-CM

## 2024-01-27 DIAGNOSIS — E66.01 CLASS 2 SEVERE OBESITY DUE TO EXCESS CALORIES WITH SERIOUS COMORBIDITY AND BODY MASS INDEX (BMI) OF 35.0 TO 35.9 IN ADULT: ICD-10-CM

## 2024-01-27 LAB
ALBUMIN SERPL BCP-MCNC: 4.4 G/DL (ref 3.5–5)
ALP SERPL-CCNC: 84 U/L (ref 34–104)
ALT SERPL W P-5'-P-CCNC: 29 U/L (ref 7–52)
ANION GAP SERPL CALCULATED.3IONS-SCNC: 7 MMOL/L
AST SERPL W P-5'-P-CCNC: 25 U/L (ref 13–39)
BASOPHILS # BLD AUTO: 0.07 THOUSANDS/ÂΜL (ref 0–0.1)
BASOPHILS NFR BLD AUTO: 0 % (ref 0–1)
BILIRUB SERPL-MCNC: 0.52 MG/DL (ref 0.2–1)
BUN SERPL-MCNC: 20 MG/DL (ref 5–25)
CALCIUM SERPL-MCNC: 10.1 MG/DL (ref 8.4–10.2)
CHLORIDE SERPL-SCNC: 102 MMOL/L (ref 96–108)
CO2 SERPL-SCNC: 28 MMOL/L (ref 21–32)
CREAT SERPL-MCNC: 1.17 MG/DL (ref 0.6–1.3)
EOSINOPHIL # BLD AUTO: 0.07 THOUSAND/ÂΜL (ref 0–0.61)
EOSINOPHIL NFR BLD AUTO: 0 % (ref 0–6)
ERYTHROCYTE [DISTWIDTH] IN BLOOD BY AUTOMATED COUNT: 12.4 % (ref 11.6–15.1)
EST. AVERAGE GLUCOSE BLD GHB EST-MCNC: 223 MG/DL
GFR SERPL CREATININE-BSD FRML MDRD: 71 ML/MIN/1.73SQ M
GLUCOSE P FAST SERPL-MCNC: 208 MG/DL (ref 65–99)
HBA1C MFR BLD: 9.4 %
HCT VFR BLD AUTO: 47.2 % (ref 36.5–49.3)
HGB BLD-MCNC: 14.7 G/DL (ref 12–17)
IMM GRANULOCYTES # BLD AUTO: 0.08 THOUSAND/UL (ref 0–0.2)
IMM GRANULOCYTES NFR BLD AUTO: 1 % (ref 0–2)
LYMPHOCYTES # BLD AUTO: 11.57 THOUSANDS/ÂΜL (ref 0.6–4.47)
LYMPHOCYTES NFR BLD AUTO: 68 % (ref 14–44)
MAGNESIUM SERPL-MCNC: 2.2 MG/DL (ref 1.9–2.7)
MCH RBC QN AUTO: 26.9 PG (ref 26.8–34.3)
MCHC RBC AUTO-ENTMCNC: 31.1 G/DL (ref 31.4–37.4)
MCV RBC AUTO: 86 FL (ref 82–98)
MONOCYTES # BLD AUTO: 0.71 THOUSAND/ÂΜL (ref 0.17–1.22)
MONOCYTES NFR BLD AUTO: 4 % (ref 4–12)
NEUTROPHILS # BLD AUTO: 4.73 THOUSANDS/ÂΜL (ref 1.85–7.62)
NEUTS SEG NFR BLD AUTO: 27 % (ref 43–75)
NRBC BLD AUTO-RTO: 0 /100 WBCS
PLATELET # BLD AUTO: 177 THOUSANDS/UL (ref 149–390)
PMV BLD AUTO: 9.9 FL (ref 8.9–12.7)
POTASSIUM SERPL-SCNC: 4 MMOL/L (ref 3.5–5.3)
PROT SERPL-MCNC: 6.9 G/DL (ref 6.4–8.4)
RBC # BLD AUTO: 5.47 MILLION/UL (ref 3.88–5.62)
SODIUM SERPL-SCNC: 137 MMOL/L (ref 135–147)
TSH SERPL DL<=0.05 MIU/L-ACNC: 2.65 UIU/ML (ref 0.45–4.5)
WBC # BLD AUTO: 17.23 THOUSAND/UL (ref 4.31–10.16)

## 2024-01-27 PROCEDURE — 83735 ASSAY OF MAGNESIUM: CPT

## 2024-01-27 PROCEDURE — 85025 COMPLETE CBC W/AUTO DIFF WBC: CPT

## 2024-01-27 PROCEDURE — 36415 COLL VENOUS BLD VENIPUNCTURE: CPT

## 2024-01-27 PROCEDURE — 83036 HEMOGLOBIN GLYCOSYLATED A1C: CPT

## 2024-01-27 PROCEDURE — 80053 COMPREHEN METABOLIC PANEL: CPT

## 2024-01-27 PROCEDURE — 84443 ASSAY THYROID STIM HORMONE: CPT

## 2024-01-27 PROCEDURE — 3046F HEMOGLOBIN A1C LEVEL >9.0%: CPT | Performed by: INTERNAL MEDICINE

## 2024-01-29 ENCOUNTER — TELEPHONE (OUTPATIENT)
Dept: INTERNAL MEDICINE CLINIC | Facility: CLINIC | Age: 54
End: 2024-01-29

## 2024-01-29 NOTE — TELEPHONE ENCOUNTER
Called spoke with patient gave results and patient understood     ----- Message from Nic Olvera MD sent at 1/29/2024 12:25 AM EST -----  Please call the patient regarding his abnormal result.HbA1C  improved  from  11,1  to  9.4   keep up good work follow up endo  goal get bellow  8

## 2024-02-21 ENCOUNTER — TELEPHONE (OUTPATIENT)
Age: 54
End: 2024-02-21

## 2024-02-21 NOTE — TELEPHONE ENCOUNTER
Caller: Javan    Doctor: Deep    Reason for call: Please rewrite the MRI RX for the left knee. It is written for the right    Call back#: [phone]

## 2024-02-23 NOTE — TELEPHONE ENCOUNTER
Please call patient and make aware that we have switched the order to a left knee MRI. He can call central scheduling to schedule.

## 2024-02-23 NOTE — TELEPHONE ENCOUNTER
Left message on Pt phone stating providers message. Left central scheduling phone number to schedule.

## 2024-03-09 ENCOUNTER — HOSPITAL ENCOUNTER (OUTPATIENT)
Dept: MRI IMAGING | Facility: HOSPITAL | Age: 54
Discharge: HOME | End: 2024-03-09
Payer: COMMERCIAL

## 2024-03-09 DIAGNOSIS — M23.92 ACUTE INTERNAL DERANGEMENT OF LEFT KNEE: ICD-10-CM

## 2024-03-09 PROCEDURE — 73721 MRI JNT OF LWR EXTRE W/O DYE: CPT

## 2024-04-30 DIAGNOSIS — I10 ESSENTIAL HYPERTENSION: ICD-10-CM

## 2024-05-02 RX ORDER — METOPROLOL SUCCINATE 50 MG/1
50 TABLET, EXTENDED RELEASE ORAL DAILY
Qty: 90 TABLET | Refills: 0 | Status: SHIPPED | OUTPATIENT
Start: 2024-05-02

## 2024-05-16 DIAGNOSIS — E78.00 PURE HYPERCHOLESTEROLEMIA: ICD-10-CM

## 2024-05-16 DIAGNOSIS — E11.9 TYPE 2 DIABETES MELLITUS WITHOUT COMPLICATION, WITHOUT LONG-TERM CURRENT USE OF INSULIN (HCC): ICD-10-CM

## 2024-05-16 DIAGNOSIS — Z79.4 TYPE 2 DIABETES MELLITUS WITH HYPERGLYCEMIA, WITH LONG-TERM CURRENT USE OF INSULIN (HCC): ICD-10-CM

## 2024-05-16 DIAGNOSIS — E11.65 TYPE 2 DIABETES MELLITUS WITH HYPERGLYCEMIA, WITH LONG-TERM CURRENT USE OF INSULIN (HCC): ICD-10-CM

## 2024-05-16 RX ORDER — PRAVASTATIN SODIUM 80 MG/1
80 TABLET ORAL DAILY
Qty: 90 TABLET | Refills: 1 | Status: SHIPPED | OUTPATIENT
Start: 2024-05-16

## 2024-05-16 RX ORDER — INSULIN ASPART 100 [IU]/ML
INJECTION, SUSPENSION SUBCUTANEOUS
Qty: 45 ML | Refills: 1 | Status: SHIPPED | OUTPATIENT
Start: 2024-05-16 | End: 2024-05-23 | Stop reason: SDUPTHER

## 2024-05-17 ENCOUNTER — TELEPHONE (OUTPATIENT)
Age: 54
End: 2024-05-17

## 2024-05-17 NOTE — TELEPHONE ENCOUNTER
PA for OZEMPIC    Submitted via    []CMM-KEY    [x]Semajriethan-Case ID # [id number]   []Faxed to plan   []Other website    []Phone call Case ID #      Office notes sent, clinical questions answered. Awaiting determination    Turnaround time for your insurance to make a decision on your Prior Authorization can take 7-21 business days.

## 2024-05-21 NOTE — TELEPHONE ENCOUNTER
PA for OZEMPIC Approved     Date(s) approved April 20, 2024 to May 20, 2025     Case # [id number]     Patient advised by          [] Efficiency Exchanget Message  [] Phone call   [x]LMOM  []L/M to call office as no active Communication consent on file  []Unable to leave detailed message as VM not approved on Communication consent       Pharmacy advised by    []Fax  [x]Phone call    Approval letter scanned into Media Yes

## 2024-05-23 ENCOUNTER — OFFICE VISIT (OUTPATIENT)
Dept: ENDOCRINOLOGY | Facility: CLINIC | Age: 54
End: 2024-05-23
Payer: COMMERCIAL

## 2024-05-23 VITALS
HEART RATE: 85 BPM | BODY MASS INDEX: 33.62 KG/M2 | SYSTOLIC BLOOD PRESSURE: 140 MMHG | HEIGHT: 66 IN | DIASTOLIC BLOOD PRESSURE: 70 MMHG | WEIGHT: 209.2 LBS | OXYGEN SATURATION: 97 %

## 2024-05-23 DIAGNOSIS — I10 ESSENTIAL HYPERTENSION: ICD-10-CM

## 2024-05-23 DIAGNOSIS — N52.9 ERECTILE DYSFUNCTION, UNSPECIFIED ERECTILE DYSFUNCTION TYPE: ICD-10-CM

## 2024-05-23 DIAGNOSIS — Z79.4 TYPE 2 DIABETES MELLITUS WITH HYPERGLYCEMIA, WITH LONG-TERM CURRENT USE OF INSULIN (HCC): Primary | ICD-10-CM

## 2024-05-23 DIAGNOSIS — E11.3299 MILD NONPROLIFERATIVE DIABETIC RETINOPATHY ASSOCIATED WITH TYPE 2 DIABETES MELLITUS, MACULAR EDEMA PRESENCE UNSPECIFIED, UNSPECIFIED LATERALITY (HCC): ICD-10-CM

## 2024-05-23 DIAGNOSIS — E78.2 MIXED HYPERLIPIDEMIA: ICD-10-CM

## 2024-05-23 DIAGNOSIS — E66.01 CLASS 2 SEVERE OBESITY DUE TO EXCESS CALORIES WITH SERIOUS COMORBIDITY AND BODY MASS INDEX (BMI) OF 35.0 TO 35.9 IN ADULT (HCC): ICD-10-CM

## 2024-05-23 DIAGNOSIS — E11.65 TYPE 2 DIABETES MELLITUS WITH HYPERGLYCEMIA, WITH LONG-TERM CURRENT USE OF INSULIN (HCC): Primary | ICD-10-CM

## 2024-05-23 LAB — SL AMB POCT HEMOGLOBIN AIC: 9.3 (ref ?–6.5)

## 2024-05-23 PROCEDURE — 83036 HEMOGLOBIN GLYCOSYLATED A1C: CPT | Performed by: INTERNAL MEDICINE

## 2024-05-23 PROCEDURE — 99214 OFFICE O/P EST MOD 30 MIN: CPT | Performed by: INTERNAL MEDICINE

## 2024-05-23 RX ORDER — INSULIN ASPART 100 [IU]/ML
INJECTION, SUSPENSION SUBCUTANEOUS
Qty: 45 ML | Refills: 1 | Status: SHIPPED | OUTPATIENT
Start: 2024-05-23

## 2024-05-23 NOTE — PROGRESS NOTES
Javan Zamudio 53 y.o. 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  Counseled on risk of hypoglycemia.  Advised to take 30 units before breakfast and lunch and 24 units before dinner.  He should check his blood sugar at least 2-4 times a day         Relevant Medications    insulin aspart protamine-insulin aspart (NovoLOG Mix 70/30 FlexPen) 100 Units/mL injection pen    Other Relevant Orders    POCT hemoglobin A1c (Completed)    Hemoglobin A1C    Albumin / creatinine urine ratio    Comprehensive metabolic panel    Lipid Panel with Direct LDL reflex       Other    Class 2 severe obesity due to excess calories with serious comorbidity and body mass index (BMI) of 35.0 to 35.9 in adult ([facility])    Erectile dysfunction    Relevant Orders    Testosterone, free, total    Mixed hyperlipidemia     Continue statins         Relevant Orders    Comprehensive metabolic panel    Lipid Panel with Direct LDL reflex        CC: Diabetes    History of Present Illness     HPI:  53-year-old male with type 2 diabetes on insulin therapy seen in follow-up   Current regimen:   Novolog 70/30-- 30 units 2 houres before breakfast , 30 units at 6 pm and 20 units at bedtime     Jardiance 25mg daily  Ozempic 0.5 mg weekly   Metformin     Has been checking his blood sugar 2 or 3 times a day  Fasting 170s-200  Predinner -140-170s  Bedtime 140-200        Meals 9 am ,6 pm , 12 am     No polyuria , polydipsia , no blurry vision   No numbness and tingling in feet     Weight stable   Review of Systems    Historical Information   Past Medical History:   Diagnosis Date    Anxiety     Arthritis     Cancer (HCC)     Depression     Diabetes (HCC)     Diabetes mellitus (HCC)     Fatty liver     GERD (gastroesophageal reflux disease)     Hyperlipidemia     Hypertension     Intertrigo     resolved 10/26/17    Sebaceous cyst     Sexual disorder     Sexual dysfunction     last assessed 03/04/14    Snoring     last assessed 12/08/14    Somnolence, daytime     last assessed 12/08/14    Thoracic disc herniation     last assessed 05/02/14    Vision problem     Vitamin D deficiency      Past Surgical History:   Procedure Laterality Date    COLONOSCOPY      last assessed 01/22/14    LYMPHADENECTOMY      Axillary; last assessed 05/30/14    UT CIRCUMCISION AGE >28 DAYS N/A 3/1/2022    Procedure: CIRCUMCISION ADULT;  Surgeon: Brennen Fink MD;  Location: [facility] OR;  Service: Urology     Social History   Social History     Substance and Sexual Activity   Alcohol Use Yes    Comment: social     Social History     Substance and Sexual Activity   Drug Use No     Social History     Tobacco Use   Smoking Status Never   Smokeless Tobacco Never     Family History:   Family History   Problem Relation Age of Onset    Diabetes Mother     Kidney cancer Mother     Diabetes Father     Diabetes Sister     Diabetes Family     Hypertension Family        Meds/Allergies   Current Outpatient Medications   Medication Sig Dispense Refill    Cholecalciferol (VITAMIN D3) 5000 units CAPS Take by mouth      ciclopirox (PENLAC) 8 % solution Apply topically daily at bedtime 6 mL 1    dorzolamide-timolol (COSOPT) 22.3-6.8 MG/ML ophthalmic  "solution INSTILL 1 DROP IN RIGHT EYE TWICE DAILY 1 mL 1    Empagliflozin 25 MG TABS Take 1 tablet (25 mg total) by mouth daily 90 tablet 1    insulin aspart protamine-insulin aspart (NovoLOG Mix 70/30 FlexPen) 100 Units/mL injection pen Inject 30 Units  before  Breakfast and Lunch, and 24 Units At Dinner 45 mL 1    Insulin Pen Needle (BD Pen Needle Marielos 2nd Gen) 32G X 4 MM MISC Inject as directed 3 (three) times a day 300 each 3    Insulin Syringe-Needle U-100 (B-D INS SYR ULTRAFINE .5CC/30G) 30G X 1/2\" 0.5 ML MISC Inject insulin 3 times daily 300 each 1    Lancets (freestyle) lancets Use 1 each 3 (three) times a day Test blood sugars 4 times daily 300 each 0    losartan-hydrochlorothiazide (HYZAAR) 100-25 MG per tablet Take 1 tablet by mouth daily 90 tablet 0    metFORMIN (GLUCOPHAGE-XR) 500 mg 24 hr tablet 2 tabs twice daily with meals 360 tablet 1    metoprolol succinate (TOPROL-XL) 50 mg 24 hr tablet Take 1 tablet (50 mg total) by mouth daily 90 tablet 0    pravastatin (PRAVACHOL) 80 mg tablet Take 1 tablet (80 mg total) by mouth daily 90 tablet 1    semaglutide, 0.25 or 0.5 mg/dose, (Ozempic, 0.25 or 0.5 MG/DOSE,) 2 mg/3 mL injection pen Inject 0.75 mL (0.5 mg total) under the skin every 7 days 9 mL 1    BinaxNOW COVID-19 Ag Home Test KIT TEST AS DIRECTED TODAY (Patient not taking: Reported on 1/17/2023)      celecoxib (CeleBREX) 200 mg capsule Take 1 capsule (200 mg total) by mouth daily (Patient not taking: Reported on 5/23/2024) 30 capsule 1    sildenafil (REVATIO) 20 mg tablet Take 1 or 2 tablets 1 hour prior to relation (Patient not taking: Reported on 10/4/2023) 30 tablet 0     No current facility-administered medications for this visit.     Allergies   Allergen Reactions    Ace Inhibitors Cough       Objective   Vitals: Blood pressure 140/70, pulse 85, height 5' 6\" (1.676 m), weight 94.9 kg (209 lb 3.2 oz), SpO2 97%.    Physical Exam  Vitals reviewed.   Constitutional:       General: He is not in acute " distress.     Appearance: Normal appearance. He is obese. He is not ill-appearing, toxic-appearing or diaphoretic.   HENT:      Head: Normocephalic and atraumatic.   Eyes:      General: No scleral icterus.     Extraocular Movements: Extraocular movements intact.   Cardiovascular:      Rate and Rhythm: Normal rate and regular rhythm.      Heart sounds: Normal heart sounds. No murmur heard.  Pulmonary:      Effort: Pulmonary effort is normal. No respiratory distress.      Breath sounds: Normal breath sounds. No wheezing or rales.   Musculoskeletal:      Cervical back: Neck supple.      Right lower leg: No edema.      Left lower leg: No edema.   Lymphadenopathy:      Cervical: No cervical adenopathy.   Skin:     General: Skin is warm and dry.   Neurological:      General: No focal deficit present.      Mental Status: He is alert and oriented to person, place, and time.      Gait: Gait normal.   Psychiatric:         Mood and Affect: Mood normal.         Behavior: Behavior normal.         Thought Content: Thought content normal.         Judgment: Judgment normal.         The history was obtained from the review of the chart, patient and family.    Lab Results:   Lab Results   Component Value Date/Time    Hemoglobin A1C 9.3 (A) 05/23/2024 10:49 AM    Hemoglobin A1C 9.4 (H) 01/27/2024 10:00 AM    Hemoglobin A1C 11.1 (H) 09/20/2023 09:04 AM    WBC 17.23 (H) 01/27/2024 10:00 AM    WBC 17.81 (H) 09/20/2023 09:04 AM    Hemoglobin 14.7 01/27/2024 10:00 AM    Hemoglobin 14.9 09/20/2023 09:04 AM    Hematocrit 47.2 01/27/2024 10:00 AM    Hematocrit 47.4 09/20/2023 09:04 AM    MCV 86 01/27/2024 10:00 AM    MCV 87 09/20/2023 09:04 AM    Platelets 177 01/27/2024 10:00 AM    Platelets 205 09/20/2023 09:04 AM    BUN 20 01/27/2024 10:00 AM    BUN 22 09/20/2023 09:04 AM    Potassium 4.0 01/27/2024 10:00 AM    Potassium 4.0 09/20/2023 09:04 AM    Chloride 102 01/27/2024 10:00 AM    Chloride 100 09/20/2023 09:04 AM    CO2 28 01/27/2024  "10:00 AM    CO2 29 09/20/2023 09:04 AM    Creatinine 1.17 01/27/2024 10:00 AM    Creatinine 1.21 09/20/2023 09:04 AM    AST 25 01/27/2024 10:00 AM    AST 39 09/20/2023 09:04 AM    ALT 29 01/27/2024 10:00 AM    ALT 49 09/20/2023 09:04 AM    Total Protein 6.9 01/27/2024 10:00 AM    Total Protein 6.7 09/20/2023 09:04 AM    Albumin 4.4 01/27/2024 10:00 AM    Albumin 4.4 09/20/2023 09:04 AM    HDL, Direct 34 (L) 09/20/2023 09:04 AM    Triglycerides 251 (H) 09/20/2023 09:04 AM           Imaging Studies:     Portions of the record may have been created with voice recognition software. Occasional wrong word or \"sound a like\" substitutions may have occurred due to the inherent limitations of voice recognition software. Read the chart carefully and recognize, using context, where substitutions have occurred.    "

## 2024-05-24 NOTE — ASSESSMENT & PLAN NOTE
Lab Results   Component Value Date    HGBA1C 9.3 (A) 05/23/2024   Diabetes remains poorly controlled-he is taking NovoLog 70/30 2 hours before breakfast-counseled on the importance of taking insulin as directed.  Counseled on risk of hypoglycemia.  Advised to take 30 units before breakfast and lunch and 24 units before dinner.  He should check his blood sugar at least 2-4 times a day

## 2024-06-04 ENCOUNTER — APPOINTMENT (OUTPATIENT)
Dept: LAB | Facility: HOSPITAL | Age: 54
End: 2024-06-04
Payer: COMMERCIAL

## 2024-06-04 ENCOUNTER — OFFICE VISIT (OUTPATIENT)
Dept: INTERNAL MEDICINE CLINIC | Facility: CLINIC | Age: 54
End: 2024-06-04
Payer: COMMERCIAL

## 2024-06-04 ENCOUNTER — TELEPHONE (OUTPATIENT)
Age: 54
End: 2024-06-04

## 2024-06-04 VITALS
SYSTOLIC BLOOD PRESSURE: 145 MMHG | WEIGHT: 209 LBS | BODY MASS INDEX: 33.59 KG/M2 | HEART RATE: 78 BPM | HEIGHT: 66 IN | TEMPERATURE: 98.1 F | DIASTOLIC BLOOD PRESSURE: 78 MMHG

## 2024-06-04 DIAGNOSIS — E11.69 DYSLIPIDEMIA DUE TO TYPE 2 DIABETES MELLITUS  (HCC): ICD-10-CM

## 2024-06-04 DIAGNOSIS — Z79.4 TYPE 2 DIABETES MELLITUS WITH HYPERGLYCEMIA, WITH LONG-TERM CURRENT USE OF INSULIN (HCC): ICD-10-CM

## 2024-06-04 DIAGNOSIS — E11.65 TYPE 2 DIABETES MELLITUS WITH HYPERGLYCEMIA, WITH LONG-TERM CURRENT USE OF INSULIN (HCC): ICD-10-CM

## 2024-06-04 DIAGNOSIS — R37 SEXUAL DYSFUNCTION: ICD-10-CM

## 2024-06-04 DIAGNOSIS — Z12.5 PROSTATE CANCER SCREENING: ICD-10-CM

## 2024-06-04 DIAGNOSIS — I10 PRIMARY HYPERTENSION: ICD-10-CM

## 2024-06-04 DIAGNOSIS — C91.10 CHRONIC LYMPHOCYTIC LEUKEMIA (HCC): ICD-10-CM

## 2024-06-04 DIAGNOSIS — E78.5 DYSLIPIDEMIA DUE TO TYPE 2 DIABETES MELLITUS  (HCC): ICD-10-CM

## 2024-06-04 DIAGNOSIS — B35.1 ONYCHOMYCOSIS OF LEFT GREAT TOE: Primary | ICD-10-CM

## 2024-06-04 PROBLEM — H25.9 AGE-RELATED CATARACT OF BOTH EYES: Status: ACTIVE | Noted: 2021-04-08

## 2024-06-04 PROBLEM — H43.819 POSTERIOR VITREOUS DETACHMENT: Status: ACTIVE | Noted: 2021-12-02

## 2024-06-04 PROBLEM — H34.8112 CENTRAL RETINAL VEIN OCCLUSION OF RIGHT EYE: Status: ACTIVE | Noted: 2021-04-08

## 2024-06-04 PROBLEM — H35.373 EPIRETINAL MEMBRANE (ERM) OF BOTH EYES: Status: ACTIVE | Noted: 2021-12-02

## 2024-06-04 PROBLEM — F11.20 CONTINUOUS OPIOID DEPENDENCE (HCC): Status: RESOLVED | Noted: 2022-03-25 | Resolved: 2024-06-04

## 2024-06-04 LAB
CREAT UR-MCNC: 91 MG/DL
MICROALBUMIN UR-MCNC: <7 MG/L
MICROALBUMIN/CREAT 24H UR: <8 MG/G CREATININE (ref 0–30)

## 2024-06-04 PROCEDURE — 82570 ASSAY OF URINE CREATININE: CPT

## 2024-06-04 PROCEDURE — 99214 OFFICE O/P EST MOD 30 MIN: CPT | Performed by: STUDENT IN AN ORGANIZED HEALTH CARE EDUCATION/TRAINING PROGRAM

## 2024-06-04 PROCEDURE — 99396 PREV VISIT EST AGE 40-64: CPT | Performed by: STUDENT IN AN ORGANIZED HEALTH CARE EDUCATION/TRAINING PROGRAM

## 2024-06-04 PROCEDURE — 82043 UR ALBUMIN QUANTITATIVE: CPT

## 2024-06-04 RX ORDER — SILDENAFIL CITRATE 20 MG/1
TABLET ORAL
Qty: 30 TABLET | Refills: 1 | Status: SHIPPED | OUTPATIENT
Start: 2024-06-04

## 2024-06-04 RX ORDER — TERBINAFINE HYDROCHLORIDE 250 MG/1
250 TABLET ORAL DAILY
Qty: 90 TABLET | Refills: 0 | Status: SHIPPED | OUTPATIENT
Start: 2024-06-04 | End: 2024-09-02

## 2024-06-04 NOTE — ASSESSMENT & PLAN NOTE
Lab Results   Component Value Date    WBC 17.23 (H) 01/27/2024    WBC 17.81 (H) 09/20/2023    WBC 19.28 (H) 01/17/2023     CBC, CMP and LDH ordered

## 2024-06-04 NOTE — TELEPHONE ENCOUNTER
PA for sildenafil (REVATIO) 20 mg tablet     Submitted via    []CMM-KEY   [x]One [employer] One-Case ID #   []Faxed to plan   []Other website   []Phone call Case ID #     Office notes sent, clinical questions answered. Awaiting determination    Turnaround time for your insurance to make a decision on your Prior Authorization can take 7-21 business days.

## 2024-06-04 NOTE — ASSESSMENT & PLAN NOTE
Lab Results   Component Value Date    LDLCALC 79 09/20/2023    LDLCALC 54 01/17/2023    LDLDIRECT 51 07/30/2022    LDLDIRECT 58 09/04/2018       LDL near goal  Currently on pravastatin 80 mg daily  Lipid panel ordered. Advised to complete 1 week before his next visit.

## 2024-06-04 NOTE — ASSESSMENT & PLAN NOTE
BP above goal of 130/80  Currently on losartan-hydrochlorothiazide 100-25 mg daily, Toprol XL 50 mg in Jardiance 25 mg to type 2 diabetes  -We have agreed on continuing regimen he monitors his blood pressure at home daily for the next 2 weeks.  He will maintain a log that he can drop off at the reception's desk for review to guide our decision process and modifying his regimen.

## 2024-06-04 NOTE — PROGRESS NOTES
"INTERNAL MEDICINE OFFICE VISIT NOTE  [facility] Internal Medicine [place]    NAME: Javan Zamudio  AGE: 53 y.o. SEX: male    DATE OF ENCOUNTER:       Chief Complaint   Patient presents with    Follow-up     4 month follow up      Patient presents for a comprehensive physical exam and f/u   Has been following with endocrinology for uncontrolled diabetes    Diet/Nutrition: low carb diet.   Exercise: 3-4 times a week on average.   Hearing: normal - bilateral.  Vision: most recent eye exam <1 year ago and wears glasses.   Dental: no dental visits for >1 year.       Depression Screening  PHQ-2/9 Depression Screening    Little interest or pleasure in doing things: 0 - not at all  Feeling down, depressed, or hopeless: 0 - not at all  PHQ-2 Score: 0  PHQ-2 Interpretation: Negative depression screen       Labs reviewed.       Review of Systems  10 point ROS negative except per HPI    OBJECTIVE:  Vitals:    06/04/24 0859   BP: 145/78   BP Location: Left arm   Patient Position: Sitting   Cuff Size: Standard   Pulse: 78   Temp: 98.1 °F (36.7 °C)   Weight: 94.8 kg (209 lb)   Height: 5' 6\" (1.676 m)       Physical Exam:   GENERAL: NAD, Normal appearance. Non diaphoretic, non-toxic, well-developed, well-nourished.   NEUROLOGIC:  Alert/oriented x3  HEENT:  NC/AT, PERRL, EOMI, no scleral icterus  CARDIAC:  RRR, +S1/S2, no m/g/r  PULMONARY:  non-labored breathing, CTA B/L  ABDOMEN:  Soft, NT/ND, +BS  Extremities:  No edema  SKIN:  No rashes or erythema      ASSESSMENT/PLAN:    1. Onychomycosis of left great toe  Assessment & Plan:  Limited to the left great toe nail  Proceed with Lamisil 250 mg x 12 weeks.  Most recent liver enzymes within normal limits.  CMP ordered and advised to complete before his next visit  Orders:  -     terbinafine (LamISIL) 250 mg tablet; Take 1 tablet (250 mg total) by mouth daily  2. Sexual dysfunction  -     sildenafil (REVATIO) 20 mg tablet; Take 1 or 2 tablets 1 hour prior to relation  3. Type " 2 diabetes mellitus with hyperglycemia, with long-term current use of insulin ([facility])  Assessment & Plan:    Lab Results   Component Value Date    HGBA1C 9.3 (A) 05/23/2024     Orders:  -     Comprehensive metabolic panel; Future  -     TSH, 3rd generation with Free T4 reflex; Future  -     Lipid Panel with Direct LDL reflex; Future; Expected date: 08/13/2024  4. Prostate cancer screening  -     PSA, Total Screen; Future  5. Primary hypertension  Assessment & Plan:  BP above goal of 130/80  Currently on losartan-hydrochlorothiazide 100-25 mg daily, Toprol XL 50 mg in Jardiance 25 mg to type 2 diabetes  -We have agreed on continuing regimen he monitors his blood pressure at home daily for the next 2 weeks.  He will maintain a log that he can drop off at the reception's desk for review to guide our decision process and modifying his regimen.  6. Chronic lymphocytic leukemia (HCC)  Assessment & Plan:  Lab Results   Component Value Date    WBC 17.23 (H) 01/27/2024    WBC 17.81 (H) 09/20/2023    WBC 19.28 (H) 01/17/2023     CBC, CMP and LDH ordered   Orders:  -     LD,Blood; Future  -     CBC and differential; Future  7. Dyslipidemia due to type 2 diabetes mellitus  (HCC)  Assessment & Plan:  Lab Results   Component Value Date    LDLCALC 79 09/20/2023    LDLCALC 54 01/17/2023    LDLDIRECT 51 07/30/2022    LDLDIRECT 58 09/04/2018       LDL near goal  Currently on pravastatin 80 mg daily  Lipid panel ordered. Advised to complete 1 week before his next visit.         Return in 4 months (on 10/4/2024).      Current Outpatient Medications:     Cholecalciferol (VITAMIN D3) 5000 units CAPS, Take by mouth, Disp: , Rfl:     dorzolamide-timolol (COSOPT) 22.3-6.8 MG/ML ophthalmic solution, INSTILL 1 DROP IN RIGHT EYE TWICE DAILY, Disp: 1 mL, Rfl: 1    Empagliflozin 25 MG TABS, Take 1 tablet (25 mg total) by mouth daily, Disp: 90 tablet, Rfl: 1    insulin aspart protamine-insulin aspart (NovoLOG Mix 70/30 FlexPen) 100 Units/mL  "injection pen, Inject 30 Units  before  Breakfast and Lunch, and 24 Units At Dinner, Disp: 45 mL, Rfl: 1    Insulin Pen Needle (BD Pen Needle Marielos 2nd Gen) 32G X 4 MM MISC, Inject as directed 3 (three) times a day, Disp: 300 each, Rfl: 3    Insulin Syringe-Needle U-100 (B-D INS SYR ULTRAFINE .5CC/30G) 30G X 1/2\" 0.5 ML MISC, Inject insulin 3 times daily, Disp: 300 each, Rfl: 1    Lancets (freestyle) lancets, Use 1 each 3 (three) times a day Test blood sugars 4 times daily, Disp: 300 each, Rfl: 0    losartan-hydrochlorothiazide (HYZAAR) 100-25 MG per tablet, Take 1 tablet by mouth daily, Disp: 90 tablet, Rfl: 0    metFORMIN (GLUCOPHAGE-XR) 500 mg 24 hr tablet, 2 tabs twice daily with meals, Disp: 360 tablet, Rfl: 1    metoprolol succinate (TOPROL-XL) 50 mg 24 hr tablet, Take 1 tablet (50 mg total) by mouth daily, Disp: 90 tablet, Rfl: 0    pravastatin (PRAVACHOL) 80 mg tablet, Take 1 tablet (80 mg total) by mouth daily, Disp: 90 tablet, Rfl: 1    semaglutide, 0.25 or 0.5 mg/dose, (Ozempic, 0.25 or 0.5 MG/DOSE,) 2 mg/3 mL injection pen, Inject 0.75 mL (0.5 mg total) under the skin every 7 days, Disp: 9 mL, Rfl: 1    sildenafil (REVATIO) 20 mg tablet, Take 1 or 2 tablets 1 hour prior to relation, Disp: 30 tablet, Rfl: 1    terbinafine (LamISIL) 250 mg tablet, Take 1 tablet (250 mg total) by mouth daily, Disp: 90 tablet, Rfl: 0    Patient Active Problem List   Diagnosis    Essential hypertension    Mixed hyperlipidemia    Gastroesophageal reflux disease without esophagitis    Chronic lymphocytic leukemia (HCC)    Low testosterone    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 (HCC)    Type 2 diabetes mellitus with hyperglycemia, with long-term current use of insulin (HCC)    Other hemorrhoids    Phimosis    Continuous opioid dependence ([facility])    Class 2 severe obesity due to excess calories with serious comorbidity and body mass index (BMI) of 35.0 to 35.9 in adult ([facility])    Hip " pain    Erectile dysfunction             Bryson Posey MD  [facility] Internal Medicine [place]    * Please Note: This note was completed in part utilizing a dictation software.  Grammatical errors, random word insertions, spelling mistakes, and incomplete sentences may be an occasional consequence of this system secondary to software limitations, ambient noise, and hardware issues.  If you have any questions or concerns about the content, text, or information contained within the body of this dictation, please contact the provider for clarification.**

## 2024-06-04 NOTE — ASSESSMENT & PLAN NOTE
Limited to the left great toe nail  Proceed with Lamisil 250 mg x 12 weeks.  Most recent liver enzymes within normal limits.  CMP ordered and advised to complete before his next visit

## 2024-06-05 ENCOUNTER — TELEPHONE (OUTPATIENT)
Age: 54
End: 2024-06-05

## 2024-06-05 NOTE — TELEPHONE ENCOUNTER
Caller: Galen     Doctor/Office: Michelle    Callback#: [phone]        Patient is requesting a transfer of care for the following reason: Location patient moved         Doctor: Michelle    Would you release patient from your care?      Doctor: Jessa     Would you take patient on as a patient?    Disclaimer translated..    Please advise,   Thank you.

## 2024-06-06 NOTE — TELEPHONE ENCOUNTER
Pt called, requesting return phone call.  He would like Dr. Posey to refer him to an orthopedic surgeon. The injections he has been receiving no longer seem to be helping. He is looking for permanent relief and would like to discuss his options with a surgeon. Kindly adivse.     Assistance : alex #[id number]

## 2024-06-06 NOTE — TELEPHONE ENCOUNTER
Caller: royal Edouard    Doctor: Jessa    Reason for call: pt returning the nurse's call.  Pt asked if you can please call him at 12 today    Call back#: [phone]

## 2024-06-06 NOTE — TELEPHONE ENCOUNTER
Caller: royal Edouard    Doctor: Jessa    Reason for call: pt returning the nurse's call.  Pt asked to leave a detailed message as to why you need to reach him.  Pt also stated you can try to reach him tomorrow (Friday) at 9 am    Call back#: [phone]

## 2024-06-06 NOTE — TELEPHONE ENCOUNTER
S/W pt.  Advised pt of the same.  Pt stated PT and injections didn't work before.  He wants to do something different like back surgery.  Advised him Dr. Germain does not do back surgery.  Pt stated he does not want to do injections b/c they didn't help.  Advised Dr. Germain does injections and medications.  Advised pt to reach out to his PCP for referral for back surgery.  Pt verbalized understanding.  Should pt keep SOVS with Dr. Germain?  Please advise.

## 2024-06-06 NOTE — TELEPHONE ENCOUNTER
PA for SILDENAFIL Denied    Reason        Message sent to office clinical pool Yes    Denial letter scanned into Media Yes    Appeal started No ( Provider will need to decide if appeal is warranted and send clinical documentation to PA team for initiation.)    **Please follow up with your patient regarding denial and next steps**

## 2024-06-08 ENCOUNTER — APPOINTMENT (OUTPATIENT)
Dept: LAB | Facility: HOSPITAL | Age: 54
End: 2024-06-08
Payer: COMMERCIAL

## 2024-06-10 DIAGNOSIS — M54.42 CHRONIC LOW BACK PAIN WITH LEFT-SIDED SCIATICA, UNSPECIFIED BACK PAIN LATERALITY: Primary | ICD-10-CM

## 2024-06-10 DIAGNOSIS — M54.6 THORACIC BACK PAIN, UNSPECIFIED BACK PAIN LATERALITY, UNSPECIFIED CHRONICITY: ICD-10-CM

## 2024-06-10 DIAGNOSIS — G89.29 CHRONIC LOW BACK PAIN WITH LEFT-SIDED SCIATICA, UNSPECIFIED BACK PAIN LATERALITY: Primary | ICD-10-CM

## 2024-06-10 LAB
TESTOST FREE SERPL-MCNC: 8.4 PG/ML (ref 7.2–24)
TESTOST SERPL-MCNC: 187 NG/DL (ref 264–916)

## 2024-06-11 ENCOUNTER — TELEPHONE (OUTPATIENT)
Dept: ENDOCRINOLOGY | Facility: CLINIC | Age: 54
End: 2024-06-11

## 2024-06-11 DIAGNOSIS — G89.29 CHRONIC BILATERAL LOW BACK PAIN WITHOUT SCIATICA: ICD-10-CM

## 2024-06-11 DIAGNOSIS — M54.6 THORACIC BACK PAIN, UNSPECIFIED BACK PAIN LATERALITY, UNSPECIFIED CHRONICITY: Primary | ICD-10-CM

## 2024-06-11 DIAGNOSIS — M54.50 CHRONIC BILATERAL LOW BACK PAIN WITHOUT SCIATICA: ICD-10-CM

## 2024-06-11 NOTE — TELEPHONE ENCOUNTER
----- Message from Argelia Vásquez PA-C sent at 6/4/2024  4:29 PM EDT -----  Urine protein testing is normal

## 2024-06-11 NOTE — TELEPHONE ENCOUNTER
Called pt detailed message given and he stated that he would like for you to put an order a neurosurgeon referral   KULDEEPI: he checked both MRI from 2013 and 2023 theres no changes on the back

## 2024-06-12 ENCOUNTER — TELEPHONE (OUTPATIENT)
Dept: INTERNAL MEDICINE CLINIC | Facility: CLINIC | Age: 54
End: 2024-06-12

## 2024-06-12 ENCOUNTER — TELEPHONE (OUTPATIENT)
Age: 54
End: 2024-06-12

## 2024-06-12 PROBLEM — E11.3493 SEVERE NONPROLIFERATIVE DIABETIC RETINOPATHY OF BOTH EYES WITHOUT MACULAR EDEMA ASSOCIATED WITH TYPE 2 DIABETES MELLITUS (HCC): Status: ACTIVE | Noted: 2021-05-27

## 2024-06-12 NOTE — TELEPHONE ENCOUNTER
Pt called he would like to know the results of his testosterone test done on Saturday please advise

## 2024-06-12 NOTE — TELEPHONE ENCOUNTER
Patient called the office asking to speak to Mckenzie regarding his missed call. Call warm transferred to Mckenzie for further assistance.

## 2024-06-13 ENCOUNTER — TELEPHONE (OUTPATIENT)
Dept: ENDOCRINOLOGY | Facility: CLINIC | Age: 54
End: 2024-06-13

## 2024-06-13 DIAGNOSIS — N52.9 ERECTILE DYSFUNCTION, UNSPECIFIED ERECTILE DYSFUNCTION TYPE: ICD-10-CM

## 2024-06-13 DIAGNOSIS — R79.89 LOW TESTOSTERONE: Primary | ICD-10-CM

## 2024-06-13 NOTE — TELEPHONE ENCOUNTER
Testosterone level is low  I will order additional testing for evaluation to be done before the next visit.

## 2024-06-13 NOTE — TELEPHONE ENCOUNTER
Patient called- he wanted his testosterone lab results. I do not see any notes on these, please advise.

## 2024-06-18 PROBLEM — M47.816 LUMBAR SPONDYLOSIS: Status: ACTIVE | Noted: 2024-06-18

## 2024-06-19 ENCOUNTER — CONSULT (OUTPATIENT)
Dept: NEUROSURGERY | Facility: CLINIC | Age: 54
End: 2024-06-19
Payer: COMMERCIAL

## 2024-06-19 VITALS
TEMPERATURE: 98.1 F | OXYGEN SATURATION: 96 % | DIASTOLIC BLOOD PRESSURE: 84 MMHG | HEART RATE: 75 BPM | WEIGHT: 210 LBS | SYSTOLIC BLOOD PRESSURE: 140 MMHG | BODY MASS INDEX: 33.75 KG/M2 | HEIGHT: 66 IN | RESPIRATION RATE: 20 BRPM

## 2024-06-19 DIAGNOSIS — M54.50 CHRONIC BILATERAL LOW BACK PAIN WITHOUT SCIATICA: ICD-10-CM

## 2024-06-19 DIAGNOSIS — M54.6 THORACIC BACK PAIN, UNSPECIFIED BACK PAIN LATERALITY, UNSPECIFIED CHRONICITY: Primary | ICD-10-CM

## 2024-06-19 DIAGNOSIS — G89.29 CHRONIC BILATERAL LOW BACK PAIN WITHOUT SCIATICA: ICD-10-CM

## 2024-06-19 DIAGNOSIS — G89.29 CHRONIC THORACIC BACK PAIN: ICD-10-CM

## 2024-06-19 DIAGNOSIS — M54.6 CHRONIC THORACIC BACK PAIN: ICD-10-CM

## 2024-06-19 DIAGNOSIS — M47.816 LUMBAR SPONDYLOSIS: ICD-10-CM

## 2024-06-19 PROCEDURE — 99244 OFF/OP CNSLTJ NEW/EST MOD 40: CPT | Performed by: PHYSICIAN ASSISTANT

## 2024-06-19 NOTE — ASSESSMENT & PLAN NOTE
New evaluation of mid back pain  Started after work accident in 2009. Pain is in his mid thoracic spine.  It does not radiate.  No BBI.    Since that time has tried PT, aqua therapy, JUAN R without relief, but none recently.  He is active going to the gym and was not able to have an injection due to his A1c being elevated at 9.4.  Exam: Midline and bilateral paraspinal muscle TTP in mid thoracic spine.  BLE 5/5, LT intact, 2+ DTRs    Plan:  Discussed current symptoms with patient and wife via .  He has longstanding thoracic spine pain with multiple attempts at conservative measures since 2009 without significant relief.  He is at the point where he would like to consider intervention and would like to speak with the surgeon.  Discussed that his diabetes is currently not controlled, the last A1c was from January 2024.  Recommend updated A1c and good control of blood sugars.  Stated to patient and wife, his A1c would need to be less than 7.5 in order to be considered for elective spine surgery if it was indicated.  Will order upright thoracic spine XR and MRI thoracic spine for further evaluation.  Reviewed red flag signs and symptoms  Follow-up once imaging completed to see spine MD.  Call sooner with any questions or concerns.

## 2024-06-19 NOTE — PROGRESS NOTES
Neurosurgery Office Note  Javan Zamudio 53 y.o. male MRN: [id number]      Assessment & Plan     Chronic thoracic back pain  New evaluation of mid back pain  Started after work accident in 2009. Pain is in his mid thoracic spine.  It does not radiate.  No BBI.    Since that time has tried PT, aqua therapy, JUAN R without relief, but none recently.  He is active going to the gym and was not able to have an injection due to his A1c being elevated at 9.4.  Exam: Midline and bilateral paraspinal muscle TTP in mid thoracic spine.  BLE 5/5, LT intact, 2+ DTRs    Plan:  Discussed current symptoms with patient and wife via .  He has longstanding thoracic spine pain with multiple attempts at conservative measures since 2009 without significant relief.  He is at the point where he would like to consider intervention and would like to speak with the surgeon.  Discussed that his diabetes is currently not controlled, the last A1c was from January 2024.  Recommend updated A1c and good control of blood sugars.  Stated to patient and wife, his A1c would need to be less than 7.5 in order to be considered for elective spine surgery if it was indicated.  Will order upright thoracic spine XR and MRI thoracic spine for further evaluation.  Reviewed red flag signs and symptoms  Follow-up once imaging completed to see spine MD.  Call sooner with any questions or concerns.       Diagnoses and all orders for this visit:    Thoracic back pain, unspecified back pain laterality, unspecified chronicity  -     Ambulatory Referral to Neurosurgery  -     XR spine thoracic 3 vw; Future  -     MRI thoracic spine wo contrast; Future  -     Hemoglobin A1C; Future    Lumbar spondylosis    Chronic bilateral low back pain without sciatica  -     Ambulatory Referral to Neurosurgery    Chronic thoracic back pain          I have spent a total time of 45 minutes on 06/19/24 in caring for this patient including Instructions for  management, Patient and family education, Importance of tx compliance, Risk factor reductions, Impressions, Counseling / Coordination of care, Documenting in the medical record, Reviewing / ordering tests, medicine, procedures  , and Obtaining or reviewing history  .      CHIEF COMPLAINT    Chief Complaint   Patient presents with    Consult     Chief Complaint: LOW BACK PAIN       HISTORY    History of Present Illness     53 y.o. year old male     53 year old gentleman seen for evaluation of back pain. There was an accident in 2009 at work. Was told that he has 3 discs in his spine that are damaged. Injections and PT/aquatherapy were recommended but didn't help. Since then it has progressed. The pain is in his mid back, right sided. Worse with walking, laying flat, doing activity. This pain is 5-6/10 and constant. Also reports hip and knee pain that didn't improve with injections and cramping in his feet. Denies low back pain. Denies radiating pain down his legs.  He notes some tingling in the right foot and sometimes the right leg feels weaker. Mentions that both legs have 'gone out on him'. No BBI. He is at the point where he would like to see a surgeon. He is going to the gym 3x/week and walks in the park. He saw pain management but couldn't have an JUAN R due to his A1c. He does not take anything for pain. Hx of DM2 with last A1c 9.4.  assisted with translation for patient and wife.         See Discussion    REVIEW OF SYSTEMS    Review of Systems   Constitutional:  Positive for fatigue.   HENT: Negative.     Eyes: Negative.    Respiratory: Negative.     Cardiovascular: Negative.    Gastrointestinal: Negative.    Endocrine: Negative.    Genitourinary:  Positive for frequency.   Musculoskeletal:  Positive for back pain (across the lower back in and in the middle, lots of pressure, radiates to Bi legs), gait problem and myalgias.   Neurological:  Positive for weakness (left leg gives out somtimes)  "and numbness (numbness in the left leg and tingiling and shooting pain in Bi feet).   Psychiatric/Behavioral:  Positive for sleep disturbance (due to pain, tost and turn).        ROS obtained by ANIYAH Ray. See HPI.     Meds/Allergies     Current Outpatient Medications   Medication Sig Dispense Refill    Cholecalciferol (VITAMIN D3) 5000 units CAPS Take by mouth      dorzolamide-timolol (COSOPT) 22.3-6.8 MG/ML ophthalmic solution INSTILL 1 DROP IN RIGHT EYE TWICE DAILY 1 mL 1    Empagliflozin 25 MG TABS Take 1 tablet (25 mg total) by mouth daily 90 tablet 1    insulin aspart protamine-insulin aspart (NovoLOG Mix 70/30 FlexPen) 100 Units/mL injection pen Inject 30 Units  before  Breakfast and Lunch, and 24 Units At Dinner 45 mL 1    Insulin Pen Needle (BD Pen Needle Marielos 2nd Gen) 32G X 4 MM MISC Inject as directed 3 (three) times a day 300 each 3    Insulin Syringe-Needle U-100 (B-D INS SYR ULTRAFINE .5CC/30G) 30G X 1/2\" 0.5 ML MISC Inject insulin 3 times daily 300 each 1    Lancets (freestyle) lancets Use 1 each 3 (three) times a day Test blood sugars 4 times daily 300 each 0    losartan-hydrochlorothiazide (HYZAAR) 100-25 MG per tablet Take 1 tablet by mouth daily 90 tablet 0    metFORMIN (GLUCOPHAGE-XR) 500 mg 24 hr tablet 2 tabs twice daily with meals 360 tablet 1    metoprolol succinate (TOPROL-XL) 50 mg 24 hr tablet Take 1 tablet (50 mg total) by mouth daily 90 tablet 0    pravastatin (PRAVACHOL) 80 mg tablet Take 1 tablet (80 mg total) by mouth daily 90 tablet 1    semaglutide, 0.25 or 0.5 mg/dose, (Ozempic, 0.25 or 0.5 MG/DOSE,) 2 mg/3 mL injection pen Inject 0.75 mL (0.5 mg total) under the skin every 7 days 9 mL 1    sildenafil (REVATIO) 20 mg tablet Take 1 or 2 tablets 1 hour prior to relation 30 tablet 1    terbinafine (LamISIL) 250 mg tablet Take 1 tablet (250 mg total) by mouth daily 90 tablet 0     No current facility-administered medications for this visit.       Allergies   Allergen Reactions    " "Ace Inhibitors Cough       PAST HISTORY    Past Medical History:   Diagnosis Date    Anxiety     Arthritis     Cancer (HCC)     Chronic thoracic back pain 06/19/2024    Depression     Diabetes (HCC)     Diabetes mellitus (HCC)     Fatty liver     GERD (gastroesophageal reflux disease)     Hyperlipidemia     Hypertension     Intertrigo     resolved 10/26/17    Lumbar spondylosis 06/18/2024    Sebaceous cyst     Sexual disorder     Sexual dysfunction     last assessed 03/04/14    Snoring     last assessed 12/08/14    Somnolence, daytime     last assessed 12/08/14    Thoracic disc herniation     last assessed 05/02/14    Vision problem     Vitamin D deficiency        Past Surgical History:   Procedure Laterality Date    COLONOSCOPY      last assessed 01/22/14    LYMPHADENECTOMY      Axillary; last assessed 05/30/14    WI CIRCUMCISION AGE >28 DAYS N/A 3/1/2022    Procedure: CIRCUMCISION ADULT;  Surgeon: Brennen Fink MD;  Location: [facility];  Service: Urology       Social History     Tobacco Use    Smoking status: Never    Smokeless tobacco: Never   Vaping Use    Vaping status: Never Used   Substance Use Topics    Alcohol use: Yes     Comment: social    Drug use: No       Family History   Problem Relation Age of Onset    Diabetes Mother     Kidney cancer Mother     Diabetes Father     Diabetes Sister     Diabetes Family     Hypertension Family          Above history personally reviewed.       EXAM    Vitals:Blood pressure 140/84, pulse 75, temperature 98.1 °F (36.7 °C), temperature source Temporal, resp. rate 20, height 5' 6\" (1.676 m), weight 95.3 kg (210 lb), SpO2 96%.,Body mass index is 33.89 kg/m².     Physical Exam  Vitals reviewed.   Constitutional:       General: He is awake.      Appearance: Normal appearance.   HENT:      Head: Normocephalic and atraumatic.   Eyes:      Conjunctiva/sclera: Conjunctivae normal.   Cardiovascular:      Rate and Rhythm: Normal rate.   Pulmonary:      Effort: Pulmonary effort is " normal.   Musculoskeletal:      Comments: Midline and bilateral paraspinal muscle TTP in mid thoracic spine   Skin:     General: Skin is warm and dry.   Neurological:      Mental Status: He is alert and oriented to person, place, and time.      Gait: Gait is intact.      Deep Tendon Reflexes:      Reflex Scores:       Patellar reflexes are 2+ on the right side and 2+ on the left side.  Psychiatric:         Attention and Perception: Attention and perception normal.         Mood and Affect: Mood and affect normal.         Speech: Speech normal.         Behavior: Behavior normal. Behavior is cooperative.         Thought Content: Thought content normal.         Cognition and Memory: Cognition and memory normal.         Judgment: Judgment normal.         Neurologic Exam     Mental Status   Oriented to person, place, and time.   Follows 2 step commands.   Attention: normal. Concentration: normal.   Speech: speech is normal   Level of consciousness: alert  Knowledge: good.   Normal comprehension.     Motor Exam   Muscle bulk: normal  Overall muscle tone: normal  BLE - 5/5     Sensory Exam   Right leg light touch: normal  Left leg light touch: normal    Gait, Coordination, and Reflexes     Gait  Gait: normal    Tremor   Resting tremor: absent  Intention tremor: absent  Action tremor: absent    Reflexes   Right patellar: 2+  Left patellar: 2+        MEDICAL DECISION MAKING    Imaging Studies:     No results found.    I have personally reviewed pertinent reports.   and I have personally reviewed pertinent films in PACS

## 2024-06-21 ENCOUNTER — TELEPHONE (OUTPATIENT)
Age: 54
End: 2024-06-21

## 2024-06-21 ENCOUNTER — TELEPHONE (OUTPATIENT)
Dept: OBGYN CLINIC | Facility: MEDICAL CENTER | Age: 54
End: 2024-06-21

## 2024-06-21 NOTE — TELEPHONE ENCOUNTER
[facility] [[email or web address]] (did not specify where) transferred me the patient assuming he wanted to talk about his labs from Dr. Fletcher. However, the patient specified that he wanted to speak with Dr. Posey office, his PCP.     Warm transferred to clinical staff at this time.

## 2024-06-21 NOTE — TELEPHONE ENCOUNTER
Left message for Pt to please call the office.      Pt was to complete MRI of Right knee and then come back to review the results. When Pt returns call please reschedule for after he has completed MRI.

## 2024-06-25 NOTE — TELEPHONE ENCOUNTER
Had  leave message  for Pt to please call the office.    Pt has not completed MRI of Right knee. Please reschedule Pt for after MRI of Right knee has been completed.

## 2024-06-26 ENCOUNTER — OFFICE VISIT (OUTPATIENT)
Dept: OBGYN CLINIC | Facility: MEDICAL CENTER | Age: 54
End: 2024-06-26
Payer: COMMERCIAL

## 2024-06-26 VITALS
WEIGHT: 209 LBS | DIASTOLIC BLOOD PRESSURE: 72 MMHG | HEIGHT: 66 IN | SYSTOLIC BLOOD PRESSURE: 118 MMHG | BODY MASS INDEX: 33.59 KG/M2 | HEART RATE: 76 BPM

## 2024-06-26 DIAGNOSIS — M70.61 TROCHANTERIC BURSITIS OF RIGHT HIP: Primary | ICD-10-CM

## 2024-06-26 PROCEDURE — 99214 OFFICE O/P EST MOD 30 MIN: CPT | Performed by: ORTHOPAEDIC SURGERY

## 2024-06-26 NOTE — PROGRESS NOTES
Knee New Patient Visit     Assesment:   53 y.o. male bilateral knee patellofemoral pain improving with R hip troch bursitis.    Plan:    Conservative treatment:    Ice to knee for 20 minutes at least 1-2 times daily.  PT for ROM/strengthening to knee, hip and core.  PT for R hip  Voltaren gel sent to pharmacy  OTC NSAIDS prn for pain.  Let pain guide gradual return activities.    Imaging:    Mri of right knee ordered to rule out internal derangement      Injection:    No injections      Surgery:     No surgery is recommended at this point, continue with conservative treatment plan as noted.      Follow up:    No follow-ups on file.        Chief Complaint   Patient presents with    Left Knee - Follow-up     MRI results       History of Present Illness:    The patient is a 53 y.o. male whose occupation is a , referred to me by their primary care physician, seen in clinic for consultation of bilateral knee pain.        He notes pain overall is improved in the left knee.  His corticosteroid injection did help.  He is here for MRI review of the left knee today.  He also notes right hip pain.  This is better with rest and worse with activity.  His pain radiates down the leg but not past the lower thigh.  No specific injury he can recall.  He has tried rest and ice and anti-inflammatories.      Knee Surgical History:  None    Past Medical, Social and Family History:  Past Medical History:   Diagnosis Date    Anxiety     Arthritis     Cancer (HCC)     Chronic thoracic back pain 06/19/2024    Depression     Diabetes (HCC)     Diabetes mellitus (HCC)     Fatty liver     GERD (gastroesophageal reflux disease)     Hyperlipidemia     Hypertension     Intertrigo     resolved 10/26/17    Lumbar spondylosis 06/18/2024    Sebaceous cyst     Sexual disorder     Sexual dysfunction     last assessed 03/04/14    Snoring     last assessed 12/08/14    Somnolence, daytime     last assessed 12/08/14    Thoracic disc  "herniation     last assessed 05/02/14    Vision problem     Vitamin D deficiency      Past Surgical History:   Procedure Laterality Date    COLONOSCOPY      last assessed 01/22/14    LYMPHADENECTOMY      Axillary; last assessed 05/30/14    ME CIRCUMCISION AGE >28 DAYS N/A 3/1/2022    Procedure: CIRCUMCISION ADULT;  Surgeon: Brennen Fink MD;  Location: [facility];  Service: Urology     Allergies   Allergen Reactions    Ace Inhibitors Cough     Current Outpatient Medications on File Prior to Visit   Medication Sig Dispense Refill    Cholecalciferol (VITAMIN D3) 5000 units CAPS Take by mouth      dorzolamide-timolol (COSOPT) 22.3-6.8 MG/ML ophthalmic solution INSTILL 1 DROP IN RIGHT EYE TWICE DAILY 1 mL 1    Empagliflozin 25 MG TABS Take 1 tablet (25 mg total) by mouth daily 90 tablet 1    insulin aspart protamine-insulin aspart (NovoLOG Mix 70/30 FlexPen) 100 Units/mL injection pen Inject 30 Units  before  Breakfast and Lunch, and 24 Units At Dinner 45 mL 1    Insulin Pen Needle (BD Pen Needle Marielos 2nd Gen) 32G X 4 MM MISC Inject as directed 3 (three) times a day 300 each 3    Insulin Syringe-Needle U-100 (B-D INS SYR ULTRAFINE .5CC/30G) 30G X 1/2\" 0.5 ML MISC Inject insulin 3 times daily 300 each 1    Lancets (freestyle) lancets Use 1 each 3 (three) times a day Test blood sugars 4 times daily 300 each 0    losartan-hydrochlorothiazide (HYZAAR) 100-25 MG per tablet Take 1 tablet by mouth daily 90 tablet 0    metFORMIN (GLUCOPHAGE-XR) 500 mg 24 hr tablet 2 tabs twice daily with meals 360 tablet 1    metoprolol succinate (TOPROL-XL) 50 mg 24 hr tablet Take 1 tablet (50 mg total) by mouth daily 90 tablet 0    pravastatin (PRAVACHOL) 80 mg tablet Take 1 tablet (80 mg total) by mouth daily 90 tablet 1    semaglutide, 0.25 or 0.5 mg/dose, (Ozempic, 0.25 or 0.5 MG/DOSE,) 2 mg/3 mL injection pen Inject 0.75 mL (0.5 mg total) under the skin every 7 days 9 mL 1    sildenafil (REVATIO) 20 mg tablet Take 1 or 2 tablets 1 hour " "prior to relation 30 tablet 1    terbinafine (LamISIL) 250 mg tablet Take 1 tablet (250 mg total) by mouth daily 90 tablet 0     No current facility-administered medications on file prior to visit.     Social History     Socioeconomic History    Marital status: Single     Spouse name: Not on file    Number of children: Not on file    Years of education: Not on file    Highest education level: Not on file   Occupational History    Not on file   Tobacco Use    Smoking status: Never    Smokeless tobacco: Never   Vaping Use    Vaping status: Never Used   Substance and Sexual Activity    Alcohol use: Yes     Comment: social    Drug use: No    Sexual activity: Yes     Partners: Female   Other Topics Concern    Not on file   Social History Narrative    Not on file     Social Determinants of Health     Financial Resource Strain: Not on file   Food Insecurity: Not on file   Transportation Needs: Not on file   Physical Activity: Not on file   Stress: Not on file   Social Connections: Not on file   Intimate Partner Violence: Not on file   Housing Stability: Not on file         I have reviewed the past medical, surgical, social and family history, medications and allergies as documented in the EMR.    Review of systems: ROS is negative other than that noted in the HPI.  Constitutional: Negative for fatigue and fever.   HENT: Negative for sore throat.    Respiratory: Negative for shortness of breath.    Cardiovascular: Negative for chest pain.   Gastrointestinal: Negative for abdominal pain.   Endocrine: Negative for cold intolerance and heat intolerance.   Genitourinary: Negative for flank pain.   Musculoskeletal: Negative for back pain.   Skin: Negative for rash.   Allergic/Immunologic: Negative for immunocompromised state.   Neurological: Negative for dizziness.   Psychiatric/Behavioral: Negative for agitation.      Physical Exam:    Blood pressure 118/72, pulse 76, height 5' 6\" (1.676 m), weight 94.8 kg (209 " lb).    General/Constitutional: NAD, well developed, well nourished  HENT: Normocephalic, atraumatic  CV: Intact distal pulses, regular rate  Resp: No respiratory distress or labored breathing  GI: Soft and non-tender   Lymphatic: No lymphadenopathy palpated  Neuro: Alert and Oriented x 3, no focal deficits  Psych: Normal mood, normal affect, normal judgement, normal behavior  Skin: Warm, dry, no rashes, no erythema      Knee Exam (focused):                RIGHT LEFT   ROM:   0-130 0-130   Palpation: Effusion negative negative     MJL tenderness Negative Negative     LJL tenderness Negative Negative   Meniscus: Tyesha Negative Negative    Apley's Compression Negative Negative   Instability: Varus stable stable     Valgus stable stable   Special Tests: Lachman Negative Negative     Posterior drawer Negative Negative     Anterior drawer Negative Negative     Pivot shift not tested not tested     Dial not tested not tested   Patella: Palpation no tenderness no tenderness     Mobility 1/4 1/4     Apprehension Negative Negative   Other: Single leg 1/4 squat not tested not tested      LE NV Exam: +2 DP/PT pulses bilaterally  Sensation intact to light touch L2-S1 bilaterally     Bilateral hip ROM demonstrates no pain actively or passively    No calf tenderness to palpation bilaterally      Right hip demonstrates full strength and range of motion.  Tender to palpation over the trochanteric bursa    Knee Imaging    X-rays of the bilateral knee from 10/11/2023 were reviewed, which demonstrate no acute fracture or dislocation. No significant degenerative changes.  I have reviewed the radiology report and do not currently have a radiology reading from [facility], but will check the result once the reading is performed.    MRI of the right knee demonstrates prominent medial plica but no other meniscal tears.  There is focal small amount of chondromalacia in the trochlea.  I reviewed the radiology part and agree with the  impression

## 2024-07-05 DIAGNOSIS — I10 ESSENTIAL HYPERTENSION: ICD-10-CM

## 2024-07-05 DIAGNOSIS — E11.65 TYPE 2 DIABETES MELLITUS WITH HYPERGLYCEMIA, WITH LONG-TERM CURRENT USE OF INSULIN (HCC): ICD-10-CM

## 2024-07-05 DIAGNOSIS — Z79.4 TYPE 2 DIABETES MELLITUS WITH HYPERGLYCEMIA, WITH LONG-TERM CURRENT USE OF INSULIN (HCC): ICD-10-CM

## 2024-07-05 RX ORDER — LOSARTAN POTASSIUM AND HYDROCHLOROTHIAZIDE 25; 100 MG/1; MG/1
1 TABLET ORAL DAILY
Qty: 100 TABLET | Refills: 1 | Status: SHIPPED | OUTPATIENT
Start: 2024-07-05

## 2024-07-05 NOTE — TELEPHONE ENCOUNTER
Reason for call:   [x] Refill   [] Prior Auth  [] Other:     Office:   [x] PCP/Provider - Bryson Posey, / Osvaldo BLAKELY  [] Specialty/Provider -         Pharmacy:   [id number] - [place] - [address] [phone]       Does the patient have enough for 3 days?   [] Yes   [x] No - Send as HP to POD

## 2024-07-06 ENCOUNTER — HOSPITAL ENCOUNTER (OUTPATIENT)
Dept: RADIOLOGY | Facility: HOSPITAL | Age: 54
Discharge: HOME | End: 2024-07-06
Payer: COMMERCIAL

## 2024-07-06 ENCOUNTER — HOSPITAL ENCOUNTER (OUTPATIENT)
Dept: MRI IMAGING | Facility: HOSPITAL | Age: 54
Discharge: HOME | End: 2024-07-06
Payer: COMMERCIAL

## 2024-07-06 DIAGNOSIS — M54.6 THORACIC BACK PAIN, UNSPECIFIED BACK PAIN LATERALITY, UNSPECIFIED CHRONICITY: ICD-10-CM

## 2024-07-06 PROCEDURE — 72072 X-RAY EXAM THORAC SPINE 3VWS: CPT

## 2024-07-06 PROCEDURE — 72146 MRI CHEST SPINE W/O DYE: CPT

## 2024-07-12 DIAGNOSIS — E11.9 TYPE 2 DIABETES MELLITUS WITHOUT COMPLICATION, WITHOUT LONG-TERM CURRENT USE OF INSULIN (HCC): ICD-10-CM

## 2024-07-12 DIAGNOSIS — I10 ESSENTIAL HYPERTENSION: ICD-10-CM

## 2024-07-12 RX ORDER — METFORMIN HYDROCHLORIDE 500 MG/1
TABLET, EXTENDED RELEASE ORAL
Qty: 360 TABLET | Refills: 1 | Status: SHIPPED | OUTPATIENT
Start: 2024-07-12

## 2024-07-17 ENCOUNTER — OFFICE VISIT (OUTPATIENT)
Dept: NEUROSURGERY | Facility: CLINIC | Age: 54
End: 2024-07-17
Payer: COMMERCIAL

## 2024-07-17 VITALS
HEIGHT: 66 IN | HEART RATE: 78 BPM | BODY MASS INDEX: 33.59 KG/M2 | SYSTOLIC BLOOD PRESSURE: 120 MMHG | WEIGHT: 209 LBS | DIASTOLIC BLOOD PRESSURE: 70 MMHG | RESPIRATION RATE: 16 BRPM | OXYGEN SATURATION: 96 % | TEMPERATURE: 97.8 F

## 2024-07-17 DIAGNOSIS — G89.29 CHRONIC MIDLINE THORACIC BACK PAIN: Primary | ICD-10-CM

## 2024-07-17 DIAGNOSIS — M54.6 THORACIC BACK PAIN, UNSPECIFIED BACK PAIN LATERALITY, UNSPECIFIED CHRONICITY: ICD-10-CM

## 2024-07-17 DIAGNOSIS — M54.6 CHRONIC MIDLINE THORACIC BACK PAIN: Primary | ICD-10-CM

## 2024-07-17 PROCEDURE — 99213 OFFICE O/P EST LOW 20 MIN: CPT | Performed by: NEUROLOGICAL SURGERY

## 2024-07-17 NOTE — PROGRESS NOTES
Patient seen previously by Lor Barney, our PA-C.  I reviewed her note and the recent imaging that she ordered.  MRI of the thoracic spine films and report reviewed.  There are small right-sided disc herniations at 2 different levels..  There is no significant spinal cord compression.  There is no signal change in the spinal cord.  X-rays of the thoracic spine show no osseous anomalies.  I explained the patient that with 15 years of midthoracic pain and the small degree of spondylosis seen in his spine, I do not think that surgical intervention in my hands is likely to benefit him.  I encouraged him to follow-up for better diabetes control as his last hemoglobin A1c was extremely elevated.  I reviewed the MRI and x-ray images with him.  On exam he had no sensory changes on the dorsum of his spine.  Verbally interactive appropriate.  Fluent in [place] Island[place] and English.  Gait within normal limits.  I spent 20 minutes in the care of this patient today reviewing images, reports, and in face-to-face discussions.  He had no additional questions.  He can continue to see pain management.  I think this will be the mainstay of his treatment.

## 2024-07-17 NOTE — PROGRESS NOTES
"Review of Systems   HENT: Negative.     Eyes: Negative.    Respiratory: Negative.     Cardiovascular: Negative.    Gastrointestinal: Negative.    Endocrine: Negative.    Genitourinary:  Positive for frequency.   Musculoskeletal:  Positive for arthralgias (pain in bi/hips, right is worse. down sides to knee), back pain (midback pain shooting forward,), gait problem and myalgias.   Neurological:  Positive for weakness (bi/leg gives out somtimes) and numbness (Bi feet).   Psychiatric/Behavioral:  Positive for sleep disturbance (due to pain, tost and turn).           Current Outpatient Medications:     Cholecalciferol (VITAMIN D3) 5000 units CAPS, Take by mouth, Disp: , Rfl:     Diclofenac Sodium (VOLTAREN) 1 %, Apply 2 g topically 4 (four) times a day, Disp: 2 g, Rfl: 0    dorzolamide-timolol (COSOPT) 22.3-6.8 MG/ML ophthalmic solution, INSTILL 1 DROP IN RIGHT EYE TWICE DAILY, Disp: 1 mL, Rfl: 1    Empagliflozin 25 MG TABS, Take 1 tablet (25 mg total) by mouth daily, Disp: 100 tablet, Rfl: 1    insulin aspart protamine-insulin aspart (NovoLOG Mix 70/30 FlexPen) 100 Units/mL injection pen, Inject 30 Units  before  Breakfast and Lunch, and 24 Units At Dinner, Disp: 45 mL, Rfl: 1    Insulin Pen Needle (BD Pen Needle Marielos 2nd Gen) 32G X 4 MM MISC, Inject as directed 3 (three) times a day, Disp: 300 each, Rfl: 3    Insulin Syringe-Needle U-100 (B-D INS SYR ULTRAFINE .5CC/30G) 30G X 1/2\" 0.5 ML MISC, Inject insulin 3 times daily, Disp: 300 each, Rfl: 1    Lancets (freestyle) lancets, Use 1 each 3 (three) times a day Test blood sugars 4 times daily, Disp: 300 each, Rfl: 0    losartan-hydrochlorothiazide (HYZAAR) 100-25 MG per tablet, Take 1 tablet by mouth daily, Disp: 100 tablet, Rfl: 1    metFORMIN (GLUCOPHAGE-XR) 500 mg 24 hr tablet, TAKE 2 TABLETS BY MOUTH TWICE DAILY WITH MEALS, Disp: 360 tablet, Rfl: 1    metoprolol succinate (TOPROL-XL) 50 mg 24 hr tablet, Take 1 tablet (50 mg total) by mouth daily, Disp: 90 tablet, " Rfl: 0    pravastatin (PRAVACHOL) 80 mg tablet, Take 1 tablet (80 mg total) by mouth daily, Disp: 90 tablet, Rfl: 1    semaglutide, 0.25 or 0.5 mg/dose, (Ozempic, 0.25 or 0.5 MG/DOSE,) 2 mg/3 mL injection pen, Inject 0.75 mL (0.5 mg total) under the skin every 7 days, Disp: 9 mL, Rfl: 1    sildenafil (REVATIO) 20 mg tablet, Take 1 or 2 tablets 1 hour prior to relation, Disp: 30 tablet, Rfl: 1    terbinafine (LamISIL) 250 mg tablet, Take 1 tablet (250 mg total) by mouth daily, Disp: 90 tablet, Rfl: 0

## 2024-07-22 ENCOUNTER — TELEPHONE (OUTPATIENT)
Age: 54
End: 2024-07-22

## 2024-07-24 ENCOUNTER — TELEPHONE (OUTPATIENT)
Age: 54
End: 2024-07-24

## 2024-07-24 NOTE — TELEPHONE ENCOUNTER
SONI santana- Gee- [phone]    Pt called to make an appt for a mass in private area. Pt stated he could only come in the morning and I was unable to schedule him in the system so I warm transferred to [place] on the clerical line and the schedule admin was able to schedule for him.

## 2024-07-25 ENCOUNTER — OFFICE VISIT (OUTPATIENT)
Dept: INTERNAL MEDICINE CLINIC | Facility: CLINIC | Age: 54
End: 2024-07-25
Payer: COMMERCIAL

## 2024-07-25 ENCOUNTER — TELEPHONE (OUTPATIENT)
Age: 54
End: 2024-07-25

## 2024-07-25 VITALS
DIASTOLIC BLOOD PRESSURE: 90 MMHG | OXYGEN SATURATION: 96 % | TEMPERATURE: 96.9 F | SYSTOLIC BLOOD PRESSURE: 126 MMHG | HEART RATE: 77 BPM | HEIGHT: 66 IN | RESPIRATION RATE: 16 BRPM | WEIGHT: 208.6 LBS | BODY MASS INDEX: 33.52 KG/M2

## 2024-07-25 DIAGNOSIS — R19.09 GROIN SWELLING: Primary | ICD-10-CM

## 2024-07-25 DIAGNOSIS — E11.9 TYPE 2 DIABETES MELLITUS WITHOUT COMPLICATION, WITHOUT LONG-TERM CURRENT USE OF INSULIN (HCC): ICD-10-CM

## 2024-07-25 PROCEDURE — 99214 OFFICE O/P EST MOD 30 MIN: CPT | Performed by: INTERNAL MEDICINE

## 2024-07-25 RX ORDER — LANCETS 28 GAUGE
1 EACH MISCELLANEOUS 3 TIMES DAILY
Qty: 300 EACH | Refills: 1 | Status: SHIPPED | OUTPATIENT
Start: 2024-07-25 | End: 2024-07-25 | Stop reason: SDUPTHER

## 2024-07-25 NOTE — TELEPHONE ENCOUNTER
[facility] called the RX Refill Line. Message is being forwarded to the office.     Pharmacy is requesting a clarification on the Directions for the Lancets (freestyle) lancets that was sent today.   The directions state Use 1 each 3 (three) times a day Test blood sugars 4 times daily   They need clarification for insurance billing purposes. Please change sig and send a new script to the pharmacy or contact pharmacy with clarification    [id number]  [place] - [address] [phone]

## 2024-07-25 NOTE — PROGRESS NOTES
INTERNAL MEDICINE OFFICE VISIT  [facility] Internal Medicine- [place]    NAME: Javan Zamudio  AGE: 53 y.o. SEX: male    DATE OF ENCOUNTER: 7/25/2024    Assessment and Plan/History of Present Illness     Here today for evaluation of left groin swelling  Medical history of type 2 diabetes, hypertension, CLL, dyslipidemia    Patient reports approximately 1 month of lump in the left inguinal area.  States this initially started as a pimple but has grown a bit in size.  No overlying rash, erythema, drainage.  He denies any recent fevers, chills, cold/flu symptoms or illness.  No unexplained weight loss, night sweats.    On exam, there is a small, approximately 1 cm nodule in the left inguinal region.  Tender to palpation.  No overlying erythema.  No drainage appreciated    Plan:  -Possible left inguinal adenopathy?  Lower suspicion for abscess.  Patient's history of CLL is noted.  His lymphocyte count has been stable ranging around 10-15,000 for the past several years.  He does not appear to be having any B symptoms  -We will obtain ultrasound for further characterization      1. Groin swelling  -     US groin/inguinal area; Future; Expected date: 07/25/2024  2. Type 2 diabetes mellitus without complication, without long-term current use of insulin (HCC)  -     Lancets (freestyle) lancets; Use 1 each 3 (three) times a day Test blood sugars 4 times daily             Orders Placed This Encounter   Procedures   • US groin/inguinal area       Chief Complaint     Chief Complaint   Patient presents with   • Mass     Patient has lump in groin, patient started noticing it last month and he is experiencing a pinching pain        Review of Systems     10 point ROS negative except per HPI    The following portions of the patient's history were reviewed and updated as appropriate: allergies, current medications, past family history, past medical history, past social history, past surgical history and problem  "list.    Objective     /90 (BP Location: Left arm, Patient Position: Sitting, Cuff Size: Standard)   Pulse 77   Temp (!) 96.9 °F (36.1 °C)   Resp 16   Ht 5' 6\" (1.676 m)   Wt 94.6 kg (208 lb 9.6 oz)   SpO2 96%   BMI 33.67 kg/m²     Physical Exam  Abdominal:      Palpations: There is mass (Small left inguinal nodule appreciated).           Current Medications     Current Outpatient Medications:   •  Cholecalciferol (VITAMIN D3) 5000 units CAPS, Take by mouth, Disp: , Rfl:   •  Diclofenac Sodium (VOLTAREN) 1 %, Apply 2 g topically 4 (four) times a day, Disp: 2 g, Rfl: 0  •  dorzolamide-timolol (COSOPT) 22.3-6.8 MG/ML ophthalmic solution, INSTILL 1 DROP IN RIGHT EYE TWICE DAILY, Disp: 1 mL, Rfl: 1  •  Empagliflozin 25 MG TABS, Take 1 tablet (25 mg total) by mouth daily, Disp: 100 tablet, Rfl: 1  •  insulin aspart protamine-insulin aspart (NovoLOG Mix 70/30 FlexPen) 100 Units/mL injection pen, Inject 30 Units  before  Breakfast and Lunch, and 24 Units At Dinner, Disp: 45 mL, Rfl: 1  •  Insulin Pen Needle (BD Pen Needle Marielos 2nd Gen) 32G X 4 MM MISC, Inject as directed 3 (three) times a day, Disp: 300 each, Rfl: 3  •  Insulin Syringe-Needle U-100 (B-D INS SYR ULTRAFINE .5CC/30G) 30G X 1/2\" 0.5 ML MISC, Inject insulin 3 times daily, Disp: 300 each, Rfl: 1  •  Lancets (freestyle) lancets, Use 1 each 3 (three) times a day Test blood sugars 4 times daily, Disp: 300 each, Rfl: 1  •  losartan-hydrochlorothiazide (HYZAAR) 100-25 MG per tablet, Take 1 tablet by mouth daily, Disp: 100 tablet, Rfl: 1  •  metFORMIN (GLUCOPHAGE-XR) 500 mg 24 hr tablet, TAKE 2 TABLETS BY MOUTH TWICE DAILY WITH MEALS, Disp: 360 tablet, Rfl: 1  •  metoprolol succinate (TOPROL-XL) 50 mg 24 hr tablet, Take 1 tablet (50 mg total) by mouth daily, Disp: 90 tablet, Rfl: 0  •  pravastatin (PRAVACHOL) 80 mg tablet, Take 1 tablet (80 mg total) by mouth daily, Disp: 90 tablet, Rfl: 1  •  semaglutide, 0.25 or 0.5 mg/dose, (Ozempic, 0.25 or 0.5 " MG/DOSE,) 2 mg/3 mL injection pen, Inject 0.75 mL (0.5 mg total) under the skin every 7 days, Disp: 9 mL, Rfl: 1  •  sildenafil (REVATIO) 20 mg tablet, Take 1 or 2 tablets 1 hour prior to relation, Disp: 30 tablet, Rfl: 1  •  terbinafine (LamISIL) 250 mg tablet, Take 1 tablet (250 mg total) by mouth daily, Disp: 90 tablet, Rfl: 0      Tyler West D.O.  [facility] [place] [address]  Office: [phone]  Fax: [phone]

## 2024-07-26 ENCOUNTER — OFFICE VISIT (OUTPATIENT)
Dept: OBGYN CLINIC | Facility: MEDICAL CENTER | Age: 54
End: 2024-07-26
Payer: COMMERCIAL

## 2024-07-26 ENCOUNTER — APPOINTMENT (OUTPATIENT)
Dept: RADIOLOGY | Facility: MEDICAL CENTER | Age: 54
End: 2024-07-26
Payer: COMMERCIAL

## 2024-07-26 VITALS
SYSTOLIC BLOOD PRESSURE: 132 MMHG | WEIGHT: 208 LBS | HEIGHT: 66 IN | DIASTOLIC BLOOD PRESSURE: 81 MMHG | HEART RATE: 80 BPM | BODY MASS INDEX: 33.43 KG/M2

## 2024-07-26 DIAGNOSIS — M70.61 TROCHANTERIC BURSITIS OF RIGHT HIP: Primary | ICD-10-CM

## 2024-07-26 DIAGNOSIS — M70.61 TROCHANTERIC BURSITIS OF RIGHT HIP: ICD-10-CM

## 2024-07-26 PROCEDURE — 20610 DRAIN/INJ JOINT/BURSA W/O US: CPT | Performed by: ORTHOPAEDIC SURGERY

## 2024-07-26 PROCEDURE — 73502 X-RAY EXAM HIP UNI 2-3 VIEWS: CPT

## 2024-07-26 PROCEDURE — 99214 OFFICE O/P EST MOD 30 MIN: CPT | Performed by: ORTHOPAEDIC SURGERY

## 2024-07-26 RX ORDER — ROPIVACAINE HYDROCHLORIDE 2 MG/ML
4 INJECTION, SOLUTION EPIDURAL; INFILTRATION; PERINEURAL
Status: COMPLETED | OUTPATIENT
Start: 2024-07-26 | End: 2024-07-26

## 2024-07-26 RX ORDER — LANCETS 28 GAUGE
EACH MISCELLANEOUS
Qty: 300 EACH | Refills: 1 | Status: SHIPPED | OUTPATIENT
Start: 2024-07-26

## 2024-07-26 RX ORDER — METHYLPREDNISOLONE ACETATE 40 MG/ML
1 INJECTION, SUSPENSION INTRA-ARTICULAR; INTRALESIONAL; INTRAMUSCULAR; SOFT TISSUE
Status: COMPLETED | OUTPATIENT
Start: 2024-07-26 | End: 2024-07-26

## 2024-07-26 RX ADMIN — METHYLPREDNISOLONE ACETATE 1 ML: 40 INJECTION, SUSPENSION INTRA-ARTICULAR; INTRALESIONAL; INTRAMUSCULAR; SOFT TISSUE at 09:30

## 2024-07-26 RX ADMIN — ROPIVACAINE HYDROCHLORIDE 4 ML: 2 INJECTION, SOLUTION EPIDURAL; INFILTRATION; PERINEURAL at 09:30

## 2024-07-26 NOTE — PROGRESS NOTES
"Orthopaedic Surgery - Office Note  Javan Zamudio (53 y.o. male)   : 1970   MRN: [id number]  Encounter Date: 2024    Assessment / Plan  Right hip GT bursitis     CSI of right trochanteric bursa was performed  Referral given to being PT  Activity as tolerated  Ice, heat and anti-inflammatories prn   Ordered and reviewed XR during the visit   Follow-up:  Return if symptoms worsen or fail to improve.      Chief Complaint / Date of Onset  Right hip pain, been going on since 2024 with no injury   Injury Mechanism / Date  None  Surgery / Date  None    History of Present Illness   Javan Zamudio is a 53 y.o. male who presents for evaluation of right hip pain. He has been having lateral hip pain for 3 months with no injury. He describes this pain as getting worse with walking and doing steps. He states when this pain worsens it does go down to his knee. He denies any radiating symptoms or lower back pain. He has not had any formal treatments yet.     Treatment Summary  Medications / Modalities  None  Bracing / Immobilization  None  Physical Therapy  None  Injections  None  Prior Surgeries  None  Other Treatments  None    Employment / Current Status  , working full time     Sport / Organization / Current Status  Active       Review of Systems  Pertinent items are noted in HPI.  All other systems were reviewed and are negative.      Physical Exam  /81   Pulse 80   Ht 5' 6\" (1.676 m)   Wt 94.3 kg (208 lb)   BMI 33.57 kg/m²   Cons: Appears well.  No apparent distress.  Psych: Alert. Oriented x3.  Mood and affect normal.  Eyes: PERRLA, EOMI  Resp: Normal effort.  No audible wheezing or stridor.  CV: Palpable pulse.  No discernable arrhythmia.  No LE edema.  Lymph:  No palpable cervical, axillary, or inguinal lymphadenopathy.  Skin: Warm.  No palpable masses.  No visible lesions.  Neuro: Normal muscle tone.  Normal and symmetric DTR's.     Right Hip Exam  Alignment / " "Posture:  Normal resting hip posture.  Inspection:  No swelling. No ecchymosis.  Palpation:   moderate greater troch bursa tenderness. No effusion.  ROM:  Hip Flexion 100. Hip ER 40. Hip IR 30.  Strength:  5/5 hip flexors and abductors.  Stability:  No objective hip instability.  Tests:  (+) CLAU.  Neurovascular:  Sensation intact in DP/SP/Glass/Sa/T nerve distributions. 2+ DP & PT pulses.  Gait:  Normal.       Studies Reviewed  I have personally reviewed pertinent films in PACS.  XR of right hip - images from 07/26/2024 showing no abnormalities or fractures      Large joint arthrocentesis: R greater trochanteric bursa  Universal Protocol:  Consent given by: patient  Time out: Immediately prior to procedure a \"time out\" was called to verify the correct patient, procedure, equipment, support staff and site/side marked as required.  Site marked: the operative site was marked  Supporting Documentation  Indications: pain and diagnostic evaluation   Procedure Details  Location: hip - R greater trochanteric bursa  Preparation: Patient was prepped and draped in the usual sterile fashion  Needle size: 22 G  Ultrasound guidance: no  Approach: lateral  Medications administered: 1 mL methylPREDNISolone acetate 40 mg/mL; 4 mL ropivacaine 0.2 %    Patient tolerance: patient tolerated the procedure well with no immediate complications  Dressing:  Sterile dressing applied            Medical, Surgical, Family, and Social History  The patient's medical history, family history, and social history, were reviewed and updated as appropriate.    Past Medical History:   Diagnosis Date    Anxiety     Arthritis     Cancer (HCC)     Chronic thoracic back pain 06/19/2024    Depression     Diabetes (HCC)     Diabetes mellitus (HCC)     Fatty liver     GERD (gastroesophageal reflux disease)     Hyperlipidemia     Hypertension     Intertrigo     resolved 10/26/17    Lumbar spondylosis 06/18/2024    Sebaceous cyst     Sexual disorder     Sexual " "dysfunction     last assessed 03/04/14    Snoring     last assessed 12/08/14    Somnolence, daytime     last assessed 12/08/14    Thoracic disc herniation     last assessed 05/02/14    Vision problem     Vitamin D deficiency        Past Surgical History:   Procedure Laterality Date    COLONOSCOPY      last assessed 01/22/14    LYMPHADENECTOMY      Axillary; last assessed 05/30/14    RI CIRCUMCISION AGE >28 DAYS N/A 3/1/2022    Procedure: CIRCUMCISION ADULT;  Surgeon: Brennen Fink MD;  Location: [facility];  Service: Urology       Family History   Problem Relation Age of Onset    Diabetes Mother     Kidney cancer Mother     Diabetes Father     Diabetes Sister     Diabetes Family     Hypertension Family        Social History     Occupational History    Not on file   Tobacco Use    Smoking status: Never    Smokeless tobacco: Never   Vaping Use    Vaping status: Never Used   Substance and Sexual Activity    Alcohol use: Yes     Comment: social    Drug use: No    Sexual activity: Yes     Partners: Female       Allergies   Allergen Reactions    Ace Inhibitors Cough         Current Outpatient Medications:     Cholecalciferol (VITAMIN D3) 5000 units CAPS, Take by mouth, Disp: , Rfl:     Diclofenac Sodium (VOLTAREN) 1 %, Apply 2 g topically 4 (four) times a day, Disp: 2 g, Rfl: 0    dorzolamide-timolol (COSOPT) 22.3-6.8 MG/ML ophthalmic solution, INSTILL 1 DROP IN RIGHT EYE TWICE DAILY, Disp: 1 mL, Rfl: 1    Empagliflozin 25 MG TABS, Take 1 tablet (25 mg total) by mouth daily, Disp: 100 tablet, Rfl: 1    insulin aspart protamine-insulin aspart (NovoLOG Mix 70/30 FlexPen) 100 Units/mL injection pen, Inject 30 Units  before  Breakfast and Lunch, and 24 Units At Dinner, Disp: 45 mL, Rfl: 1    Insulin Pen Needle (BD Pen Needle Marielos 2nd Gen) 32G X 4 MM MISC, Inject as directed 3 (three) times a day, Disp: 300 each, Rfl: 3    Insulin Syringe-Needle U-100 (B-D INS SYR ULTRAFINE .5CC/30G) 30G X 1/2\" 0.5 ML MISC, Inject insulin 3 " times daily, Disp: 300 each, Rfl: 1    Lancets (freestyle) lancets, Use 1 each 3 (three) times a day Test blood sugars 4 times daily, Disp: 300 each, Rfl: 1    losartan-hydrochlorothiazide (HYZAAR) 100-25 MG per tablet, Take 1 tablet by mouth daily, Disp: 100 tablet, Rfl: 1    metFORMIN (GLUCOPHAGE-XR) 500 mg 24 hr tablet, TAKE 2 TABLETS BY MOUTH TWICE DAILY WITH MEALS, Disp: 360 tablet, Rfl: 1    metoprolol succinate (TOPROL-XL) 50 mg 24 hr tablet, Take 1 tablet (50 mg total) by mouth daily, Disp: 90 tablet, Rfl: 0    pravastatin (PRAVACHOL) 80 mg tablet, Take 1 tablet (80 mg total) by mouth daily, Disp: 90 tablet, Rfl: 1    semaglutide, 0.25 or 0.5 mg/dose, (Ozempic, 0.25 or 0.5 MG/DOSE,) 2 mg/3 mL injection pen, Inject 0.75 mL (0.5 mg total) under the skin every 7 days, Disp: 9 mL, Rfl: 1    sildenafil (REVATIO) 20 mg tablet, Take 1 or 2 tablets 1 hour prior to relation, Disp: 30 tablet, Rfl: 1    terbinafine (LamISIL) 250 mg tablet, Take 1 tablet (250 mg total) by mouth daily, Disp: 90 tablet, Rfl: 0      Karley Hernandez    Scribe Attestation      I,:   am acting as a scribe while in the presence of the attending physician.:       I,:   personally performed the services described in this documentation    as scribed in my presence.:

## 2024-07-27 ENCOUNTER — HOSPITAL ENCOUNTER (OUTPATIENT)
Dept: ULTRASOUND IMAGING | Facility: HOSPITAL | Age: 54
Discharge: HOME | End: 2024-07-27
Attending: INTERNAL MEDICINE
Payer: COMMERCIAL

## 2024-07-27 DIAGNOSIS — R19.09 GROIN SWELLING: ICD-10-CM

## 2024-07-27 PROCEDURE — 76705 ECHO EXAM OF ABDOMEN: CPT

## 2024-07-30 DIAGNOSIS — L73.9 FOLLICULITIS: Primary | ICD-10-CM

## 2024-08-01 ENCOUNTER — TELEPHONE (OUTPATIENT)
Age: 54
End: 2024-08-01

## 2024-08-01 DIAGNOSIS — E78.00 PURE HYPERCHOLESTEROLEMIA: ICD-10-CM

## 2024-08-01 DIAGNOSIS — I10 ESSENTIAL HYPERTENSION: ICD-10-CM

## 2024-08-01 RX ORDER — PRAVASTATIN SODIUM 80 MG/1
80 TABLET ORAL DAILY
Qty: 90 TABLET | Refills: 1 | Status: CANCELLED | OUTPATIENT
Start: 2024-08-01

## 2024-08-01 RX ORDER — METOPROLOL SUCCINATE 50 MG/1
50 TABLET, EXTENDED RELEASE ORAL DAILY
Qty: 90 TABLET | Refills: 1 | Status: SHIPPED | OUTPATIENT
Start: 2024-08-01

## 2024-08-01 NOTE — TELEPHONE ENCOUNTER
Patient calling to request glucose test strips. States the doctor only sent script for the lancets. Please advise patient. He uses the [facility] in [place] on [address].

## 2024-08-01 NOTE — TELEPHONE ENCOUNTER
Patient calling to request glucose test strips. States the doctor only sent script for the lancets. Please advise patient. He uses the [facility] in [place] on [address].

## 2024-08-01 NOTE — TELEPHONE ENCOUNTER
Patient called and requested a refill of metoprolol succinate (TOPROL-XL) 50 mg 24 hr tablet sent to [facility] in [place] on [address]. Patient only has three tablets left.     Thank you.

## 2024-08-01 NOTE — TELEPHONE ENCOUNTER
Patient called and requested refill of pravastatin (PRAVACHOL) 80 mg tablet to be refilled at [facility] in [place]. Patient only has a few tablets left.    Thank you.

## 2024-08-02 RX ORDER — PRAVASTATIN SODIUM 80 MG/1
80 TABLET ORAL DAILY
Qty: 90 TABLET | Refills: 1 | Status: SHIPPED | OUTPATIENT
Start: 2024-08-02

## 2024-08-06 LAB
LEFT EYE DIABETIC RETINOPATHY: POSITIVE
RIGHT EYE DIABETIC RETINOPATHY: POSITIVE

## 2024-08-07 ENCOUNTER — TELEPHONE (OUTPATIENT)
Dept: INTERNAL MEDICINE CLINIC | Facility: CLINIC | Age: 54
End: 2024-08-07

## 2024-08-07 NOTE — TELEPHONE ENCOUNTER
----- Message from Tyler West DO sent at 7/30/2024 12:00 PM EDT -----  Please give patient a call.  Ultrasound shows that the lump in the left groin may be related to folliculitis, which is inflammation and possibly infection of a hair follicle    If the lump is still there, would recommend we treat with an antibiotic cream - mupirocin, which can be applied 3 times a day for 7 days     If the lump does not go away with treatment, can consider getting a tissue sample/biopsy

## 2024-08-12 ENCOUNTER — TELEPHONE (OUTPATIENT)
Age: 54
End: 2024-08-12

## 2024-08-12 DIAGNOSIS — E11.9 TYPE 2 DIABETES MELLITUS WITHOUT COMPLICATION, WITHOUT LONG-TERM CURRENT USE OF INSULIN (HCC): Primary | ICD-10-CM

## 2024-08-12 NOTE — TELEPHONE ENCOUNTER
Patient said his doctor was suppose to send in a rx for test strips. The patient would like a rx for test strips sent to [id number] - [place] - [address]

## 2024-08-20 ENCOUNTER — TELEPHONE (OUTPATIENT)
Dept: ENDOCRINOLOGY | Facility: CLINIC | Age: 54
End: 2024-08-20

## 2024-08-20 NOTE — TELEPHONE ENCOUNTER
Patient called the RX Refill Line. Message is being forwarded to the office.     Patient is requesting   glucose blood (FREESTYLE LITE) test strip   Not on Patient's active medication list.    Please contact patient at [phone]

## 2024-08-22 DIAGNOSIS — E11.65 TYPE 2 DIABETES MELLITUS WITH HYPERGLYCEMIA, WITH LONG-TERM CURRENT USE OF INSULIN (HCC): Primary | ICD-10-CM

## 2024-08-22 DIAGNOSIS — Z79.4 TYPE 2 DIABETES MELLITUS WITH HYPERGLYCEMIA, WITH LONG-TERM CURRENT USE OF INSULIN (HCC): Primary | ICD-10-CM

## 2024-08-22 RX ORDER — BLOOD-GLUCOSE METER
KIT MISCELLANEOUS
Qty: 100 STRIP | Refills: 1 | Status: SHIPPED | OUTPATIENT
Start: 2024-08-22

## 2024-08-22 NOTE — TELEPHONE ENCOUNTER
Patient called in stating his test strips aren't covered by his insurance, so he would like for someone to call and find out what is covered. I did let him know it is his responsibility to call his insurance and find out what they will cover and then we will most certainly send in whatever he needs. Patient then disconnected the phone call.      was on the line as well.

## 2024-08-29 DIAGNOSIS — E11.65 TYPE 2 DIABETES MELLITUS WITH HYPERGLYCEMIA, WITH LONG-TERM CURRENT USE OF INSULIN (HCC): ICD-10-CM

## 2024-08-29 DIAGNOSIS — Z79.4 TYPE 2 DIABETES MELLITUS WITH HYPERGLYCEMIA, WITH LONG-TERM CURRENT USE OF INSULIN (HCC): ICD-10-CM

## 2024-08-29 RX ORDER — INSULIN ASPART 100 [IU]/ML
INJECTION, SUSPENSION SUBCUTANEOUS
Qty: 45 ML | Refills: 1 | Status: SHIPPED | OUTPATIENT
Start: 2024-08-29

## 2024-08-29 NOTE — TELEPHONE ENCOUNTER
Reason for call:   [x] Refill   [] Prior Auth  [] Other:     Office:   [] PCP/Provider -   [x] Specialty/Provider - Shira Beasley MD     Medication:     insulin aspart protamine-insulin aspart (NovoLOG Mix 70/30 FlexPen) 100 Units/mL injection pen            Pharmacy: [id number] [place], [place] - [address] [place] [address] [place]      Does the patient have enough for 3 days?   [x] Yes   [] No - Send as HP to POD

## 2024-08-30 ENCOUNTER — OFFICE VISIT (OUTPATIENT)
Dept: PODIATRY | Facility: CLINIC | Age: 54
End: 2024-08-30
Payer: COMMERCIAL

## 2024-08-30 VITALS
BODY MASS INDEX: 33.11 KG/M2 | HEART RATE: 71 BPM | DIASTOLIC BLOOD PRESSURE: 71 MMHG | WEIGHT: 206 LBS | SYSTOLIC BLOOD PRESSURE: 115 MMHG | HEIGHT: 66 IN

## 2024-08-30 DIAGNOSIS — E11.65 TYPE 2 DIABETES MELLITUS WITH HYPERGLYCEMIA, WITH LONG-TERM CURRENT USE OF INSULIN (HCC): Primary | ICD-10-CM

## 2024-08-30 DIAGNOSIS — Z79.4 TYPE 2 DIABETES MELLITUS WITH HYPERGLYCEMIA, WITH LONG-TERM CURRENT USE OF INSULIN (HCC): Primary | ICD-10-CM

## 2024-08-30 PROCEDURE — 99203 OFFICE O/P NEW LOW 30 MIN: CPT | Performed by: PODIATRIST

## 2024-08-30 NOTE — PATIENT INSTRUCTIONS
"Patient Education     Foot care for people with diabetes   The Basics   Written by the doctors and editors at [facility]   Why is foot care important if I have diabetes? -- Diabetes can cause nerve damage if your blood sugar is high for a long time. The medical term for this is \"diabetic neuropathy.\"  If you have problems with the nerves in your feet, you might not be able to feel pain in your foot. Normally, people feel pain when they get a cut or a blister on their foot. The pain tells them that they need to treat their cut so it can heal. But people with nerve damage might not feel any pain when their feet get hurt. They might not even know that they have a cut, so they might not treat it. Problems that aren't treated right away can get much worse. For example, an untreated cut can get infected and turn into an open sore.  High blood sugar can also damage blood vessels and decrease blood flow to your feet. This can weaken your skin and make wounds take longer to heal. You are also more likely to get an infection if you have high blood sugar.  How do I take care of my feet? -- Taking good care of your feet can help prevent foot problems. You should:   Wash your feet every day with soap and warm water. Pat your feet dry, and be sure to dry the skin between your toes.   Keep your feet moisturized. Put lotion on the tops and bottoms of your feet, but not between your toes.   Check your feet every day (figure 1). Look for cuts, blisters, redness, or swelling. Use a mirror, or ask someone to help you check the bottoms of your feet. Check all parts of the foot, especially between the toes. Look for broken skin, ulcers, blisters, or redness.   Trim your toenails straight across when needed (figure 2). Do not cut the corners of your toenails. File rough edges. Do not cut your cuticles. Ask for help if you cannot see well or have problems reaching your feet.   Ask your doctor or nurse to check your feet at each visit. Take " your shoes and socks off for these checks.   See a foot care provider (such as a podiatrist) if you have an ingrown toenail, corn, or callus. Do not try to remove corns and calluses yourself.  How do I protect my feet from injury? -- There are several ways to protect your feet. You can:   Wear shoes and socks at all times, even at home. Do not walk barefoot. Wear swim shoes if you go to the beach or a swimming pool.   Choose shoes that fit well. They should not be not too tight or too loose. Your shoes should have plenty of room for your toes (figure 3). Your doctor might give you a prescription for special shoes. Check to see if they are covered by your insurance.   Check your shoes each time before you put them on to make sure that the lining is smooth. Also check to make sure that there is nothing inside the shoes before putting them on.   Do not wear shoes that expose any part of the foot, like sandals, thongs, or clogs.   Wear cotton socks that fit loosely. Do not wear shoes without socks.   Protect your feet from heat and cold. Test bath water before putting your feet in it to make sure that it is not too hot. Do not walk barefoot on hot ground. Take extra care when going outside in the cold and wear warm socks.  What else should I know? -- You can lower your risk for foot problems by keeping your blood sugar levels as close to your goal as possible. Other things you can do include:   Move your ankles and toes often to help with blood flow. You can wear a support stocking to help with swelling.   Walk often. Regular walking helps blood flow.   If you smoke, try to quit. Your doctor or nurse can help. Smoking causes poor blood flow to your feet and can damage your nerves.  When should I call the doctor? -- Call your doctor or nurse for advice if you have:   A fever of 100.4°F (38°C) or higher, chills, or a wound that will not heal   Swelling, redness, warmth around a wound, a foul smell coming from a wound, or  yellowish, greenish, or bloody discharge   Sores or blisters on your feet that hurt more or less than you would expect   Numbness or tingling in your foot or leg   Corns, calluses, blisters, or new sores on your foot   Very dry, scaly, or cracked skin on your feet   Changes in the way your foot joints or arch look  All topics are updated as new evidence becomes available and our peer review process is complete.  This topic retrieved from [employer] on: Mar 13, 2024.  Topic 484076 Version 2.0  Release: 32.2.4 - C32.71  © 2024 UpToDate, Inc. and/or its affiliates. All rights reserved.  figure 1: Foot check for people with diabetes     People with diabetes should check both of their feet every day. It is important to check your feet all over, including in between your toes. If you can't see the bottom of your foot, use a mirror or ask another person to check for you. Let your doctor or nurse know if you find any:  Redness   Cuts or cracks in the skin   Blisters   Swelling   Graphic 44316 Version 3.0  figure 2: Trim your toenails     Trim your toenails straight across and smooth them with a nail file.  Graphic 85629 Version 2.0  figure 3: Correct shoe shape     Choose shoes that fit the right way and are not too tight or too loose. Your shoes should have plenty of room for your toes.  Graphic 32911 Version 2.0  Consumer Information Use and Disclaimer   Disclaimer: This generalized information is a limited summary of diagnosis, treatment, and/or medication information. It is not meant to be comprehensive and should be used as a tool to help the user understand and/or assess potential diagnostic and treatment options. It does NOT include all information about conditions, treatments, medications, side effects, or risks that may apply to a specific patient. It is not intended to be medical advice or a substitute for the medical advice, diagnosis, or treatment of a health care provider based on the health care provider's  examination and assessment of a patient's specific and unique circumstances. Patients must speak with a health care provider for complete information about their health, medical questions, and treatment options, including any risks or benefits regarding use of medications. This information does not endorse any treatments or medications as safe, effective, or approved for treating a specific patient. UpToDate, Inc. and its affiliates disclaim any warranty or liability relating to this information or the use thereof.The use of this information is governed by the Terms of Use, available at https://www.[employer].uwer.com/en/know/clinical-effectiveness-terms. 2024© UpToDate, Inc. and its affiliates and/or licensors. All rights reserved.  Copyright   © 2024 UpToDate, Inc. and/or its affiliates. All rights reserved.

## 2024-08-30 NOTE — PROGRESS NOTES
PATIENT:  Javan Zamudio    1970    ASSESSMENT:     1. Type 2 diabetes mellitus with hyperglycemia, with long-term current use of insulin ([facility])              PLAN:  1.  Reviewed medical records.  Reviewed the note from PCP.  Patient was counseled on the condition and diagnosis.    2.  Educated disease prevention and risks related to diabetes.    3.  Educated proper daily foot care and exam.  Instructed proper skin care / protection and footwear.  Instructed to identify any signs of infection and related foot problem.    4.  The recent blood work was reviewed / discussed and the last HbA1c was 9.3.  Discussed proper blood glucose control with diet and exercise.    5. Finish oral Lamisil as prescribed for onychomycosis.  Discussed risks and complications.    6. The patient will return in 6 months for diabetic foot exam.      Imaging: I have personally reviewed pertinent films in PACS  Labs, pathology, and Other Studies: I have personally reviewed pertinent reports.        Subjective:          HPI  The patient presents for diabetic foot evaluation.  The patient has diabetes for 10+ years.  The blood glucose was high, but had been better.  No history of diabetic foot ulcer or related foot infection.  He has some numbness and paresthesia a  Denied weakness or significant functional deficit.  The patient denied any acute pedal disorder or injury.  He reports he has nail fungus on left great toe and is on oral antifungal.      The following portions of the patient's history were reviewed and updated as appropriate: allergies, current medications, past family history, past medical history, past social history, past surgical history and problem list.  All pertinent labs and images were reviewed.    Past Medical History  Past Medical History:   Diagnosis Date    Anxiety     Arthritis     Cancer (HCC)     Chronic thoracic back pain 06/19/2024    Depression     Diabetes (HCC)     Diabetes mellitus ([facility])      "Fatty liver     GERD (gastroesophageal reflux disease)     Hyperlipidemia     Hypertension     Intertrigo     resolved 10/26/17    Lumbar spondylosis 06/18/2024    Sebaceous cyst     Sexual disorder     Sexual dysfunction     last assessed 03/04/14    Snoring     last assessed 12/08/14    Somnolence, daytime     last assessed 12/08/14    Thoracic disc herniation     last assessed 05/02/14    Vision problem     Vitamin D deficiency        Past Surgical History  Past Surgical History:   Procedure Laterality Date    COLONOSCOPY      last assessed 01/22/14    LYMPHADENECTOMY      Axillary; last assessed 05/30/14    ID CIRCUMCISION AGE >28 DAYS N/A 3/1/2022    Procedure: CIRCUMCISION ADULT;  Surgeon: Brennen Fink MD;  Location: [facility];  Service: Urology        Allergies:  Ace inhibitors    Medications:  Current Outpatient Medications   Medication Sig Dispense Refill    Cholecalciferol (VITAMIN D3) 5000 units CAPS Take by mouth      Diclofenac Sodium (VOLTAREN) 1 % Apply 2 g topically 4 (four) times a day 2 g 0    dorzolamide-timolol (COSOPT) 22.3-6.8 MG/ML ophthalmic solution INSTILL 1 DROP IN RIGHT EYE TWICE DAILY 1 mL 1    Empagliflozin 25 MG TABS Take 1 tablet (25 mg total) by mouth daily 100 tablet 1    glucose blood (FREESTYLE LITE) test strip Use as instructed 3/day 100 strip 1    insulin aspart protamine-insulin aspart (NovoLOG Mix 70/30 FlexPen) 100 Units/mL injection pen Inject 30 Units  before  Breakfast and Lunch, and 24 Units At Dinner 45 mL 1    Insulin Pen Needle (BD Pen Needle Marielos 2nd Gen) 32G X 4 MM MISC Inject as directed 3 (three) times a day 300 each 3    Insulin Syringe-Needle U-100 (B-D INS SYR ULTRAFINE .5CC/30G) 30G X 1/2\" 0.5 ML MISC Inject insulin 3 times daily 300 each 1    Lancets (freestyle) lancets Test blood sugars 4 times daily 300 each 1    losartan-hydrochlorothiazide (HYZAAR) 100-25 MG per tablet Take 1 tablet by mouth daily 100 tablet 1    metFORMIN (GLUCOPHAGE-XR) 500 mg 24 hr " tablet TAKE 2 TABLETS BY MOUTH TWICE DAILY WITH MEALS 360 tablet 1    metoprolol succinate (TOPROL-XL) 50 mg 24 hr tablet Take 1 tablet (50 mg total) by mouth daily 90 tablet 1    pravastatin (PRAVACHOL) 80 mg tablet Take 1 tablet (80 mg total) by mouth daily 90 tablet 1    semaglutide, 0.25 or 0.5 mg/dose, (Ozempic, 0.25 or 0.5 MG/DOSE,) 2 mg/3 mL injection pen Inject 0.75 mL (0.5 mg total) under the skin every 7 days 9 mL 1    sildenafil (REVATIO) 20 mg tablet Take 1 or 2 tablets 1 hour prior to relation 30 tablet 1    terbinafine (LamISIL) 250 mg tablet Take 1 tablet (250 mg total) by mouth daily 90 tablet 0    mupirocin (BACTROBAN) 2 % ointment Apply topically 3 (three) times a day for 7 days 50 g 0     No current facility-administered medications for this visit.       Social History:  Social History     Socioeconomic History    Marital status: Single     Spouse name: None    Number of children: None    Years of education: None    Highest education level: None   Occupational History    None   Tobacco Use    Smoking status: Never    Smokeless tobacco: Never   Vaping Use    Vaping status: Never Used   Substance and Sexual Activity    Alcohol use: Yes     Comment: social    Drug use: No    Sexual activity: Yes     Partners: Female   Other Topics Concern    None   Social History Narrative    None     Social Determinants of Health     Financial Resource Strain: Not on file   Food Insecurity: Not on file   Transportation Needs: Not on file   Physical Activity: Not on file   Stress: Not on file   Social Connections: Not on file   Intimate Partner Violence: Not on file   Housing Stability: Not on file        Review of Systems   Constitutional:  Negative for chills and fever.   Respiratory:  Negative for cough and shortness of breath.    Cardiovascular:  Negative for chest pain and leg swelling.   Gastrointestinal:  Negative for nausea and vomiting.   Musculoskeletal:  Negative for gait problem.   Skin:  Negative for  "wound.   Neurological:  Negative for weakness.   Hematological: Negative.    Psychiatric/Behavioral:  Negative for behavioral problems and confusion.          Objective:      /71   Pulse 71   Ht 5' 6\" (1.676 m)   Wt 93.4 kg (206 lb)   BMI 33.25 kg/m²          Physical Exam  Vitals reviewed.   Constitutional:       General: He is not in acute distress.     Appearance: He is not toxic-appearing or diaphoretic.   HENT:      Head: Normocephalic and atraumatic.   Eyes:      Extraocular Movements: Extraocular movements intact.   Cardiovascular:      Rate and Rhythm: Normal rate and regular rhythm.      Pulses: Normal pulses. no weak pulses.           Dorsalis pedis pulses are 2+ on the right side and 2+ on the left side.        Posterior tibial pulses are 2+ on the right side and 2+ on the left side.   Pulmonary:      Effort: Pulmonary effort is normal. No respiratory distress.   Musculoskeletal:         General: No tenderness, deformity or signs of injury.      Cervical back: Normal range of motion and neck supple.      Right lower leg: No edema.      Left lower leg: No edema.      Right foot: No Charcot foot or foot drop.      Left foot: No Charcot foot or foot drop.   Feet:      Right foot:      Protective Sensation: 10 sites tested.  10 sites sensed.      Skin integrity: No ulcer, skin breakdown or erythema.      Left foot:      Protective Sensation: 10 sites tested.  10 sites sensed.      Skin integrity: No ulcer, skin breakdown or erythema.   Skin:     General: Skin is warm.      Capillary Refill: Capillary refill takes less than 2 seconds.      Coloration: Skin is not cyanotic or mottled.      Findings: No abscess, erythema or wound.      Nails: There is no clubbing.      Comments: Thick, mycotic nail on left great toe with onycholysis.   Neurological:      General: No focal deficit present.      Mental Status: He is alert and oriented to person, place, and time.      Cranial Nerves: No cranial nerve " deficit.      Sensory: No sensory deficit.      Motor: No weakness.      Coordination: Coordination normal.   Psychiatric:         Mood and Affect: Mood normal.         Behavior: Behavior normal.         Thought Content: Thought content normal.         Judgment: Judgment normal.           Diabetic Foot Exam    Patient's shoes and socks removed.    Right Foot/Ankle   Right Foot Inspection  Skin Exam: skin intact. No erythema, no maceration, no pre-ulcer and no ulcer.     Toe Exam: ROM and strength within normal limits. No swelling, no tenderness, erythema and  no right toe deformity    Sensory   Vibration: diminished  Proprioception: intact  Monofilament testing: intact    Vascular  Capillary refills: < 3 seconds  The right DP pulse is 2+. The right PT pulse is 2+.     Left Foot/Ankle  Left Foot Inspection  Skin Exam: skin intact. No erythema, no maceration, no pre-ulcer and no ulcer.     Toe Exam: ROM and strength within normal limits. No swelling, no tenderness, no erythema and no left toe deformity.     Sensory   Vibration: diminished  Proprioception: intact  Monofilament testing: intact    Vascular  Capillary refills: < 3 seconds  The left DP pulse is 2+. The left PT pulse is 2+.     Assign Risk Category  No deformity present  No loss of protective sensation  No weak pulses  Risk: 0

## 2024-09-05 DIAGNOSIS — E11.65 TYPE 2 DIABETES MELLITUS WITH HYPERGLYCEMIA, WITH LONG-TERM CURRENT USE OF INSULIN (HCC): ICD-10-CM

## 2024-09-05 DIAGNOSIS — Z79.4 TYPE 2 DIABETES MELLITUS WITH HYPERGLYCEMIA, WITH LONG-TERM CURRENT USE OF INSULIN (HCC): ICD-10-CM

## 2024-09-05 RX ORDER — INSULIN ASPART 100 [IU]/ML
INJECTION, SUSPENSION SUBCUTANEOUS
Qty: 45 ML | Refills: 0 | OUTPATIENT
Start: 2024-09-05

## 2024-09-05 NOTE — TELEPHONE ENCOUNTER
Reason for call:   [x] Refill   [] Prior Auth  [] Other:     Office:   [] PCP/Provider -   [x] Specialty/Provider - ENDO    Medication:         [facility]     Does the patient have enough for 3 days?   [] Yes   [x] No - Send as HP to POD

## 2024-09-27 ENCOUNTER — OFFICE VISIT (OUTPATIENT)
Dept: ENDOCRINOLOGY | Facility: CLINIC | Age: 54
End: 2024-09-27
Payer: COMMERCIAL

## 2024-09-27 VITALS
BODY MASS INDEX: 33.56 KG/M2 | HEART RATE: 78 BPM | DIASTOLIC BLOOD PRESSURE: 78 MMHG | SYSTOLIC BLOOD PRESSURE: 120 MMHG | OXYGEN SATURATION: 98 % | WEIGHT: 208.8 LBS | HEIGHT: 66 IN

## 2024-09-27 DIAGNOSIS — I10 PRIMARY HYPERTENSION: ICD-10-CM

## 2024-09-27 DIAGNOSIS — Z79.4 TYPE 2 DIABETES MELLITUS WITH HYPERGLYCEMIA, WITH LONG-TERM CURRENT USE OF INSULIN (HCC): Primary | ICD-10-CM

## 2024-09-27 DIAGNOSIS — E78.5 DYSLIPIDEMIA DUE TO TYPE 2 DIABETES MELLITUS  (HCC): ICD-10-CM

## 2024-09-27 DIAGNOSIS — E29.1 TESTICULAR HYPOGONADISM: ICD-10-CM

## 2024-09-27 DIAGNOSIS — E11.69 DYSLIPIDEMIA DUE TO TYPE 2 DIABETES MELLITUS  (HCC): ICD-10-CM

## 2024-09-27 DIAGNOSIS — E11.65 TYPE 2 DIABETES MELLITUS WITH HYPERGLYCEMIA, WITH LONG-TERM CURRENT USE OF INSULIN (HCC): Primary | ICD-10-CM

## 2024-09-27 LAB — SL AMB POCT HEMOGLOBIN AIC: 8.8 (ref ?–6.5)

## 2024-09-27 PROCEDURE — 99214 OFFICE O/P EST MOD 30 MIN: CPT

## 2024-09-27 PROCEDURE — 83036 HEMOGLOBIN GLYCOSYLATED A1C: CPT

## 2024-09-27 RX ORDER — BLOOD-GLUCOSE METER
EACH MISCELLANEOUS
Qty: 1 KIT | Refills: 0 | Status: SHIPPED | OUTPATIENT
Start: 2024-09-27

## 2024-09-27 RX ORDER — INSULIN ASPART 100 [IU]/ML
INJECTION, SUSPENSION SUBCUTANEOUS
Qty: 45 ML | Refills: 1 | Status: SHIPPED | OUTPATIENT
Start: 2024-09-27

## 2024-09-27 RX ORDER — BLOOD-GLUCOSE SENSOR
EACH MISCELLANEOUS
Qty: 6 EACH | Refills: 1 | Status: SHIPPED | OUTPATIENT
Start: 2024-09-27

## 2024-09-27 NOTE — ASSESSMENT & PLAN NOTE
HGA1C has improved but remains above goal. No BGs to review. He used Samir in the past but had issues with sensors failing so he stopped using. He does not currently have BG meter because insurance stopped covering test strips.   Treatment regimen:   - Will increase Ozempic to 1 mg since he is tolerating well without any issues.   - Continue with Jardiance and Metformin at current dose.   - Continue with Novolog 70-30 at current dose. He denies any hypoglycemia.   - Would recommend he start back on CGM again, ordered Samir 3 plus as well as new glucometer so he can start to check BGs.   - Continue to focus on lifestyle modifications.   Discussed risks/complications associated with uncontrolled diabetes.    Advised to adhere to diabetic diet, and recommended staying active/exercising routinely.  Keep carbohydrates consistent to limit blood glucose fluctuations.  Advised to call if blood sugars less than 70 mg/dl or over 300 mg/dl.   Discussed symptoms and treatment of hypoglycemia.    Recommended routine follow-up with podiatry and ophthalmology.   Ordered blood work to complete prior to next visit.   Lab Results   Component Value Date    HGBA1C 8.8 (A) 09/27/2024

## 2024-09-27 NOTE — PATIENT INSTRUCTIONS
Increase Ozempic to 1 mg weekly  Continue with Novolog 70/30 30 units before breakfast and lunch and 24 units before dinner.  Continue with Metformin and Jardiance at current dose.

## 2024-09-27 NOTE — ASSESSMENT & PLAN NOTE
He has hx of hypogonadism. He complaints of ED and low energy. I have asked him to complete labs for hypogonadism work up that was ordered at previous visit.   He has been treated before with gel and patch. These were stopped due to an improvement in testosterone.

## 2024-09-27 NOTE — PROGRESS NOTES
Established Patient Progress Note      Chief Complaint   Patient presents with    Diabetes Type 2        Impression & Plan:    Problem List Items Addressed This Visit          Cardiovascular and Mediastinum    Primary hypertension     BP at goal. Continue current medication regimen.              Endocrine    Dyslipidemia due to type 2 diabetes mellitus  (HCC)     Continue on statin therapy. He has lipid panel ordered by PCP. Recommend he complete labs.          Relevant Medications    semaglutide, 1 mg/dose, (Ozempic, 1 MG/DOSE,) 4 mg/3 mL injection pen    insulin aspart protamine-insulin aspart (NovoLOG Mix 70/30 FlexPen) 100 Units/mL injection pen    Empagliflozin 25 MG TABS    Type 2 diabetes mellitus with hyperglycemia, with long-term current use of insulin (HCC) - Primary     HGA1C has improved but remains above goal. No BGs to review. He used Samir in the past but had issues with sensors failing so he stopped using. He does not currently have BG meter because insurance stopped covering test strips.   Treatment regimen:   - Will increase Ozempic to 1 mg since he is tolerating well without any issues.   - Continue with Jardiance and Metformin at current dose.   - Continue with Novolog 70-30 at current dose. He denies any hypoglycemia.   - Would recommend he start back on CGM again, ordered Samir 3 plus as well as new glucometer so he can start to check BGs.   - Continue to focus on lifestyle modifications.   Discussed risks/complications associated with uncontrolled diabetes.    Advised to adhere to diabetic diet, and recommended staying active/exercising routinely.  Keep carbohydrates consistent to limit blood glucose fluctuations.  Advised to call if blood sugars less than 70 mg/dl or over 300 mg/dl.   Discussed symptoms and treatment of hypoglycemia.    Recommended routine follow-up with podiatry and ophthalmology.   Ordered blood work to complete prior to next visit.   Lab Results   Component Value Date     HGBA1C 8.8 (A) 09/27/2024            Relevant Medications    semaglutide, 1 mg/dose, (Ozempic, 1 MG/DOSE,) 4 mg/3 mL injection pen    glucose blood (Contour Next Test) test strip    Blood Glucose Monitoring Suppl (Contour Next Monitor) w/Device KIT    Continuous Glucose Sensor (FreeStyle Samir 3 Plus Sensor) MISC    insulin aspart protamine-insulin aspart (NovoLOG Mix 70/30 FlexPen) 100 Units/mL injection pen    Empagliflozin 25 MG TABS    Other Relevant Orders    POCT hemoglobin A1c (Completed)    Testicular hypogonadism     He has hx of hypogonadism. He complaints of ED and low energy. I have asked him to complete labs for hypogonadism work up that was ordered at previous visit.   He has been treated before with gel and patch. These were stopped due to an improvement in testosterone.             History of Present Illness:   Javan Zamudio is a 54 y.o. male with type 2 diabetes seen in follow up. Reports complications of retinopathy and neuropathy.  Denies recent severe hypoglycemic or severe hyperglycemic episodes. Denies any issues with his current regimen. POCT A1C was 8.8. Home glucose monitoring: are not performed.      Current regimen:   Novolog 70/30-- 30 units before breakfast and lunch, 24 units before dinner  Jardiance 25mg daily  Ozempic 0.5 mg weekly   Metformin XR 1000 mg twice daily      Last Eye Exam: UTD  Last Foot Exam: UTD    Has hypertension: Taking losartan/HCTZ  Has hyperlipidemia: Taking pravastatin       Patient Active Problem List   Diagnosis    Primary hypertension    Dyslipidemia due to type 2 diabetes mellitus  (HCC)    Gastroesophageal reflux disease without esophagitis    Chronic lymphocytic leukemia (HCC)    Low testosterone    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 without macular edema associated with type 2 diabetes mellitus (HCC)    Type 2 diabetes mellitus with hyperglycemia, with long-term current use of insulin (HCC)    Other hemorrhoids    Phimosis     Class 2 severe obesity due to excess calories with serious comorbidity and body mass index (BMI) of 35.0 to 35.9 in adult (HCC)    Hip pain    Erectile dysfunction    Age-related cataract of both eyes    Central retinal vein occlusion of right eye    Epiretinal membrane (ERM) of both eyes    Fatty liver    Posterior vitreous detachment    Testicular hypogonadism    Vitamin D deficiency    Onychomycosis of left great toe    Lumbar spondylosis    Chronic thoracic back pain    Type 2 diabetes mellitus without complication, without long-term current use of insulin (HCC)      Past Medical History:   Diagnosis Date    Anxiety     Arthritis     Cancer (HCC)     Chronic thoracic back pain 06/19/2024    Depression     Diabetes (HCC)     Diabetes mellitus (HCC)     Fatty liver     GERD (gastroesophageal reflux disease)     Hyperlipidemia     Hypertension     Intertrigo     resolved 10/26/17    Lumbar spondylosis 06/18/2024    Sebaceous cyst     Sexual disorder     Sexual dysfunction     last assessed 03/04/14    Snoring     last assessed 12/08/14    Somnolence, daytime     last assessed 12/08/14    Thoracic disc herniation     last assessed 05/02/14    Vision problem     Vitamin D deficiency       Past Surgical History:   Procedure Laterality Date    COLONOSCOPY      last assessed 01/22/14    LYMPHADENECTOMY      Axillary; last assessed 05/30/14    DC CIRCUMCISION AGE >28 DAYS N/A 3/1/2022    Procedure: CIRCUMCISION ADULT;  Surgeon: Brennen Fink MD;  Location: [facility];  Service: Urology      Social History     Tobacco Use    Smoking status: Never    Smokeless tobacco: Never   Substance Use Topics    Alcohol use: Yes     Comment: social     Allergies   Allergen Reactions    Ace Inhibitors Cough         Current Outpatient Medications:     Blood Glucose Monitoring Suppl (Contour Next Monitor) w/Device KIT, Use to check BG 3 times daily, Disp: 1 kit, Rfl: 0    Continuous Glucose Sensor (FreeStyle Samir 3 Plus Sensor) MISC,  "Change once every 15 days, Disp: 6 each, Rfl: 1    Diclofenac Sodium (VOLTAREN) 1 %, Apply 2 g topically 4 (four) times a day, Disp: 2 g, Rfl: 0    dorzolamide-timolol (COSOPT) 22.3-6.8 MG/ML ophthalmic solution, INSTILL 1 DROP IN RIGHT EYE TWICE DAILY, Disp: 1 mL, Rfl: 1    Empagliflozin 25 MG TABS, Take 1 tablet (25 mg total) by mouth daily, Disp: 100 tablet, Rfl: 1    glucose blood (Contour Next Test) test strip, Check BG 3 times daily., Disp: 100 strip, Rfl: 2    insulin aspart protamine-insulin aspart (NovoLOG Mix 70/30 FlexPen) 100 Units/mL injection pen, Inject 30 Units  before  Breakfast and Lunch, and 24 Units At Dinner, Disp: 45 mL, Rfl: 1    Insulin Pen Needle (BD Pen Needle Marielos 2nd Gen) 32G X 4 MM MISC, Inject as directed 3 (three) times a day, Disp: 300 each, Rfl: 3    Insulin Syringe-Needle U-100 (B-D INS SYR ULTRAFINE .5CC/30G) 30G X 1/2\" 0.5 ML MISC, Inject insulin 3 times daily, Disp: 300 each, Rfl: 1    Lancets (freestyle) lancets, Test blood sugars 4 times daily, Disp: 300 each, Rfl: 1    losartan-hydrochlorothiazide (HYZAAR) 100-25 MG per tablet, Take 1 tablet by mouth daily, Disp: 100 tablet, Rfl: 1    metFORMIN (GLUCOPHAGE-XR) 500 mg 24 hr tablet, TAKE 2 TABLETS BY MOUTH TWICE DAILY WITH MEALS, Disp: 360 tablet, Rfl: 1    metoprolol succinate (TOPROL-XL) 50 mg 24 hr tablet, Take 1 tablet (50 mg total) by mouth daily, Disp: 90 tablet, Rfl: 1    mupirocin (BACTROBAN) 2 % ointment, Apply topically 3 (three) times a day for 7 days, Disp: 50 g, Rfl: 0    pravastatin (PRAVACHOL) 80 mg tablet, Take 1 tablet (80 mg total) by mouth daily, Disp: 90 tablet, Rfl: 1    semaglutide, 1 mg/dose, (Ozempic, 1 MG/DOSE,) 4 mg/3 mL injection pen, Inject 0.75 mL (1 mg total) under the skin every 7 days, Disp: 9 mL, Rfl: 1    sildenafil (REVATIO) 20 mg tablet, Take 1 or 2 tablets 1 hour prior to relation, Disp: 30 tablet, Rfl: 1    Cholecalciferol (VITAMIN D3) 5000 units CAPS, Take by mouth, Disp: , Rfl:     Review " "of Systems   Constitutional:  Negative for chills, fever and unexpected weight change.   Eyes:  Positive for visual disturbance.   Respiratory:  Negative for shortness of breath.    Cardiovascular:  Negative for chest pain.   Gastrointestinal:  Negative for abdominal pain, nausea and vomiting.   Endocrine: Negative for polydipsia and polyuria.   Skin:  Negative for wound.   Neurological:  Negative for syncope.   All other systems reviewed and are negative.      Physical Exam:  Body mass index is 33.7 kg/m².  /78   Pulse 78   Ht 5' 6\" (1.676 m)   Wt 94.7 kg (208 lb 12.8 oz)   SpO2 98%   BMI 33.70 kg/m²    Wt Readings from Last 3 Encounters:   09/27/24 94.7 kg (208 lb 12.8 oz)   08/30/24 93.4 kg (206 lb)   07/26/24 94.3 kg (208 lb)       Physical Exam  Vitals reviewed.   Constitutional:       Appearance: Normal appearance. He is obese.   Cardiovascular:      Rate and Rhythm: Normal rate.   Pulmonary:      Effort: Pulmonary effort is normal.   Neurological:      Mental Status: He is alert and oriented to person, place, and time.   Psychiatric:         Mood and Affect: Mood normal.         Thought Content: Thought content normal.       Labs:   Lab Results   Component Value Date    HGBA1C 8.8 (A) 09/27/2024    HGBA1C 9.3 (A) 05/23/2024    HGBA1C 9.4 (H) 01/27/2024     Lab Results   Component Value Date    CREATININE 1.17 01/27/2024    CREATININE 1.21 09/20/2023    CREATININE 1.14 05/16/2023    BUN 20 01/27/2024     (L) 12/06/2014    K 4.0 01/27/2024     01/27/2024    CO2 28 01/27/2024     eGFR   Date Value Ref Range Status   01/27/2024 71 ml/min/1.73sq m Final     Lab Results   Component Value Date    CHOL 133 01/28/2014    HDL 34 (L) 09/20/2023    TRIG 251 (H) 09/20/2023     Lab Results   Component Value Date    ALT 29 01/27/2024    AST 25 01/27/2024    GGT 67 (H) 09/20/2023    ALKPHOS 84 01/27/2024    BILITOT 0.4 12/06/2014     Lab Results   Component Value Date    ENS8QELPSCJV 2.649 01/27/2024 "    [id number] 3.366 09/20/2023    [id number] 3.527 01/17/2023       Patient Instructions   Increase Ozempic to 1 mg weekly  Continue with Novolog 70/30 30 units before breakfast and lunch and 24 units before dinner.  Continue with Metformin and Jardiance at current dose.     Discussed with the patient and all questioned fully answered. He will call me if any problems arise.    TONY Mcfadden

## 2024-09-30 ENCOUNTER — TELEPHONE (OUTPATIENT)
Age: 54
End: 2024-09-30

## 2024-09-30 NOTE — TELEPHONE ENCOUNTER
PA for semaglutide, 1 mg/dose, (Ozempic, 1 MG/DOSE,) 4 mg/3 mL injection pen  APPROVED     Date(s) approved April 20, 2024 to May 20, 2025     Case #     Patient advised by          []MyChart Message  [x]Phone call   []LMOM  []L/M to call office as no active Communication consent on file  []Unable to leave detailed message as VM not approved on Communication consent       Pharmacy advised by    [x]Fax  []Phone call    Approval letter scanned into Media No

## 2024-10-05 ENCOUNTER — APPOINTMENT (OUTPATIENT)
Dept: LAB | Facility: HOSPITAL | Age: 54
End: 2024-10-05
Payer: COMMERCIAL

## 2024-10-05 DIAGNOSIS — Z79.4 TYPE 2 DIABETES MELLITUS WITH HYPERGLYCEMIA, WITH LONG-TERM CURRENT USE OF INSULIN (HCC): ICD-10-CM

## 2024-10-05 DIAGNOSIS — M54.6 THORACIC BACK PAIN, UNSPECIFIED BACK PAIN LATERALITY, UNSPECIFIED CHRONICITY: ICD-10-CM

## 2024-10-05 DIAGNOSIS — N52.9 ERECTILE DYSFUNCTION, UNSPECIFIED ERECTILE DYSFUNCTION TYPE: ICD-10-CM

## 2024-10-05 DIAGNOSIS — Z12.5 PROSTATE CANCER SCREENING: ICD-10-CM

## 2024-10-05 DIAGNOSIS — C91.10 CHRONIC LYMPHOCYTIC LEUKEMIA (HCC): ICD-10-CM

## 2024-10-05 DIAGNOSIS — E11.65 TYPE 2 DIABETES MELLITUS WITH HYPERGLYCEMIA, WITH LONG-TERM CURRENT USE OF INSULIN (HCC): ICD-10-CM

## 2024-10-05 DIAGNOSIS — R79.89 LOW TESTOSTERONE: ICD-10-CM

## 2024-10-05 DIAGNOSIS — E78.2 MIXED HYPERLIPIDEMIA: ICD-10-CM

## 2024-10-05 LAB
ALBUMIN SERPL BCG-MCNC: 4.3 G/DL (ref 3.5–5)
ALP SERPL-CCNC: 84 U/L (ref 34–104)
ALT SERPL W P-5'-P-CCNC: 27 U/L (ref 7–52)
ANION GAP SERPL CALCULATED.3IONS-SCNC: 7 MMOL/L (ref 4–13)
AST SERPL W P-5'-P-CCNC: 22 U/L (ref 13–39)
BILIRUB SERPL-MCNC: 0.33 MG/DL (ref 0.2–1)
BUN SERPL-MCNC: 25 MG/DL (ref 5–25)
CALCIUM SERPL-MCNC: 10.1 MG/DL (ref 8.4–10.2)
CHLORIDE SERPL-SCNC: 104 MMOL/L (ref 96–108)
CHOLEST SERPL-MCNC: 128 MG/DL
CO2 SERPL-SCNC: 28 MMOL/L (ref 21–32)
CREAT SERPL-MCNC: 1.12 MG/DL (ref 0.6–1.3)
ERYTHROCYTE [DISTWIDTH] IN BLOOD BY AUTOMATED COUNT: 12.4 % (ref 11.6–15.1)
EST. AVERAGE GLUCOSE BLD GHB EST-MCNC: 209 MG/DL
FSH SERPL-ACNC: 4.4 MIU/ML
GFR SERPL CREATININE-BSD FRML MDRD: 74 ML/MIN/1.73SQ M
GLUCOSE P FAST SERPL-MCNC: 179 MG/DL (ref 65–99)
HBA1C MFR BLD: 8.9 %
HCT VFR BLD AUTO: 44.5 % (ref 36.5–49.3)
HDLC SERPL-MCNC: 22 MG/DL
HGB BLD-MCNC: 14 G/DL (ref 12–17)
LDH SERPL-CCNC: 140 U/L (ref 140–271)
LDLC SERPL DIRECT ASSAY-MCNC: 47 MG/DL (ref 0–100)
LH SERPL-ACNC: 7.6 MIU/ML
MCH RBC QN AUTO: 27.8 PG (ref 26.8–34.3)
MCHC RBC AUTO-ENTMCNC: 31.5 G/DL (ref 31.4–37.4)
MCV RBC AUTO: 89 FL (ref 82–98)
PLATELET # BLD AUTO: 190 THOUSANDS/UL (ref 149–390)
PMV BLD AUTO: 9.7 FL (ref 8.9–12.7)
POTASSIUM SERPL-SCNC: 4.2 MMOL/L (ref 3.5–5.3)
PROLACTIN SERPL-MCNC: 8.15 NG/ML
PROT SERPL-MCNC: 6.7 G/DL (ref 6.4–8.4)
PSA SERPL-MCNC: 1.21 NG/ML (ref 0–4)
RBC # BLD AUTO: 5.03 MILLION/UL (ref 3.88–5.62)
SODIUM SERPL-SCNC: 139 MMOL/L (ref 135–147)
TRIGL SERPL-MCNC: 421 MG/DL
TSH SERPL DL<=0.05 MIU/L-ACNC: 3.72 UIU/ML (ref 0.45–4.5)
WBC # BLD AUTO: 12.12 THOUSAND/UL (ref 4.31–10.16)

## 2024-10-05 PROCEDURE — 80061 LIPID PANEL: CPT

## 2024-10-05 PROCEDURE — 84146 ASSAY OF PROLACTIN: CPT

## 2024-10-05 PROCEDURE — G0103 PSA SCREENING: HCPCS

## 2024-10-05 PROCEDURE — 80053 COMPREHEN METABOLIC PANEL: CPT

## 2024-10-05 PROCEDURE — 36415 COLL VENOUS BLD VENIPUNCTURE: CPT

## 2024-10-05 PROCEDURE — 85007 BL SMEAR W/DIFF WBC COUNT: CPT

## 2024-10-05 PROCEDURE — 84443 ASSAY THYROID STIM HORMONE: CPT

## 2024-10-05 PROCEDURE — 83036 HEMOGLOBIN GLYCOSYLATED A1C: CPT

## 2024-10-05 PROCEDURE — 84270 ASSAY OF SEX HORMONE GLOBUL: CPT

## 2024-10-05 PROCEDURE — 83615 LACTATE (LD) (LDH) ENZYME: CPT

## 2024-10-05 PROCEDURE — 83001 ASSAY OF GONADOTROPIN (FSH): CPT

## 2024-10-05 PROCEDURE — 83002 ASSAY OF GONADOTROPIN (LH): CPT

## 2024-10-05 PROCEDURE — 84403 ASSAY OF TOTAL TESTOSTERONE: CPT

## 2024-10-05 PROCEDURE — 85027 COMPLETE CBC AUTOMATED: CPT

## 2024-10-05 PROCEDURE — 83721 ASSAY OF BLOOD LIPOPROTEIN: CPT

## 2024-10-05 PROCEDURE — 84402 ASSAY OF FREE TESTOSTERONE: CPT

## 2024-10-06 LAB — SHBG SERPL-SCNC: 19.6 NMOL/L (ref 19.3–76.4)

## 2024-10-07 DIAGNOSIS — Z79.4 TYPE 2 DIABETES MELLITUS WITH HYPERGLYCEMIA, WITH LONG-TERM CURRENT USE OF INSULIN (HCC): ICD-10-CM

## 2024-10-07 DIAGNOSIS — E11.65 TYPE 2 DIABETES MELLITUS WITH HYPERGLYCEMIA, WITH LONG-TERM CURRENT USE OF INSULIN (HCC): ICD-10-CM

## 2024-10-07 LAB
TESTOST FREE SERPL-MCNC: 8.6 PG/ML (ref 7.2–24)
TESTOST SERPL-MCNC: 244 NG/DL (ref 264–916)

## 2024-10-07 PROCEDURE — 85060 BLOOD SMEAR INTERPRETATION: CPT | Performed by: PATHOLOGY

## 2024-10-07 RX ORDER — INSULIN ASPART 100 [IU]/ML
INJECTION, SUSPENSION SUBCUTANEOUS
Qty: 45 ML | Refills: 2 | Status: SHIPPED | OUTPATIENT
Start: 2024-10-07 | End: 2024-10-07 | Stop reason: SDUPTHER

## 2024-10-07 RX ORDER — INSULIN ASPART 100 [IU]/ML
INJECTION, SUSPENSION SUBCUTANEOUS
Qty: 45 ML | Refills: 1 | Status: SHIPPED | OUTPATIENT
Start: 2024-10-07 | End: 2024-10-08 | Stop reason: SDUPTHER

## 2024-10-07 NOTE — TELEPHONE ENCOUNTER
Reason for call:   [x] Refill   [] Prior Auth  [] Other:     Office:   [] PCP/Provider -   [x] Specialty/Provider - Endo/Mayelin Aburto     Medication: insulin aspart protamine-insulin aspart (NovoLOG Mix 70/30 FlexPen) 100 Units/mL     Dose/Frequency: Inject 30 Units before Breakfast and Lunch, and 24 Units At Dinner     Quantity: 45 mL    Pharmacy: [facility] [place] [place] - [address]    Does the patient have enough for 3 days?   [] Yes   [x] No - Send as HP to POD

## 2024-10-07 NOTE — TELEPHONE ENCOUNTER
Reason for call: Patient is switching pharmacies  [x] Refill   [] Prior Auth  [] Other:      Office:   [] PCP/Provider -   [x] Specialty/Provider - Endo/Mayelin Aburto      Medication: insulin aspart protamine-insulin aspart (NovoLOG Mix 70/30 FlexPen) 100 Units/mL      Dose/Frequency: Inject 30 Units before Breakfast and Lunch, and 24 Units At Dinner      Quantity: 45 mL     [facility]     Does the patient have enough for 3 days?   [] Yes   [x] No - Send as HP to POD

## 2024-10-08 ENCOUNTER — OFFICE VISIT (OUTPATIENT)
Dept: INTERNAL MEDICINE CLINIC | Facility: CLINIC | Age: 54
End: 2024-10-08
Payer: COMMERCIAL

## 2024-10-08 VITALS
HEIGHT: 66 IN | SYSTOLIC BLOOD PRESSURE: 130 MMHG | HEART RATE: 70 BPM | BODY MASS INDEX: 32.78 KG/M2 | DIASTOLIC BLOOD PRESSURE: 75 MMHG | TEMPERATURE: 97.1 F | WEIGHT: 204 LBS

## 2024-10-08 DIAGNOSIS — E66.01 CLASS 2 SEVERE OBESITY DUE TO EXCESS CALORIES WITH SERIOUS COMORBIDITY AND BODY MASS INDEX (BMI) OF 35.0 TO 35.9 IN ADULT (HCC): ICD-10-CM

## 2024-10-08 DIAGNOSIS — R79.89 LOW TESTOSTERONE: ICD-10-CM

## 2024-10-08 DIAGNOSIS — E66.812 CLASS 2 SEVERE OBESITY DUE TO EXCESS CALORIES WITH SERIOUS COMORBIDITY AND BODY MASS INDEX (BMI) OF 35.0 TO 35.9 IN ADULT (HCC): ICD-10-CM

## 2024-10-08 DIAGNOSIS — I10 PRIMARY HYPERTENSION: Primary | ICD-10-CM

## 2024-10-08 DIAGNOSIS — E11.69 DYSLIPIDEMIA DUE TO TYPE 2 DIABETES MELLITUS  (HCC): ICD-10-CM

## 2024-10-08 DIAGNOSIS — C91.10 CHRONIC LYMPHOCYTIC LEUKEMIA (HCC): ICD-10-CM

## 2024-10-08 DIAGNOSIS — E78.5 DYSLIPIDEMIA DUE TO TYPE 2 DIABETES MELLITUS  (HCC): ICD-10-CM

## 2024-10-08 DIAGNOSIS — E55.9 VITAMIN D DEFICIENCY: ICD-10-CM

## 2024-10-08 PROBLEM — E11.9 TYPE 2 DIABETES MELLITUS WITHOUT COMPLICATION, WITHOUT LONG-TERM CURRENT USE OF INSULIN (HCC): Status: RESOLVED | Noted: 2024-08-12 | Resolved: 2024-10-08

## 2024-10-08 LAB
BASOPHILS # BLD MANUAL: 0.12 THOUSAND/UL (ref 0–0.1)
BASOPHILS NFR MAR MANUAL: 1 % (ref 0–1)
EOSINOPHIL # BLD MANUAL: 0 THOUSAND/UL (ref 0–0.4)
EOSINOPHIL NFR BLD MANUAL: 0 % (ref 0–6)
LYMPHOCYTES # BLD AUTO: 67 % (ref 14–44)
LYMPHOCYTES # BLD AUTO: 8.61 THOUSAND/UL (ref 0.6–4.47)
METAMYELOCYTE ABSOLUTE CT: 0.12 THOUSAND/UL (ref 0–0.1)
METAMYELOCYTES NFR BLD MANUAL: 1 % (ref 0–1)
MONOCYTES # BLD AUTO: 0.61 THOUSAND/UL (ref 0–1.22)
MONOCYTES NFR BLD: 5 % (ref 4–12)
NEUTROPHILS # BLD MANUAL: 2.67 THOUSAND/UL (ref 1.85–7.62)
NEUTS BAND NFR BLD MANUAL: 1 % (ref 0–8)
NEUTS SEG NFR BLD AUTO: 21 % (ref 43–75)
PATHOLOGY REVIEW: YES
PLATELET BLD QL SMEAR: ADEQUATE
RBC MORPH BLD: NORMAL
SMUDGE CELLS BLD QL SMEAR: PRESENT
VARIANT LYMPHS # BLD AUTO: 4 %

## 2024-10-08 PROCEDURE — 99214 OFFICE O/P EST MOD 30 MIN: CPT | Performed by: STUDENT IN AN ORGANIZED HEALTH CARE EDUCATION/TRAINING PROGRAM

## 2024-10-08 RX ORDER — INSULIN ASPART 100 [IU]/ML
INJECTION, SUSPENSION SUBCUTANEOUS
Qty: 75 ML | Refills: 0 | Status: SHIPPED | OUTPATIENT
Start: 2024-10-08

## 2024-10-08 NOTE — TELEPHONE ENCOUNTER
This is not a duplicate. Medication was sent to the wrong pharmacy    Reason for call:   [x] Refill   [] Prior Auth  [] Other:     Office:   [] PCP/Provider -   [x] Specialty/Provider - endo     Medication:   Insulin aspart protamine-insuline aspart (Novolog Mix 70/30 FlexPen) 100 Units/ML- Inject 30 units before breakfast and lunch, 24 units at dinner.       Pharmacy: [facility] [address]     Does the patient have enough for 3 days?   [] Yes   [x] No - Send as HP to POD

## 2024-10-08 NOTE — ASSESSMENT & PLAN NOTE
Controlled on losartan-hydrochlorothiazide 100-25 mg daily and metoprolol succinate 50 mg daily  Continue current regimen

## 2024-10-08 NOTE — ASSESSMENT & PLAN NOTE
Lab Results   Component Value Date    WBC 12.12 (H) 10/05/2024    WBC 17.23 (H) 01/27/2024    WBC 17.81 (H) 09/20/2023     Stable  Will repeat labs in 6 months

## 2024-10-08 NOTE — ASSESSMENT & PLAN NOTE
Body mass index is 32.93 kg/m².  Wt Readings from Last 6 Encounters:   10/08/24 92.5 kg (204 lb)   09/27/24 94.7 kg (208 lb 12.8 oz)   08/30/24 93.4 kg (206 lb)   07/26/24 94.3 kg (208 lb)   07/25/24 94.6 kg (208 lb 9.6 oz)   07/17/24 94.8 kg (209 lb)        BMI counseling provided today with focus on diet and physical activity.

## 2024-10-08 NOTE — PROGRESS NOTES
"INTERNAL MEDICINE OFFICE VISIT NOTE  [facility] [place]    NAME: Javan Zamudio  AGE: 54 y.o. SEX: male    DATE OF ENCOUNTER:       Chief Complaint   Patient presents with    Follow-up     4 month follow up          Assessment & Plan  Primary hypertension  Controlled on losartan-hydrochlorothiazide 100-25 mg daily and metoprolol succinate 50 mg daily  Continue current regimen         Dyslipidemia due to type 2 diabetes mellitus  (HCC)  Lab Results   Component Value Date    LDLCALC  10/05/2024      Comment:      Calculated LDL invalid, triglycerides >400 mg/dl    LDLCALC 79 09/20/2023    LDLDIRECT 47 10/05/2024    LDLDIRECT 51 07/30/2022     LDL goal less than 70  Now at goal  Currently on pravastatin 80 mg-continue             Chronic lymphocytic leukemia (HCC)  Lab Results   Component Value Date    WBC 12.12 (H) 10/05/2024    WBC 17.23 (H) 01/27/2024    WBC 17.81 (H) 09/20/2023     Stable  Will repeat labs in 6 months         Class 2 severe obesity due to excess calories with serious comorbidity and body mass index (BMI) of 35.0 to 35.9 in adult ([facility])  Body mass index is 32.93 kg/m².  Wt Readings from Last 6 Encounters:   10/08/24 92.5 kg (204 lb)   09/27/24 94.7 kg (208 lb 12.8 oz)   08/30/24 93.4 kg (206 lb)   07/26/24 94.3 kg (208 lb)   07/25/24 94.6 kg (208 lb 9.6 oz)   07/17/24 94.8 kg (209 lb)        BMI counseling provided today with focus on diet and physical activity.            Low testosterone  Following with endocrinology         Vitamin D deficiency  Lab Results   Component Value Date    [id number] 37.0 09/20/2023    [id number] 39.3 01/17/2023     Continue supplementation           No follow-ups on file.      OBJECTIVE:  Vitals:    10/08/24 0901   BP: 130/75   BP Location: Left arm   Patient Position: Standing   Cuff Size: Standard   Pulse: 70   Temp: (!) 97.1 °F (36.2 °C)   Weight: 92.5 kg (204 lb)   Height: 5' 6\" (1.676 m)         Physical Exam:   GENERAL: NAD, Normal " "appearance.    NEUROLOGIC:  Alert/oriented x3.  HEENT:  NC/AT, no scleral icterus  CARDIAC:  RRR, +S1/S2  PULMONARY:  non-labored breathing        Current Outpatient Medications:     Blood Glucose Monitoring Suppl (Contour Next Monitor) w/Device KIT, Use to check BG 3 times daily, Disp: 1 kit, Rfl: 0    Cholecalciferol (VITAMIN D3) 5000 units CAPS, Take by mouth, Disp: , Rfl:     Continuous Glucose Sensor (FreeStyle Samir 3 Plus Sensor) MISC, Change once every 15 days, Disp: 6 each, Rfl: 1    Diclofenac Sodium (VOLTAREN) 1 %, Apply 2 g topically 4 (four) times a day, Disp: 2 g, Rfl: 0    dorzolamide-timolol (COSOPT) 22.3-6.8 MG/ML ophthalmic solution, INSTILL 1 DROP IN RIGHT EYE TWICE DAILY, Disp: 1 mL, Rfl: 1    Empagliflozin 25 MG TABS, Take 1 tablet (25 mg total) by mouth daily, Disp: 100 tablet, Rfl: 1    glucose blood (Contour Next Test) test strip, Check BG 3 times daily., Disp: 100 strip, Rfl: 2    insulin aspart protamine-insulin aspart (NovoLOG Mix 70/30 FlexPen) 100 Units/mL injection pen, Inject 30 Units  before  Breakfast and Lunch, and 24 Units At Dinner, Disp: 45 mL, Rfl: 1    Insulin Pen Needle (BD Pen Needle Marielos 2nd Gen) 32G X 4 MM MISC, Inject as directed 3 (three) times a day, Disp: 300 each, Rfl: 3    Insulin Syringe-Needle U-100 (B-D INS SYR ULTRAFINE .5CC/30G) 30G X 1/2\" 0.5 ML MISC, Inject insulin 3 times daily, Disp: 300 each, Rfl: 1    Lancets (freestyle) lancets, Test blood sugars 4 times daily, Disp: 300 each, Rfl: 1    losartan-hydrochlorothiazide (HYZAAR) 100-25 MG per tablet, Take 1 tablet by mouth daily, Disp: 100 tablet, Rfl: 1    metFORMIN (GLUCOPHAGE-XR) 500 mg 24 hr tablet, TAKE 2 TABLETS BY MOUTH TWICE DAILY WITH MEALS, Disp: 360 tablet, Rfl: 1    metoprolol succinate (TOPROL-XL) 50 mg 24 hr tablet, Take 1 tablet (50 mg total) by mouth daily, Disp: 90 tablet, Rfl: 1    mupirocin (BACTROBAN) 2 % ointment, Apply topically 3 (three) times a day for 7 days, Disp: 50 g, Rfl: 0    " pravastatin (PRAVACHOL) 80 mg tablet, Take 1 tablet (80 mg total) by mouth daily, Disp: 90 tablet, Rfl: 1    semaglutide, 1 mg/dose, (Ozempic, 1 MG/DOSE,) 4 mg/3 mL injection pen, Inject 0.75 mL (1 mg total) under the skin every 7 days, Disp: 9 mL, Rfl: 1    sildenafil (REVATIO) 20 mg tablet, Take 1 or 2 tablets 1 hour prior to relation, Disp: 30 tablet, Rfl: 1    Patient Active Problem List   Diagnosis    Primary hypertension    Dyslipidemia due to type 2 diabetes mellitus  (HCC)    Gastroesophageal reflux disease without esophagitis    Chronic lymphocytic leukemia ([facility])    Low testosterone    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 without macular edema associated with type 2 diabetes mellitus ([facility])    Type 2 diabetes mellitus with hyperglycemia, with long-term current use of insulin ([facility])    Other hemorrhoids    Phimosis    Class 2 severe obesity due to excess calories with serious comorbidity and body mass index (BMI) of 35.0 to 35.9 in adult ([facility])    Hip pain    Erectile dysfunction    Age-related cataract of both eyes    Central retinal vein occlusion of right eye    Epiretinal membrane (ERM) of both eyes    Fatty liver    Posterior vitreous detachment    Testicular hypogonadism    Vitamin D deficiency    Onychomycosis of left great toe    Lumbar spondylosis    Chronic thoracic back pain    Type 2 diabetes mellitus without complication, without long-term current use of insulin ([facility])             Bryson Posey MD  [facility] [place]    * Please Note: This note was completed in part utilizing a dictation software.  Grammatical errors, random word insertions, spelling mistakes, and incomplete sentences may be an occasional consequence of this system secondary to software limitations, ambient noise, and hardware issues.  If you have any questions or concerns about the content, text, or information contained within the body of this dictation, please contact the provider for  clarification.**

## 2024-10-08 NOTE — ASSESSMENT & PLAN NOTE
Lab Results   Component Value Date    LDLCALC  10/05/2024      Comment:      Calculated LDL invalid, triglycerides >400 mg/dl    LDLCALC 79 09/20/2023    LDLDIRECT 47 10/05/2024    LDLDIRECT 51 07/30/2022     LDL goal less than 70  Now at goal  Currently on pravastatin 80 mg-continue

## 2024-10-08 NOTE — ASSESSMENT & PLAN NOTE
Lab Results   Component Value Date    [id number] 37.0 09/20/2023    [id number] 39.3 01/17/2023     Continue supplementation

## 2024-10-09 ENCOUNTER — TELEPHONE (OUTPATIENT)
Dept: ENDOCRINOLOGY | Facility: CLINIC | Age: 54
End: 2024-10-09

## 2024-10-09 ENCOUNTER — TELEPHONE (OUTPATIENT)
Age: 54
End: 2024-10-09

## 2024-10-09 NOTE — TELEPHONE ENCOUNTER
Caller: Javan    Doctor: JEANNIE    Reason for call: pt would like to discuss scheduling another procedure    Call back#: [phone]

## 2024-10-09 NOTE — TELEPHONE ENCOUNTER
S/w pt, he was last seen in 2022 and then back in June requested a CORY to Dr. Germain because he lives in [place]. Dr. Germain denied because he is not an injection candidate d/t elevated A1C above 9 at the time. Patient since has met with the surgeon and he is not eligible for sx because of his A1C as well. Pt would like to discuss medication options. OK to schedule appt with AP to discuss/eval?

## 2024-10-11 NOTE — TELEPHONE ENCOUNTER
Caller: Javan    Doctor: CORY?    Reason for call: patient declined appt on 10/25 with Clifford in [facility] states this location is too far. He wanted appt at [place] or [place].     I explained A1c is too high you are not a candidate for epidurals and they do not provide Medication management at any location. He understood and he will call his primary physician.

## 2024-10-11 NOTE — TELEPHONE ENCOUNTER
"Patient returned call.  Informed him of messages from provider as follows:    \"Testosterone low but improving - sex hormone binding low. This means that testosterone will improve with weight loss. Would recommend to increase physical activity and focus on diet to improve both diabetes and testosterone level. Will continue to monitor. \"    And:    \"A1c remains high. Is he back on Samir or can he send us BG logs? \"    Patient asks if something can be prescribed for his testosterone he said his previous dr did and he already goes to the gym every day.     Also, he has Smair.     Please call patient back.         "

## 2024-10-15 NOTE — TELEPHONE ENCOUNTER
I would not recommend testosterone replacement at this time due to low sex hormone binding low. Losing weight will improve his testosterone level without the use of testosterone replacement. Will continue to monitor level.     Reviewed Samir report. BGs improving but still having highs after lunch and dinner. Would recommend increasing lunch dose of insulin to 32 units and dinner dose to 26 units.

## 2024-10-18 NOTE — TELEPHONE ENCOUNTER
Patient calling, relayed the above message and he expressed frustration with this. He states that he feels he should start the testosterone replacement based off of how he feels. He is requesting a call back on Monday by the doctor to discuss why that decision was made.  Please advise

## 2024-10-21 ENCOUNTER — OFFICE VISIT (OUTPATIENT)
Dept: INTERNAL MEDICINE CLINIC | Facility: CLINIC | Age: 54
End: 2024-10-21
Payer: COMMERCIAL

## 2024-10-21 VITALS
HEIGHT: 66 IN | WEIGHT: 208 LBS | SYSTOLIC BLOOD PRESSURE: 169 MMHG | HEART RATE: 80 BPM | DIASTOLIC BLOOD PRESSURE: 83 MMHG | BODY MASS INDEX: 33.43 KG/M2 | TEMPERATURE: 97.8 F

## 2024-10-21 DIAGNOSIS — Z79.4 TYPE 2 DIABETES MELLITUS WITH HYPERGLYCEMIA, WITH LONG-TERM CURRENT USE OF INSULIN (HCC): Primary | ICD-10-CM

## 2024-10-21 DIAGNOSIS — E11.65 TYPE 2 DIABETES MELLITUS WITH HYPERGLYCEMIA, WITH LONG-TERM CURRENT USE OF INSULIN (HCC): Primary | ICD-10-CM

## 2024-10-21 PROCEDURE — 99214 OFFICE O/P EST MOD 30 MIN: CPT | Performed by: STUDENT IN AN ORGANIZED HEALTH CARE EDUCATION/TRAINING PROGRAM

## 2024-10-22 ENCOUNTER — TELEPHONE (OUTPATIENT)
Age: 54
End: 2024-10-22

## 2024-10-22 NOTE — TELEPHONE ENCOUNTER
Patient called in asks if his FMLA form was received, it was faxed to the office.  Not seeing anything in media.  He will have them refax.

## 2024-10-22 NOTE — TELEPHONE ENCOUNTER
I looked in all the doctors bins from [facility] and we have no received FMLA forms yet.

## 2024-10-22 NOTE — PROGRESS NOTES
"INTERNAL MEDICINE OFFICE VISIT NOTE  [facility] [place]    NAME: Javan Zamudio  AGE: 54 y.o. SEX: male    DATE OF ENCOUNTER:       Chief Complaint   Patient presents with    Follow-up     Low Blood Sugar, Dizziness, Bodyaches         Assessment & Plan  Type 2 diabetes mellitus with hyperglycemia, with long-term current use of insulin ([facility])    Lab Results   Component Value Date    HGBA1C 8.9 (H) 10/05/2024     Uncontrolled.  Following with endocrinology  Presents with complaints of hypoglycemia.  Reports he has had multiple episodes of blood sugars in the 70s in the mornings.  For he has been using NovoLog 70/30 30 units with breakfast 25 units with lunch and 30 units with dinner  Additionally has been using his 25 mg daily, metformin 500 mg once a day and Ozempic 1 mg weekly  Advised him to reduce his NovoLog to 27 units with breakfast, 22 units with lunch and 27 units with dinner.  Reduce this further by 2 units if he continues to experience episodes of hypoglycemia  Additionally advised him to discuss with his endocrinology team transition to a long-acting insulin at bedtime and as short acting insulin with meals             No follow-ups on file.      OBJECTIVE:  Vitals:    10/21/24 1051   BP: 169/83   BP Location: Left arm   Patient Position: Sitting   Cuff Size: Standard   Pulse: 80   Temp: 97.8 °F (36.6 °C)   Weight: 94.3 kg (208 lb)   Height: 5' 6\" (1.676 m)         Physical Exam:   GENERAL: NAD, Normal appearance.    NEUROLOGIC:  Alert/oriented x3.  HEENT:  NC/AT, no scleral icterus  CARDIAC:  RRR, +S1/S2  PULMONARY:  non-labored breathing        Current Outpatient Medications:     Blood Glucose Monitoring Suppl (Contour Next Monitor) w/Device KIT, Use to check BG 3 times daily, Disp: 1 kit, Rfl: 0    Cholecalciferol (VITAMIN D3) 5000 units CAPS, Take by mouth, Disp: , Rfl:     Continuous Glucose Sensor (FreeStyle Samir 3 Plus Sensor) MISC, Change once every 15 days, Disp: 6 " "each, Rfl: 1    Diclofenac Sodium (VOLTAREN) 1 %, Apply 2 g topically 4 (four) times a day, Disp: 2 g, Rfl: 0    dorzolamide-timolol (COSOPT) 22.3-6.8 MG/ML ophthalmic solution, INSTILL 1 DROP IN RIGHT EYE TWICE DAILY, Disp: 1 mL, Rfl: 1    Empagliflozin 25 MG TABS, Take 1 tablet (25 mg total) by mouth daily, Disp: 100 tablet, Rfl: 1    glucose blood (Contour Next Test) test strip, Check BG 3 times daily., Disp: 100 strip, Rfl: 2    insulin aspart protamine-insulin aspart (NovoLOG Mix 70/30 FlexPen) 100 Units/mL injection pen, Inject 30 Units  before  Breakfast and Lunch, and 24 Units At Dinner, Disp: 75 mL, Rfl: 0    Insulin Pen Needle (BD Pen Needle Marielos 2nd Gen) 32G X 4 MM MISC, Inject as directed 3 (three) times a day, Disp: 300 each, Rfl: 3    Insulin Syringe-Needle U-100 (B-D INS SYR ULTRAFINE .5CC/30G) 30G X 1/2\" 0.5 ML MISC, Inject insulin 3 times daily, Disp: 300 each, Rfl: 1    Lancets (freestyle) lancets, Test blood sugars 4 times daily, Disp: 300 each, Rfl: 1    losartan-hydrochlorothiazide (HYZAAR) 100-25 MG per tablet, Take 1 tablet by mouth daily, Disp: 100 tablet, Rfl: 1    metFORMIN (GLUCOPHAGE-XR) 500 mg 24 hr tablet, TAKE 2 TABLETS BY MOUTH TWICE DAILY WITH MEALS, Disp: 360 tablet, Rfl: 1    metoprolol succinate (TOPROL-XL) 50 mg 24 hr tablet, Take 1 tablet (50 mg total) by mouth daily, Disp: 90 tablet, Rfl: 1    mupirocin (BACTROBAN) 2 % ointment, Apply topically 3 (three) times a day for 7 days, Disp: 50 g, Rfl: 0    pravastatin (PRAVACHOL) 80 mg tablet, Take 1 tablet (80 mg total) by mouth daily, Disp: 90 tablet, Rfl: 1    semaglutide, 1 mg/dose, (Ozempic, 1 MG/DOSE,) 4 mg/3 mL injection pen, Inject 0.75 mL (1 mg total) under the skin every 7 days, Disp: 9 mL, Rfl: 1    sildenafil (REVATIO) 20 mg tablet, Take 1 or 2 tablets 1 hour prior to relation, Disp: 30 tablet, Rfl: 1    Patient Active Problem List   Diagnosis    Primary hypertension    Dyslipidemia due to type 2 diabetes mellitus  (HCC) "    Gastroesophageal reflux disease without esophagitis    Chronic lymphocytic leukemia (HCC)    Low testosterone    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 without macular edema associated with type 2 diabetes mellitus (HCC)    Type 2 diabetes mellitus with hyperglycemia, with long-term current use of insulin (HCC)    Other hemorrhoids    Phimosis    Class 2 severe obesity due to excess calories with serious comorbidity and body mass index (BMI) of 35.0 to 35.9 in adult (HCC)    Hip pain    Erectile dysfunction    Age-related cataract of both eyes    Central retinal vein occlusion of right eye    Epiretinal membrane (ERM) of both eyes    Fatty liver    Posterior vitreous detachment    Testicular hypogonadism    Vitamin D deficiency    Onychomycosis of left great toe    Lumbar spondylosis    Chronic thoracic back pain             Bryson Posey MD  [facility] [place]    * Please Note: This note was completed in part utilizing a dictation software.  Grammatical errors, random word insertions, spelling mistakes, and incomplete sentences may be an occasional consequence of this system secondary to software limitations, ambient noise, and hardware issues.  If you have any questions or concerns about the content, text, or information contained within the body of this dictation, please contact the provider for clarification.**

## 2024-10-22 NOTE — TELEPHONE ENCOUNTER
Patient calling to see if we received fax from his job for his FMLA paperwork? I don't see it in our system.

## 2024-10-22 NOTE — TELEPHONE ENCOUNTER
Patient called in regards to he will be dropping off his FMLA paper to the office so he can fill out information on form that provider only knows like his back pain. Patient stated that the specialist can not fill out paper work since he has not seen them yet.

## 2024-10-29 ENCOUNTER — OFFICE VISIT (OUTPATIENT)
Dept: OBGYN CLINIC | Facility: MEDICAL CENTER | Age: 54
End: 2024-10-29
Payer: COMMERCIAL

## 2024-10-29 VITALS
BODY MASS INDEX: 33.75 KG/M2 | SYSTOLIC BLOOD PRESSURE: 150 MMHG | HEIGHT: 66 IN | WEIGHT: 210 LBS | DIASTOLIC BLOOD PRESSURE: 80 MMHG | HEART RATE: 84 BPM

## 2024-10-29 DIAGNOSIS — M22.2X1 PATELLOFEMORAL DISORDER OF RIGHT KNEE: ICD-10-CM

## 2024-10-29 DIAGNOSIS — M22.2X2 PATELLOFEMORAL DISORDER OF LEFT KNEE: Primary | ICD-10-CM

## 2024-10-29 PROCEDURE — 99214 OFFICE O/P EST MOD 30 MIN: CPT | Performed by: ORTHOPAEDIC SURGERY

## 2024-10-29 PROCEDURE — 20610 DRAIN/INJ JOINT/BURSA W/O US: CPT | Performed by: ORTHOPAEDIC SURGERY

## 2024-10-29 RX ORDER — METHYLPREDNISOLONE ACETATE 40 MG/ML
1 INJECTION, SUSPENSION INTRA-ARTICULAR; INTRALESIONAL; INTRAMUSCULAR; SOFT TISSUE
Status: COMPLETED | OUTPATIENT
Start: 2024-10-29 | End: 2024-10-29

## 2024-10-29 RX ORDER — BUPIVACAINE HYDROCHLORIDE 2.5 MG/ML
4 INJECTION, SOLUTION INFILTRATION; PERINEURAL
Status: COMPLETED | OUTPATIENT
Start: 2024-10-29 | End: 2024-10-29

## 2024-10-29 RX ADMIN — METHYLPREDNISOLONE ACETATE 1 ML: 40 INJECTION, SUSPENSION INTRA-ARTICULAR; INTRALESIONAL; INTRAMUSCULAR; SOFT TISSUE at 09:00

## 2024-10-29 RX ADMIN — BUPIVACAINE HYDROCHLORIDE 4 ML: 2.5 INJECTION, SOLUTION INFILTRATION; PERINEURAL at 09:00

## 2024-10-29 NOTE — PROGRESS NOTES
Knee New Patient Visit     Assesment:     54 y.o. male bilateral knee patellofemoral pain improving with R hip troch bursitis.    Plan:    Conservative treatment:    Ice to knee for 20 minutes at least 1-2 times daily.  PT for ROM/strengthening to knee, hip and core.  PT for R hip  OTC NSAIDS prn for pain.  Let pain guide gradual return activities.    Imaging:    L knee MRI reviewed today.     Injection:    CS injection today in bilateral knees      Surgery:     No surgery is recommended at this point, continue with conservative treatment plan as noted.      Follow up:    6-8 weeks for recheck    Large joint arthrocentesis: R knee  Universal Protocol:  Consent: Verbal consent obtained.  Risks and benefits: risks, benefits and alternatives were discussed  Consent given by: patient  Patient understanding: patient states understanding of the procedure being performed  Site marked: the operative site was marked  Patient identity confirmed: verbally with patient  Supporting Documentation  Indications: pain and joint swelling   Procedure Details  Location: knee - R knee  Needle size: 22 G  Ultrasound guidance: no  Approach: anteromedial  Medications administered: 1 mL methylPREDNISolone acetate 40 mg/mL; 4 mL bupivacaine 0.25 %    Patient tolerance: patient tolerated the procedure well with no immediate complications  Dressing:  Sterile dressing applied      Large joint arthrocentesis: L knee  Universal Protocol:  Consent: Verbal consent obtained.  Risks and benefits: risks, benefits and alternatives were discussed  Consent given by: patient  Patient understanding: patient states understanding of the procedure being performed  Site marked: the operative site was marked  Patient identity confirmed: verbally with patient  Supporting Documentation  Indications: pain and joint swelling   Procedure Details  Location: knee - R knee  Needle size: 22 G  Ultrasound guidance: no  Approach: anteromedial  Medications administered: 1  mL methylPREDNISolone acetate 40 mg/mL; 4 mL bupivacaine 0.25 %    Patient tolerance: patient tolerated the procedure well with no immediate complications  Dressing:  Sterile dressing applied        Chief Complaint   Patient presents with    Left Knee - Pain    Right Knee - Pain       History of Present Illness:    The patient is a 54 y.o. male whose occupation is a , referred to me by their primary care physician, seen in clinic for consultation of bilateral knee pain.        He notes pain overall is improved in the left knee.  His corticosteroid injection did help.  He is here for MRI review of the left knee today.  He also notes right hip pain.  This is better with rest and worse with activity.  His pain radiates down the leg but not past the lower thigh.  No specific injury he can recall.  He has tried rest and ice and anti-inflammatories.      Knee Surgical History:  None    Past Medical, Social and Family History:  Past Medical History:   Diagnosis Date    Anxiety     Arthritis     Cancer (HCC)     Chronic thoracic back pain 06/19/2024    Depression     Diabetes (HCC)     Diabetes mellitus (HCC)     Fatty liver     GERD (gastroesophageal reflux disease)     Hyperlipidemia     Hypertension     Intertrigo     resolved 10/26/17    Lumbar spondylosis 06/18/2024    Sebaceous cyst     Sexual disorder     Sexual dysfunction     last assessed 03/04/14    Snoring     last assessed 12/08/14    Somnolence, daytime     last assessed 12/08/14    Thoracic disc herniation     last assessed 05/02/14    Vision problem     Vitamin D deficiency      Past Surgical History:   Procedure Laterality Date    COLONOSCOPY      last assessed 01/22/14    LYMPHADENECTOMY      Axillary; last assessed 05/30/14    NH CIRCUMCISION AGE >28 DAYS N/A 3/1/2022    Procedure: CIRCUMCISION ADULT;  Surgeon: Brennen Fink MD;  Location: [facility];  Service: Urology     Allergies   Allergen Reactions    Ace Inhibitors Cough     Current  "Outpatient Medications on File Prior to Visit   Medication Sig Dispense Refill    Blood Glucose Monitoring Suppl (Contour Next Monitor) w/Device KIT Use to check BG 3 times daily 1 kit 0    Cholecalciferol (VITAMIN D3) 5000 units CAPS Take by mouth      Continuous Glucose Sensor (FreeStyle Samir 3 Plus Sensor) MISC Change once every 15 days 6 each 1    Diclofenac Sodium (VOLTAREN) 1 % Apply 2 g topically 4 (four) times a day 2 g 0    dorzolamide-timolol (COSOPT) 22.3-6.8 MG/ML ophthalmic solution INSTILL 1 DROP IN RIGHT EYE TWICE DAILY 1 mL 1    Empagliflozin 25 MG TABS Take 1 tablet (25 mg total) by mouth daily 100 tablet 1    glucose blood (Contour Next Test) test strip Check BG 3 times daily. 100 strip 2    insulin aspart protamine-insulin aspart (NovoLOG Mix 70/30 FlexPen) 100 Units/mL injection pen Inject 30 Units  before  Breakfast and Lunch, and 24 Units At Dinner 75 mL 0    Insulin Pen Needle (BD Pen Needle Marielos 2nd Gen) 32G X 4 MM MISC Inject as directed 3 (three) times a day 300 each 3    Insulin Syringe-Needle U-100 (B-D INS SYR ULTRAFINE .5CC/30G) 30G X 1/2\" 0.5 ML MISC Inject insulin 3 times daily 300 each 1    Lancets (freestyle) lancets Test blood sugars 4 times daily 300 each 1    losartan-hydrochlorothiazide (HYZAAR) 100-25 MG per tablet Take 1 tablet by mouth daily 100 tablet 1    metFORMIN (GLUCOPHAGE-XR) 500 mg 24 hr tablet TAKE 2 TABLETS BY MOUTH TWICE DAILY WITH MEALS 360 tablet 1    metoprolol succinate (TOPROL-XL) 50 mg 24 hr tablet Take 1 tablet (50 mg total) by mouth daily 90 tablet 1    pravastatin (PRAVACHOL) 80 mg tablet Take 1 tablet (80 mg total) by mouth daily 90 tablet 1    semaglutide, 1 mg/dose, (Ozempic, 1 MG/DOSE,) 4 mg/3 mL injection pen Inject 0.75 mL (1 mg total) under the skin every 7 days 9 mL 1    sildenafil (REVATIO) 20 mg tablet Take 1 or 2 tablets 1 hour prior to relation 30 tablet 1    mupirocin (BACTROBAN) 2 % ointment Apply topically 3 (three) times a day for 7 days " "50 g 0     No current facility-administered medications on file prior to visit.     Social History     Socioeconomic History    Marital status: Single     Spouse name: Not on file    Number of children: Not on file    Years of education: Not on file    Highest education level: Not on file   Occupational History    Not on file   Tobacco Use    Smoking status: Never     Passive exposure: Never    Smokeless tobacco: Never   Vaping Use    Vaping status: Never Used   Substance and Sexual Activity    Alcohol use: Yes     Comment: social    Drug use: No    Sexual activity: Yes     Partners: Female   Other Topics Concern    Not on file   Social History Narrative    Not on file     Social Determinants of Health     Financial Resource Strain: Not on file   Food Insecurity: Not on file   Transportation Needs: Not on file   Physical Activity: Not on file   Stress: Not on file   Social Connections: Not on file   Intimate Partner Violence: Not on file   Housing Stability: Not on file         I have reviewed the past medical, surgical, social and family history, medications and allergies as documented in the EMR.    Review of systems: ROS is negative other than that noted in the HPI.  Constitutional: Negative for fatigue and fever.   HENT: Negative for sore throat.    Respiratory: Negative for shortness of breath.    Cardiovascular: Negative for chest pain.   Gastrointestinal: Negative for abdominal pain.   Endocrine: Negative for cold intolerance and heat intolerance.   Genitourinary: Negative for flank pain.   Musculoskeletal: Negative for back pain.   Skin: Negative for rash.   Allergic/Immunologic: Negative for immunocompromised state.   Neurological: Negative for dizziness.   Psychiatric/Behavioral: Negative for agitation.      Physical Exam:    Blood pressure 150/80, pulse 84, height 5' 6\" (1.676 m), weight 95.3 kg (210 lb).    General/Constitutional: NAD, well developed, well nourished  HENT: Normocephalic, atraumatic  CV: " Intact distal pulses, regular rate  Resp: No respiratory distress or labored breathing  GI: Soft and non-tender   Lymphatic: No lymphadenopathy palpated  Neuro: Alert and Oriented x 3, no focal deficits  Psych: Normal mood, normal affect, normal judgement, normal behavior  Skin: Warm, dry, no rashes, no erythema      Knee Exam (focused):                RIGHT LEFT   ROM:   0-130 0-130   Palpation: Effusion negative negative     MJL tenderness Negative Negative     LJL tenderness Negative Negative   Meniscus: Tyesha Negative Negative    Apley's Compression Negative Negative   Instability: Varus stable stable     Valgus stable stable   Special Tests: Lachman Negative Negative     Posterior drawer Negative Negative     Anterior drawer Negative Negative     Pivot shift not tested not tested     Dial not tested not tested   Patella: Palpation no tenderness no tenderness     Mobility 1/4 1/4     Apprehension Negative Negative   Other: Single leg 1/4 squat not tested not tested      LE NV Exam: +2 DP/PT pulses bilaterally  Sensation intact to light touch L2-S1 bilaterally     Bilateral hip ROM demonstrates no pain actively or passively    No calf tenderness to palpation bilaterally      Right hip demonstrates full strength and range of motion.  Tender to palpation over the trochanteric bursa    Knee Imaging    X-rays of the bilateral knee from 10/11/2023 were reviewed, which demonstrate no acute fracture or dislocation. No significant degenerative changes.  I have reviewed the radiology report and do not currently have a radiology reading from [facility], but will check the result once the reading is performed.    MRI of the left knee demonstrates prominent medial plica but no other meniscal tears.  There is focal small amount of chondromalacia in the trochlea.  I reviewed the radiology part and agree with the impression

## 2024-10-31 ENCOUNTER — TELEPHONE (OUTPATIENT)
Age: 54
End: 2024-10-31

## 2024-10-31 NOTE — TELEPHONE ENCOUNTER
Patient called the RX Refill Line. Message is being forwarded to the office.     Patient called and wanted to get glucose test strips. States that the Freestyle sensor is not working. Used three and checked his sugar and it was 360 and did not come down. Would rather have the test strips so he can check his sugar and doesn't want to use more insulin than he has to. Would like a 90 day supply sent to [facility] [address]    Please contact patient at [phone] to let him know it was sent to the pharmacy and is any issues.

## 2024-11-01 DIAGNOSIS — Z79.4 TYPE 2 DIABETES MELLITUS WITH HYPERGLYCEMIA, WITH LONG-TERM CURRENT USE OF INSULIN (HCC): ICD-10-CM

## 2024-11-01 DIAGNOSIS — E11.65 TYPE 2 DIABETES MELLITUS WITH HYPERGLYCEMIA, WITH LONG-TERM CURRENT USE OF INSULIN (HCC): ICD-10-CM

## 2024-11-07 ENCOUNTER — TELEPHONE (OUTPATIENT)
Dept: ENDOCRINOLOGY | Facility: CLINIC | Age: 54
End: 2024-11-07

## 2024-11-07 DIAGNOSIS — Z79.4 TYPE 2 DIABETES MELLITUS WITH HYPERGLYCEMIA, WITH LONG-TERM CURRENT USE OF INSULIN (HCC): ICD-10-CM

## 2024-11-07 DIAGNOSIS — E11.65 TYPE 2 DIABETES MELLITUS WITH HYPERGLYCEMIA, WITH LONG-TERM CURRENT USE OF INSULIN (HCC): ICD-10-CM

## 2024-11-07 RX ORDER — BLOOD-GLUCOSE METER
EACH MISCELLANEOUS
Qty: 1 KIT | Refills: 0 | Status: SHIPPED | OUTPATIENT
Start: 2024-11-07

## 2024-11-07 NOTE — TELEPHONE ENCOUNTER
Not a duplicate    Patient is using [facility]    Reason for call:   [x] Refill   [] Prior Auth  [x] Other: pharmacy change    Office:   [x] Specialty/Provider - endo / [facility]    Medication: Contour Next glucose meter    Dose/Frequency: check blood glucose tid    Quantity: 1    Pharmacy: [id number] - [place] - [address]     Does the patient have enough for 3 days?   [] Yes   [x] No - Send as HP to POD

## 2024-11-07 NOTE — TELEPHONE ENCOUNTER
Patient called in regards to contour monitor that he has yet to receive. Informed patient that script was sent on 09/27/2024 and to contact the pharmacy to speak to someone live in regards to this matter or go into the store. Patient also was explaining that his freestyle nighat sensor 3 plus was not working appropriately. Informed patient to either speak to a pharmacist to go into the store or to speak to one over the phone in regards to the discrepancy and the pharmacist would be able to help. Patient verbalized understanding.

## 2024-11-19 ENCOUNTER — TELEPHONE (OUTPATIENT)
Age: 54
End: 2024-11-19

## 2024-11-19 NOTE — TELEPHONE ENCOUNTER
Patient called in states that his FMLA form says only 1 hour per episode and he said it needs to be 8 hours and he said that he is needing this at least every 2 weeks he said when he feels like his blood sugar is low he gets very tired and has to leave work. He has a Samir, said he gets readings in the 70s, 80, 90s and gets tired.  Please advise.

## 2024-11-22 NOTE — TELEPHONE ENCOUNTER
If he is having frequent hypoglycemia that often then medication should be adjusted. This can be discussed at his upcoming appointment. He does not need 8 hour for 1 hypoglycemic episode.

## 2024-11-25 ENCOUNTER — OFFICE VISIT (OUTPATIENT)
Dept: ENDOCRINOLOGY | Facility: CLINIC | Age: 54
End: 2024-11-25
Payer: COMMERCIAL

## 2024-11-25 VITALS
HEART RATE: 78 BPM | BODY MASS INDEX: 35.03 KG/M2 | OXYGEN SATURATION: 98 % | HEIGHT: 66 IN | WEIGHT: 218 LBS | DIASTOLIC BLOOD PRESSURE: 80 MMHG | SYSTOLIC BLOOD PRESSURE: 122 MMHG

## 2024-11-25 DIAGNOSIS — E78.5 DYSLIPIDEMIA DUE TO TYPE 2 DIABETES MELLITUS  (HCC): ICD-10-CM

## 2024-11-25 DIAGNOSIS — E66.09 CLASS 1 OBESITY DUE TO EXCESS CALORIES WITH SERIOUS COMORBIDITY AND BODY MASS INDEX (BMI) OF 33.0 TO 33.9 IN ADULT: ICD-10-CM

## 2024-11-25 DIAGNOSIS — E11.69 DYSLIPIDEMIA DUE TO TYPE 2 DIABETES MELLITUS  (HCC): ICD-10-CM

## 2024-11-25 DIAGNOSIS — E78.5 HYPERLIPIDEMIA, UNSPECIFIED HYPERLIPIDEMIA TYPE: ICD-10-CM

## 2024-11-25 DIAGNOSIS — E11.65 TYPE 2 DIABETES MELLITUS WITH HYPERGLYCEMIA, WITH LONG-TERM CURRENT USE OF INSULIN (HCC): Primary | ICD-10-CM

## 2024-11-25 DIAGNOSIS — E66.811 CLASS 1 OBESITY DUE TO EXCESS CALORIES WITH SERIOUS COMORBIDITY AND BODY MASS INDEX (BMI) OF 33.0 TO 33.9 IN ADULT: ICD-10-CM

## 2024-11-25 DIAGNOSIS — R79.89 LOW TESTOSTERONE: ICD-10-CM

## 2024-11-25 DIAGNOSIS — E11.3493 SEVERE NONPROLIFERATIVE DIABETIC RETINOPATHY OF BOTH EYES WITHOUT MACULAR EDEMA ASSOCIATED WITH TYPE 2 DIABETES MELLITUS (HCC): ICD-10-CM

## 2024-11-25 DIAGNOSIS — Z79.4 TYPE 2 DIABETES MELLITUS WITH HYPERGLYCEMIA, WITH LONG-TERM CURRENT USE OF INSULIN (HCC): Primary | ICD-10-CM

## 2024-11-25 DIAGNOSIS — I10 PRIMARY HYPERTENSION: ICD-10-CM

## 2024-11-25 PROCEDURE — 99214 OFFICE O/P EST MOD 30 MIN: CPT | Performed by: INTERNAL MEDICINE

## 2024-11-25 PROCEDURE — 95251 CONT GLUC MNTR ANALYSIS I&R: CPT | Performed by: INTERNAL MEDICINE

## 2024-11-25 RX ORDER — INSULIN ASPART 100 [IU]/ML
INJECTION, SUSPENSION SUBCUTANEOUS
Qty: 45 ML | Refills: 1 | Status: SHIPPED | OUTPATIENT
Start: 2024-11-25

## 2024-11-25 RX ORDER — INSULIN ASPART 100 [IU]/ML
INJECTION, SUSPENSION SUBCUTANEOUS
Qty: 45 ML | Refills: 1 | Status: SHIPPED | OUTPATIENT
Start: 2024-11-25 | End: 2024-11-25 | Stop reason: SDUPTHER

## 2024-11-25 NOTE — PROGRESS NOTES
Javan Zamudio 54 y.o. 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 ([facility])      Lab Results   Component Value Date    HGBA1C 8.9 (H) 10/05/2024   Continue follow-up with ophthalmologist         Relevant Medications    semaglutide, 2 mg/dose, (Ozempic) 8 mg/ mL injection pen    insulin aspart protamine-insulin aspart (NovoLOG Mix 70/30 FlexPen) 100 Units/mL injection pen    Type 2 diabetes mellitus with hyperglycemia, with long-term current use of insulin ([facility]) - Primary      Lab Results   Component Value Date    HGBA1C 8.9 (H) 10/05/2024   Overall control is improving-for now increase Ozempic to 2 mg weekly.  Decrease NovoLog 7030 to 20 units before breakfast, continue 24 units at dinner and 20 units before bedtime snack  He will be due for his next A1c in January  Based on the sensor download average glucose is 173 Mg per DL   He is requesting more FMLA as he states that some days he is not able to work if his sugars are either too high or too low as that affects his vision.  Discussed with him that his FLA forms were filled out for him to be able to come for his appointments  I am not sure why he was not able to go to work yesterday but does not seem to be related to his blood sugars  I discussed with him that he should have accommodations or housework which include being able to check his  blood sugars, take breaks to check his blood sugars and treat hypoglycemia if needed, be able to a source of fast acting carbohydrates with him at all times we can treat hypoglycemia if needed         Relevant Medications    semaglutide, 2 mg/dose, (Ozempic) 8 mg/ mL injection pen    insulin aspart protamine-insulin aspart (NovoLOG Mix 70/30 FlexPen) 100 Units/mL injection pen    Other Relevant Orders    Hemoglobin A1C    Albumin / creatinine urine ratio    Comprehensive metabolic panel    TSH, 3rd generation       Other    Class 1 obesity due to excess calories with serious comorbidity and body mass index (BMI) of 33.0 to 33.9 in adult    Continue to focus on dietary lifestyle modifications and weight loss         Hyperlipidemia    Continue statins-triglycerides are high likely due to dietary indiscretions-suggest follow-up with nutritionist that he has declined.  Repeat fasting lipid profile prior to next visit and if still high consider adding fibrate's         Low testosterone    Total testosterone low due to low sex hormone binding globulin-Free testosterone is normal, no indication for testosterone replacement             CC: Diabetes    History of Present Illness     HPI: 54-year-old male with type 2 diabetes on insulin therapy seen in follow-up history obtained from the help of  # [id number]       Current regimen:   Novolog 70/30-- 25  units before breakfast and lunch, 25 units before dinner and 20 units before bedtime snacks   Jardiance 25mg daily  Ozempic 1 mg weekly   Metformin XR 1000 mg twice daily     Sensor issues - last 2 sensor did not work -       Javan Zamudio   Device used Freestyle nighat  Home use       Indications  Type 2 Diabetes    More than 72 hours of data was reviewed. Report to be scanned to chart.     Date Range: nov 12th- 25th 2024     Analysis of data:   Average Glucose: 173 mg/dl  Coefficient of Variation: 33.7%  Time in Target Range: 60%  Time Above Range: 40%  Time  Below Range: 0%    Interpretation of data: Sugars improved overall-continues to have postmeal hyperglycemia, sugars can be lowish in the afternoon-high overnight due to snacks     No polyuria , polydipsia , c/o blurry vision -when sugars are high and low  States he was not able to go to work yesterday as he was not feeling well-on the sensor download no hypoglycemia noted-sugars range between 100s to 270s yesterday    Review of Systems    Historical Information   Past Medical History:   Diagnosis Date    Anxiety     Arthritis     Cancer (HCC)     Chronic thoracic back pain 06/19/2024    Depression     Diabetes (HCC)     Diabetes mellitus (HCC)     Fatty liver     GERD (gastroesophageal reflux disease)     Hyperlipidemia     Hypertension     Intertrigo     resolved 10/26/17    Lumbar spondylosis 06/18/2024    Sebaceous cyst     Sexual disorder     Sexual dysfunction     last assessed 03/04/14    Snoring     last assessed 12/08/14    Somnolence, daytime     last assessed 12/08/14    Thoracic disc herniation     last assessed 05/02/14    Vision problem     Vitamin D deficiency      Past Surgical History:   Procedure Laterality Date    COLONOSCOPY      last assessed 01/22/14    LYMPHADENECTOMY      Axillary; last assessed 05/30/14    PA CIRCUMCISION AGE >28 DAYS N/A 3/1/2022    Procedure: CIRCUMCISION ADULT;  Surgeon: Brenenn Fink MD;  Location: [facility];  Service: Urology     Social History   Social History     Substance and Sexual Activity   Alcohol Use Yes    Comment: social     Social History     Substance and Sexual Activity   Drug Use No     Social History     Tobacco Use   Smoking Status Never    Passive exposure: Never   Smokeless Tobacco Never     Family History:   Family History   Problem Relation Age of Onset    Diabetes Mother     Kidney cancer Mother     Diabetes Father     Diabetes Sister     Diabetes Family     Hypertension Family        Meds/Allergies   Current Outpatient Medications   Medication Sig  "Dispense Refill    Blood Glucose Monitoring Suppl (Contour Next Monitor) w/Device KIT Use to check BG 3 times daily 1 kit 0    Cholecalciferol (VITAMIN D3) 5000 units CAPS Take by mouth      Continuous Glucose Sensor (FreeStyle Samir 3 Plus Sensor) MISC Change once every 15 days 6 each 1    Diclofenac Sodium (VOLTAREN) 1 % Apply 2 g topically 4 (four) times a day 2 g 0    dorzolamide-timolol (COSOPT) 22.3-6.8 MG/ML ophthalmic solution INSTILL 1 DROP IN RIGHT EYE TWICE DAILY 1 mL 1    Empagliflozin 25 MG TABS Take 1 tablet (25 mg total) by mouth daily 100 tablet 1    glucose blood (Contour Next Test) test strip Check BG 3 times daily. 300 strip 2    insulin aspart protamine-insulin aspart (NovoLOG Mix 70/30 FlexPen) 100 Units/mL injection pen Inject 20 Units  before  Breakfast and 24 Units At Dinner and 20 units before bedtime snacks 45 mL 1    Insulin Pen Needle (BD Pen Needle Marielos 2nd Gen) 32G X 4 MM MISC Inject as directed 3 (three) times a day 300 each 3    Insulin Syringe-Needle U-100 (B-D INS SYR ULTRAFINE .5CC/30G) 30G X 1/2\" 0.5 ML MISC Inject insulin 3 times daily 300 each 1    Lancets (freestyle) lancets Test blood sugars 4 times daily 300 each 1    losartan-hydrochlorothiazide (HYZAAR) 100-25 MG per tablet Take 1 tablet by mouth daily 100 tablet 1    metFORMIN (GLUCOPHAGE-XR) 500 mg 24 hr tablet TAKE 2 TABLETS BY MOUTH TWICE DAILY WITH MEALS 360 tablet 1    metoprolol succinate (TOPROL-XL) 50 mg 24 hr tablet Take 1 tablet (50 mg total) by mouth daily 90 tablet 1    mupirocin (BACTROBAN) 2 % ointment Apply topically 3 (three) times a day for 7 days 50 g 0    pravastatin (PRAVACHOL) 80 mg tablet Take 1 tablet (80 mg total) by mouth daily 90 tablet 1    semaglutide, 2 mg/dose, (Ozempic) 8 mg/ mL injection pen Inject 0.75 mL (2 mg total) under the skin every 7 days 3 mL 3    sildenafil (REVATIO) 20 mg tablet Take 1 or 2 tablets 1 hour prior to relation 30 tablet 1     No current facility-administered " "medications for this visit.     Allergies   Allergen Reactions    Ace Inhibitors Cough       Objective   Vitals: Blood pressure 122/80, pulse 78, height 5' 6\" (1.676 m), weight 98.9 kg (218 lb), SpO2 98%.    Physical Exam  Vitals reviewed.   Constitutional:       General: He is not in acute distress.     Appearance: Normal appearance. He is obese. He is not ill-appearing, toxic-appearing or diaphoretic.   HENT:      Head: Normocephalic and atraumatic.   Eyes:      General: No scleral icterus.     Extraocular Movements: Extraocular movements intact.   Cardiovascular:      Rate and Rhythm: Normal rate and regular rhythm.      Heart sounds: Normal heart sounds. No murmur heard.  Pulmonary:      Effort: Pulmonary effort is normal. No respiratory distress.      Breath sounds: Normal breath sounds. No wheezing or rales.   Musculoskeletal:      Cervical back: Neck supple.      Right lower leg: No edema.      Left lower leg: No edema.   Lymphadenopathy:      Cervical: No cervical adenopathy.   Skin:     General: Skin is warm and dry.   Neurological:      General: No focal deficit present.      Mental Status: He is alert and oriented to person, place, and time.      Gait: Gait normal.   Psychiatric:         Mood and Affect: Mood normal.         Behavior: Behavior normal.         Thought Content: Thought content normal.         Judgment: Judgment normal.         The history was obtained from the review of the chart, patient.    Lab Results:   Lab Results   Component Value Date/Time    Hemoglobin A1C 8.9 (H) 10/05/2024 08:37 AM    Hemoglobin A1C 8.8 (A) 09/27/2024 10:34 AM    Hemoglobin A1C 9.3 (A) 05/23/2024 10:49 AM    Hemoglobin A1C 9.4 (H) 01/27/2024 10:00 AM    WBC 12.12 (H) 10/05/2024 08:37 AM    WBC 17.23 (H) 01/27/2024 10:00 AM    Hemoglobin 14.0 10/05/2024 08:37 AM    Hemoglobin 14.7 01/27/2024 10:00 AM    Hematocrit 44.5 10/05/2024 08:37 AM    Hematocrit 47.2 01/27/2024 10:00 AM    MCV 89 10/05/2024 08:37 AM    MCV " "86 01/27/2024 10:00 AM    Platelets 190 10/05/2024 08:37 AM    Platelets 177 01/27/2024 10:00 AM    BUN 25 10/05/2024 08:37 AM    BUN 20 01/27/2024 10:00 AM    Potassium 4.2 10/05/2024 08:37 AM    Potassium 4.0 01/27/2024 10:00 AM    Chloride 104 10/05/2024 08:37 AM    Chloride 102 01/27/2024 10:00 AM    CO2 28 10/05/2024 08:37 AM    CO2 28 01/27/2024 10:00 AM    Creatinine 1.12 10/05/2024 08:37 AM    Creatinine 1.17 01/27/2024 10:00 AM    AST 22 10/05/2024 08:37 AM    AST 25 01/27/2024 10:00 AM    ALT 27 10/05/2024 08:37 AM    ALT 29 01/27/2024 10:00 AM    Total Protein 6.7 10/05/2024 08:37 AM    Total Protein 6.9 01/27/2024 10:00 AM    Albumin 4.3 10/05/2024 08:37 AM    Albumin 4.4 01/27/2024 10:00 AM    HDL, Direct 22 (L) 10/05/2024 08:37 AM    Triglycerides 421 (H) 10/05/2024 08:37 AM           Imaging Studies: Results Review Statement: No pertinent imaging studies reviewed.    Portions of the record may have been created with voice recognition software. Occasional wrong word or \"sound a like\" substitutions may have occurred due to the inherent limitations of voice recognition software. Read the chart carefully and recognize, using context, where substitutions have occurred.    "

## 2024-11-25 NOTE — ASSESSMENT & PLAN NOTE
Continue statins-triglycerides are high likely due to dietary indiscretions-suggest follow-up with nutritionist that he has declined.  Repeat fasting lipid profile prior to next visit and if still high consider adding fibrate's

## 2024-11-25 NOTE — ASSESSMENT & PLAN NOTE
Lab Results   Component Value Date    HGBA1C 8.9 (H) 10/05/2024   Overall control is improving-for now increase Ozempic to 2 mg weekly.  Decrease NovoLog 7030 to 20 units before breakfast, continue 24 units at dinner and 20 units before bedtime snack  He will be due for his next A1c in January  Based on the sensor download average glucose is 173 Mg per DL   He is requesting more FMLA as he states that some days he is not able to work if his sugars are either too high or too low as that affects his vision.  Discussed with him that his FLA forms were filled out for him to be able to come for his appointments  I am not sure why he was not able to go to work yesterday but does not seem to be related to his blood sugars  I discussed with him that he should have accommodations or housework which include being able to check his blood sugars, take breaks to check his blood sugars and treat hypoglycemia if needed, be able to a source of fast acting carbohydrates with him at all times we can treat hypoglycemia if needed

## 2024-11-25 NOTE — ASSESSMENT & PLAN NOTE
Lab Results   Component Value Date    HGBA1C 8.9 (H) 10/05/2024   Continue follow-up with ophthalmologist

## 2024-11-25 NOTE — ASSESSMENT & PLAN NOTE
Total testosterone low due to low sex hormone binding globulin-Free testosterone is normal, no indication for testosterone replacement

## 2024-11-26 ENCOUNTER — TELEPHONE (OUTPATIENT)
Dept: ENDOCRINOLOGY | Facility: CLINIC | Age: 54
End: 2024-11-26

## 2024-11-29 ENCOUNTER — TELEPHONE (OUTPATIENT)
Dept: OBGYN CLINIC | Facility: MEDICAL CENTER | Age: 54
End: 2024-11-29

## 2024-11-29 NOTE — TELEPHONE ENCOUNTER
Calling Pt to let them know a work note was written and the Pt can come to the office to pick it up or provide a fax #    Pt stated his wife will come to pick it up    Flores GUPTA

## 2024-12-02 ENCOUNTER — OFFICE VISIT (OUTPATIENT)
Dept: OBGYN CLINIC | Facility: MEDICAL CENTER | Age: 54
End: 2024-12-02
Payer: COMMERCIAL

## 2024-12-02 VITALS — WEIGHT: 218 LBS | HEIGHT: 66 IN | BODY MASS INDEX: 35.03 KG/M2

## 2024-12-02 DIAGNOSIS — M70.61 TROCHANTERIC BURSITIS OF RIGHT HIP: Primary | ICD-10-CM

## 2024-12-02 PROCEDURE — 99213 OFFICE O/P EST LOW 20 MIN: CPT | Performed by: PHYSICIAN ASSISTANT

## 2024-12-02 PROCEDURE — 20610 DRAIN/INJ JOINT/BURSA W/O US: CPT | Performed by: PHYSICIAN ASSISTANT

## 2024-12-02 RX ORDER — ROPIVACAINE HYDROCHLORIDE 5 MG/ML
4 INJECTION, SOLUTION EPIDURAL; INFILTRATION; PERINEURAL
Status: COMPLETED | OUTPATIENT
Start: 2024-12-02 | End: 2024-12-02

## 2024-12-02 RX ORDER — METHYLPREDNISOLONE ACETATE 40 MG/ML
1 INJECTION, SUSPENSION INTRA-ARTICULAR; INTRALESIONAL; INTRAMUSCULAR; SOFT TISSUE
Status: COMPLETED | OUTPATIENT
Start: 2024-12-02 | End: 2024-12-02

## 2024-12-02 RX ADMIN — ROPIVACAINE HYDROCHLORIDE 4 ML: 5 INJECTION, SOLUTION EPIDURAL; INFILTRATION; PERINEURAL at 11:00

## 2024-12-02 RX ADMIN — METHYLPREDNISOLONE ACETATE 1 ML: 40 INJECTION, SUSPENSION INTRA-ARTICULAR; INTRALESIONAL; INTRAMUSCULAR; SOFT TISSUE at 11:00

## 2024-12-02 NOTE — PROGRESS NOTES
Orthopaedic Surgery - Office Note  Javan Zamudio (54 y.o. male)   : 1970   MRN: [id number]  Encounter Date: 2024    Assessment / Plan  Right hip GT bursitis   Right hip osteoarthritis    CSI of right trochanteric bursa was performed and tolerated well.  I did discuss with the patient that since he did have stiffness in his hip joint and some groin pain that if he does not have improvement with the bursa injection or continues to have the groin pain we will try a steroid guided injection into the hip joint to see his pain relief.  I did discuss physical therapy with the patient but he would like to continue with his home program at this time.  We did review some of the exercises especially stretching for the IT band at this time.    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  Follow-up:  Return if symptoms worsen or fail to improve.      Chief Complaint / Date of Onset  Right hip pain, been going on since 2024 with no injury   Injury Mechanism / Date  None  Surgery / Date  None    History of Present Illness   Javan Zamudio is a 54 y.o. male right hip pain.  Most of his pain is lateral in the area of the bursa but he does have some groin pain at this time.  He also notes some stiffness.  He did have a steroid injection into the troches bursa on 2024 which she feels completely improved his symptoms up to about 3 days ago when all of a sudden he started with significant pain in the lateral aspect of that right hip that has continued to limit him since.  He is requesting a repeat steroid injection into trochanteric bursa at this time since it relieves his symptoms over the last time.  He states that the pain in his groin is much less significant and he does not have it with walking.  All activities seem to  "aggravate the pain in the lateral aspect of the hip and he does have radiation laterally down to his knee.  He denies any radiating symptoms or lower back pain.     Treatment Summary  Medications / Modalities  None  Bracing / Immobilization  None  Physical Therapy  Self-guided home exercises that were reviewed with the patient at that appointment on 7/26/2024.  Injections  Steroid injection right trochanteric bursa on 7/26/2024.  Steroid injection right trochanteric bursa on 12/2/2024.  Prior Surgeries  None  Other Treatments  None    Employment / Current Status  , working full time     Sport / Organization / Current Status  Active       Review of Systems  Pertinent items are noted in HPI.  All other systems were reviewed and are negative.      Physical Exam  Ht 5' 6\" (1.676 m)   Wt 98.9 kg (218 lb)   BMI 35.19 kg/m²   Cons: Appears well.  No apparent distress.  Psych: Alert. Oriented x3.  Mood and affect normal.  Eyes: PERRLA, EOMI  Resp: Normal effort.  No audible wheezing or stridor.  CV: Palpable pulse.  No discernable arrhythmia.  No LE edema.  Lymph:  No palpable cervical, axillary, or inguinal lymphadenopathy.  Skin: Warm.  No palpable masses.  No visible lesions.  Neuro: Normal muscle tone.  Normal and symmetric DTR's.     Right Hip Exam  Alignment / Posture:  Normal resting hip posture.  Inspection:  No swelling. No ecchymosis.  Palpation:   Moderate greater troch bursa tenderness. No effusion.  ROM:  Hip Flexion 100. Hip ER 20. Hip IR 0.  Strength:  5/5 hip flexors and abductors.  Stability:  No objective hip instability.  Tests:  (+) CLAU.  Neurovascular:  Sensation intact in DP/SP/Glass/Sa/T nerve distributions. 2+ DP & PT pulses.  Gait:  Normal.       Studies Reviewed  I have personally reviewed pertinent films in PACS.  XR of right hip - images from 07/26/2024 showed mild narrowing of the right hip joint with no fracture or dislocation.       Large joint arthrocentesis: R greater " "trochanteric bursa  Universal Protocol:  Consent given by: patient  Time out: Immediately prior to procedure a \"time out\" was called to verify the correct patient, procedure, equipment, support staff and site/side marked as required.  Site marked: the operative site was marked  Supporting Documentation  Indications: pain and diagnostic evaluation   Procedure Details  Location: hip - R greater trochanteric bursa  Preparation: Patient was prepped and draped in the usual sterile fashion  Needle size: 22 G  Ultrasound guidance: no  Approach: lateral  Medications administered: 1 mL methylPREDNISolone acetate 40 mg/mL; 4 mL ropivacaine 0.5 %    Patient tolerance: patient tolerated the procedure well with no immediate complications  Dressing:  Sterile dressing applied            Medical, Surgical, Family, and Social History  The patient's medical history, family history, and social history, were reviewed and updated as appropriate.    Past Medical History:   Diagnosis Date    Anxiety     Arthritis     Cancer (HCC)     Chronic thoracic back pain 06/19/2024    Depression     Diabetes (HCC)     Diabetes mellitus (HCC)     Fatty liver     GERD (gastroesophageal reflux disease)     Hyperlipidemia     Hypertension     Intertrigo     resolved 10/26/17    Lumbar spondylosis 06/18/2024    Sebaceous cyst     Sexual disorder     Sexual dysfunction     last assessed 03/04/14    Snoring     last assessed 12/08/14    Somnolence, daytime     last assessed 12/08/14    Thoracic disc herniation     last assessed 05/02/14    Vision problem     Vitamin D deficiency        Past Surgical History:   Procedure Laterality Date    COLONOSCOPY      last assessed 01/22/14    LYMPHADENECTOMY      Axillary; last assessed 05/30/14    DE CIRCUMCISION AGE >28 DAYS N/A 3/1/2022    Procedure: CIRCUMCISION ADULT;  Surgeon: Brennen Fink MD;  Location: [facility];  Service: Urology       Family History   Problem Relation Age of Onset    Diabetes Mother     " "Kidney cancer Mother     Diabetes Father     Diabetes Sister     Diabetes Family     Hypertension Family        Social History     Occupational History    Not on file   Tobacco Use    Smoking status: Never     Passive exposure: Never    Smokeless tobacco: Never   Vaping Use    Vaping status: Never Used   Substance and Sexual Activity    Alcohol use: Yes     Comment: social    Drug use: No    Sexual activity: Yes     Partners: Female       Allergies   Allergen Reactions    Ace Inhibitors Cough         Current Outpatient Medications:     Blood Glucose Monitoring Suppl (Contour Next Monitor) w/Device KIT, Use to check BG 3 times daily, Disp: 1 kit, Rfl: 0    Cholecalciferol (VITAMIN D3) 5000 units CAPS, Take by mouth, Disp: , Rfl:     Continuous Glucose Sensor (FreeStyle Samir 3 Plus Sensor) MISC, Change once every 15 days, Disp: 6 each, Rfl: 1    Diclofenac Sodium (VOLTAREN) 1 %, Apply 2 g topically 4 (four) times a day, Disp: 2 g, Rfl: 0    dorzolamide-timolol (COSOPT) 22.3-6.8 MG/ML ophthalmic solution, INSTILL 1 DROP IN RIGHT EYE TWICE DAILY, Disp: 1 mL, Rfl: 1    Empagliflozin 25 MG TABS, Take 1 tablet (25 mg total) by mouth daily, Disp: 100 tablet, Rfl: 1    glucose blood (Contour Next Test) test strip, Check BG 3 times daily., Disp: 300 strip, Rfl: 2    insulin aspart protamine-insulin aspart (NovoLOG Mix 70/30 FlexPen) 100 Units/mL injection pen, Inject 20 Units  before  Breakfast and 24 Units At Dinner and 20 units before bedtime snacks, Disp: 45 mL, Rfl: 1    Insulin Pen Needle (BD Pen Needle Marielos 2nd Gen) 32G X 4 MM MISC, Inject as directed 3 (three) times a day, Disp: 300 each, Rfl: 3    Insulin Syringe-Needle U-100 (B-D INS SYR ULTRAFINE .5CC/30G) 30G X 1/2\" 0.5 ML MISC, Inject insulin 3 times daily, Disp: 300 each, Rfl: 1    Lancets (freestyle) lancets, Test blood sugars 4 times daily, Disp: 300 each, Rfl: 1    losartan-hydrochlorothiazide (HYZAAR) 100-25 MG per tablet, Take 1 tablet by mouth daily, Disp: " 100 tablet, Rfl: 1    metFORMIN (GLUCOPHAGE-XR) 500 mg 24 hr tablet, TAKE 2 TABLETS BY MOUTH TWICE DAILY WITH MEALS, Disp: 360 tablet, Rfl: 1    metoprolol succinate (TOPROL-XL) 50 mg 24 hr tablet, Take 1 tablet (50 mg total) by mouth daily, Disp: 90 tablet, Rfl: 1    pravastatin (PRAVACHOL) 80 mg tablet, Take 1 tablet (80 mg total) by mouth daily, Disp: 90 tablet, Rfl: 1    semaglutide, 2 mg/dose, (Ozempic) 8 mg/ mL injection pen, Inject 0.75 mL (2 mg total) under the skin every 7 days, Disp: 3 mL, Rfl: 3    sildenafil (REVATIO) 20 mg tablet, Take 1 or 2 tablets 1 hour prior to relation, Disp: 30 tablet, Rfl: 1    mupirocin (BACTROBAN) 2 % ointment, Apply topically 3 (three) times a day for 7 days, Disp: 50 g, Rfl: 0      Rashid Garcia PA-C    Scribe Attestation      I,:   am acting as a scribe while in the presence of the attending physician.:       I,:   personally performed the services described in this documentation    as scribed in my presence.:

## 2024-12-02 NOTE — LETTER
December 2, 2024     Patient: Javan Zamudio  YOB: 1970  Date of Visit: 12/2/2024      To Whom it May Concern:    Javan Zamudio is under my professional care. Javan was seen in my office on 12/2/2024. 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     If you have any questions or concerns, please don't hesitate to call.         Sincerely,          Rashid Garcia PA-C        CC: No Recipients

## 2024-12-09 ENCOUNTER — TELEPHONE (OUTPATIENT)
Dept: OBGYN CLINIC | Facility: HOSPITAL | Age: 54
End: 2024-12-09

## 2024-12-09 NOTE — TELEPHONE ENCOUNTER
Patient was calling back about the work note. He needs to pick it up today to take to work. Please call patient when it is ready.   Thank you.

## 2024-12-09 NOTE — TELEPHONE ENCOUNTER
Patient calling back about work note again - he needs to pick it up before 3:00 to take to his work.

## 2024-12-09 NOTE — TELEPHONE ENCOUNTER
Caller: Javan (patient)    Doctor: Rashid FISCHER PA-C    Reason for call: Patient was in a lot of pain and did not go to work Saturday. He is also asking for another week with his restriction.     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     Patient would like a call once it is ready.       Call back#: [phone]

## 2024-12-10 ENCOUNTER — OFFICE VISIT (OUTPATIENT)
Dept: OBGYN CLINIC | Facility: MEDICAL CENTER | Age: 54
End: 2024-12-10
Payer: COMMERCIAL

## 2024-12-10 VITALS
HEIGHT: 66 IN | BODY MASS INDEX: 32.95 KG/M2 | WEIGHT: 205 LBS | SYSTOLIC BLOOD PRESSURE: 135 MMHG | HEART RATE: 88 BPM | DIASTOLIC BLOOD PRESSURE: 83 MMHG

## 2024-12-10 DIAGNOSIS — M70.61 TROCHANTERIC BURSITIS OF RIGHT HIP: Primary | ICD-10-CM

## 2024-12-10 DIAGNOSIS — M22.2X2 PATELLOFEMORAL DISORDER OF LEFT KNEE: ICD-10-CM

## 2024-12-10 DIAGNOSIS — M22.2X1 PATELLOFEMORAL DISORDER OF RIGHT KNEE: ICD-10-CM

## 2024-12-10 PROCEDURE — 99213 OFFICE O/P EST LOW 20 MIN: CPT | Performed by: ORTHOPAEDIC SURGERY

## 2024-12-10 NOTE — PROGRESS NOTES
Ortho Sports Medicine Knee Follow Up Visit     Assesment:     54 y.o. male bilateral knee patellofemoral syndrome status post bilateral corticosteroid injections on 10/29/2024 with resolution of pain.  Of note patient was also seen by Dr. Patino for right greater trochanteric bursitis.    Plan:  Continue conservative treatment with ice, PT, over-the-counter NSAIDs, and Tylenol  Follow-up for reevaluation in 8 weeks, with possible bilateral corticosteroid injections at this next visit if symptoms return and clinically indicated    Conservative treatment:    Ice to knee for 20 minutes at least 1-2 times daily.  PT for ROM/strengthening to knee, hip and core.  OTC NSAIDS prn for pain.  Tylenol for pain.    Imaging:    No imaging was available for review today.      Injection:    No Injection planned at this time.  May consider future corticosteroid injection depending on clinical exam/imaging with their that he is repeat injection being 1/29/2025.      Surgery:     No surgery is recommended at this point, continue with conservative treatment plan as noted.    Follow up:    No follow-ups on file.        Chief Complaint   Patient presents with    Left Knee - Follow-up    Right Knee - Follow-up       History of Present Illness:    The patient is returns for follow up of bilateral knee patellofemoral pain.  Since the prior visit, in which she received a corticosteroid injection for his bilateral knees (10/29/2024), he reports significant improvement.  His pain has been relieved by his corticosteroid injections and he is able to complete his work as a  without any impairments.  No issues with going up or down stairs, weightbearing, standing, or pivoting.  He is aware that the corticosteroid injections may wear off and his pain may return the earliest for repeat injections would be 1/29/2025.       Knee Surgical History:  None    Past Medical, Social and Family History:  Past Medical History:   Diagnosis Date     Anxiety     Arthritis     Cancer (HCC)     Chronic thoracic back pain 06/19/2024    Depression     Diabetes (HCC)     Diabetes mellitus (HCC)     Fatty liver     GERD (gastroesophageal reflux disease)     Hyperlipidemia     Hypertension     Intertrigo     resolved 10/26/17    Lumbar spondylosis 06/18/2024    Sebaceous cyst     Sexual disorder     Sexual dysfunction     last assessed 03/04/14    Snoring     last assessed 12/08/14    Somnolence, daytime     last assessed 12/08/14    Thoracic disc herniation     last assessed 05/02/14    Vision problem     Vitamin D deficiency      Past Surgical History:   Procedure Laterality Date    COLONOSCOPY      last assessed 01/22/14    LYMPHADENECTOMY      Axillary; last assessed 05/30/14    VT CIRCUMCISION AGE >28 DAYS N/A 3/1/2022    Procedure: CIRCUMCISION ADULT;  Surgeon: Brennen Fink MD;  Location: [facility];  Service: Urology     Allergies   Allergen Reactions    Ace Inhibitors Cough     Current Outpatient Medications on File Prior to Visit   Medication Sig Dispense Refill    Cholecalciferol (VITAMIN D3) 5000 units CAPS Take by mouth      Diclofenac Sodium (VOLTAREN) 1 % Apply 2 g topically 4 (four) times a day 2 g 0    dorzolamide-timolol (COSOPT) 22.3-6.8 MG/ML ophthalmic solution INSTILL 1 DROP IN RIGHT EYE TWICE DAILY 1 mL 1    Empagliflozin 25 MG TABS Take 1 tablet (25 mg total) by mouth daily 100 tablet 1    insulin aspart protamine-insulin aspart (NovoLOG Mix 70/30 FlexPen) 100 Units/mL injection pen Inject 20 Units  before  Breakfast and 24 Units At Dinner and 20 units before bedtime snacks 45 mL 1    losartan-hydrochlorothiazide (HYZAAR) 100-25 MG per tablet Take 1 tablet by mouth daily 100 tablet 1    metFORMIN (GLUCOPHAGE-XR) 500 mg 24 hr tablet TAKE 2 TABLETS BY MOUTH TWICE DAILY WITH MEALS 360 tablet 1    metoprolol succinate (TOPROL-XL) 50 mg 24 hr tablet Take 1 tablet (50 mg total) by mouth daily 90 tablet 1    pravastatin (PRAVACHOL) 80 mg tablet Take  "1 tablet (80 mg total) by mouth daily 90 tablet 1    semaglutide, 2 mg/dose, (Ozempic) 8 mg/ mL injection pen Inject 0.75 mL (2 mg total) under the skin every 7 days 3 mL 3    sildenafil (REVATIO) 20 mg tablet Take 1 or 2 tablets 1 hour prior to relation 30 tablet 1    Blood Glucose Monitoring Suppl (Contour Next Monitor) w/Device KIT Use to check BG 3 times daily (Patient not taking: Reported on 12/10/2024) 1 kit 0    Continuous Glucose Sensor (FreeStyle Samir 3 Plus Sensor) MISC Change once every 15 days (Patient not taking: Reported on 12/10/2024) 6 each 1    glucose blood (Contour Next Test) test strip Check BG 3 times daily. (Patient not taking: Reported on 12/10/2024) 300 strip 2    Insulin Pen Needle (BD Pen Needle Marielos 2nd Gen) 32G X 4 MM MISC Inject as directed 3 (three) times a day (Patient not taking: Reported on 12/10/2024) 300 each 3    Insulin Syringe-Needle U-100 (B-D INS SYR ULTRAFINE .5CC/30G) 30G X 1/2\" 0.5 ML MISC Inject insulin 3 times daily (Patient not taking: Reported on 12/10/2024) 300 each 1    Lancets (freestyle) lancets Test blood sugars 4 times daily (Patient not taking: Reported on 12/10/2024) 300 each 1    mupirocin (BACTROBAN) 2 % ointment Apply topically 3 (three) times a day for 7 days 50 g 0     No current facility-administered medications on file prior to visit.     Social History     Socioeconomic History    Marital status: Single     Spouse name: Not on file    Number of children: Not on file    Years of education: Not on file    Highest education level: Not on file   Occupational History    Not on file   Tobacco Use    Smoking status: Never     Passive exposure: Never    Smokeless tobacco: Never   Vaping Use    Vaping status: Never Used   Substance and Sexual Activity    Alcohol use: Yes     Comment: social    Drug use: No    Sexual activity: Yes     Partners: Female   Other Topics Concern    Not on file   Social History Narrative    Not on file     Social Drivers of Health " "    Financial Resource Strain: Not on file   Food Insecurity: Not on file   Transportation Needs: Not on file   Physical Activity: Not on file   Stress: Not on file   Social Connections: Not on file   Intimate Partner Violence: Not on file   Housing Stability: Not on file         I have reviewed the past medical, surgical, social and family history, medications and allergies as documented in the EMR.    Review of systems: ROS is negative other than that noted in the HPI.  Constitutional: Negative for fatigue and fever.      Physical Exam:    Blood pressure 135/83, pulse 88, height 5' 6\" (1.676 m), weight 93 kg (205 lb).    General/Constitutional: NAD, well developed, well nourished  HENT: Normocephalic, atraumatic  CV: Intact distal pulses, regular rate  Resp: No respiratory distress or labored breathing  GI: Soft and non-tender   Lymphatic: No lymphadenopathy palpated  Neuro: Alert and Oriented x 3, no focal deficits  Psych: Normal mood, normal affect, normal judgement, normal behavior  Skin: Warm, dry, no rashes, no erythema      Knee Exam (focused):               RIGHT LEFT   ROM:   0-130 0-130   Palpation: Effusion negative negative     MJL tenderness Negative Negative     LJL tenderness Negative Negative   Meniscus: Tyesha Negative Negative    Apley's Compression Negative Negative   Instability: Varus stable stable     Valgus stable stable   Special Tests: Lachman Negative Negative     Posterior drawer Negative Negative     Anterior drawer Negative Negative     Pivot shift not tested not tested     Dial not tested not tested   Patella: Palpation no tenderness no tenderness     Mobility 1/4 1/4     Apprehension Negative Negative   Other: Single leg 1/4 squat not tested not tested           LE NV Exam: +2 DP/PT pulses bilaterally  Sensation intact to light touch L2-S1 bilaterally    No calf tenderness to palpation bilaterally      Knee Imaging    No imaging was performed today      Scribe Attestation      I,:   " am acting as a scribe while in the presence of the attending physician.:       I,:   personally performed the services described in this documentation    as scribed in my presence.:

## 2024-12-10 NOTE — TELEPHONE ENCOUNTER
Caller: Patient    Doctor/Office: Rashid FISCHER / Osvaldo    #: [phone]      What needs to be faxed: All 3 work letters from 11/29, 12/2 and 12/9.     ATTN to: aSi    Fax#: [phone] (please confirm before sending)

## 2024-12-10 NOTE — TELEPHONE ENCOUNTER
Caller: Aurelia Edouard    Doctor: Emily BRAVO     Reason for call: Patient called in wanting to know if his letter for work has been updated.

## 2024-12-11 ENCOUNTER — TELEPHONE (OUTPATIENT)
Age: 54
End: 2024-12-11

## 2024-12-11 NOTE — TELEPHONE ENCOUNTER
Caller: Maynor    Doctor: Deep    Reason for call: patient is calling regarding his FMLA paperwork. Patient is currently seeing both Dr toledo (hip) and Dr Mahan (knee). Dr toledo filled out the paperwork for his hip. Sai also needs one filled out for Dr Adame for his knee.  I do not see anything in the chart .  Please fill out paperwork and fax to the number on the paperwork  Thank you    Call back#: [phone]

## 2024-12-11 NOTE — TELEPHONE ENCOUNTER
LM for pt unable to complete forms from dr colorado as dr does not have pt out of work or working with restrictions dr toledo does. Advised if we complete forms from dr colorado it will look as if pt can work. Advised pt we can send office notes if he would like.

## 2024-12-12 ENCOUNTER — TELEPHONE (OUTPATIENT)
Dept: OBGYN CLINIC | Facility: MEDICAL CENTER | Age: 54
End: 2024-12-12

## 2024-12-12 NOTE — TELEPHONE ENCOUNTER
Pt came into the office today questioning his FMLA paperwork that was filled out- He states that he has been getting flare ups about 3 x a week towards the end of the week which are lasting all day. Question number 8 was filled out stating that he does not have flare ups, with then the 2 following questions being left blank.     Are these able to be corrected?    Thanks!

## 2024-12-17 NOTE — TELEPHONE ENCOUNTER
I did review this with Dr. Patino and we do not do fmla for trochanteric bursitis.  We can give him a referral to PT or a interarticular hip injection as we discussed last office visit.

## 2024-12-20 ENCOUNTER — TELEPHONE (OUTPATIENT)
Age: 54
End: 2024-12-20

## 2024-12-20 LAB
LEFT EYE DIABETIC RETINOPATHY: POSITIVE
RIGHT EYE DIABETIC RETINOPATHY: POSITIVE

## 2024-12-20 NOTE — TELEPHONE ENCOUNTER
That was discussed on the visit in November-total testosterone was slightly low due to obesity however free testosterone which is active testosterone was normal.  No indication for testosterone replacement

## 2024-12-20 NOTE — TELEPHONE ENCOUNTER
Spoke with patient through . Patient would like to know the results of the testosterone labs done.

## 2024-12-20 NOTE — TELEPHONE ENCOUNTER
Patient is requesting a lab slip to check his testosterone levels.  Patient will be getting lab work on 12/21/2024 at a [facility] lab.

## 2024-12-30 ENCOUNTER — TELEPHONE (OUTPATIENT)
Age: 54
End: 2024-12-30

## 2024-12-30 NOTE — TELEPHONE ENCOUNTER
Caller: Patient- Javan     Doctor: Dr Adame    Reason for call: Patient is calling in stating that he was in a lot of pain to his right hip and had to miss work yesterday 12/29 and is asking if the Dr is able to provide him with a work note excusing him from work. Patient stated that he is feeling better today and is able to work today. He is asking if he is able to pick this work note up into the office today, please reach out to patient relating this.     Call back#: [phone]

## 2024-12-30 NOTE — TELEPHONE ENCOUNTER
Spoke with Pt stating the doctor will not be in the office this week. The doctor will get your message and he will decide if he will write a note for work. I do not see that you were seen anywhere for the hip pain. Pt states understanding and states he does feel better today. Pt requested verification of upcoming appointment. I stated you have an upcoming appointment with Dr. Adame on 1- at 9:45 am. Pt states understanding and thank you.

## 2024-12-31 DIAGNOSIS — M16.11 PRIMARY OSTEOARTHRITIS OF ONE HIP, RIGHT: Primary | ICD-10-CM

## 2024-12-31 NOTE — TELEPHONE ENCOUNTER
One of his apts was switched for him to see Dr. Patino on 1/20 for his hip  He would like to get a hip injection as well.   Advised about note, he will  in office on Thursday, he also asked me to fax it to daniel

## 2024-12-31 NOTE — TELEPHONE ENCOUNTER
I put in a note for this patient.  It looks like they had scheduled his follow-up for his hip with Dr. Adame instead of Sukumar so this will likely need to be switched unless Deep agreed to see his hip now going forward.  Also I did discuss with the patient previously about getting an intra-articular hip injection with spine and pain which if he feels that his pain has not improved much after his last injection we can order before his next follow-up.

## 2025-01-01 DIAGNOSIS — E11.9 TYPE 2 DIABETES MELLITUS WITHOUT COMPLICATION, WITHOUT LONG-TERM CURRENT USE OF INSULIN (HCC): ICD-10-CM

## 2025-01-01 DIAGNOSIS — I10 ESSENTIAL HYPERTENSION: ICD-10-CM

## 2025-01-02 ENCOUNTER — TELEPHONE (OUTPATIENT)
Age: 55
End: 2025-01-02

## 2025-01-02 RX ORDER — METFORMIN HYDROCHLORIDE 500 MG/1
1000 TABLET, EXTENDED RELEASE ORAL 2 TIMES DAILY WITH MEALS
Qty: 360 TABLET | Refills: 1 | Status: SHIPPED | OUTPATIENT
Start: 2025-01-02

## 2025-01-02 NOTE — TELEPHONE ENCOUNTER
Caller: Javan    Doctor: SHARON    Reason for call: returning office call regarding His A-1C  Patient will call back in the morning    Call back#: [phone]

## 2025-01-02 NOTE — TELEPHONE ENCOUNTER
Left a detailed message asking the patient to complete a A1C before we can schedule the patient for his direct injection. Dr Germain would like to have an updated result. Also stated in my message the patient has one ordered so he can walk into any lab and have it completed.

## 2025-01-03 NOTE — TELEPHONE ENCOUNTER
Caller: Patient/    Doctor: Deanne    Reason for call: Questioned why he wasn't able to have intermittent FMLA for his hip pain? Advised per message 12/17/24 no FMLA for trochanteric bursitis. Could have referral for PT or hip injection as discussed in office visit.  Please fax work excuse dated 12/31 to Sai at [phone]    Call back#: [phone]

## 2025-01-09 ENCOUNTER — TELEPHONE (OUTPATIENT)
Age: 55
End: 2025-01-09

## 2025-01-09 NOTE — TELEPHONE ENCOUNTER
Patient calling wanting to know when he needs to get his labs done. I let him know he can go 1/25 or after. No further questions at this time.

## 2025-01-14 NOTE — TELEPHONE ENCOUNTER
Patient calling back again asking when he can go for his labs. I let him know the earliest he can go for them is 1/25/25. He verbalized understanding. No further questions at this time.

## 2025-01-15 ENCOUNTER — OFFICE VISIT (OUTPATIENT)
Dept: INTERNAL MEDICINE CLINIC | Facility: CLINIC | Age: 55
End: 2025-01-15
Payer: COMMERCIAL

## 2025-01-15 VITALS
HEART RATE: 106 BPM | HEIGHT: 66 IN | BODY MASS INDEX: 33.07 KG/M2 | SYSTOLIC BLOOD PRESSURE: 140 MMHG | DIASTOLIC BLOOD PRESSURE: 82 MMHG | WEIGHT: 205.8 LBS | TEMPERATURE: 99.4 F | OXYGEN SATURATION: 97 %

## 2025-01-15 DIAGNOSIS — J10.1 INFLUENZA A: Primary | ICD-10-CM

## 2025-01-15 DIAGNOSIS — E11.3493 SEVERE NONPROLIFERATIVE DIABETIC RETINOPATHY OF BOTH EYES WITHOUT MACULAR EDEMA ASSOCIATED WITH TYPE 2 DIABETES MELLITUS (HCC): ICD-10-CM

## 2025-01-15 DIAGNOSIS — C91.10 CHRONIC LYMPHOCYTIC LEUKEMIA (HCC): ICD-10-CM

## 2025-01-15 LAB
SL AMB POCT RAPID FLU A: POSITIVE
SL AMB POCT RAPID FLU B: NEGATIVE

## 2025-01-15 PROCEDURE — 99214 OFFICE O/P EST MOD 30 MIN: CPT | Performed by: STUDENT IN AN ORGANIZED HEALTH CARE EDUCATION/TRAINING PROGRAM

## 2025-01-15 PROCEDURE — 87804 INFLUENZA ASSAY W/OPTIC: CPT | Performed by: STUDENT IN AN ORGANIZED HEALTH CARE EDUCATION/TRAINING PROGRAM

## 2025-01-15 RX ORDER — BENZONATATE 100 MG/1
100 CAPSULE ORAL 3 TIMES DAILY PRN
Qty: 20 CAPSULE | Refills: 0 | Status: SHIPPED | OUTPATIENT
Start: 2025-01-15

## 2025-01-15 RX ORDER — OSELTAMIVIR PHOSPHATE 75 MG/1
75 CAPSULE ORAL EVERY 12 HOURS SCHEDULED
Qty: 10 CAPSULE | Refills: 0 | Status: SHIPPED | OUTPATIENT
Start: 2025-01-15 | End: 2025-01-20

## 2025-01-15 RX ORDER — BRIMONIDINE TARTRATE 2 MG/ML
SOLUTION/ DROPS OPHTHALMIC
COMMUNITY
Start: 2024-12-06

## 2025-01-15 NOTE — ASSESSMENT & PLAN NOTE
Presents for evaluation of upper respiratory symptoms that started 2 days ago  Feels that have progressively worsening  Complains of general malaise, body aches, headache, chills however has not measured his body temperature.  Sneezing, nasal congestion, cough  Negative COVID antigen test at home  POC influenza test positive for influenza A  We will proceed with Tamiflu  Advise OTC cold medications, Tylenol as needed, hot beverages, rest, diet as tolerated  Patient advised on isolation precautions and to monitor symptoms for worsening/complications and to contact our office or seek medical care in the emergency room if symptoms are severe      Orders:    POCT rapid flu A and B    oseltamivir (TAMIFLU) 75 mg capsule; Take 1 capsule (75 mg total) by mouth every 12 (twelve) hours for 5 days    benzonatate (TESSALON PERLES) 100 mg capsule; Take 1 capsule (100 mg total) by mouth 3 (three) times a day as needed for cough

## 2025-01-15 NOTE — ASSESSMENT & PLAN NOTE
Lab Results   Component Value Date    WBC 12.12 (H) 10/05/2024    WBC 17.23 (H) 01/27/2024    WBC 17.81 (H) 09/20/2023     Stable. Asymptomatic. On surveillance.  Previously Evaluated by Hem/Onc. Last visit 2018.   Dx 2011. CD5 positive CD23 positive, B cell clonal disease consistent with CLL. Fish panel for CLL were negative for common chromosomal abnormalities.   F/u with cbc, cmp, LDH every 6 months

## 2025-01-15 NOTE — PROGRESS NOTES
INTERNAL MEDICINE OFFICE VISIT NOTE  [facility] [place]    NAME: Javan Zamudio  AGE: 54 y.o. SEX: male    DATE OF ENCOUNTER:       Chief Complaint   Patient presents with    Cold Like Symptoms     Stuffy nose, pain in neck, body ache, coughing         Assessment & Plan  Influenza A  Presents for evaluation of upper respiratory symptoms that started 2 days ago  Feels that have progressively worsening  Complains of general malaise, body aches, headache, chills however has not measured his body temperature.  Sneezing, nasal congestion, cough  Negative COVID antigen test at home  POC influenza test positive for influenza A  We will proceed with Tamiflu  Advise OTC cold medications, Tylenol as needed, hot beverages, rest, diet as tolerated  Patient advised on isolation precautions and to monitor symptoms for worsening/complications and to contact our office or seek medical care in the emergency room if symptoms are severe      Orders:    POCT rapid flu A and B    oseltamivir (TAMIFLU) 75 mg capsule; Take 1 capsule (75 mg total) by mouth every 12 (twelve) hours for 5 days    benzonatate (TESSALON PERLES) 100 mg capsule; Take 1 capsule (100 mg total) by mouth 3 (three) times a day as needed for cough    Chronic lymphocytic leukemia (HCC)  Lab Results   Component Value Date    WBC 12.12 (H) 10/05/2024    WBC 17.23 (H) 01/27/2024    WBC 17.81 (H) 09/20/2023     Stable. Asymptomatic. On surveillance.  Previously Evaluated by Hem/Onc. Last visit 2018.   Dx 2011. CD5 positive CD23 positive, B cell clonal disease consistent with CLL. Fish panel for CLL were negative for common chromosomal abnormalities.   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      OBJECTIVE:  Vitals:    01/15/25 1322  "  BP: 140/82   BP Location: Left arm   Patient Position: Sitting   Cuff Size: Large   Pulse: (!) 106   Temp: 99.4 °F (37.4 °C)   SpO2: 97%   Weight: 93.4 kg (205 lb 12.8 oz)   Height: 5' 6\" (1.676 m)         Physical Exam:   GENERAL: Indisposed appearance but not in acute distress.  Nontoxic.  NEUROLOGIC:  Alert/oriented x3.  HEENT:  NC/AT, no scleral icterus  CARDIAC:  RRR, +S1/S2  PULMONARY:  non-labored breathing, no crackles, rales, wheezing  Abdomen obese nontender nondistended  No peripheral edema noted        Current Outpatient Medications:     benzonatate (TESSALON PERLES) 100 mg capsule, Take 1 capsule (100 mg total) by mouth 3 (three) times a day as needed for cough, Disp: 20 capsule, Rfl: 0    brimonidine tartrate 0.2 % ophthalmic solution, instill 1 drop in right eye twice daily, Disp: , Rfl:     Cholecalciferol (VITAMIN D3) 5000 units CAPS, Take by mouth, Disp: , Rfl:     Diclofenac Sodium (VOLTAREN) 1 %, Apply 2 g topically 4 (four) times a day, Disp: 2 g, Rfl: 0    dorzolamide-timolol (COSOPT) 22.3-6.8 MG/ML ophthalmic solution, INSTILL 1 DROP IN RIGHT EYE TWICE DAILY, Disp: 1 mL, Rfl: 1    Empagliflozin 25 MG TABS, Take 1 tablet (25 mg total) by mouth daily, Disp: 100 tablet, Rfl: 1    insulin aspart protamine-insulin aspart (NovoLOG Mix 70/30 FlexPen) 100 Units/mL injection pen, Inject 20 Units  before  Breakfast and 24 Units At Dinner and 20 units before bedtime snacks, Disp: 45 mL, Rfl: 1    losartan-hydrochlorothiazide (HYZAAR) 100-25 MG per tablet, Take 1 tablet by mouth daily, Disp: 100 tablet, Rfl: 1    metFORMIN (GLUCOPHAGE-XR) 500 mg 24 hr tablet, TAKE 2 TABLETS BY MOUTH TWICE DAILY WITH MEALS, Disp: 360 tablet, Rfl: 1    metoprolol succinate (TOPROL-XL) 50 mg 24 hr tablet, Take 1 tablet (50 mg total) by mouth daily, Disp: 90 tablet, Rfl: 1    mupirocin (BACTROBAN) 2 % ointment, Apply topically 3 (three) times a day for 7 days, Disp: 50 g, Rfl: 0    oseltamivir (TAMIFLU) 75 mg capsule, Take " "1 capsule (75 mg total) by mouth every 12 (twelve) hours for 5 days, Disp: 10 capsule, Rfl: 0    pravastatin (PRAVACHOL) 80 mg tablet, Take 1 tablet (80 mg total) by mouth daily, Disp: 90 tablet, Rfl: 1    semaglutide, 2 mg/dose, (Ozempic) 8 mg/ mL injection pen, Inject 0.75 mL (2 mg total) under the skin every 7 days, Disp: 3 mL, Rfl: 3    sildenafil (REVATIO) 20 mg tablet, Take 1 or 2 tablets 1 hour prior to relation, Disp: 30 tablet, Rfl: 1    Blood Glucose Monitoring Suppl (Contour Next Monitor) w/Device KIT, Use to check BG 3 times daily (Patient not taking: Reported on 1/15/2025), Disp: 1 kit, Rfl: 0    Continuous Glucose Sensor (FreeStyle Samir 3 Plus Sensor) MISC, Change once every 15 days (Patient not taking: Reported on 1/15/2025), Disp: 6 each, Rfl: 1    glucose blood (Contour Next Test) test strip, Check BG 3 times daily. (Patient not taking: Reported on 1/15/2025), Disp: 300 strip, Rfl: 2    Insulin Pen Needle (BD Pen Needle Marielos 2nd Gen) 32G X 4 MM MISC, Inject as directed 3 (three) times a day (Patient not taking: Reported on 12/10/2024), Disp: 300 each, Rfl: 3    Insulin Syringe-Needle U-100 (B-D INS SYR ULTRAFINE .5CC/30G) 30G X 1/2\" 0.5 ML MISC, Inject insulin 3 times daily (Patient not taking: Reported on 12/10/2024), Disp: 300 each, Rfl: 1    Lancets (freestyle) lancets, Test blood sugars 4 times daily (Patient not taking: Reported on 1/15/2025), Disp: 300 each, Rfl: 1    Patient Active Problem List   Diagnosis    Primary hypertension    Dyslipidemia due to type 2 diabetes mellitus  (HCC)    Gastroesophageal reflux disease without esophagitis    Chronic lymphocytic leukemia (HCC)    Low testosterone    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 without macular edema associated with type 2 diabetes mellitus (HCC)    Type 2 diabetes mellitus with hyperglycemia, with long-term current use of insulin (HCC)    Other hemorrhoids    Phimosis    Class 2 severe obesity due to excess " calories with serious comorbidity and body mass index (BMI) of 35.0 to 35.9 in adult (HCC)    Hip pain    Erectile dysfunction    Age-related cataract of both eyes    Central retinal vein occlusion of right eye    Epiretinal membrane (ERM) of both eyes    Fatty liver    Posterior vitreous detachment    Testicular hypogonadism    Vitamin D deficiency    Onychomycosis of left great toe    Lumbar spondylosis    Chronic thoracic back pain    Class 1 obesity due to excess calories with serious comorbidity and body mass index (BMI) of 33.0 to 33.9 in adult    Hyperlipidemia    Influenza A         Bryson Posey MD  [facility] [place]    * Please Note: This note was completed in part utilizing a dictation software.  Grammatical errors, random word insertions, spelling mistakes, and incomplete sentences may be an occasional consequence of this system secondary to software limitations, ambient noise, and hardware issues.  If you have any questions or concerns about the content, text, or information contained within the body of this dictation, please contact the provider for clarification.**

## 2025-01-16 DIAGNOSIS — E11.9 TYPE 2 DIABETES MELLITUS WITHOUT COMPLICATION, WITHOUT LONG-TERM CURRENT USE OF INSULIN (HCC): ICD-10-CM

## 2025-01-16 RX ORDER — PEN NEEDLE, DIABETIC 32GX 5/32"
NEEDLE, DISPOSABLE MISCELLANEOUS
Qty: 300 EACH | Refills: 1 | Status: SHIPPED | OUTPATIENT
Start: 2025-01-16

## 2025-01-17 ENCOUNTER — TELEPHONE (OUTPATIENT)
Age: 55
End: 2025-01-17

## 2025-01-17 NOTE — TELEPHONE ENCOUNTER
Patient reports he is still feeling sick and was out of work today. He would like a note stating he will return to work on Monday and would like to come into the office Monday to pick it up. He would like a call back to confirm that is okay.

## 2025-01-20 ENCOUNTER — APPOINTMENT (OUTPATIENT)
Dept: RADIOLOGY | Facility: MEDICAL CENTER | Age: 55
End: 2025-01-20
Payer: COMMERCIAL

## 2025-01-20 ENCOUNTER — OFFICE VISIT (OUTPATIENT)
Dept: OBGYN CLINIC | Facility: MEDICAL CENTER | Age: 55
End: 2025-01-20
Payer: COMMERCIAL

## 2025-01-20 VITALS — WEIGHT: 205 LBS | BODY MASS INDEX: 32.95 KG/M2 | HEIGHT: 66 IN

## 2025-01-20 DIAGNOSIS — M25.552 LEFT HIP PAIN: Primary | ICD-10-CM

## 2025-01-20 DIAGNOSIS — M25.552 LEFT HIP PAIN: ICD-10-CM

## 2025-01-20 PROCEDURE — 73502 X-RAY EXAM HIP UNI 2-3 VIEWS: CPT

## 2025-01-20 PROCEDURE — 99213 OFFICE O/P EST LOW 20 MIN: CPT | Performed by: ORTHOPAEDIC SURGERY

## 2025-01-20 NOTE — PROGRESS NOTES
"Orthopaedic Surgery - Office Note  Javan Zamudio (54 y.o. male)   : 1970   MRN: [id number]  Encounter Date: 2025    Assessment / Plan  Left lilac crest pain, likely muscular   Right hip GT bursitis with mild OA    Recommended trying oral anti-inflammatories for a week for his left sided pain  Continue with HEP for both of his hips   We can consider a repeat CSI in his right in the future if needed  Ice, heat and anti-inflammatories prn   Ordered and reviewed XR during the visit   Follow-up:  Return if symptoms worsen or fail to improve.      Chief Complaint / Date of Onset  Left hip pain since early 2025, no injury   Right hip pain, been going on since 2024 with no injury   Injury Mechanism / Date  None  Surgery / Date  None    History of Present Illness   Javan Zamudio is a 54 y.o. male who presents for follow up right hip GT bursitis and new left sided hip pain. He states his left sided pain is high, along his iliac crest. It does not feel the same as his right hip did and it is not in his groin. He denies any radiating symptoms. He states his right hip pain is still lingering but is not as bad as it was. He does feel that the injection was helpful.     Treatment Summary  Medications / Modalities  None  Bracing / Immobilization  None  Physical Therapy  Self-guided home exercises that were reviewed with the patient at that appointment on 2024.  Injections  Steroid injection right trochanteric bursa on 2024.  Steroid injection right trochanteric bursa on 2024 with 85% relief in symptoms   Prior Surgeries  None  Other Treatments  None     Employment / Current Status  , working full time      Sport / Organization / Current Status  Active       Review of Systems  Pertinent items are noted in HPI.  All other systems were reviewed and are negative.      Physical Exam  Ht 5' 6\" (1.676 m)   Wt 93 kg (205 lb)   BMI 33.09 kg/m²   Cons: Appears well.  " No apparent distress.  Psych: Alert. Oriented x3.  Mood and affect normal.  Eyes: PERRLA, EOMI  Resp: Normal effort.  No audible wheezing or stridor.  CV: Palpable pulse.  No discernable arrhythmia.  No LE edema.  Lymph:  No palpable cervical, axillary, or inguinal lymphadenopathy.  Skin: Warm.  No palpable masses.  No visible lesions.  Neuro: Normal muscle tone.  Normal and symmetric DTR's.     Left Hip Exam  Alignment / Posture:  Normal resting hip posture.  Inspection:  No swelling. No ecchymosis.  Palpation:   mild liaic crest tenderness. No effusion.  ROM:  Normal hip ROM.  Strength:  Hip Flexors 5/5. Hip Abductors 5/5.  Stability:  No objective hip instability.  Tests:  No pertinent positive or negative tests.  Neurovascular:  Sensation intact in DP/SP/Glass/Sa/T nerve distributions. 2+ DP & PT pulses.  Gait:  Normal.       Studies Reviewed  I have personally reviewed pertinent films in PACS.  XR of right hip - images from 07/26/2024 showed mild narrowing of the right hip joint with no fracture or dislocation.  XR of left hip- images from 01/20/2025 showing mild narrowing of the joint      Procedures  No procedures today.    Medical, Surgical, Family, and Social History  The patient's medical history, family history, and social history, were reviewed and updated as appropriate.    Past Medical History:   Diagnosis Date    Anxiety     Arthritis     Cancer (HCC)     Chronic thoracic back pain 06/19/2024    Depression     Diabetes (HCC)     Diabetes mellitus (HCC)     Fatty liver     GERD (gastroesophageal reflux disease)     Hyperlipidemia     Hypertension     Intertrigo     resolved 10/26/17    Lumbar spondylosis 06/18/2024    Sebaceous cyst     Sexual disorder     Sexual dysfunction     last assessed 03/04/14    Snoring     last assessed 12/08/14    Somnolence, daytime     last assessed 12/08/14    Thoracic disc herniation     last assessed 05/02/14    Vision problem     Vitamin D deficiency        Past Surgical  History:   Procedure Laterality Date    COLONOSCOPY      last assessed 01/22/14    LYMPHADENECTOMY      Axillary; last assessed 05/30/14    IL CIRCUMCISION AGE >28 DAYS N/A 3/1/2022    Procedure: CIRCUMCISION ADULT;  Surgeon: Brennen Fink MD;  Location: [facility];  Service: Urology       Family History   Problem Relation Age of Onset    Diabetes Mother     Kidney cancer Mother     Diabetes Father     Diabetes Sister     Diabetes Family     Hypertension Family        Social History     Occupational History    Not on file   Tobacco Use    Smoking status: Never     Passive exposure: Never    Smokeless tobacco: Never   Vaping Use    Vaping status: Never Used   Substance and Sexual Activity    Alcohol use: Yes     Comment: social    Drug use: No    Sexual activity: Yes     Partners: Female       Allergies   Allergen Reactions    Ace Inhibitors Cough         Current Outpatient Medications:     BD Pen Needle Marielos 2nd Gen 32G X 4 MM MISC, INJECT AS DIRECTED 3 TIMES A DAY, Disp: 300 each, Rfl: 1    benzonatate (TESSALON PERLES) 100 mg capsule, Take 1 capsule (100 mg total) by mouth 3 (three) times a day as needed for cough, Disp: 20 capsule, Rfl: 0    brimonidine tartrate 0.2 % ophthalmic solution, instill 1 drop in right eye twice daily, Disp: , Rfl:     Cholecalciferol (VITAMIN D3) 5000 units CAPS, Take by mouth, Disp: , Rfl:     Diclofenac Sodium (VOLTAREN) 1 %, Apply 2 g topically 4 (four) times a day, Disp: 2 g, Rfl: 0    dorzolamide-timolol (COSOPT) 22.3-6.8 MG/ML ophthalmic solution, INSTILL 1 DROP IN RIGHT EYE TWICE DAILY, Disp: 1 mL, Rfl: 1    Empagliflozin 25 MG TABS, Take 1 tablet (25 mg total) by mouth daily, Disp: 100 tablet, Rfl: 1    insulin aspart protamine-insulin aspart (NovoLOG Mix 70/30 FlexPen) 100 Units/mL injection pen, Inject 20 Units  before  Breakfast and 24 Units At Dinner and 20 units before bedtime snacks, Disp: 45 mL, Rfl: 1    losartan-hydrochlorothiazide (HYZAAR) 100-25 MG per tablet, Take 1  "tablet by mouth daily, Disp: 100 tablet, Rfl: 1    metFORMIN (GLUCOPHAGE-XR) 500 mg 24 hr tablet, TAKE 2 TABLETS BY MOUTH TWICE DAILY WITH MEALS, Disp: 360 tablet, Rfl: 1    metoprolol succinate (TOPROL-XL) 50 mg 24 hr tablet, Take 1 tablet (50 mg total) by mouth daily, Disp: 90 tablet, Rfl: 1    oseltamivir (TAMIFLU) 75 mg capsule, Take 1 capsule (75 mg total) by mouth every 12 (twelve) hours for 5 days, Disp: 10 capsule, Rfl: 0    pravastatin (PRAVACHOL) 80 mg tablet, Take 1 tablet (80 mg total) by mouth daily, Disp: 90 tablet, Rfl: 1    semaglutide, 2 mg/dose, (Ozempic) 8 mg/ mL injection pen, Inject 0.75 mL (2 mg total) under the skin every 7 days, Disp: 3 mL, Rfl: 3    sildenafil (REVATIO) 20 mg tablet, Take 1 or 2 tablets 1 hour prior to relation, Disp: 30 tablet, Rfl: 1    Blood Glucose Monitoring Suppl (Contour Next Monitor) w/Device KIT, Use to check BG 3 times daily (Patient not taking: Reported on 12/10/2024), Disp: 1 kit, Rfl: 0    Continuous Glucose Sensor (FreeStyle Samir 3 Plus Sensor) MISC, Change once every 15 days (Patient not taking: Reported on 12/10/2024), Disp: 6 each, Rfl: 1    glucose blood (Contour Next Test) test strip, Check BG 3 times daily. (Patient not taking: Reported on 12/10/2024), Disp: 300 strip, Rfl: 2    Insulin Syringe-Needle U-100 (B-D INS SYR ULTRAFINE .5CC/30G) 30G X 1/2\" 0.5 ML MISC, Inject insulin 3 times daily (Patient not taking: Reported on 12/10/2024), Disp: 300 each, Rfl: 1    Lancets (freestyle) lancets, Test blood sugars 4 times daily (Patient not taking: Reported on 12/10/2024), Disp: 300 each, Rfl: 1    mupirocin (BACTROBAN) 2 % ointment, Apply topically 3 (three) times a day for 7 days, Disp: 50 g, Rfl: 0      Karley Jackmanibe Attestation      I,:   am acting as a scribe while in the presence of the attending physician.:       I,:   personally performed the services described in this documentation    as scribed in my presence.:           "

## 2025-01-22 ENCOUNTER — TELEPHONE (OUTPATIENT)
Age: 55
End: 2025-01-22

## 2025-01-25 ENCOUNTER — APPOINTMENT (OUTPATIENT)
Dept: LAB | Facility: HOSPITAL | Age: 55
End: 2025-01-25
Payer: COMMERCIAL

## 2025-01-25 DIAGNOSIS — E11.69 DYSLIPIDEMIA DUE TO TYPE 2 DIABETES MELLITUS  (HCC): ICD-10-CM

## 2025-01-25 DIAGNOSIS — Z79.4 TYPE 2 DIABETES MELLITUS WITH HYPERGLYCEMIA, WITH LONG-TERM CURRENT USE OF INSULIN (HCC): ICD-10-CM

## 2025-01-25 DIAGNOSIS — E11.65 TYPE 2 DIABETES MELLITUS WITH HYPERGLYCEMIA, WITH LONG-TERM CURRENT USE OF INSULIN (HCC): ICD-10-CM

## 2025-01-25 DIAGNOSIS — E78.5 DYSLIPIDEMIA DUE TO TYPE 2 DIABETES MELLITUS  (HCC): ICD-10-CM

## 2025-01-25 LAB
ALBUMIN SERPL BCG-MCNC: 4.2 G/DL (ref 3.5–5)
ALP SERPL-CCNC: 78 U/L (ref 34–104)
ALT SERPL W P-5'-P-CCNC: 49 U/L (ref 7–52)
ANION GAP SERPL CALCULATED.3IONS-SCNC: 4 MMOL/L (ref 4–13)
AST SERPL W P-5'-P-CCNC: 31 U/L (ref 13–39)
BILIRUB SERPL-MCNC: 0.46 MG/DL (ref 0.2–1)
BUN SERPL-MCNC: 14 MG/DL (ref 5–25)
CALCIUM SERPL-MCNC: 9.9 MG/DL (ref 8.4–10.2)
CHLORIDE SERPL-SCNC: 105 MMOL/L (ref 96–108)
CHOLEST SERPL-MCNC: 174 MG/DL (ref ?–200)
CO2 SERPL-SCNC: 29 MMOL/L (ref 21–32)
CREAT SERPL-MCNC: 1.05 MG/DL (ref 0.6–1.3)
CREAT UR-MCNC: 193.9 MG/DL
EST. AVERAGE GLUCOSE BLD GHB EST-MCNC: 146 MG/DL
GFR SERPL CREATININE-BSD FRML MDRD: 80 ML/MIN/1.73SQ M
GLUCOSE P FAST SERPL-MCNC: 109 MG/DL (ref 65–99)
HBA1C MFR BLD: 6.7 %
HDLC SERPL-MCNC: 31 MG/DL
LDLC SERPL CALC-MCNC: 116 MG/DL (ref 0–100)
MICROALBUMIN UR-MCNC: 10.5 MG/L
MICROALBUMIN/CREAT 24H UR: 5 MG/G CREATININE (ref 0–30)
POTASSIUM SERPL-SCNC: 4.2 MMOL/L (ref 3.5–5.3)
PROT SERPL-MCNC: 6.5 G/DL (ref 6.4–8.4)
SODIUM SERPL-SCNC: 138 MMOL/L (ref 135–147)
TRIGL SERPL-MCNC: 134 MG/DL (ref ?–150)
TSH SERPL DL<=0.05 MIU/L-ACNC: 1.39 UIU/ML (ref 0.45–4.5)

## 2025-01-25 PROCEDURE — 36415 COLL VENOUS BLD VENIPUNCTURE: CPT

## 2025-01-25 PROCEDURE — 80053 COMPREHEN METABOLIC PANEL: CPT

## 2025-01-25 PROCEDURE — 83036 HEMOGLOBIN GLYCOSYLATED A1C: CPT

## 2025-01-25 PROCEDURE — 82570 ASSAY OF URINE CREATININE: CPT

## 2025-01-25 PROCEDURE — 84443 ASSAY THYROID STIM HORMONE: CPT

## 2025-01-25 PROCEDURE — 82043 UR ALBUMIN QUANTITATIVE: CPT

## 2025-01-25 PROCEDURE — 80061 LIPID PANEL: CPT

## 2025-01-26 ENCOUNTER — RESULTS FOLLOW-UP (OUTPATIENT)
Dept: ENDOCRINOLOGY | Facility: CLINIC | Age: 55
End: 2025-01-26

## 2025-01-27 NOTE — RESULT ENCOUNTER NOTE
Please call the patient regarding labs - A1C improved to 6.7-triglycerides have also improved-continue current meds

## 2025-02-05 ENCOUNTER — TELEPHONE (OUTPATIENT)
Age: 55
End: 2025-02-05

## 2025-02-05 NOTE — TELEPHONE ENCOUNTER
Patient called in for the results.  Informed him of provider's message.  No questions or concerns.

## 2025-02-05 NOTE — TELEPHONE ENCOUNTER
Caller: Patient    Doctor/Office: [facility]    Call regarding :  Patient calling to schedule procedure direct referral from ortho     Call was transferred to: procedure

## 2025-02-10 ENCOUNTER — OFFICE VISIT (OUTPATIENT)
Dept: OBGYN CLINIC | Facility: MEDICAL CENTER | Age: 55
End: 2025-02-10
Payer: COMMERCIAL

## 2025-02-10 VITALS — HEIGHT: 66 IN | WEIGHT: 204 LBS | BODY MASS INDEX: 32.78 KG/M2

## 2025-02-10 DIAGNOSIS — M22.2X2 PATELLOFEMORAL DISORDER OF LEFT KNEE: Primary | ICD-10-CM

## 2025-02-10 DIAGNOSIS — M22.2X1 PATELLOFEMORAL DISORDER OF RIGHT KNEE: ICD-10-CM

## 2025-02-10 PROCEDURE — 20610 DRAIN/INJ JOINT/BURSA W/O US: CPT | Performed by: ORTHOPAEDIC SURGERY

## 2025-02-10 PROCEDURE — 99213 OFFICE O/P EST LOW 20 MIN: CPT | Performed by: ORTHOPAEDIC SURGERY

## 2025-02-10 RX ORDER — TRIAMCINOLONE ACETONIDE 40 MG/ML
80 INJECTION, SUSPENSION INTRA-ARTICULAR; INTRAMUSCULAR
Status: COMPLETED | OUTPATIENT
Start: 2025-02-10 | End: 2025-02-10

## 2025-02-10 RX ORDER — BUPIVACAINE HYDROCHLORIDE 2.5 MG/ML
2 INJECTION, SOLUTION INFILTRATION; PERINEURAL
Status: COMPLETED | OUTPATIENT
Start: 2025-02-10 | End: 2025-02-10

## 2025-02-10 RX ADMIN — TRIAMCINOLONE ACETONIDE 80 MG: 40 INJECTION, SUSPENSION INTRA-ARTICULAR; INTRAMUSCULAR at 10:45

## 2025-02-10 RX ADMIN — BUPIVACAINE HYDROCHLORIDE 2 ML: 2.5 INJECTION, SOLUTION INFILTRATION; PERINEURAL at 10:45

## 2025-02-10 NOTE — PROGRESS NOTES
Ortho Sports Medicine Knee New Patient Visit     Assesment:     54 y.o. male  bilateral knee patellofemoral syndrome.      Plan:    Conservative treatment:    Ice to knee for 20 minutes at least 1-2 times daily.  OTC NSAIDS prn for pain.  Tylenol for pain.  Let pain guide gradual return activities.  Continue home exercise program for lower extremity strengthening.   Bilateral cortisone injections provided in office today.     Imaging:    No imaging was available for review today.      Injection:    The risks and benefits of the injection (which include but are not limited to: infection, bleeding,damage to nerve/artery, need for further intervention), as well as the risks and benefits of all alternative treatments were explained and understood.  The patient elected to proceed with injection.  The procedure was done with aseptic technique, and the patient tolerated the procedure well with no complications.  A corticosteroid injection was performed.  May consider future corticosteroid injection depending on clinical exam/imaging.      Surgery:     No surgery is recommended at this point, continue with conservative treatment plan as noted.      Follow up:    No follow-ups on file.        Chief Complaint   Patient presents with    Right Knee - Follow-up     Sometimes falls up the steps. Would like CSI.    Left Knee - Follow-up     Sometimes falls up the steps. Would like CSI.       History of Present Illness:    The patient is returns for follow up of bilateral knee patellofemoral syndrome.  Since the prior visit, He reports that his symptoms have returned. He previously received an injection on 10/29/2024 which provided several months of relief.      Pain is located anterior, over the patellar tendon.     Pain is improved by rest and injection.  Pain is aggravated by stairs and weight bearing.    Symptoms include cracking and sharp pain. The patient reports occasional feelings of instability on the right knee. He states  that he relies on the left knee more often, therefore, he experiences more pain in the left.      The patient has tried rest and injection.          Knee Surgical History:  None    Past Medical, Social and Family History:  Past Medical History:   Diagnosis Date    Anxiety     Arthritis     Cancer (HCC)     Chronic thoracic back pain 06/19/2024    Depression     Diabetes (HCC)     Diabetes mellitus (HCC)     Fatty liver     GERD (gastroesophageal reflux disease)     Hyperlipidemia     Hypertension     Intertrigo     resolved 10/26/17    Lumbar spondylosis 06/18/2024    Sebaceous cyst     Sexual disorder     Sexual dysfunction     last assessed 03/04/14    Snoring     last assessed 12/08/14    Somnolence, daytime     last assessed 12/08/14    Thoracic disc herniation     last assessed 05/02/14    Vision problem     Vitamin D deficiency      Past Surgical History:   Procedure Laterality Date    COLONOSCOPY      last assessed 01/22/14    LYMPHADENECTOMY      Axillary; last assessed 05/30/14    [place] CIRCUMCISION AGE >28 DAYS N/A 3/1/2022    Procedure: CIRCUMCISION ADULT;  Surgeon: Brennen Fink MD;  Location: [facility];  Service: Urology     Allergies   Allergen Reactions    Ace Inhibitors Cough     Current Outpatient Medications on File Prior to Visit   Medication Sig Dispense Refill    benzonatate (TESSALON PERLES) 100 mg capsule Take 1 capsule (100 mg total) by mouth 3 (three) times a day as needed for cough 20 capsule 0    brimonidine tartrate 0.2 % ophthalmic solution instill 1 drop in right eye twice daily      Cholecalciferol (VITAMIN D3) 5000 units CAPS Take by mouth      Diclofenac Sodium (VOLTAREN) 1 % Apply 2 g topically 4 (four) times a day 2 g 0    dorzolamide-timolol (COSOPT) 22.3-6.8 MG/ML ophthalmic solution INSTILL 1 DROP IN RIGHT EYE TWICE DAILY 1 mL 1    Empagliflozin 25 MG TABS Take 1 tablet (25 mg total) by mouth daily 100 tablet 1    insulin aspart protamine-insulin aspart (NovoLOG Mix 70/30 FlexPen)  "100 Units/mL injection pen Inject 20 Units  before  Breakfast and 24 Units At Dinner and 20 units before bedtime snacks 45 mL 1    losartan-hydrochlorothiazide (HYZAAR) 100-25 MG per tablet Take 1 tablet by mouth daily 100 tablet 1    metFORMIN (GLUCOPHAGE-XR) 500 mg 24 hr tablet TAKE 2 TABLETS BY MOUTH TWICE DAILY WITH MEALS 360 tablet 1    metoprolol succinate (TOPROL-XL) 50 mg 24 hr tablet Take 1 tablet (50 mg total) by mouth daily 90 tablet 1    pravastatin (PRAVACHOL) 80 mg tablet Take 1 tablet (80 mg total) by mouth daily 90 tablet 1    semaglutide, 2 mg/dose, (Ozempic) 8 mg/ mL injection pen Inject 0.75 mL (2 mg total) under the skin every 7 days 3 mL 3    sildenafil (REVATIO) 20 mg tablet Take 1 or 2 tablets 1 hour prior to relation 30 tablet 1    BD Pen Needle Marielos 2nd Gen 32G X 4 MM MISC INJECT AS DIRECTED 3 TIMES A DAY (Patient not taking: Reported on 2/10/2025) 300 each 1    Blood Glucose Monitoring Suppl (Contour Next Monitor) w/Device KIT Use to check BG 3 times daily (Patient not taking: Reported on 2/10/2025) 1 kit 0    Continuous Glucose Sensor (FreeStyle Samir 3 Plus Sensor) MISC Change once every 15 days (Patient not taking: Reported on 2/10/2025) 6 each 1    glucose blood (Contour Next Test) test strip Check BG 3 times daily. (Patient not taking: Reported on 2/10/2025) 300 strip 2    Insulin Syringe-Needle U-100 (B-D INS SYR ULTRAFINE .5CC/30G) 30G X 1/2\" 0.5 ML MISC Inject insulin 3 times daily (Patient not taking: Reported on 12/10/2024) 300 each 1    Lancets (freestyle) lancets Test blood sugars 4 times daily (Patient not taking: Reported on 2/10/2025) 300 each 1    mupirocin (BACTROBAN) 2 % ointment Apply topically 3 (three) times a day for 7 days 50 g 0     No current facility-administered medications on file prior to visit.     Social History     Socioeconomic History    Marital status: Single     Spouse name: Not on file    Number of children: Not on file    Years of education: Not on file " "   Highest education level: Not on file   Occupational History    Not on file   Tobacco Use    Smoking status: Never     Passive exposure: Never    Smokeless tobacco: Never   Vaping Use    Vaping status: Never Used   Substance and Sexual Activity    Alcohol use: Yes     Comment: social    Drug use: No    Sexual activity: Yes     Partners: Female   Other Topics Concern    Not on file   Social History Narrative    Not on file     Social Drivers of Health     Financial Resource Strain: Not on file   Food Insecurity: Not on file   Transportation Needs: Not on file   Physical Activity: Not on file   Stress: Not on file   Social Connections: Not on file   Intimate Partner Violence: Not on file   Housing Stability: Not on file         I have reviewed the past medical, surgical, social and family history, medications and allergies as documented in the EMR.    Review of systems: ROS is negative other than that noted in the HPI.  Constitutional: Negative for fatigue and fever.   HENT: Negative for sore throat.    Respiratory: Negative for shortness of breath.    Cardiovascular: Negative for chest pain.   Gastrointestinal: Negative for abdominal pain.   Endocrine: Negative for cold intolerance and heat intolerance.   Genitourinary: Negative for flank pain.   Musculoskeletal: Negative for back pain.   Skin: Negative for rash.   Allergic/Immunologic: Negative for immunocompromised state.   Neurological: Negative for dizziness.   Psychiatric/Behavioral: Negative for agitation.      Physical Exam:    Height 5' 6\" (1.676 m), weight 92.5 kg (204 lb).    General/Constitutional: NAD, well developed, well nourished  HENT: Normocephalic, atraumatic  CV: Intact distal pulses, regular rate  Resp: No respiratory distress or labored breathing  GI: Soft and non-tender   Lymphatic: No lymphadenopathy palpated  Neuro: Alert and Oriented x 3, no focal deficits  Psych: Normal mood, normal affect, normal judgement, normal behavior  Skin: Warm, " dry, no rashes, no erythema      Knee Exam (focused):                RIGHT LEFT   ROM:   0-130 0-130   Palpation: Effusion negative negative     MJL tenderness Negative Negative     LJL tenderness Negative Negative   Meniscus: Tyesha Negative Negative    Apley's Compression Negative Negative   Instability: Varus stable stable     Valgus stable stable   Special Tests: Lachman Negative Negative     Posterior drawer Negative Negative     Anterior drawer Negative Negative     Pivot shift not tested not tested     Dial not tested not tested   Patella: Palpation medial facet ttp and patellar tendon medial facet ttp and patellar tendon     Mobility 1/4 1/4     Apprehension Negative Negative   Other: Single leg 1/4 squat not tested not tested      LE NV Exam: +2 DP/PT pulses bilaterally  Sensation intact to light touch L2-S1 bilaterally     Bilateral hip ROM demonstrates no pain actively or passively    No calf tenderness to palpation bilaterally    Knee Imaging    No new imaging at time of visit.     Large joint arthrocentesis: bilateral knee  Universal Protocol:  procedure performed by consultantConsent: Verbal consent obtained.  Risks and benefits: risks, benefits and alternatives were discussed  Consent given by: patient  Timeout called at: 2/10/2025 11:00 AM.  Patient understanding: patient states understanding of the procedure being performed  Patient consent: the patient's understanding of the procedure matches consent given  Site marked: the operative site was marked  Radiology Images displayed and confirmed. If images not available, report reviewed: imaging studies available  Patient identity confirmed: verbally with patient  Supporting Documentation  Indications: pain and joint swelling   Procedure Details  Location: knee - bilateral knee  Ultrasound guidance: no  Approach: anterolateral    Medications (Right): 80 mg triamcinolone acetonide 40 mg/mL; 2 mL bupivacaine 0.25 %Medications (Left): 80 mg triamcinolone  acetonide 40 mg/mL; 2 mL bupivacaine 0.25 %   Patient tolerance: patient tolerated the procedure well with no immediate complications  Dressing:  Sterile dressing applied            Scribe Attestation      I,:  Emdundo Cabrera am acting as a scribe while in the presence of the attending physician.:       I,:  Venkat Adame DO personally performed the services described in this documentation    as scribed in my presence.:

## 2025-02-14 PROBLEM — J10.1 INFLUENZA A: Status: RESOLVED | Noted: 2025-01-15 | Resolved: 2025-02-14

## 2025-02-20 ENCOUNTER — HOSPITAL ENCOUNTER (OUTPATIENT)
Dept: RADIOLOGY | Facility: MEDICAL CENTER | Age: 55
End: 2025-02-20
Payer: COMMERCIAL

## 2025-02-20 VITALS
OXYGEN SATURATION: 98 % | HEART RATE: 95 BPM | DIASTOLIC BLOOD PRESSURE: 89 MMHG | TEMPERATURE: 98.3 F | RESPIRATION RATE: 18 BRPM | SYSTOLIC BLOOD PRESSURE: 173 MMHG

## 2025-02-20 DIAGNOSIS — M16.11 PRIMARY OSTEOARTHRITIS OF ONE HIP, RIGHT: ICD-10-CM

## 2025-02-20 PROCEDURE — 77002 NEEDLE LOCALIZATION BY XRAY: CPT | Performed by: ANESTHESIOLOGY

## 2025-02-20 PROCEDURE — 20610 DRAIN/INJ JOINT/BURSA W/O US: CPT | Performed by: ANESTHESIOLOGY

## 2025-02-20 PROCEDURE — 77002 NEEDLE LOCALIZATION BY XRAY: CPT

## 2025-02-20 RX ORDER — LIDOCAINE HYDROCHLORIDE 10 MG/ML
3 INJECTION, SOLUTION EPIDURAL; INFILTRATION; INTRACAUDAL; PERINEURAL ONCE
Status: COMPLETED | OUTPATIENT
Start: 2025-02-20 | End: 2025-02-20

## 2025-02-20 RX ORDER — METHYLPREDNISOLONE ACETATE 40 MG/ML
40 INJECTION, SUSPENSION INTRA-ARTICULAR; INTRALESIONAL; INTRAMUSCULAR; PARENTERAL; SOFT TISSUE ONCE
Status: COMPLETED | OUTPATIENT
Start: 2025-02-20 | End: 2025-02-20

## 2025-02-20 RX ORDER — ROPIVACAINE HYDROCHLORIDE 2 MG/ML
3 INJECTION, SOLUTION EPIDURAL; INFILTRATION; PERINEURAL ONCE
Status: COMPLETED | OUTPATIENT
Start: 2025-02-20 | End: 2025-02-20

## 2025-02-20 RX ADMIN — METHYLPREDNISOLONE ACETATE 40 MG: 40 INJECTION, SUSPENSION INTRA-ARTICULAR; INTRALESIONAL; INTRAMUSCULAR; SOFT TISSUE at 10:21

## 2025-02-20 RX ADMIN — LIDOCAINE HYDROCHLORIDE 3 ML: 10 INJECTION, SOLUTION EPIDURAL; INFILTRATION; INTRACAUDAL at 10:20

## 2025-02-20 RX ADMIN — ROPIVACAINE HYDROCHLORIDE 3 ML: 2 INJECTION, SOLUTION EPIDURAL; INFILTRATION at 10:21

## 2025-02-20 RX ADMIN — IOHEXOL 1 ML: 300 INJECTION, SOLUTION INTRAVENOUS at 10:21

## 2025-02-20 NOTE — H&P
History of Present Illness: The patient is a 54 y.o. male who presents with complaints of hip pain    Past Medical History:   Diagnosis Date    Anxiety     Arthritis     Cancer (HCC)     Chronic thoracic back pain 06/19/2024    Depression     Diabetes (HCC)     Diabetes mellitus (HCC)     Fatty liver     GERD (gastroesophageal reflux disease)     Hyperlipidemia     Hypertension     Intertrigo     resolved 10/26/17    Lumbar spondylosis 06/18/2024    Sebaceous cyst     Sexual disorder     Sexual dysfunction     last assessed 03/04/14    Snoring     last assessed 12/08/14    Somnolence, daytime     last assessed 12/08/14    Thoracic disc herniation     last assessed 05/02/14    Vision problem     Vitamin D deficiency        Past Surgical History:   Procedure Laterality Date    COLONOSCOPY      last assessed 01/22/14    LYMPHADENECTOMY      Axillary; last assessed 05/30/14    [place] CIRCUMCISION AGE >28 DAYS N/A 3/1/2022    Procedure: CIRCUMCISION ADULT;  Surgeon: Brennen Fink MD;  Location: [facility];  Service: Urology         Current Outpatient Medications:     BD Pen Needle Marielos 2nd Gen 32G X 4 MM MISC, INJECT AS DIRECTED 3 TIMES A DAY (Patient not taking: Reported on 2/10/2025), Disp: 300 each, Rfl: 1    benzonatate (TESSALON PERLES) 100 mg capsule, Take 1 capsule (100 mg total) by mouth 3 (three) times a day as needed for cough, Disp: 20 capsule, Rfl: 0    Blood Glucose Monitoring Suppl (Contour Next Monitor) w/Device KIT, Use to check BG 3 times daily (Patient not taking: Reported on 2/10/2025), Disp: 1 kit, Rfl: 0    brimonidine tartrate 0.2 % ophthalmic solution, instill 1 drop in right eye twice daily, Disp: , Rfl:     Cholecalciferol (VITAMIN D3) 5000 units CAPS, Take by mouth, Disp: , Rfl:     Continuous Glucose Sensor (FreeStyle Samir 3 Plus Sensor) MISC, Change once every 15 days (Patient not taking: Reported on 2/10/2025), Disp: 6 each, Rfl: 1    Diclofenac Sodium (VOLTAREN) 1 %, Apply 2 g topically 4  "(four) times a day, Disp: 2 g, Rfl: 0    dorzolamide-timolol (COSOPT) 22.3-6.8 MG/ML ophthalmic solution, INSTILL 1 DROP IN RIGHT EYE TWICE DAILY, Disp: 1 mL, Rfl: 1    Empagliflozin 25 MG TABS, Take 1 tablet (25 mg total) by mouth daily, Disp: 100 tablet, Rfl: 1    glucose blood (Contour Next Test) test strip, Check BG 3 times daily. (Patient not taking: Reported on 2/10/2025), Disp: 300 strip, Rfl: 2    insulin aspart protamine-insulin aspart (NovoLOG Mix 70/30 FlexPen) 100 Units/mL injection pen, Inject 20 Units  before  Breakfast and 24 Units At Dinner and 20 units before bedtime snacks, Disp: 45 mL, Rfl: 1    Insulin Syringe-Needle U-100 (B-D INS SYR ULTRAFINE .5CC/30G) 30G X 1/2\" 0.5 ML MISC, Inject insulin 3 times daily (Patient not taking: Reported on 12/10/2024), Disp: 300 each, Rfl: 1    Lancets (freestyle) lancets, Test blood sugars 4 times daily (Patient not taking: Reported on 2/10/2025), Disp: 300 each, Rfl: 1    losartan-hydrochlorothiazide (HYZAAR) 100-25 MG per tablet, Take 1 tablet by mouth daily, Disp: 100 tablet, Rfl: 1    metFORMIN (GLUCOPHAGE-XR) 500 mg 24 hr tablet, TAKE 2 TABLETS BY MOUTH TWICE DAILY WITH MEALS, Disp: 360 tablet, Rfl: 1    metoprolol succinate (TOPROL-XL) 50 mg 24 hr tablet, Take 1 tablet (50 mg total) by mouth daily, Disp: 90 tablet, Rfl: 1    mupirocin (BACTROBAN) 2 % ointment, Apply topically 3 (three) times a day for 7 days, Disp: 50 g, Rfl: 0    pravastatin (PRAVACHOL) 80 mg tablet, Take 1 tablet (80 mg total) by mouth daily, Disp: 90 tablet, Rfl: 1    semaglutide, 2 mg/dose, (Ozempic) 8 mg/ mL injection pen, Inject 0.75 mL (2 mg total) under the skin every 7 days, Disp: 3 mL, Rfl: 3    sildenafil (REVATIO) 20 mg tablet, Take 1 or 2 tablets 1 hour prior to relation, Disp: 30 tablet, Rfl: 1    Current Facility-Administered Medications:     iohexol (OMNIPAQUE) 300 mg/mL injection 1 mL, 1 mL, Intra-articular, Once, Will Giovanna Germain MD    lidocaine (PF) (XYLOCAINE-MPF) 1 % " injection 3 mL, 3 mL, Infiltration, Once, Will Giovanna Germain MD    methylPREDNISolone acetate (DEPO-MEDROL) injection 40 mg, 40 mg, Intra-articular, Once, Will Giovanna Germain MD    ropivacaine (NAROPIN) injection 3 mL, 3 mL, Intra-articular, Once, Will Giovanna Germain MD    Allergies   Allergen Reactions    Ace Inhibitors Cough       Physical Exam: There were no vitals filed for this visit.  General: Awake, Alert, Oriented x 3, Mood and affect appropriate  Respiratory: Respirations even and unlabored  Cardiovascular: Peripheral pulses intact; no edema  Musculoskeletal Exam: right hip pain    ASA Score: 3         Assessment:   1. Primary osteoarthritis of one hip, right        Plan: R IA hip injection w/ fluoro

## 2025-02-20 NOTE — DISCHARGE INSTR - LAB
"INSTRUCCIONES PARA EL DOMINIC POSTERIOR AL PROCEDIMIENTO DE INYECCIÓN EN MURIEL ARTICULACIÓN    No aplique calor en ninguna área que esté entumecida. Si siente molestias o dolor en el lugar de la inyección, por hoy puede colocar hielo israel 20 minutos y retirarlo israel otros 20 minutos. A partir de mañana, podrá utilizar hielo o calor húmedo. No aplique hielo o calor directo sobre la piel.     Puede experimentar un aumento o cambio en el malestar israel las 36-48 horas posteriores al tratamiento. Aplique hielo y continúe tomando cualquier analgésico que le hayan recetado.    No realice ninguna actividad extenuante, por alfred. Puede ducharse, roberto no se bañe en tinas ni en jacuzzis por alfred. Puede retomar toño actividades normales mañana, roberto \"no se exceda\". Retome toño actividades normales gradualmente a medida que se sienta mejor.    Si nota enrojecimiento, secreción o inflamación en el sitio donde lo inyectaron, o si presenta fiebre superior a 100 grados, llame a [facility] al [phone] o acuda a la sonya de urgencias.    Continúe tomando todos los medicamentos de rutina que le haya recetado maldonado médico de cabecera, a menos que nuestro personal le indique lo contrario. Debería volver a kelsie la mayoría de los anticoagulantes de acuerdo con la dosis que tiene programada regularmente. Si tiene alguna pregunta, llame a nuestro consultorio.    Si tiene algún problema relacionado específicamente con el procedimiento, llame a nuestro consultorio al [phone]. Si tiene problemas que no estén relacionados con maldonado procedimiento, debe comunicarse con maldonado médico de cabecera.   "

## 2025-02-21 ENCOUNTER — TELEPHONE (OUTPATIENT)
Age: 55
End: 2025-02-21

## 2025-02-21 NOTE — TELEPHONE ENCOUNTER
Patient calling asks if doctor can send in a prescription to [facility] [address] for a blood pressure machine.  Please advise and call patient.

## 2025-03-06 ENCOUNTER — TELEPHONE (OUTPATIENT)
Dept: PAIN MEDICINE | Facility: CLINIC | Age: 55
End: 2025-03-06

## 2025-03-17 ENCOUNTER — OFFICE VISIT (OUTPATIENT)
Dept: PAIN MEDICINE | Facility: CLINIC | Age: 55
End: 2025-03-17
Payer: COMMERCIAL

## 2025-03-17 VITALS — WEIGHT: 203.93 LBS | HEIGHT: 66 IN | BODY MASS INDEX: 32.77 KG/M2

## 2025-03-17 DIAGNOSIS — M54.14 THORACIC RADICULOPATHY: Primary | ICD-10-CM

## 2025-03-17 DIAGNOSIS — M47.814 THORACIC SPONDYLOSIS: ICD-10-CM

## 2025-03-17 DIAGNOSIS — M99.02 THORACIC REGION SOMATIC DYSFUNCTION: ICD-10-CM

## 2025-03-17 PROCEDURE — 99214 OFFICE O/P EST MOD 30 MIN: CPT | Performed by: ANESTHESIOLOGY

## 2025-03-17 RX ORDER — LATANOPROST 50 UG/ML
SOLUTION/ DROPS OPHTHALMIC
COMMUNITY
Start: 2025-02-07

## 2025-03-17 NOTE — PROGRESS NOTES
Assessment:  1. Thoracic radiculopathy    2. Thoracic spondylosis    3. Thoracic region somatic dysfunction      Patient presenting with chronic thoracic back pain for greater than 1 year, worsening over the past several months.    Pain is multifactorial with components of thoracic radiculitis, thoracic spondylosis, thoracic somatic dysfunction accompanied by consistent moderate to severe pain on the pain scale (5+/10) with inability to participate in IADLs for >6 weeks. Patient has participated with physical therapy as well as home exercises and stretches. He previously had injections in the past with benefit. Patient has tried Tylenol, NSAIDs, muscle relaxants, Tramadol with limited benefit.    Denies any bowel or bladder incontinence, saddle anesthesia.    In regards to the patient's pathology, we discussed the various treatment options including physical therapy, chiropractic treatment, medication management, activity modifications, interventional spine procedures.  Given that patient has not had any benefit with conservative treatments, I think patient would benefit from targeted interventional treatment modalities.    Independently reviewed and interpreted thoracic MRI which showed multiple central disc herniations at T6-7, T7-8, T8-9 with mild central canal narrowing.    Plan:    Discussed and offered T6-7 thoracic interlaminar epidural steroid injection.  The procedures, its risks, and benefits were explained in detail to the patient. Risks include but are not limited to bleeding, infection, hematoma formation, abscess formation, weakness, headache, failure the pain to improve, nerve irritation or damage, and potential worsening of the pain.  The approach was demonstrated using models and literature was provided. The patient verbalized understanding and wished to proceed with the procedure.     Will refer patient to chiropractic therapy for additional physical treatment modalities.  He states he failed  physical therapy in the past.    Will follow-up about 1 month after thoracic JUAN R.    Reviewed Orthopedic surgery, prior pain management office notes.    Reviewed hemoglobin A1c, renal function, CBC and/or PT/INR prior to discussing/offering interventional modalities.    [place] Prescription Drug Monitoring Program report was reviewed and was appropriate     My impressions and treatment recommendations were discussed in detail with the patient who verbalized understanding and had no further questions.  Discharge instructions were provided. I personally saw and examined the patient and I agree with the above discussed plan of care.    Orders Placed This Encounter   Procedures    FL spine and pain procedure     Standing Status:   Future     Expected Date:   3/17/2025     Expiration Date:   3/17/2029     Reason for Exam::   right T6-7 thoracic JUAN R     Anticoagulant hold needed?:   no    Ambulatory Referral to Chiropractic     Standing Status:   Future     Expiration Date:   3/17/2026     Referral Priority:   Routine     Referral Type:   Chiropractic     Referral Reason:   Specialty Services Required     Requested Specialty:   Chiropractic Medicine     Number of Visits Requested:   1     Expiration Date:   3/17/2026     New Medications Ordered This Visit   Medications    latanoprost (XALATAN) 0.005 % ophthalmic solution     Sig: INSTILL 1 DROP INTO THE RIGHT EYE EVERY EVENING       History of Present Illness:  Javan Zamudio is a 54 y.o. male who presents for a follow up office visit in regards to Back Pain.   The patient’s current symptoms include worsening thoracic back pain radiating to the right chest wall.     I have personally reviewed and/or updated the patient's past medical history, past surgical history, family history, social history, current medications, allergies, and vital signs today.     Review of Systems   Constitutional:  Negative for chills and fever.   HENT:  Negative for ear pain and  sore throat.    Eyes:  Negative for pain and visual disturbance.   Respiratory:  Negative for cough and shortness of breath.    Cardiovascular:  Negative for chest pain and palpitations.   Gastrointestinal:  Negative for abdominal pain and vomiting.   Genitourinary:  Negative for dysuria and hematuria.   Musculoskeletal:  Positive for arthralgias, back pain and myalgias.   Skin:  Negative for color change and rash.   Neurological:  Negative for seizures and syncope.   All other systems reviewed and are negative.      Patient Active Problem List   Diagnosis    Primary hypertension    Dyslipidemia due to type 2 diabetes mellitus  (HCC)    Gastroesophageal reflux disease without esophagitis    Chronic lymphocytic leukemia (HCC)    Low testosterone    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 without macular edema associated with type 2 diabetes mellitus (HCC)    Type 2 diabetes mellitus with hyperglycemia, with long-term current use of insulin (HCC)    Other hemorrhoids    Phimosis    Class 2 severe obesity due to excess calories with serious comorbidity and body mass index (BMI) of 35.0 to 35.9 in adult (HCC)    Hip pain    Erectile dysfunction    Age-related cataract of both eyes    Central retinal vein occlusion of right eye    Epiretinal membrane (ERM) of both eyes    Fatty liver    Posterior vitreous detachment    Testicular hypogonadism    Vitamin D deficiency    Onychomycosis of left great toe    Lumbar spondylosis    Chronic thoracic back pain    Class 1 obesity due to excess calories with serious comorbidity and body mass index (BMI) of 33.0 to 33.9 in adult    Hyperlipidemia    Primary osteoarthritis of one hip, right       Past Medical History:   Diagnosis Date    Anxiety     Arthritis     Cancer (HCC)     Chronic thoracic back pain 06/19/2024    Depression     Diabetes (HCC)     Diabetes mellitus (HCC)     Fatty liver     GERD (gastroesophageal reflux disease)     Hyperlipidemia      Hypertension     Intertrigo     resolved 10/26/17    Lumbar spondylosis 06/18/2024    Sebaceous cyst     Sexual disorder     Sexual dysfunction     last assessed 03/04/14    Snoring     last assessed 12/08/14    Somnolence, daytime     last assessed 12/08/14    Thoracic disc herniation     last assessed 05/02/14    Vision problem     Vitamin D deficiency        Past Surgical History:   Procedure Laterality Date    COLONOSCOPY      last assessed 01/22/14    LYMPHADENECTOMY      Axillary; last assessed 05/30/14    MA CIRCUMCISION AGE >28 DAYS N/A 3/1/2022    Procedure: CIRCUMCISION ADULT;  Surgeon: Brennen Fink MD;  Location: [facility];  Service: Urology       Family History   Problem Relation Age of Onset    Diabetes Mother     Kidney cancer Mother     Diabetes Father     Diabetes Sister     Diabetes Family     Hypertension Family        Social History     Occupational History    Not on file   Tobacco Use    Smoking status: Never     Passive exposure: Never    Smokeless tobacco: Never   Vaping Use    Vaping status: Never Used   Substance and Sexual Activity    Alcohol use: Yes     Comment: social    Drug use: No    Sexual activity: Yes     Partners: Female       Current Outpatient Medications on File Prior to Visit   Medication Sig    benzonatate (TESSALON PERLES) 100 mg capsule Take 1 capsule (100 mg total) by mouth 3 (three) times a day as needed for cough    brimonidine tartrate 0.2 % ophthalmic solution instill 1 drop in right eye twice daily    Cholecalciferol (VITAMIN D3) 5000 units CAPS Take by mouth    Diclofenac Sodium (VOLTAREN) 1 % Apply 2 g topically 4 (four) times a day    dorzolamide-timolol (COSOPT) 22.3-6.8 MG/ML ophthalmic solution INSTILL 1 DROP IN RIGHT EYE TWICE DAILY    Empagliflozin 25 MG TABS Take 1 tablet (25 mg total) by mouth daily    insulin aspart protamine-insulin aspart (NovoLOG Mix 70/30 FlexPen) 100 Units/mL injection pen Inject 20 Units  before  Breakfast and 24 Units At Dinner and  "20 units before bedtime snacks    latanoprost (XALATAN) 0.005 % ophthalmic solution INSTILL 1 DROP INTO THE RIGHT EYE EVERY EVENING    losartan-hydrochlorothiazide (HYZAAR) 100-25 MG per tablet Take 1 tablet by mouth daily    metFORMIN (GLUCOPHAGE-XR) 500 mg 24 hr tablet TAKE 2 TABLETS BY MOUTH TWICE DAILY WITH MEALS    metoprolol succinate (TOPROL-XL) 50 mg 24 hr tablet Take 1 tablet (50 mg total) by mouth daily    pravastatin (PRAVACHOL) 80 mg tablet Take 1 tablet (80 mg total) by mouth daily    semaglutide, 2 mg/dose, (Ozempic) 8 mg/ mL injection pen Inject 0.75 mL (2 mg total) under the skin every 7 days    sildenafil (REVATIO) 20 mg tablet Take 1 or 2 tablets 1 hour prior to relation    BD Pen Needle Marielos 2nd Gen 32G X 4 MM MISC INJECT AS DIRECTED 3 TIMES A DAY (Patient not taking: Reported on 3/17/2025)    Blood Glucose Monitoring Suppl (Contour Next Monitor) w/Device KIT Use to check BG 3 times daily (Patient not taking: Reported on 12/10/2024)    Continuous Glucose Sensor (FreeStyle Samir 3 Plus Sensor) MISC Change once every 15 days (Patient not taking: Reported on 12/10/2024)    glucose blood (Contour Next Test) test strip Check BG 3 times daily. (Patient not taking: Reported on 12/10/2024)    Insulin Syringe-Needle U-100 (B-D INS SYR ULTRAFINE .5CC/30G) 30G X 1/2\" 0.5 ML MISC Inject insulin 3 times daily (Patient not taking: Reported on 12/10/2024)    Lancets (freestyle) lancets Test blood sugars 4 times daily (Patient not taking: Reported on 12/10/2024)    mupirocin (BACTROBAN) 2 % ointment Apply topically 3 (three) times a day for 7 days     No current facility-administered medications on file prior to visit.       Allergies   Allergen Reactions    Ace Inhibitors Cough       Physical Exam:    Ht 5' 6\" (1.676 m)   Wt 92.5 kg (203 lb 14.8 oz)   BMI 32.91 kg/m²     Constitutional:normal, well developed, well nourished, alert, in no distress and non-toxic and no overt pain " behavior.  Eyes:anicteric  HEENT:grossly intact  Neck:supple, symmetric, trachea midline and no masses   Pulmonary:even and unlabored  Cardiovascular:No edema or pitting edema present  Skin:Normal without rashes or lesions and well hydrated  Psychiatric:Mood and affect appropriate  Neurologic: Motor function is grossly intact with no focal neurologic deficits  Musculoskeletal: Limited thoracic spine ROM    Imaging  MRI THORACIC SPINE WITHOUT CONTRAST     INDICATION: M54.6: Pain in thoracic spine.     COMPARISON: 5/17/2019     TECHNIQUE:  Multiplanar, multisequence imaging of the thoracic spine was performed. .     IMAGE QUALITY: Diagnostic.     FINDINGS:     ALIGNMENT: Normal alignment of the thoracic spine.  No compression fracture.  No subluxation.  No scoliosis.     MARROW SIGNAL: Scattered degenerative endplate changes.  No focally suspicious marrow lesions.  No bone marrow edema or compression abnormality.     THORACIC CORD: Normal signal within the thoracic cord.     PARAVERTEBRAL SOFT TISSUES:  Normal.     THORACIC DEGENERATIVE CHANGE: Small central disc herniations at T6-T7, T7-T8 and T8-T9 as well as T11-T12 calls mild central canal narrowing. No cord compression or cord signal abnormality.     OTHER FINDINGS:  None.     IMPRESSION:     Mild spondylosis without cord compression or cord signal abnormality.

## 2025-03-18 DIAGNOSIS — I10 PRIMARY HYPERTENSION: Primary | ICD-10-CM

## 2025-03-18 RX ORDER — BLOOD PRESSURE TEST KIT
KIT MISCELLANEOUS
Qty: 1 KIT | Refills: 0 | Status: SHIPPED | OUTPATIENT
Start: 2025-03-18

## 2025-03-24 ENCOUNTER — HOSPITAL ENCOUNTER (OUTPATIENT)
Dept: RADIOLOGY | Facility: MEDICAL CENTER | Age: 55
Discharge: HOME | End: 2025-03-24
Payer: COMMERCIAL

## 2025-03-24 VITALS
DIASTOLIC BLOOD PRESSURE: 81 MMHG | TEMPERATURE: 97.7 F | HEART RATE: 89 BPM | RESPIRATION RATE: 18 BRPM | SYSTOLIC BLOOD PRESSURE: 148 MMHG | OXYGEN SATURATION: 98 %

## 2025-03-24 DIAGNOSIS — M54.14 THORACIC RADICULOPATHY: ICD-10-CM

## 2025-03-24 PROCEDURE — 62321 NJX INTERLAMINAR CRV/THRC: CPT | Performed by: ANESTHESIOLOGY

## 2025-03-24 RX ORDER — METHYLPREDNISOLONE ACETATE 80 MG/ML
80 INJECTION, SUSPENSION INTRA-ARTICULAR; INTRALESIONAL; INTRAMUSCULAR; PARENTERAL; SOFT TISSUE ONCE
Status: COMPLETED | OUTPATIENT
Start: 2025-03-24 | End: 2025-03-24

## 2025-03-24 RX ORDER — BUPIVACAINE HCL/PF 2.5 MG/ML
1 VIAL (ML) INJECTION ONCE
Status: COMPLETED | OUTPATIENT
Start: 2025-03-24 | End: 2025-03-24

## 2025-03-24 RX ADMIN — IOHEXOL 1 ML: 300 INJECTION, SOLUTION INTRAVENOUS at 09:17

## 2025-03-24 RX ADMIN — METHYLPREDNISOLONE ACETATE 80 MG: 80 INJECTION, SUSPENSION INTRA-ARTICULAR; INTRALESIONAL; INTRAMUSCULAR; SOFT TISSUE at 09:17

## 2025-03-24 RX ADMIN — BUPIVACAINE HYDROCHLORIDE 1 ML: 2.5 INJECTION, SOLUTION EPIDURAL; INFILTRATION; INTRACAUDAL at 09:17

## 2025-03-24 NOTE — DISCHARGE INSTR - LAB
Epidural Steroid Injection   WHAT YOU NEED TO KNOW:   An epidural steroid injection (JUAN R) is a procedure to inject steroid medicine into the epidural space. The epidural space is between your spinal cord and vertebrae. Steroids reduce inflammation and fluid buildup in your spine that may be causing pain. You may be given pain medicine along with the steroids.          ACTIVITY  Do not drive or operate machinery today.  No strenuous activity today - bending, lifting, etc.  You may resume normal activites starting tomorrow - start slowly and as tolerated.  You may shower today, but no tub baths or hot tubs.  You may have numbness for several hours from the local anesthetic. Please use caution and common sense, especially with weight-bearing activities.    CARE OF THE INJECTION SITE  If you have soreness or pain, apply ice to the area today (20 minutes on/20 minutes off).  Starting tomorrow, you may use warm, moist heat or ice if needed.  You may have an increase or change in your discomfort for 36-48 hours after your treatment.  Apply ice and continue with any pain medication you have been prescribed.  Notify the [facility] if you have any of the following: redness, drainage, swelling, headache, stiff neck or fever above 100°F.    SPECIAL INSTRUCTIONS  Our office will contact you in approximately 14 days for a progress report.    MEDICATIONS  Continue to take all routine medications.  Our office may have instructed you to hold some medications.    As no general anesthesia was used in today's procedure, you should not experience any side effects related to anesthesia.     If you are diabetic, the steroids used in today's injection may temporarily increase your blood sugar levels after the first few days after your injection. Please keep a close eye on your sugars and alert the doctor who manages your diabetes if your sugars are significantly high from your baseline or you are symptomatic.     If you have a  problem specifically related to your procedure, please call our office at [phone].  Problems not related to your procedure should be directed to your primary care physician.

## 2025-03-24 NOTE — H&P
History of Present Illness: The patient is a 54 y.o. male who presents with complaints of back pain    Past Medical History:   Diagnosis Date    Anxiety     Arthritis     Cancer (HCC)     Chronic thoracic back pain 06/19/2024    Depression     Diabetes (HCC)     Diabetes mellitus (HCC)     Fatty liver     GERD (gastroesophageal reflux disease)     Hyperlipidemia     Hypertension     Intertrigo     resolved 10/26/17    Lumbar spondylosis 06/18/2024    Sebaceous cyst     Sexual disorder     Sexual dysfunction     last assessed 03/04/14    Snoring     last assessed 12/08/14    Somnolence, daytime     last assessed 12/08/14    Thoracic disc herniation     last assessed 05/02/14    Vision problem     Vitamin D deficiency        Past Surgical History:   Procedure Laterality Date    COLONOSCOPY      last assessed 01/22/14    LYMPHADENECTOMY      Axillary; last assessed 05/30/14    SD CIRCUMCISION AGE >28 DAYS N/A 3/1/2022    Procedure: CIRCUMCISION ADULT;  Surgeon: Brennen Fink MD;  Location: [facility];  Service: Urology         Current Outpatient Medications:     BD Pen Needle Marielos 2nd Gen 32G X 4 MM MISC, INJECT AS DIRECTED 3 TIMES A DAY (Patient not taking: Reported on 3/17/2025), Disp: 300 each, Rfl: 1    benzonatate (TESSALON PERLES) 100 mg capsule, Take 1 capsule (100 mg total) by mouth 3 (three) times a day as needed for cough, Disp: 20 capsule, Rfl: 0    Blood Glucose Monitoring Suppl (Contour Next Monitor) w/Device KIT, Use to check BG 3 times daily (Patient not taking: Reported on 12/10/2024), Disp: 1 kit, Rfl: 0    Blood Pressure KIT, Monitor Blood Pressure Daily, Disp: 1 kit, Rfl: 0    brimonidine tartrate 0.2 % ophthalmic solution, instill 1 drop in right eye twice daily, Disp: , Rfl:     Cholecalciferol (VITAMIN D3) 5000 units CAPS, Take by mouth, Disp: , Rfl:     Continuous Glucose Sensor (FreeStyle Samir 3 Plus Sensor) MISC, Change once every 15 days (Patient not taking: Reported on 12/10/2024), Disp: 6  "each, Rfl: 1    Diclofenac Sodium (VOLTAREN) 1 %, Apply 2 g topically 4 (four) times a day, Disp: 2 g, Rfl: 0    dorzolamide-timolol (COSOPT) 22.3-6.8 MG/ML ophthalmic solution, INSTILL 1 DROP IN RIGHT EYE TWICE DAILY, Disp: 1 mL, Rfl: 1    Empagliflozin 25 MG TABS, Take 1 tablet (25 mg total) by mouth daily, Disp: 100 tablet, Rfl: 1    glucose blood (Contour Next Test) test strip, Check BG 3 times daily. (Patient not taking: Reported on 12/10/2024), Disp: 300 strip, Rfl: 2    insulin aspart protamine-insulin aspart (NovoLOG Mix 70/30 FlexPen) 100 Units/mL injection pen, Inject 20 Units  before  Breakfast and 24 Units At Dinner and 20 units before bedtime snacks, Disp: 45 mL, Rfl: 1    Insulin Syringe-Needle U-100 (B-D INS SYR ULTRAFINE .5CC/30G) 30G X 1/2\" 0.5 ML MISC, Inject insulin 3 times daily (Patient not taking: Reported on 12/10/2024), Disp: 300 each, Rfl: 1    Lancets (freestyle) lancets, Test blood sugars 4 times daily (Patient not taking: Reported on 12/10/2024), Disp: 300 each, Rfl: 1    latanoprost (XALATAN) 0.005 % ophthalmic solution, INSTILL 1 DROP INTO THE RIGHT EYE EVERY EVENING, Disp: , Rfl:     losartan-hydrochlorothiazide (HYZAAR) 100-25 MG per tablet, Take 1 tablet by mouth daily, Disp: 100 tablet, Rfl: 1    metFORMIN (GLUCOPHAGE-XR) 500 mg 24 hr tablet, TAKE 2 TABLETS BY MOUTH TWICE DAILY WITH MEALS, Disp: 360 tablet, Rfl: 1    metoprolol succinate (TOPROL-XL) 50 mg 24 hr tablet, Take 1 tablet (50 mg total) by mouth daily, Disp: 90 tablet, Rfl: 1    mupirocin (BACTROBAN) 2 % ointment, Apply topically 3 (three) times a day for 7 days, Disp: 50 g, Rfl: 0    pravastatin (PRAVACHOL) 80 mg tablet, Take 1 tablet (80 mg total) by mouth daily, Disp: 90 tablet, Rfl: 1    semaglutide, 2 mg/dose, (Ozempic) 8 mg/ mL injection pen, Inject 0.75 mL (2 mg total) under the skin every 7 days, Disp: 3 mL, Rfl: 3    sildenafil (REVATIO) 20 mg tablet, Take 1 or 2 tablets 1 hour prior to relation, Disp: 30 tablet, " Rfl: 1    Current Facility-Administered Medications:     bupivacaine (PF) (MARCAINE) 0.25 % injection 1 mL, 1 mL, Epidural, Once, Will Giovanna Germain MD    iohexol (OMNIPAQUE) 300 mg/mL injection 1 mL, 1 mL, Epidural, Once, Will Giovanna Germain MD    methylPREDNISolone acetate (DEPO-MEDROL) injection 80 mg, 80 mg, Epidural, Once, Will Giovanna Germain MD    Allergies   Allergen Reactions    Ace Inhibitors Cough       Physical Exam:   Vitals:    03/24/25 0859   BP: 153/86   Pulse: 89   Resp: 18   Temp: 97.7 °F (36.5 °C)   SpO2: 98%     General: Awake, Alert, Oriented x 3, Mood and affect appropriate  Respiratory: Respirations even and unlabored  Cardiovascular: Peripheral pulses intact; no edema  Musculoskeletal Exam: back pain    ASA Score: 3    Patient/Chart Verification  Patient ID Verified: Verbal  ID Band Applied: No  Consents Confirmed: To be obtained in the Procedural area  Interval H&P(within 24 hr) Complete (required for Outpatients and Surgery Admit only): To be obtained in the Procedural area  Allergies Reviewed: Yes  Anticoag/NSAID held?: NA (denies)  Currently on antibiotics?: No    Assessment:   1. Thoracic radiculopathy        Plan: right T6-7 thoracic JUAN R

## 2025-03-31 ENCOUNTER — OFFICE VISIT (OUTPATIENT)
Age: 55
End: 2025-03-31
Payer: COMMERCIAL

## 2025-03-31 VITALS — HEIGHT: 66 IN | WEIGHT: 203 LBS | BODY MASS INDEX: 32.62 KG/M2

## 2025-03-31 DIAGNOSIS — M51.24 HERNIATION OF INTERVERTEBRAL DISC OF THORACIC REGION: ICD-10-CM

## 2025-03-31 DIAGNOSIS — M79.18 MYOFASCIAL PAIN: Primary | ICD-10-CM

## 2025-03-31 DIAGNOSIS — M47.814 THORACIC SPONDYLOSIS: ICD-10-CM

## 2025-03-31 DIAGNOSIS — M54.14 THORACIC RADICULOPATHY: ICD-10-CM

## 2025-03-31 DIAGNOSIS — M99.02 THORACIC REGION SOMATIC DYSFUNCTION: ICD-10-CM

## 2025-03-31 PROCEDURE — 98940 CHIROPRACT MANJ 1-2 REGIONS: CPT | Performed by: CHIROPRACTOR

## 2025-03-31 PROCEDURE — 99203 OFFICE O/P NEW LOW 30 MIN: CPT | Performed by: CHIROPRACTOR

## 2025-03-31 NOTE — PROGRESS NOTES
Assessment/Plan:       1. Myofascial pain        2. Thoracic radiculopathy  Ambulatory Referral to Chiropractic      3. Thoracic spondylosis  Ambulatory Referral to Chiropractic      4. Thoracic region somatic dysfunction  Ambulatory Referral to Chiropractic      5. Herniation of intervertebral disc of thoracic region              Review of Diagnostic Studies and Office Notes:    The patient's X-ray and MRI reports of cervical spine were reviewed prior to the chiropractic evaluation today.      Results for orders placed during the hospital encounter of 07/06/24    XR spine thoracic 3 vw    Narrative  XR SPINE THORACIC 3 VW    INDICATION: M54.6: Pain in thoracic spine.    COMPARISON: 5/17/2019    FINDINGS:    No acute fracture. Intact pedicles.    Anatomic alignment.    Mild lower spondylosis.    No displacement of the paraspinal line.    Impression  No acute osseous abnormality.    Workstation performed: [id number]    MRI thoracic spine wo contrast    Narrative  MRI THORACIC SPINE WITHOUT CONTRAST    INDICATION: M54.6: Pain in thoracic spine.    COMPARISON: 5/17/2019    TECHNIQUE:  Multiplanar, multisequence imaging of the thoracic spine was performed. .    IMAGE QUALITY: Diagnostic.    FINDINGS:    ALIGNMENT: Normal alignment of the thoracic spine.  No compression fracture.  No subluxation.  No scoliosis.    MARROW SIGNAL: Scattered degenerative endplate changes.  No focally suspicious marrow lesions.  No bone marrow edema or compression abnormality.    THORACIC CORD: Normal signal within the thoracic cord.    PARAVERTEBRAL SOFT TISSUES:  Normal.    THORACIC DEGENERATIVE CHANGE: Small central disc herniations at T6-T7, T7-T8 and T8-T9 as well as T11-T12 calls mild central canal narrowing. No cord compression or cord signal abnormality.    OTHER FINDINGS:  None.    Impression  Mild spondylosis without cord compression or cord signal abnormality.        Workstation performed: [id number]    Decision and  Treatment Plan:    I reviewed my findings with the patient today.  Patient was interested in receiving chiropractic care and was treated today.  We will treat the patient 2x/week for the next three weeks and re-evaluate. If there is improvement in symptoms and objective findings, frequency will be reduced.       The patient will be treated with conservative chiropractic care including myofascial release, joint mobilization, and a home stretching and icing program.    Patient Instructions:    Return for treatment once next week.     TIME/Reviewed with Patient:    At least 34 minutes of time was spent with the patient during the consultation including the patient's history/current complaints, physical exam, reviewing the diagnostic report, reviewing home instruction/reducing risk factors with the patient, reviewing my findings with the patient, and discussing the treatment with the patient.     This is a separate identifiable portion of today's visit from the E and M code billed.    Treatment today consisted of myofascial release thoracic paraspinals, middle trapezius, rhomboid, lower trapezius.  The focus will be on the muscular hypertonicity.    Manipulation was performed to T3-4, T5-6, T8-T9 utilizing activator technique HVLA was performed.    Subjective:      Javan Zamudio is a 54 y.o. male who presents today for chiropractic evaluation and possible treatment.  He has a chief complaint of mid to back pain.  He was referred over by Dr. Germain to see if we can help him with the mid back.  He reports an injury that is work-related back in 2007 where he was lifting a heavy bar and hurt his mid back.  He has had pain on and off since then.  He does report having had thoracic spine injections just a week ago which did not seem to help too much.  He states he has had physical therapy which did not help in the past.  He states he had chiropractic care approximately 10 years ago which was helpful but the problem did  not resolve.  He was evaluated by a neurosurgeon and surgery was not recommended for the thoracic spine.  He denies weakness in the lower extremities.  He denies changes in bowel/bladder habits, urinary incontinence or retention, fever, chills, night sweats, saddle anesthesia.    He is working job and drives a forklift.  He does get increased pain with increased activity level.  He does have other issues with his knees as well as trochanteric bursitis.         Objective:     There were no vitals taken for this visit.    Appearance: Well developed, well nourished, and in no acute distress    Alert and oriented x 3    Mood/Affect: calm and pleasant    Gait/Posture:    Gait: Normal    Antalgic posture: None    Lordosis: Lumbar- normal    Kyphosis: Thoracic- normal    Lower extremity reflexes:    Deep tendon reflexes were normal and symmetrical in the bilateral lower extremities.    Lower Extremity Muscle Testing:    Muscle testing of the bilateral lower extremities was normal and graded +5/5.    Sensory:    Sensation to light touch was normal and symmetrical in the bilateral lower extremities.    Babinski was negative bilaterally.    Lumbar Orthopedic Tests:    Seated Straight Leg Raise was negative  on the Right.    Seated Straight Leg Raise was negative  on the Left.    Supine Straight Leg Raise was negative  on the Right.    Supine Straight Leg Raise was negative on the rigt,    Active thoracic Ranges of Motion:  Thoracic range of motion examination shows flexion to be 35% restricted with pain.  Extension is 25% restricted with pain.  Left lateral bending is 20% restricted with pain.  Right lateral bending is 20% restricted with pain.  Left rotation is 25% restricted with pain.  Right rotation is 20% restricted with pain.      Palpation:    Moderate/severe spasm and tenderness is noted in the thoracolumbar paraspinals more so on the right as well as the mid and lower thoracic paraspinals, middle trapezius, lower  trapezius.  Joint dysfunctions present at T3-4, T6-7, T8-T9, L1-L2.           Dictation voice to text software has been used in the creation of this document. Please consider this in light of any contextual or grammatical errors.

## 2025-04-07 ENCOUNTER — TELEPHONE (OUTPATIENT)
Dept: PAIN MEDICINE | Facility: CLINIC | Age: 55
End: 2025-04-07

## 2025-04-10 ENCOUNTER — PROCEDURE VISIT (OUTPATIENT)
Age: 55
End: 2025-04-10
Payer: COMMERCIAL

## 2025-04-10 VITALS — BODY MASS INDEX: 32.62 KG/M2 | HEIGHT: 66 IN | WEIGHT: 203 LBS

## 2025-04-10 DIAGNOSIS — I10 ESSENTIAL HYPERTENSION: ICD-10-CM

## 2025-04-10 DIAGNOSIS — M47.814 THORACIC SPONDYLOSIS: ICD-10-CM

## 2025-04-10 DIAGNOSIS — M99.02 THORACIC REGION SOMATIC DYSFUNCTION: ICD-10-CM

## 2025-04-10 DIAGNOSIS — M79.18 MYOFASCIAL PAIN: ICD-10-CM

## 2025-04-10 DIAGNOSIS — M51.24 HERNIATION OF INTERVERTEBRAL DISC OF THORACIC REGION: ICD-10-CM

## 2025-04-10 DIAGNOSIS — E11.65 TYPE 2 DIABETES MELLITUS WITH HYPERGLYCEMIA, WITH LONG-TERM CURRENT USE OF INSULIN (HCC): ICD-10-CM

## 2025-04-10 DIAGNOSIS — M54.14 THORACIC RADICULOPATHY: Primary | ICD-10-CM

## 2025-04-10 DIAGNOSIS — Z79.4 TYPE 2 DIABETES MELLITUS WITH HYPERGLYCEMIA, WITH LONG-TERM CURRENT USE OF INSULIN (HCC): ICD-10-CM

## 2025-04-10 PROCEDURE — 98940 CHIROPRACT MANJ 1-2 REGIONS: CPT | Performed by: CHIROPRACTOR

## 2025-04-10 RX ORDER — INSULIN ASPART 100 [IU]/ML
INJECTION, SUSPENSION SUBCUTANEOUS
Qty: 45 ML | Refills: 0 | Status: SHIPPED | OUTPATIENT
Start: 2025-04-10

## 2025-04-10 RX ORDER — METOPROLOL SUCCINATE 50 MG/1
50 TABLET, EXTENDED RELEASE ORAL DAILY
Qty: 90 TABLET | Refills: 0 | Status: SHIPPED | OUTPATIENT
Start: 2025-04-10

## 2025-04-10 RX ORDER — LOSARTAN POTASSIUM AND HYDROCHLOROTHIAZIDE 25; 100 MG/1; MG/1
1 TABLET ORAL DAILY
Qty: 100 TABLET | Refills: 0 | Status: SHIPPED | OUTPATIENT
Start: 2025-04-10

## 2025-04-10 NOTE — TELEPHONE ENCOUNTER
Reason for call:   [x] Refill   [] Prior Auth  [] Other:     Office:   [] PCP/Provider -   [x] Specialty/Provider - Shira Beasley,     Medication: insulin aspart protamine-insulin aspart (NovoLOG Mix 70/30 FlexPen)   Dose/Frequency:  Inject 20 Units before Breakfast and 24 Units At Dinner and 20 units before bedtime snacks   Quantity:90 day supply    semaglutide, 2 mg/dose, (Ozempic) 8 mg/ mL    Inject 0.75 mL (2 mg total) under the skin every 7 days   Qty - 90 day supply    Pharmacy: [facility]   Does the patient have enough for 3 days?   [x] Yes   [] No - Send as HP to POD    Mail Away Pharmacy   Does the patient have enough for 10 days?   [] Yes   [] No - Send as HP to POD

## 2025-04-10 NOTE — TELEPHONE ENCOUNTER
Reason for call:   [x] Refill   [] Prior Auth  [] Other:     Office:   [] PCP/Provider - Kalpesh  [] Specialty/Provider -     Medication: losartan-hydrochlorothiazide (HYZAAR) 100-25 MG   Dose/Frequency: 1 daily  Quantity: 90    metoprolol succinate (TOPROL-XL) 50 mg, 1 daily #90    Pharmacy: [facility]   Does the patient have enough for 3 days?   [x] Yes   [] No - Send as HP to POD    Mail Away Pharmacy   Does the patient have enough for 10 days?   [] Yes   [] No - Send as HP to POD

## 2025-04-10 NOTE — PROGRESS NOTES
Assessment:     1. Thoracic radiculopathy        2. Thoracic spondylosis        3. Thoracic region somatic dysfunction        4. Myofascial pain        5. Herniation of intervertebral disc of thoracic region            Condition is the same.    PLAN/TREATMENT:    Return for treatment 1-2 times per week.     Continue using ice as needed for post treatment soreness.   Continue stretching.    Treatment:  Treatment today consisted of myofascial release thoracic paraspinals, middle trapezius, rhomboid, lower trapezius.  The focus will be on the muscular hypertonicity.     Manipulation was performed to T3-4, T5-6, T8-T9 utilizing activator technique.  No HVLA was performed.    Subjective:    Javan is here today.  He feels about the same today.  Felt some temporary relief after last visit. Pain returns when increased activity such as at work.     Patient is feeling the same since last visit.  Janice translated.     Objective:  Moderate/severe spasm and tenderness is noted in the thoracolumbar paraspinals more so on the right as well as the mid and lower thoracic paraspinals, middle trapezius, lower trapezius. Joint dysfunctions present at T3-4, T6-7, T8-T9, L1-L2.   .

## 2025-04-11 ENCOUNTER — OFFICE VISIT (OUTPATIENT)
Dept: INTERNAL MEDICINE CLINIC | Facility: CLINIC | Age: 55
End: 2025-04-11
Payer: COMMERCIAL

## 2025-04-11 VITALS
HEIGHT: 66 IN | DIASTOLIC BLOOD PRESSURE: 74 MMHG | TEMPERATURE: 97.1 F | SYSTOLIC BLOOD PRESSURE: 144 MMHG | BODY MASS INDEX: 32.32 KG/M2 | WEIGHT: 201.1 LBS

## 2025-04-11 DIAGNOSIS — E11.65 TYPE 2 DIABETES MELLITUS WITH HYPERGLYCEMIA, WITH LONG-TERM CURRENT USE OF INSULIN (HCC): Primary | ICD-10-CM

## 2025-04-11 DIAGNOSIS — C91.10 CHRONIC LYMPHOCYTIC LEUKEMIA (HCC): ICD-10-CM

## 2025-04-11 DIAGNOSIS — Z79.4 TYPE 2 DIABETES MELLITUS WITH HYPERGLYCEMIA, WITH LONG-TERM CURRENT USE OF INSULIN (HCC): Primary | ICD-10-CM

## 2025-04-11 DIAGNOSIS — E11.3493 SEVERE NONPROLIFERATIVE DIABETIC RETINOPATHY OF BOTH EYES WITHOUT MACULAR EDEMA ASSOCIATED WITH TYPE 2 DIABETES MELLITUS (HCC): ICD-10-CM

## 2025-04-11 DIAGNOSIS — H34.8112 CENTRAL RETINAL VEIN OCCLUSION OF RIGHT EYE, UNSPECIFIED COMPLICATION STATUS: ICD-10-CM

## 2025-04-11 DIAGNOSIS — I10 PRIMARY HYPERTENSION: ICD-10-CM

## 2025-04-11 LAB — SL AMB POCT HEMOGLOBIN AIC: 8.2 (ref ?–6.5)

## 2025-04-11 PROCEDURE — 83036 HEMOGLOBIN GLYCOSYLATED A1C: CPT | Performed by: STUDENT IN AN ORGANIZED HEALTH CARE EDUCATION/TRAINING PROGRAM

## 2025-04-11 PROCEDURE — 99214 OFFICE O/P EST MOD 30 MIN: CPT | Performed by: STUDENT IN AN ORGANIZED HEALTH CARE EDUCATION/TRAINING PROGRAM

## 2025-04-11 NOTE — PROGRESS NOTES
INTERNAL MEDICINE OFFICE VISIT NOTE  [facility] Internal Medicine [place]    NAME: Javan Zamudio  AGE: 54 y.o. SEX: male    DATE OF ENCOUNTER:       Chief Complaint   Patient presents with    Follow-up     6 month    Cough     Since his got the flu a few months agos         Assessment & Plan  Primary hypertension  Blood pressure goal less than 130/80  Today 144/74.  Blood pressure readings during other medical appointments in the last 3 months appears to be also above the goal  He reports compliance with his regimen of losartan-hydrochlorothiazide 100-25 mg daily and metoprolol succinate 50 mg daily  He reports he measures his blood pressure at home but unable to provide readings.  We have agreed on monitoring his blood pressure daily for the next week at home.  He will maintain a log which he can drop off at the reception desk for review.  We discussed that if blood pressure readings are consistently elevated above the goal of 130/80 we need to discuss up titration of his regimen         Type 2 diabetes mellitus with hyperglycemia, with long-term current use of insulin ([facility])  Lab Results   Component Value Date    HGBA1C 8.2 (A) 04/11/2025    HGBA1C 6.7 (H) 01/25/2025    HGBA1C 7.6 10/17/2017     Lab Results   Component Value Date    MICROALBCRE 5 01/25/2025    MICROALBCRE <8 06/04/2024    LDLCALC 116 (H) 01/25/2025    LDLCALC  10/05/2024      Comment:      Calculated LDL invalid, triglycerides >400 mg/dl    LDLDIRECT 47 10/05/2024      CrCl cannot be calculated (Patient's most recent lab result is older than the maximum 7 days allowed.).      It appears his A1c had improved in January however A1c today is elevated again  He had an appointment with endocrinology a few days ago which he missed.  There is no reschedule visit as of today.  Reports compliance with Ozempic 2 mg weekly but self discontinue metformin and Jardiance.  he has self titrated his NovoLog insulin to 20-20-20 based on readings for the  "freestyle CGM system.   I advised him to schedule a visit with his endocrinologist and discuss his regimen.          Orders:    Lipid Panel with Direct LDL reflex; Future    POCT hemoglobin A1c    Central retinal vein occlusion of right eye, unspecified complication status  F/w Retina specialist at [facility]          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 without macular edema associated with type 2 diabetes mellitus (HCC)  F/w [facility]         Chronic lymphocytic leukemia (HCC)    Orders:    CBC and differential; Future    Comprehensive metabolic panel; Future    LD,Blood; Future      Return in about 3 months (around 7/11/2025) for Annual physical.      OBJECTIVE:  Vitals:    04/11/25 0951   BP: 144/74   BP Location: Left arm   Patient Position: Sitting   Cuff Size: Standard   Temp: (!) 97.1 °F (36.2 °C)   TempSrc: Temporal   Weight: 91.2 kg (201 lb 1.6 oz)   Height: 5' 6\" (1.676 m)         Physical Exam:   GENERAL: NAD, Normal appearance.    NEUROLOGIC:  Alert/oriented x3.  HEENT:  NC/AT, no scleral icterus  CARDIAC:  RRR, +S1/S2  PULMONARY:  non-labored breathing        Current Outpatient Medications:     benzonatate (TESSALON PERLES) 100 mg capsule, Take 1 capsule (100 mg total) by mouth 3 (three) times a day as needed for cough, Disp: 20 capsule, Rfl: 0    Blood Pressure KIT, Monitor Blood Pressure Daily, Disp: 1 kit, Rfl: 0    brimonidine tartrate 0.2 % ophthalmic solution, instill 1 drop in right eye twice daily, Disp: , Rfl:     Cholecalciferol (VITAMIN D3) 5000 units CAPS, Take by mouth, Disp: , Rfl:     Continuous Glucose Sensor (FreeStyle Samir 3 Plus Sensor) MISC, Change once every 15 days, Disp: 6 each, Rfl: 1    Diclofenac Sodium (VOLTAREN) 1 %, Apply 2 g topically 4 (four) times a day, Disp: 2 g, Rfl: 0    dorzolamide-timolol (COSOPT) 22.3-6.8 MG/ML ophthalmic solution, INSTILL 1 DROP IN RIGHT EYE TWICE DAILY, Disp: 1 mL, Rfl: 1    insulin aspart " "protamine-insulin aspart (NovoLOG Mix 70/30 FlexPen) 100 Units/mL injection pen, Inject 20 Units  before  Breakfast and 24 Units At Dinner and 20 units before bedtime snacks, Disp: 45 mL, Rfl: 0    latanoprost (XALATAN) 0.005 % ophthalmic solution, INSTILL 1 DROP INTO THE RIGHT EYE EVERY EVENING, Disp: , Rfl:     losartan-hydrochlorothiazide (HYZAAR) 100-25 MG per tablet, Take 1 tablet by mouth daily, Disp: 100 tablet, Rfl: 0    metoprolol succinate (TOPROL-XL) 50 mg 24 hr tablet, Take 1 tablet (50 mg total) by mouth daily, Disp: 90 tablet, Rfl: 0    pravastatin (PRAVACHOL) 80 mg tablet, Take 1 tablet (80 mg total) by mouth daily, Disp: 90 tablet, Rfl: 1    semaglutide, 2 mg/dose, (Ozempic) 8 mg/ mL injection pen, Inject 0.75 mL (2 mg total) under the skin every 7 days, Disp: 3 mL, Rfl: 0    sildenafil (REVATIO) 20 mg tablet, Take 1 or 2 tablets 1 hour prior to relation, Disp: 30 tablet, Rfl: 1    BD Pen Needle Marielos 2nd Gen 32G X 4 MM MISC, INJECT AS DIRECTED 3 TIMES A DAY (Patient not taking: Reported on 4/11/2025), Disp: 300 each, Rfl: 1    Blood Glucose Monitoring Suppl (Contour Next Monitor) w/Device KIT, Use to check BG 3 times daily (Patient not taking: Reported on 4/11/2025), Disp: 1 kit, Rfl: 0    Empagliflozin 25 MG TABS, Take 1 tablet (25 mg total) by mouth daily (Patient not taking: Reported on 4/11/2025), Disp: 100 tablet, Rfl: 1    glucose blood (Contour Next Test) test strip, Check BG 3 times daily. (Patient not taking: Reported on 4/11/2025), Disp: 300 strip, Rfl: 2    Insulin Syringe-Needle U-100 (B-D INS SYR ULTRAFINE .5CC/30G) 30G X 1/2\" 0.5 ML MISC, Inject insulin 3 times daily (Patient not taking: Reported on 12/10/2024), Disp: 300 each, Rfl: 1    Lancets (freestyle) lancets, Test blood sugars 4 times daily (Patient not taking: Reported on 4/11/2025), Disp: 300 each, Rfl: 1    metFORMIN (GLUCOPHAGE-XR) 500 mg 24 hr tablet, TAKE 2 TABLETS BY MOUTH TWICE DAILY WITH MEALS (Patient not taking: " Reported on 4/11/2025), Disp: 360 tablet, Rfl: 1    Patient Active Problem List   Diagnosis    Primary hypertension    Dyslipidemia due to type 2 diabetes mellitus  (HCC)    Gastroesophageal reflux disease without esophagitis    Chronic lymphocytic leukemia (HCC)    Low testosterone    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 without macular edema associated with type 2 diabetes mellitus (HCC)    Type 2 diabetes mellitus with hyperglycemia, with long-term current use of insulin (HCC)    Other hemorrhoids    Phimosis    Class 2 severe obesity due to excess calories with serious comorbidity and body mass index (BMI) of 35.0 to 35.9 in adult (HCC)    Hip pain    Erectile dysfunction    Age-related cataract of both eyes    Central retinal vein occlusion of right eye    Epiretinal membrane (ERM) of both eyes    Fatty liver    Posterior vitreous detachment    Testicular hypogonadism    Vitamin D deficiency    Onychomycosis of left great toe    Lumbar spondylosis    Chronic thoracic back pain    Class 1 obesity due to excess calories with serious comorbidity and body mass index (BMI) of 33.0 to 33.9 in adult    Hyperlipidemia    Primary osteoarthritis of one hip, right    Thoracic radiculopathy           Bryson Posey MD  [facility] Internal Medicine [place]    * Please Note: This note was completed in part utilizing a dictation software.  Grammatical errors, random word insertions, spelling mistakes, and incomplete sentences may be an occasional consequence of this system secondary to software limitations, ambient noise, and hardware issues.  If you have any questions or concerns about the content, text, or information contained within the body of this dictation, please contact the provider for clarification.**

## 2025-04-11 NOTE — ASSESSMENT & PLAN NOTE
Orders:    CBC and differential; Future    Comprehensive metabolic panel; Future    LD,Blood; Future

## 2025-04-11 NOTE — ASSESSMENT & PLAN NOTE
Lab Results   Component Value Date    HGBA1C 8.2 (A) 04/11/2025    HGBA1C 6.7 (H) 01/25/2025    HGBA1C 7.6 10/17/2017     Lab Results   Component Value Date    MICROALBCRE 5 01/25/2025    MICROALBCRE <8 06/04/2024    LDLCALC 116 (H) 01/25/2025    LDLCALC  10/05/2024      Comment:      Calculated LDL invalid, triglycerides >400 mg/dl    LDLDIRECT 47 10/05/2024      CrCl cannot be calculated (Patient's most recent lab result is older than the maximum 7 days allowed.).      It appears his A1c had improved in January however A1c today is elevated again  He had an appointment with endocrinology a few days ago which he missed.  There is no reschedule visit as of today.  Reports compliance with Ozempic 2 mg weekly but self discontinue metformin and Jardiance.  he has self titrated his NovoLog insulin to 20-20-20 based on readings for the freestyle CGM system.   I advised him to schedule a visit with his endocrinologist and discuss his regimen.          Orders:    Lipid Panel with Direct LDL reflex; Future    POCT hemoglobin A1c

## 2025-04-11 NOTE — ASSESSMENT & PLAN NOTE
Blood pressure goal less than 130/80  Today 144/74.  Blood pressure readings during other medical appointments in the last 3 months appears to be also above the goal  He reports compliance with his regimen of losartan-hydrochlorothiazide 100-25 mg daily and metoprolol succinate 50 mg daily  He reports he measures his blood pressure at home but unable to provide readings.  We have agreed on monitoring his blood pressure daily for the next week at home.  He will maintain a log which he can drop off at the reception desk for review.  We discussed that if blood pressure readings are consistently elevated above the goal of 130/80 we need to discuss up titration of his regimen

## 2025-04-16 ENCOUNTER — TELEPHONE (OUTPATIENT)
Dept: ENDOCRINOLOGY | Facility: CLINIC | Age: 55
End: 2025-04-16

## 2025-04-17 NOTE — TELEPHONE ENCOUNTER
Please provide all necessary medication information so Prior Authorization can be initiated properly without delay.    Name of Medication     Dosage/Frequency of Medication     Quantity     Caller []Patient            []Insurance Company    Name   Phone Number    []Pharmacy     Name  Phone number      []Fax received via [employer]    Key Code    Scanned into Media     Is the patient currently out of medication?

## 2025-04-18 NOTE — TELEPHONE ENCOUNTER
PA for (Ozempic) 8 mg SUBMITTED to [facility]    via    [x][employer]-Case ID # [id number]  [x]PA sent as URGENT    All office notes, labs and other pertaining documents and studies sent. Clinical questions answered. Awaiting determination from insurance company.     Turnaround time for your insurance to make a decision on your Prior Authorization can take 7-21 business days.

## 2025-04-22 ENCOUNTER — TELEPHONE (OUTPATIENT)
Dept: ENDOCRINOLOGY | Facility: CLINIC | Age: 55
End: 2025-04-22

## 2025-04-22 NOTE — TELEPHONE ENCOUNTER
PA for (Ozempic) 8 mg  APPROVED     Date(s) approved 3/22/25-4/21/26    Case #[id number]    Patient advised by          []Graph Alchemisthart Message  []Phone call   [x]LMOM  []L/M to call office as no active Communication consent on file  []Unable to leave detailed message as VM not approved on Communication consent       Pharmacy advised by    [x]Fax  []Phone call  []Secure Chat    Specialty Pharmacy    []      Approval letter scanned into Media Yes

## 2025-04-29 DIAGNOSIS — Z79.4 TYPE 2 DIABETES MELLITUS WITH HYPERGLYCEMIA, WITH LONG-TERM CURRENT USE OF INSULIN (HCC): ICD-10-CM

## 2025-04-29 DIAGNOSIS — E11.65 TYPE 2 DIABETES MELLITUS WITH HYPERGLYCEMIA, WITH LONG-TERM CURRENT USE OF INSULIN (HCC): ICD-10-CM

## 2025-04-30 RX ORDER — INSULIN ASPART 100 [IU]/ML
INJECTION, SUSPENSION SUBCUTANEOUS
Qty: 45 ML | Refills: 1 | Status: SHIPPED | OUTPATIENT
Start: 2025-04-30

## 2025-05-05 ENCOUNTER — OFFICE VISIT (OUTPATIENT)
Dept: PAIN MEDICINE | Facility: CLINIC | Age: 55
End: 2025-05-05
Payer: COMMERCIAL

## 2025-05-05 VITALS — WEIGHT: 201 LBS | BODY MASS INDEX: 32.3 KG/M2 | HEIGHT: 66 IN

## 2025-05-05 DIAGNOSIS — M48.04 THORACIC SPINAL STENOSIS: Primary | ICD-10-CM

## 2025-05-05 DIAGNOSIS — M54.14 THORACIC RADICULOPATHY: ICD-10-CM

## 2025-05-05 PROCEDURE — 99214 OFFICE O/P EST MOD 30 MIN: CPT | Performed by: ANESTHESIOLOGY

## 2025-05-05 NOTE — PROGRESS NOTES
Assessment:  1. Thoracic spinal stenosis    2. Thoracic radiculopathy      Patient presenting with chronic thoracic back pain for greater than 1 year, worsening over the past several months.    Pain is multifactorial with components of thoracic radiculitis, thoracic spondylosis, thoracic somatic dysfunction accompanied by consistent moderate to severe pain on the pain scale (5+/10) with inability to participate in IADLs for >6 weeks. Patient has participated with physical therapy as well as home exercises and stretches. He previously had injections in the past with benefit. Patient has tried Tylenol, NSAIDs, muscle relaxants, Tramadol with limited benefit.    Denies any bowel or bladder incontinence, saddle anesthesia.    Independently reviewed and interpreted thoracic MRI which showed multiple central disc herniations at T6-7, T7-8, T8-9 with mild central canal narrowing.    After recently having T6-7 thoracic JUAN R, he notes no significant improvement in his mid back and right radiating chest wall symptoms.    Plan:    At this time given continued pain in a thoracic distribution would recommend repeat thoracic JUANR  with different approach with right T9-10 interlaminar JUAN R. Patient has failed PT and HEP and is not a candidate for surgery, so discussed that injections would be the only remaining modality to offer.    The procedures, its risks, and benefits were explained in detail to the patient. Risks include but are not limited to bleeding, infection, hematoma formation, abscess formation, weakness, headache, failure the pain to improve, nerve irritation or damage, and potential worsening of the pain.  The approach was demonstrated using models and literature was provided. The patient verbalized understanding and wished to proceed with the procedure.     Continue chiropractic therapy for additional physical treatment modalities.  He states he failed physical therapy in the past.    Will follow-up about 1 month after  thoracic JUAN R.    Reviewed Orthopedic surgery, prior pain management office notes.    Reviewed hemoglobin A1c, renal function, CBC and/or PT/INR prior to discussing/offering interventional modalities.    My impressions and treatment recommendations were discussed in detail with the patient who verbalized understanding and had no further questions.  Discharge instructions were provided. I personally saw and examined the patient and I agree with the above discussed plan of care.    Orders Placed This Encounter   Procedures    FL spine and pain procedure     Standing Status:   Future     Expected Date:   5/5/2025     Expiration Date:   5/5/2029     Reason for Exam::   T9-10 thoracic JUAN R     Anticoagulant hold needed?:   no     No orders of the defined types were placed in this encounter.      History of Present Illness:  Javan Zamudio is a 54 y.o. male who presents for a follow up office visit in regards to Back Pain.   The patient’s current symptoms include worsening thoracic back pain radiating to the right chest wall.     I have personally reviewed and/or updated the patient's past medical history, past surgical history, family history, social history, current medications, allergies, and vital signs today.     Review of Systems   Constitutional:  Negative for chills and fever.   HENT:  Negative for ear pain and sore throat.    Eyes:  Negative for pain and visual disturbance.   Respiratory:  Negative for cough and shortness of breath.    Cardiovascular:  Negative for chest pain and palpitations.   Gastrointestinal:  Negative for abdominal pain and vomiting.   Genitourinary:  Negative for dysuria and hematuria.   Musculoskeletal:  Positive for arthralgias, back pain and myalgias.   Skin:  Negative for color change and rash.   Neurological:  Negative for seizures and syncope.   All other systems reviewed and are negative.      Patient Active Problem List   Diagnosis    Primary hypertension    Dyslipidemia due  to type 2 diabetes mellitus  (HCC)    Gastroesophageal reflux disease without esophagitis    Chronic lymphocytic leukemia (HCC)    Low testosterone    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 without macular edema associated with type 2 diabetes mellitus (HCC)    Type 2 diabetes mellitus with hyperglycemia, with long-term current use of insulin (HCC)    Other hemorrhoids    Phimosis    Class 2 severe obesity due to excess calories with serious comorbidity and body mass index (BMI) of 35.0 to 35.9 in adult (HCC)    Hip pain    Erectile dysfunction    Age-related cataract of both eyes    Central retinal vein occlusion of right eye    Epiretinal membrane (ERM) of both eyes    Fatty liver    Posterior vitreous detachment    Testicular hypogonadism    Vitamin D deficiency    Onychomycosis of left great toe    Lumbar spondylosis    Chronic thoracic back pain    Class 1 obesity due to excess calories with serious comorbidity and body mass index (BMI) of 33.0 to 33.9 in adult    Hyperlipidemia    Primary osteoarthritis of one hip, right    Thoracic radiculopathy       Past Medical History:   Diagnosis Date    Anxiety     Arthritis     Cancer (HCC)     Chronic thoracic back pain 06/19/2024    Depression     Diabetes (HCC)     Diabetes mellitus (HCC)     Fatty liver     GERD (gastroesophageal reflux disease)     Hyperlipidemia     Hypertension     Intertrigo     resolved 10/26/17    Lumbar spondylosis 06/18/2024    Sebaceous cyst     Sexual disorder     Sexual dysfunction     last assessed 03/04/14    Snoring     last assessed 12/08/14    Somnolence, daytime     last assessed 12/08/14    Thoracic disc herniation     last assessed 05/02/14    Vision problem     Vitamin D deficiency        Past Surgical History:   Procedure Laterality Date    COLONOSCOPY      last assessed 01/22/14    LYMPHADENECTOMY      Axillary; last assessed 05/30/14    NV CIRCUMCISION AGE >28 DAYS N/A 3/1/2022    Procedure: CIRCUMCISION  ADULT;  Surgeon: Brennen Fink MD;  Location: [facility] OR;  Service: Urology       Family History   Problem Relation Age of Onset    Diabetes Mother     Kidney cancer Mother     Diabetes Father     Diabetes Sister     Diabetes Family     Hypertension Family        Social History     Occupational History    Not on file   Tobacco Use    Smoking status: Never     Passive exposure: Never    Smokeless tobacco: Never   Vaping Use    Vaping status: Never Used   Substance and Sexual Activity    Alcohol use: Yes     Comment: social    Drug use: No    Sexual activity: Yes     Partners: Female       Current Outpatient Medications on File Prior to Visit   Medication Sig    BD Pen Needle Marielos 2nd Gen 32G X 4 MM MISC INJECT AS DIRECTED 3 TIMES A DAY (Patient not taking: Reported on 4/11/2025)    benzonatate (TESSALON PERLES) 100 mg capsule Take 1 capsule (100 mg total) by mouth 3 (three) times a day as needed for cough    Blood Glucose Monitoring Suppl (Contour Next Monitor) w/Device KIT Use to check BG 3 times daily (Patient not taking: Reported on 4/11/2025)    Blood Pressure KIT Monitor Blood Pressure Daily    brimonidine tartrate 0.2 % ophthalmic solution instill 1 drop in right eye twice daily    Cholecalciferol (VITAMIN D3) 5000 units CAPS Take by mouth    Continuous Glucose Sensor (FreeStyle Samir 3 Plus Sensor) MISC Change once every 15 days    Diclofenac Sodium (VOLTAREN) 1 % Apply 2 g topically 4 (four) times a day    dorzolamide-timolol (COSOPT) 22.3-6.8 MG/ML ophthalmic solution INSTILL 1 DROP IN RIGHT EYE TWICE DAILY    Empagliflozin 25 MG TABS Take 1 tablet (25 mg total) by mouth daily (Patient not taking: Reported on 4/11/2025)    glucose blood (Contour Next Test) test strip Check BG 3 times daily. (Patient not taking: Reported on 4/11/2025)    Insulin Aspart Prot & Aspart (Insulin Asp Prot & Asp FlexPen) (70-30) 100 UNIT/ML SUPN INJECT 20 UNITS BEFORE BREAKFAST, 24 UNITS BEFORE DINNER AND 20 UNITS BEFORE BEDTIME  "SNACKS    Insulin Syringe-Needle U-100 (B-D INS SYR ULTRAFINE .5CC/30G) 30G X 1/2\" 0.5 ML MISC Inject insulin 3 times daily (Patient not taking: Reported on 12/10/2024)    Lancets (freestyle) lancets Test blood sugars 4 times daily (Patient not taking: Reported on 4/11/2025)    latanoprost (XALATAN) 0.005 % ophthalmic solution INSTILL 1 DROP INTO THE RIGHT EYE EVERY EVENING    losartan-hydrochlorothiazide (HYZAAR) 100-25 MG per tablet Take 1 tablet by mouth daily    metFORMIN (GLUCOPHAGE-XR) 500 mg 24 hr tablet TAKE 2 TABLETS BY MOUTH TWICE DAILY WITH MEALS (Patient not taking: Reported on 4/11/2025)    metoprolol succinate (TOPROL-XL) 50 mg 24 hr tablet Take 1 tablet (50 mg total) by mouth daily    pravastatin (PRAVACHOL) 80 mg tablet Take 1 tablet (80 mg total) by mouth daily    semaglutide, 2 mg/dose, (Ozempic) 8 mg/ mL injection pen Inject 0.75 mL (2 mg total) under the skin every 7 days    sildenafil (REVATIO) 20 mg tablet Take 1 or 2 tablets 1 hour prior to relation     No current facility-administered medications on file prior to visit.       Allergies   Allergen Reactions    Ace Inhibitors Cough       Physical Exam:    Ht 5' 6\" (1.676 m)   Wt 91.2 kg (201 lb)   BMI 32.44 kg/m²     Constitutional:normal, well developed, well nourished, alert, in no distress and non-toxic and no overt pain behavior.  Eyes:anicteric  HEENT:grossly intact  Neck:supple, symmetric, trachea midline and no masses   Pulmonary:even and unlabored  Cardiovascular:No edema or pitting edema present  Skin:Normal without rashes or lesions and well hydrated  Psychiatric:Mood and affect appropriate  Neurologic: Motor function is grossly intact with no focal neurologic deficits  Musculoskeletal: Limited thoracic spine ROM    Imaging  MRI THORACIC SPINE WITHOUT CONTRAST     INDICATION: M54.6: Pain in thoracic spine.     COMPARISON: 5/17/2019     TECHNIQUE:  Multiplanar, multisequence imaging of the thoracic spine was performed. .     IMAGE " QUALITY: Diagnostic.     FINDINGS:     ALIGNMENT: Normal alignment of the thoracic spine.  No compression fracture.  No subluxation.  No scoliosis.     MARROW SIGNAL: Scattered degenerative endplate changes.  No focally suspicious marrow lesions.  No bone marrow edema or compression abnormality.     THORACIC CORD: Normal signal within the thoracic cord.     PARAVERTEBRAL SOFT TISSUES:  Normal.     THORACIC DEGENERATIVE CHANGE: Small central disc herniations at T6-T7, T7-T8 and T8-T9 as well as T11-T12 calls mild central canal narrowing. No cord compression or cord signal abnormality.     OTHER FINDINGS:  None.     IMPRESSION:     Mild spondylosis without cord compression or cord signal abnormality.

## 2025-05-14 ENCOUNTER — PROCEDURE VISIT (OUTPATIENT)
Age: 55
End: 2025-05-14
Payer: COMMERCIAL

## 2025-05-14 DIAGNOSIS — M79.18 MYOFASCIAL PAIN: ICD-10-CM

## 2025-05-14 DIAGNOSIS — M51.24 HERNIATION OF INTERVERTEBRAL DISC OF THORACIC REGION: ICD-10-CM

## 2025-05-14 DIAGNOSIS — M54.14 THORACIC RADICULOPATHY: Primary | ICD-10-CM

## 2025-05-14 DIAGNOSIS — M47.814 THORACIC SPONDYLOSIS: ICD-10-CM

## 2025-05-14 DIAGNOSIS — M99.02 THORACIC REGION SOMATIC DYSFUNCTION: ICD-10-CM

## 2025-05-14 PROCEDURE — 98940 CHIROPRACT MANJ 1-2 REGIONS: CPT | Performed by: CHIROPRACTOR

## 2025-05-14 NOTE — PROGRESS NOTES
Assessment:     1. Thoracic radiculopathy        2. Thoracic spondylosis        3. Thoracic region somatic dysfunction        4. Myofascial pain        5. Herniation of intervertebral disc of thoracic region              Condition is the same.    PLAN/TREATMENT:    Return for treatment 1-2 times per week, treatment once next week.     Continue using ice as needed for post treatment soreness.   Continue stretching.    Treatment:  Treatment today consisted of myofascial release thoracic paraspinals, middle trapezius, rhomboid, lower trapezius.  The focus will be on the muscular hypertonicity.     Manipulation was performed to T3-4, T5-6, T8-T9 utilizing activator technique.  No HVLA was performed.    Subjective:    Javan is here today.  He feels about the same today.  Felt some temporary relief after last visit. Work seems to aggravate the back.  Was last seen over a month ago.  Not able to come in due to work schedule.   Saw Dr. Germain last week.  Getting another JUAN R in a few weeks.     Objective:  Moderate/severe spasm and tenderness is noted in the thoracolumbar paraspinals more so on the right as well as the mid and lower thoracic paraspinals, middle trapezius, lower trapezius. Joint dysfunctions present at T3-4, T6-7, T8-T9, L1-L2.   .

## 2025-05-15 ENCOUNTER — TELEPHONE (OUTPATIENT)
Dept: RADIOLOGY | Facility: MEDICAL CENTER | Age: 55
End: 2025-05-15

## 2025-05-15 NOTE — TELEPHONE ENCOUNTER
HPI    Pt is here today for dry eye recheck and re-refraction. Pt states that she   felt as if she saw improvement, but as of yesterday, the dryness and   itching began again.   DLS: 2/16/2023 Dr. Hall  (-)Flashes (-)Floaters (-)Diplopia (-)Headaches   (+)Itching OU (-)Tearing (+)Burning OU (+)Dryness (+)Photophobia while   watching TV or on phone, pt uses denny glasses  (+)Glare OS seems as if there is a halo in [place]  Past Eye Sx: Cataract removal OD 12/22/2022  Eye Meds: Refresh PF ATs bid OS  Last edited by Katherine Hall, OD on 3/6/2023  2:31 PM.            Assessment /Plan     For exam results, see Encounter Report.    Status post cataract extraction and insertion of intraocular lens of right eye    Dry eye syndrome of bilateral lacrimal glands      1. Educated on today's WNL findings OU. Eyes well healed from cataract surgery OU. Pt OK to use OTC readers (lowest strength if needed due to built-in mono S/P CE on dominant OD, nearsighted OS)  2. Pt to continue Refresh PF ATs PRN    RTC in 1 year for annual eye exam unless changes noted sooner.           Patient did not show for 5/15/25 procedure with Dr. Germain. Called mobile # could not leave message. Called work # and they did not know if patient worked there.

## 2025-05-15 NOTE — TELEPHONE ENCOUNTER
Tried reaching out to the patient to reschedule his procedure but when I called the phone number listed it was not taking calls at this time. I will try again at another time.

## 2025-05-15 NOTE — TELEPHONE ENCOUNTER
Caller: Javan     Doctor: Dr. Germain     Reason for call: Patient calling stating missed procedure this morning and needs to reschedule please advise     Call back#: [phone]

## 2025-05-21 DIAGNOSIS — E11.65 TYPE 2 DIABETES MELLITUS WITH HYPERGLYCEMIA, WITH LONG-TERM CURRENT USE OF INSULIN (HCC): ICD-10-CM

## 2025-05-21 DIAGNOSIS — Z79.4 TYPE 2 DIABETES MELLITUS WITH HYPERGLYCEMIA, WITH LONG-TERM CURRENT USE OF INSULIN (HCC): ICD-10-CM

## 2025-05-21 NOTE — TELEPHONE ENCOUNTER
Reason for call:   [x] Refill   [] Prior Auth  [] Other:     Office:   [] PCP/Provider -   [x] Specialty/Provider - Endo    Medication: Semaglutide 2 mg, inject 0.75 mL under the skin every 7 days       Pharmacy: [facility]     [facility]   Does the patient have enough for 3 days?   [x] Yes   [] No - Send as HP to POD    [facility]   Does the patient have enough for 10 days?   [] Yes   [] No - Send as HP to POD

## 2025-05-23 ENCOUNTER — TELEPHONE (OUTPATIENT)
Age: 55
End: 2025-05-23

## 2025-05-23 ENCOUNTER — PROCEDURE VISIT (OUTPATIENT)
Age: 55
End: 2025-05-23
Payer: COMMERCIAL

## 2025-05-23 DIAGNOSIS — M47.814 THORACIC SPONDYLOSIS: ICD-10-CM

## 2025-05-23 DIAGNOSIS — M54.14 THORACIC RADICULOPATHY: Primary | ICD-10-CM

## 2025-05-23 DIAGNOSIS — M99.02 THORACIC REGION SOMATIC DYSFUNCTION: ICD-10-CM

## 2025-05-23 DIAGNOSIS — M79.18 MYOFASCIAL PAIN: ICD-10-CM

## 2025-05-23 DIAGNOSIS — M51.24 HERNIATION OF INTERVERTEBRAL DISC OF THORACIC REGION: ICD-10-CM

## 2025-05-23 PROCEDURE — 97110 THERAPEUTIC EXERCISES: CPT | Performed by: CHIROPRACTOR

## 2025-05-23 PROCEDURE — 98940 CHIROPRACT MANJ 1-2 REGIONS: CPT | Performed by: CHIROPRACTOR

## 2025-05-23 NOTE — TELEPHONE ENCOUNTER
Caller: Javan    Doctor: Dr. Germain    Reason for call: pt calling to schedule procedure that he was scheduled for on 05/15    Call back#: [phone]

## 2025-05-23 NOTE — PROGRESS NOTES
Assessment:     1. Thoracic radiculopathy        2. Thoracic spondylosis        3. Thoracic region somatic dysfunction        4. Myofascial pain        5. Herniation of intervertebral disc of thoracic region              Condition is the same.    PLAN/TREATMENT:    Return for treatment once next week.     Continue using ice as needed for post treatment soreness.   Continue stretching.    Treatment:  Treatment today consisted of myofascial release thoracic paraspinals, middle trapezius, rhomboid, lower trapezius.  The focus will be on the muscular hypertonicity.  Combined myofascial release with gentle distraction and stretching to the latissimus dorsi and lower trapezius on the right in the side-lying position intermittently for 15 minutes..  This was done to improve range of motion and flexibility.     Manipulation was performed to T3-4, T5-6, T8-T9 utilizing activator technique.  No HVLA was performed.Traction applied to  thoracic spine in side lying position.     Subjective:    Javan is here today.  He feels about the same today.  Felt some temporary relief after last visit. Missed appt. For JUAN R.  Rescheduled it.      Objective:  Moderate/severe spasm and tenderness is noted in the thoracolumbar paraspinals more so on the right as well as the mid and lower thoracic paraspinals, middle trapezius, lower trapezius. Joint dysfunctions present at T3-4, T6-7, T8-T9, L1-L2.   .

## 2025-05-30 ENCOUNTER — PROCEDURE VISIT (OUTPATIENT)
Age: 55
End: 2025-05-30
Payer: COMMERCIAL

## 2025-05-30 DIAGNOSIS — M79.18 MYOFASCIAL PAIN: ICD-10-CM

## 2025-05-30 DIAGNOSIS — M99.02 THORACIC REGION SOMATIC DYSFUNCTION: ICD-10-CM

## 2025-05-30 DIAGNOSIS — M54.14 THORACIC RADICULOPATHY: Primary | ICD-10-CM

## 2025-05-30 DIAGNOSIS — M51.24 HERNIATION OF INTERVERTEBRAL DISC OF THORACIC REGION: ICD-10-CM

## 2025-05-30 DIAGNOSIS — M47.814 THORACIC SPONDYLOSIS: ICD-10-CM

## 2025-05-30 PROCEDURE — 98940 CHIROPRACT MANJ 1-2 REGIONS: CPT | Performed by: CHIROPRACTOR

## 2025-05-30 NOTE — PROGRESS NOTES
Assessment:     1. Thoracic radiculopathy        2. Thoracic spondylosis        3. Thoracic region somatic dysfunction        4. Myofascial pain        5. Herniation of intervertebral disc of thoracic region                Condition is the same.    PLAN/TREATMENT:    Return for treatment once next week.     Continue using ice as needed for post treatment soreness.   Continue stretching.    Treatment:  Treatment today consisted of myofascial release thoracic paraspinals, middle trapezius, rhomboid, lower trapezius.  The focus will be on the muscular hypertonicity.  Combined myofascial release with gentle distraction and stretching to the latissimus dorsi and lower trapezius on the right in the side-lying position intermittently for 15 minutes..  This was done to improve range of motion and flexibility.     Manipulation was performed to T3-4, T5-6, T8-T9 utilizing activator technique.  No HVLA was performed.Traction applied to  thoracic spine in side lying position.     Subjective:    Javan is here today.  He feels about the same today.  Felt some temporary relief after last visit.    Has JUAN R scheduled for 6/9/25.    Objective:  Moderate/severe spasm and tenderness is noted in the thoracolumbar paraspinals more so on the right as well as the mid and lower thoracic paraspinals, middle trapezius, lower trapezius. Joint dysfunctions present at T3-4, T6-7, T8-T9, L1-L2.   .

## 2025-06-09 ENCOUNTER — HOSPITAL ENCOUNTER (OUTPATIENT)
Dept: RADIOLOGY | Facility: MEDICAL CENTER | Age: 55
Discharge: HOME | End: 2025-06-09
Attending: ANESTHESIOLOGY
Payer: COMMERCIAL

## 2025-06-09 VITALS
TEMPERATURE: 97.9 F | OXYGEN SATURATION: 96 % | SYSTOLIC BLOOD PRESSURE: 123 MMHG | RESPIRATION RATE: 16 BRPM | DIASTOLIC BLOOD PRESSURE: 76 MMHG | HEART RATE: 86 BPM

## 2025-06-09 DIAGNOSIS — M54.14 THORACIC RADICULOPATHY: ICD-10-CM

## 2025-06-09 DIAGNOSIS — M48.04 THORACIC SPINAL STENOSIS: ICD-10-CM

## 2025-06-09 PROCEDURE — 62321 NJX INTERLAMINAR CRV/THRC: CPT | Performed by: ANESTHESIOLOGY

## 2025-06-09 RX ORDER — METHYLPREDNISOLONE ACETATE 80 MG/ML
80 INJECTION, SUSPENSION INTRA-ARTICULAR; INTRALESIONAL; INTRAMUSCULAR; PARENTERAL; SOFT TISSUE ONCE
Status: COMPLETED | OUTPATIENT
Start: 2025-06-09 | End: 2025-06-09

## 2025-06-09 RX ORDER — BUPIVACAINE HCL/PF 2.5 MG/ML
1 VIAL (ML) INJECTION ONCE
Status: COMPLETED | OUTPATIENT
Start: 2025-06-09 | End: 2025-06-09

## 2025-06-09 RX ADMIN — BUPIVACAINE HYDROCHLORIDE 1 ML: 2.5 INJECTION, SOLUTION EPIDURAL; INFILTRATION; INTRACAUDAL at 09:31

## 2025-06-09 RX ADMIN — IOHEXOL 1 ML: 300 INJECTION, SOLUTION INTRAVENOUS at 09:31

## 2025-06-09 RX ADMIN — METHYLPREDNISOLONE ACETATE 80 MG: 80 INJECTION, SUSPENSION INTRA-ARTICULAR; INTRALESIONAL; INTRAMUSCULAR; SOFT TISSUE at 09:31

## 2025-06-09 NOTE — DISCHARGE INSTR - LAB
Epidural Steroid Injection   WHAT YOU NEED TO KNOW:   An epidural steroid injection (JUAN R) is a procedure to inject steroid medicine into the epidural space. The epidural space is between your spinal cord and vertebrae. Steroids reduce inflammation and fluid buildup in your spine that may be causing pain. You may be given pain medicine along with the steroids.          ACTIVITY  Do not drive or operate machinery today.  No strenuous activity today - bending, lifting, etc.  You may resume normal activites starting tomorrow - start slowly and as tolerated.  You may shower today, but no tub baths or hot tubs.  You may have numbness for several hours from the local anesthetic. Please use caution and common sense, especially with weight-bearing activities.    CARE OF THE INJECTION SITE  If you have soreness or pain, apply ice to the area today (20 minutes on/20 minutes off).  Starting tomorrow, you may use warm, moist heat or ice if needed.  You may have an increase or change in your discomfort for 36-48 hours after your treatment.  Apply ice and continue with any pain medication you have been prescribed.  Notify the [facility] if you have any of the following: redness, drainage, swelling, headache, stiff neck or fever above 100°F.    SPECIAL INSTRUCTIONS  Our office will contact you in approximately 14 days for a progress report.    MEDICATIONS  Continue to take all routine medications.  Our office may have instructed you to hold some medications.    As no general anesthesia was used in today's procedure, you should not experience any side effects related to anesthesia.     If you are diabetic, the steroids used in today's injection may temporarily increase your blood sugar levels after the first few days after your injection. Please keep a close eye on your sugars and alert the doctor who manages your diabetes if your sugars are significantly high from your baseline or you are symptomatic.     If you have a  problem specifically related to your procedure, please call our office at [phone].  Problems not related to your procedure should be directed to your primary care physician.

## 2025-06-09 NOTE — H&P
History of Present Illness: The patient is a 54 y.o. male who presents with complaints of back pain    Past Medical History:   Diagnosis Date    Anxiety     Arthritis     Cancer (HCC)     Chronic thoracic back pain 06/19/2024    Depression     Diabetes (HCC)     Diabetes mellitus (HCC)     Fatty liver     GERD (gastroesophageal reflux disease)     Hyperlipidemia     Hypertension     Intertrigo     resolved 10/26/17    Lumbar spondylosis 06/18/2024    Sebaceous cyst     Sexual disorder     Sexual dysfunction     last assessed 03/04/14    Snoring     last assessed 12/08/14    Somnolence, daytime     last assessed 12/08/14    Thoracic disc herniation     last assessed 05/02/14    Vision problem     Vitamin D deficiency        Past Surgical History:   Procedure Laterality Date    COLONOSCOPY      last assessed 01/22/14    LYMPHADENECTOMY      Axillary; last assessed 05/30/14    OK CIRCUMCISION AGE >28 DAYS N/A 3/1/2022    Procedure: CIRCUMCISION ADULT;  Surgeon: Brennen Fink MD;  Location: [facility];  Service: Urology         Current Outpatient Medications:     BD Pen Needle Marielos 2nd Gen 32G X 4 MM MISC, INJECT AS DIRECTED 3 TIMES A DAY (Patient not taking: Reported on 4/11/2025), Disp: 300 each, Rfl: 1    benzonatate (TESSALON PERLES) 100 mg capsule, Take 1 capsule (100 mg total) by mouth 3 (three) times a day as needed for cough, Disp: 20 capsule, Rfl: 0    Blood Glucose Monitoring Suppl (Contour Next Monitor) w/Device KIT, Use to check BG 3 times daily (Patient not taking: Reported on 4/11/2025), Disp: 1 kit, Rfl: 0    Blood Pressure KIT, Monitor Blood Pressure Daily, Disp: 1 kit, Rfl: 0    brimonidine tartrate 0.2 % ophthalmic solution, instill 1 drop in right eye twice daily, Disp: , Rfl:     Cholecalciferol (VITAMIN D3) 5000 units CAPS, Take by mouth, Disp: , Rfl:     Continuous Glucose Sensor (FreeStyle Samir 3 Plus Sensor) MISC, Change once every 15 days, Disp: 6 each, Rfl: 1    Diclofenac Sodium (VOLTAREN) 1  "%, Apply 2 g topically 4 (four) times a day, Disp: 2 g, Rfl: 0    dorzolamide-timolol (COSOPT) 22.3-6.8 MG/ML ophthalmic solution, INSTILL 1 DROP IN RIGHT EYE TWICE DAILY, Disp: 1 mL, Rfl: 1    Empagliflozin 25 MG TABS, Take 1 tablet (25 mg total) by mouth daily (Patient not taking: No sig reported), Disp: 100 tablet, Rfl: 1    glucose blood (Contour Next Test) test strip, Check BG 3 times daily. (Patient not taking: Reported on 4/11/2025), Disp: 300 strip, Rfl: 2    Insulin Aspart Prot & Aspart (Insulin Asp Prot & Asp FlexPen) (70-30) 100 UNIT/ML SUPN, INJECT 20 UNITS BEFORE BREAKFAST, 24 UNITS BEFORE DINNER AND 20 UNITS BEFORE BEDTIME SNACKS, Disp: 45 mL, Rfl: 1    Insulin Syringe-Needle U-100 (B-D INS SYR ULTRAFINE .5CC/30G) 30G X 1/2\" 0.5 ML MISC, Inject insulin 3 times daily (Patient not taking: Reported on 12/10/2024), Disp: 300 each, Rfl: 1    Lancets (freestyle) lancets, Test blood sugars 4 times daily (Patient not taking: Reported on 4/11/2025), Disp: 300 each, Rfl: 1    latanoprost (XALATAN) 0.005 % ophthalmic solution, INSTILL 1 DROP INTO THE RIGHT EYE EVERY EVENING, Disp: , Rfl:     losartan-hydrochlorothiazide (HYZAAR) 100-25 MG per tablet, Take 1 tablet by mouth daily, Disp: 100 tablet, Rfl: 0    metFORMIN (GLUCOPHAGE-XR) 500 mg 24 hr tablet, TAKE 2 TABLETS BY MOUTH TWICE DAILY WITH MEALS (Patient not taking: Reported on 4/11/2025), Disp: 360 tablet, Rfl: 1    metoprolol succinate (TOPROL-XL) 50 mg 24 hr tablet, Take 1 tablet (50 mg total) by mouth daily, Disp: 90 tablet, Rfl: 0    pravastatin (PRAVACHOL) 80 mg tablet, Take 1 tablet (80 mg total) by mouth daily, Disp: 90 tablet, Rfl: 1    semaglutide, 2 mg/dose, (Ozempic) 8 mg/ mL injection pen, Inject 0.75 mL (2 mg total) under the skin every 7 days, Disp: 3 mL, Rfl: 2    sildenafil (REVATIO) 20 mg tablet, Take 1 or 2 tablets 1 hour prior to relation, Disp: 30 tablet, Rfl: 1    Current Facility-Administered Medications:     bupivacaine (PF) (MARCAINE) " 0.25 % injection 1 mL, 1 mL, Epidural, Once, Will Giovanna Germain MD    iohexol (OMNIPAQUE) 300 mg/mL injection 1 mL, 1 mL, Epidural, Once, Will Giovanna Germain MD    methylPREDNISolone acetate (DEPO-MEDROL) injection 80 mg, 80 mg, Epidural, Once, Will Giovanna Germain MD    Allergies   Allergen Reactions    Ace Inhibitors Cough       Physical Exam:   Vitals:    06/09/25 0914   BP: 150/72   Pulse: 88   Resp: 16   Temp: 97.9 °F (36.6 °C)   SpO2: 97%     General: Awake, Alert, Oriented x 3, Mood and affect appropriate  Respiratory: Respirations even and unlabored  Cardiovascular: Peripheral pulses intact; no edema  Musculoskeletal Exam: back pain    ASA Score: 3    Patient/Chart Verification  Patient ID Verified: Verbal  Consents Confirmed: To be obtained in the Procedural area  H&P( within 30 days) Verified: To be obtained in the Procedural area  Allergies Reviewed: Yes  Anticoag/NSAID held?: NA  Currently on antibiotics?: No  Pregnancy denied?: NA    Assessment:   1. Thoracic spinal stenosis    2. Thoracic radiculopathy        Plan: T9-10 thoracic JUAN R

## 2025-06-23 ENCOUNTER — TELEPHONE (OUTPATIENT)
Dept: PAIN MEDICINE | Facility: CLINIC | Age: 55
End: 2025-06-23

## 2025-06-23 ENCOUNTER — TELEPHONE (OUTPATIENT)
Dept: INTERNAL MEDICINE CLINIC | Facility: CLINIC | Age: 55
End: 2025-06-23

## 2025-06-23 NOTE — TELEPHONE ENCOUNTER
LM for patient to call back to reschedule his appointment for 07/25/2025 the doctor will not be in the office that day.

## 2025-06-25 NOTE — TELEPHONE ENCOUNTER
Patient reports slight improvement post inj  Pain level 7/10   Patient has a new pain since past 3 days.

## 2025-06-27 LAB
LEFT EYE DIABETIC RETINOPATHY: POSITIVE
RIGHT EYE DIABETIC RETINOPATHY: POSITIVE

## 2025-07-06 DIAGNOSIS — I10 ESSENTIAL HYPERTENSION: ICD-10-CM

## 2025-07-07 ENCOUNTER — OFFICE VISIT (OUTPATIENT)
Dept: PAIN MEDICINE | Facility: CLINIC | Age: 55
End: 2025-07-07
Payer: COMMERCIAL

## 2025-07-07 VITALS — BODY MASS INDEX: 32.47 KG/M2 | WEIGHT: 202 LBS | HEIGHT: 66 IN

## 2025-07-07 DIAGNOSIS — M47.814 THORACIC SPONDYLOSIS: ICD-10-CM

## 2025-07-07 DIAGNOSIS — M54.14 THORACIC RADICULITIS: Primary | ICD-10-CM

## 2025-07-07 PROCEDURE — 99214 OFFICE O/P EST MOD 30 MIN: CPT | Performed by: ANESTHESIOLOGY

## 2025-07-07 NOTE — PROGRESS NOTES
Name: Javan Zamudio      : 1970      MRN: [id number]  Encounter Provider: Wei Germain MD  Encounter Date: 2025   Encounter department: [facility]  Assessment & Plan  Thoracic radiculitis    Orders:    [place] spine and pain procedure; Future    Thoracic spondylosis         Patient presenting with chronic thoracic back pain for greater than 1 year. Pain is multifactorial with components of thoracic radiculitis, thoracic spondylosis, thoracic somatic dysfunction accompanied by consistent moderate to severe pain on the pain scale (5+/10) with inability to participate in IADLs for >6 weeks. Patient has participated with physical therapy as well as home exercises and stretches. He previously had injections in the past with benefit. Patient has tried Tylenol, NSAIDs, muscle relaxants, Tramadol with limited benefit.    Denies any bowel or bladder incontinence, saddle anesthesia.     Independently reviewed and interpreted thoracic MRI which showed multiple central disc herniations at T6-7, T7-8, T8-9 with mild central canal narrowing.     After recently having T9-10 thoracic JUAN R, he notes no significant improvement in his mid back and radiating chest wall symptoms.     At this time given ongoing 50% improvement in pain severity with last thoracic JUAN R, we will plan for repeat T9-T10 thoracic JUAN R in 3 months given chronic recurrent thoracic radicular pain symptoms.    Patient is continuing with HEP but has recurrent significant radicular pains.     The procedures, its risks, and benefits were explained in detail to the patient. Risks include but are not limited to bleeding, infection, hematoma formation, abscess formation, weakness, headache, failure the pain to improve, nerve irritation or damage, and potential worsening of the pain.  The approach was demonstrated using models and literature was provided. The patient verbalized understanding and wished to proceed with the  "procedure.      Continue chiropractic therapy for additional physical treatment modalities.  He failed physical therapy in the past.      Reviewed hemoglobin A1c, renal function, CBC and/or PT/INR prior to discussing/offering interventional modalities.    My impressions and treatment recommendations were discussed in detail with the patient who verbalized understanding and had no further questions.  Discharge instructions were provided. I personally saw and examined the patient and I agree with the above discussed plan of care.    History of Present Illness     [employer]mer [employer]pad  utilized.    Javan Zamudio is a 54 y.o. male who presents for a follow up office visit in regards to Back Pain (thoracic). The patient’s current symptoms include improved thoracic radiating pain symptoms. Pain is rated 4-6/10 and described as an intermittent radiating, stabbing pain.    Review of Systems   Respiratory:  Negative for shortness of breath.    Cardiovascular:  Negative for chest pain.   Gastrointestinal:  Negative for constipation, diarrhea, nausea and vomiting.   Musculoskeletal:  Positive for back pain (thoracic). Negative for arthralgias, gait problem, joint swelling and myalgias.   Skin:  Negative for rash.   Neurological:  Negative for dizziness, seizures and weakness.   All other systems reviewed and are negative.      Medical History Reviewed by provider this encounter:     .  Medications Ordered Prior to Encounter[1]      Objective   Ht 5' 6\" (1.676 m)   Wt 91.6 kg (202 lb)   BMI 32.60 kg/m²      Pain Score:   6  Physical Exam  Constitutional: normal, well developed, well nourished, alert, in no distress and non-toxic and no overt pain behavior.  Eyes: anicteric  HEENT: grossly intact  Neck: supple, symmetric, trachea midline and no masses   Pulmonary: even and unlabored  Cardiovascular: No edema or pitting edema present  Skin: Normal without rashes or lesions and well hydrated  Psychiatric: Mood and " affect appropriate  Neurologic:  Motor function is grossly intact with no focal neurologic deficits   Musculoskeletal: Nonantalgic gait    Radiology Results Review: I personally reviewed the following image studies in PACS and associated radiology reports: MRI spine. My interpretation of the radiology images/reports is: thoracic MRI showed small central disc herniations at T6-7, T7-8, T8-9 with mild central canal narrowing.           [1]   Current Outpatient Medications on File Prior to Visit   Medication Sig Dispense Refill    benzonatate (TESSALON PERLES) 100 mg capsule Take 1 capsule (100 mg total) by mouth 3 (three) times a day as needed for cough 20 capsule 0    brimonidine tartrate 0.2 % ophthalmic solution       Cholecalciferol (VITAMIN D3) 5000 units CAPS Take by mouth      Diclofenac Sodium (VOLTAREN) 1 % Apply 2 g topically 4 (four) times a day 2 g 0    dorzolamide-timolol (COSOPT) 22.3-6.8 MG/ML ophthalmic solution INSTILL 1 DROP IN RIGHT EYE TWICE DAILY 1 mL 1    Insulin Aspart Prot & Aspart (Insulin Asp Prot & Asp FlexPen) (70-30) 100 UNIT/ML SUPN INJECT 20 UNITS BEFORE BREAKFAST, 24 UNITS BEFORE DINNER AND 20 UNITS BEFORE BEDTIME SNACKS 45 mL 1    latanoprost (XALATAN) 0.005 % ophthalmic solution       losartan-hydrochlorothiazide (HYZAAR) 100-25 MG per tablet Take 1 tablet by mouth daily 100 tablet 0    metoprolol succinate (TOPROL-XL) 50 mg 24 hr tablet Take 1 tablet (50 mg total) by mouth daily 90 tablet 0    pravastatin (PRAVACHOL) 80 mg tablet Take 1 tablet (80 mg total) by mouth daily 90 tablet 1    semaglutide, 2 mg/dose, (Ozempic) 8 mg/ mL injection pen Inject 0.75 mL (2 mg total) under the skin every 7 days 3 mL 2    sildenafil (REVATIO) 20 mg tablet Take 1 or 2 tablets 1 hour prior to relation 30 tablet 1    BD Pen Needle Marielos 2nd Gen 32G X 4 MM MISC INJECT AS DIRECTED 3 TIMES A DAY (Patient not taking: Reported on 4/11/2025) 300 each 1    Blood Glucose Monitoring Suppl (Contour Next Monitor)  "w/Device KIT Use to check BG 3 times daily (Patient not taking: Reported on 4/11/2025) 1 kit 0    Blood Pressure KIT Monitor Blood Pressure Daily 1 kit 0    Continuous Glucose Sensor (FreeStyle Samir 3 Plus Sensor) MISC Change once every 15 days 6 each 1    Empagliflozin 25 MG TABS Take 1 tablet (25 mg total) by mouth daily (Patient not taking: No sig reported) 100 tablet 1    glucose blood (Contour Next Test) test strip Check BG 3 times daily. (Patient not taking: Reported on 4/11/2025) 300 strip 2    Insulin Syringe-Needle U-100 (B-D INS SYR ULTRAFINE .5CC/30G) 30G X 1/2\" 0.5 ML MISC Inject insulin 3 times daily (Patient not taking: Reported on 12/10/2024) 300 each 1    Lancets (freestyle) lancets Test blood sugars 4 times daily (Patient not taking: Reported on 4/11/2025) 300 each 1    metFORMIN (GLUCOPHAGE-XR) 500 mg 24 hr tablet TAKE 2 TABLETS BY MOUTH TWICE DAILY WITH MEALS (Patient not taking: Reported on 4/11/2025) 360 tablet 1     No current facility-administered medications on file prior to visit.     "

## 2025-07-08 RX ORDER — METOPROLOL SUCCINATE 50 MG/1
50 TABLET, EXTENDED RELEASE ORAL DAILY
Qty: 90 TABLET | Refills: 1 | Status: SHIPPED | OUTPATIENT
Start: 2025-07-08

## 2025-07-17 DIAGNOSIS — I10 ESSENTIAL HYPERTENSION: ICD-10-CM

## 2025-07-18 RX ORDER — LOSARTAN POTASSIUM AND HYDROCHLOROTHIAZIDE 25; 100 MG/1; MG/1
1 TABLET ORAL DAILY
Qty: 30 TABLET | Refills: 0 | Status: SHIPPED | OUTPATIENT
Start: 2025-07-18 | End: 2025-07-25 | Stop reason: SDUPTHER

## 2025-07-21 DIAGNOSIS — Z79.4 TYPE 2 DIABETES MELLITUS WITH HYPERGLYCEMIA, WITH LONG-TERM CURRENT USE OF INSULIN (HCC): ICD-10-CM

## 2025-07-21 DIAGNOSIS — E11.65 TYPE 2 DIABETES MELLITUS WITH HYPERGLYCEMIA, WITH LONG-TERM CURRENT USE OF INSULIN (HCC): ICD-10-CM

## 2025-07-21 NOTE — TELEPHONE ENCOUNTER
semaglutide, 2 mg/dose, (Ozempic) 8 mg/ mL injection pen Inject 0.75 mL (2 mg total) under the skin every 7 days           nsulin Aspart Prot & Aspart (Insulin Asp Prot & Asp FlexPen) (70-30) 100 UNIT/ML SUPN INJECT 20 UNITS BEFORE BREAKFAST, 24 UNITS BEFORE DINNER AND 20 UNITS BEFORE BEDTIME SNACKS         Pls send to [facility].

## 2025-07-21 NOTE — TELEPHONE ENCOUNTER
Patient called and stated that we cancelled his appointment.  I reschedule but 1st available was not until Sept/.  He is asking for us to send refills.

## 2025-07-23 DIAGNOSIS — I10 ESSENTIAL HYPERTENSION: ICD-10-CM

## 2025-07-23 DIAGNOSIS — E11.65 TYPE 2 DIABETES MELLITUS WITH HYPERGLYCEMIA, WITH LONG-TERM CURRENT USE OF INSULIN (HCC): ICD-10-CM

## 2025-07-23 DIAGNOSIS — Z79.4 TYPE 2 DIABETES MELLITUS WITH HYPERGLYCEMIA, WITH LONG-TERM CURRENT USE OF INSULIN (HCC): ICD-10-CM

## 2025-07-23 RX ORDER — LOSARTAN POTASSIUM AND HYDROCHLOROTHIAZIDE 25; 100 MG/1; MG/1
1 TABLET ORAL DAILY
Qty: 30 TABLET | Refills: 0 | OUTPATIENT
Start: 2025-07-23

## 2025-07-23 RX ORDER — INSULIN ASPART 100 [IU]/ML
INJECTION, SUSPENSION SUBCUTANEOUS
Qty: 45 ML | Refills: 1 | Status: SHIPPED | OUTPATIENT
Start: 2025-07-23

## 2025-07-23 RX ORDER — INSULIN ASPART 100 [IU]/ML
INJECTION, SUSPENSION SUBCUTANEOUS
Qty: 45 ML | Refills: 1 | OUTPATIENT
Start: 2025-07-23

## 2025-07-23 NOTE — TELEPHONE ENCOUNTER
"Phone call from patient for Mediation Refill (conversation with patient in [place]).    Patient requested 90 day supply for Insulin due to insurance as well as Losartan. Patient also states \"he used to get a 90 day supply of Ozempic, but only gets a 30 day supply he is not sure why\".     Pharmacy on Chart confirmed with patient.   "

## 2025-07-25 ENCOUNTER — TELEPHONE (OUTPATIENT)
Age: 55
End: 2025-07-25

## 2025-07-25 DIAGNOSIS — I10 ESSENTIAL HYPERTENSION: ICD-10-CM

## 2025-07-25 RX ORDER — LOSARTAN POTASSIUM AND HYDROCHLOROTHIAZIDE 25; 100 MG/1; MG/1
1 TABLET ORAL DAILY
Qty: 90 TABLET | Refills: 0 | Status: SHIPPED | OUTPATIENT
Start: 2025-07-25

## 2025-08-04 LAB
LEFT EYE DIABETIC RETINOPATHY: POSITIVE
RIGHT EYE DIABETIC RETINOPATHY: POSITIVE

## 2025-08-16 DIAGNOSIS — E11.65 TYPE 2 DIABETES MELLITUS WITH HYPERGLYCEMIA, WITH LONG-TERM CURRENT USE OF INSULIN (HCC): ICD-10-CM

## 2025-08-16 DIAGNOSIS — Z79.4 TYPE 2 DIABETES MELLITUS WITH HYPERGLYCEMIA, WITH LONG-TERM CURRENT USE OF INSULIN (HCC): ICD-10-CM

## 2025-08-18 RX ORDER — SEMAGLUTIDE 2.68 MG/ML
INJECTION, SOLUTION SUBCUTANEOUS
Qty: 3 ML | Refills: 2 | Status: SHIPPED | OUTPATIENT
Start: 2025-08-18

## (undated) DEVICE — INTENDED FOR TISSUE SEPARATION, AND OTHER PROCEDURES THAT REQUIRE A SHARP SURGICAL BLADE TO PUNCTURE OR CUT.: Brand: BARD-PARKER SAFETY BLADES SIZE 15, STERILE

## (undated) DEVICE — SUT CHROMIC 3-0 PS-2 27 1638H

## (undated) DEVICE — VIAL DECANTER

## (undated) DEVICE — CAUTERY TIP POLISHER: Brand: DEVON

## (undated) DEVICE — STRL COTTON TIP APPLCTR 6IN PK: Brand: CARDINAL HEALTH

## (undated) DEVICE — SUT VICRYL 3-0 PS-2 27 IN J427H

## (undated) DEVICE — EXIDINE 4 PCT

## (undated) DEVICE — SCD SEQUENTIAL COMPRESSION COMFORT SLEEVE MEDIUM KNEE LENGTH: Brand: KENDALL SCD

## (undated) DEVICE — COBAN 1 IN UNSTERILE

## (undated) DEVICE — PREMIUM DRY TRAY LF: Brand: MEDLINE INDUSTRIES, INC.

## (undated) DEVICE — WET SKIN PREP TRAY: Brand: MEDLINE INDUSTRIES, INC.

## (undated) DEVICE — NEEDLE 25G X 1 1/2

## (undated) DEVICE — PENCIL ELECTROSURG E-Z CLEAN -0035H

## (undated) DEVICE — STRETCH BANDAGE: Brand: CURITY

## (undated) DEVICE — GAUZE SPONGES,16 PLY: Brand: CURITY

## (undated) DEVICE — GLOVE SRG BIOGEL 7.5

## (undated) DEVICE — BETHLEHEM UNIVERSAL MINOR GEN: Brand: CARDINAL HEALTH